# Patient Record
Sex: FEMALE | Race: WHITE | NOT HISPANIC OR LATINO | Employment: FULL TIME | URBAN - METROPOLITAN AREA
[De-identification: names, ages, dates, MRNs, and addresses within clinical notes are randomized per-mention and may not be internally consistent; named-entity substitution may affect disease eponyms.]

---

## 2017-03-08 ENCOUNTER — ALLSCRIPTS OFFICE VISIT (OUTPATIENT)
Dept: OTHER | Facility: OTHER | Age: 52
End: 2017-03-08

## 2017-03-16 ENCOUNTER — ALLSCRIPTS OFFICE VISIT (OUTPATIENT)
Dept: OTHER | Facility: OTHER | Age: 52
End: 2017-03-16

## 2017-03-21 ENCOUNTER — TRANSCRIBE ORDERS (OUTPATIENT)
Dept: ADMINISTRATIVE | Facility: HOSPITAL | Age: 52
End: 2017-03-21

## 2017-03-21 DIAGNOSIS — M54.5 LOW BACK PAIN, UNSPECIFIED BACK PAIN LATERALITY, UNSPECIFIED CHRONICITY, WITH SCIATICA PRESENCE UNSPECIFIED: Primary | ICD-10-CM

## 2017-03-21 DIAGNOSIS — M54.16 LUMBAR RADICULOPATHY: ICD-10-CM

## 2017-05-08 ENCOUNTER — ALLSCRIPTS OFFICE VISIT (OUTPATIENT)
Dept: OTHER | Facility: OTHER | Age: 52
End: 2017-05-08

## 2017-05-08 DIAGNOSIS — F11.20 UNCOMPLICATED OPIOID DEPENDENCE (HCC): ICD-10-CM

## 2017-05-08 DIAGNOSIS — Z79.891 LONG TERM CURRENT USE OF OPIATE ANALGESIC: ICD-10-CM

## 2017-05-08 DIAGNOSIS — G89.4 CHRONIC PAIN SYNDROME: ICD-10-CM

## 2017-07-10 ENCOUNTER — ALLSCRIPTS OFFICE VISIT (OUTPATIENT)
Dept: OTHER | Facility: OTHER | Age: 52
End: 2017-07-10

## 2017-08-23 ENCOUNTER — TRANSCRIBE ORDERS (OUTPATIENT)
Dept: ADMINISTRATIVE | Age: 52
End: 2017-08-23

## 2017-08-23 ENCOUNTER — APPOINTMENT (OUTPATIENT)
Dept: LAB | Age: 52
End: 2017-08-23
Payer: COMMERCIAL

## 2017-08-23 DIAGNOSIS — E78.5 HYPERLIPIDEMIA: ICD-10-CM

## 2017-08-23 DIAGNOSIS — D72.819 DECREASED WHITE BLOOD CELL COUNT: ICD-10-CM

## 2017-08-23 DIAGNOSIS — Z79.899 OTHER LONG TERM (CURRENT) DRUG THERAPY: ICD-10-CM

## 2017-08-23 LAB
25(OH)D3 SERPL-MCNC: 44.2 NG/ML (ref 30–100)
ALBUMIN SERPL BCP-MCNC: 3.6 G/DL (ref 3.5–5)
ALP SERPL-CCNC: 44 U/L (ref 46–116)
ALT SERPL W P-5'-P-CCNC: 18 U/L (ref 12–78)
ANION GAP SERPL CALCULATED.3IONS-SCNC: 7 MMOL/L (ref 4–13)
AST SERPL W P-5'-P-CCNC: 10 U/L (ref 5–45)
BASOPHILS # BLD AUTO: 0.01 THOUSANDS/ΜL (ref 0–0.1)
BASOPHILS NFR BLD AUTO: 0 % (ref 0–1)
BILIRUB SERPL-MCNC: 0.67 MG/DL (ref 0.2–1)
BUN SERPL-MCNC: 15 MG/DL (ref 5–25)
CALCIUM SERPL-MCNC: 8.8 MG/DL (ref 8.3–10.1)
CHLORIDE SERPL-SCNC: 105 MMOL/L (ref 100–108)
CHOLEST SERPL-MCNC: 186 MG/DL (ref 50–200)
CO2 SERPL-SCNC: 27 MMOL/L (ref 21–32)
CREAT SERPL-MCNC: 0.82 MG/DL (ref 0.6–1.3)
EOSINOPHIL # BLD AUTO: 0.11 THOUSAND/ΜL (ref 0–0.61)
EOSINOPHIL NFR BLD AUTO: 2 % (ref 0–6)
ERYTHROCYTE [DISTWIDTH] IN BLOOD BY AUTOMATED COUNT: 13.4 % (ref 11.6–15.1)
GFR SERPL CREATININE-BSD FRML MDRD: 84 ML/MIN/1.73SQ M
GLUCOSE P FAST SERPL-MCNC: 90 MG/DL (ref 65–99)
HCT VFR BLD AUTO: 39.4 % (ref 34.8–46.1)
HDLC SERPL-MCNC: 79 MG/DL (ref 40–60)
HGB BLD-MCNC: 13.5 G/DL (ref 11.5–15.4)
LDLC SERPL CALC-MCNC: 90 MG/DL (ref 0–100)
LYMPHOCYTES # BLD AUTO: 1.39 THOUSANDS/ΜL (ref 0.6–4.47)
LYMPHOCYTES NFR BLD AUTO: 25 % (ref 14–44)
MCH RBC QN AUTO: 31.5 PG (ref 26.8–34.3)
MCHC RBC AUTO-ENTMCNC: 34.3 G/DL (ref 31.4–37.4)
MCV RBC AUTO: 92 FL (ref 82–98)
MONOCYTES # BLD AUTO: 0.3 THOUSAND/ΜL (ref 0.17–1.22)
MONOCYTES NFR BLD AUTO: 5 % (ref 4–12)
NEUTROPHILS # BLD AUTO: 3.77 THOUSANDS/ΜL (ref 1.85–7.62)
NEUTS SEG NFR BLD AUTO: 68 % (ref 43–75)
NRBC BLD AUTO-RTO: 0 /100 WBCS
PLATELET # BLD AUTO: 225 THOUSANDS/UL (ref 149–390)
PMV BLD AUTO: 9.5 FL (ref 8.9–12.7)
POTASSIUM SERPL-SCNC: 4 MMOL/L (ref 3.5–5.3)
PROT SERPL-MCNC: 6.8 G/DL (ref 6.4–8.2)
RBC # BLD AUTO: 4.28 MILLION/UL (ref 3.81–5.12)
SODIUM SERPL-SCNC: 139 MMOL/L (ref 136–145)
TRIGL SERPL-MCNC: 87 MG/DL
TSH SERPL DL<=0.05 MIU/L-ACNC: 1.06 UIU/ML (ref 0.36–3.74)
WBC # BLD AUTO: 5.6 THOUSAND/UL (ref 4.31–10.16)

## 2017-08-23 PROCEDURE — 80053 COMPREHEN METABOLIC PANEL: CPT

## 2017-08-23 PROCEDURE — 36415 COLL VENOUS BLD VENIPUNCTURE: CPT

## 2017-08-23 PROCEDURE — 84443 ASSAY THYROID STIM HORMONE: CPT

## 2017-08-23 PROCEDURE — 82306 VITAMIN D 25 HYDROXY: CPT

## 2017-08-23 PROCEDURE — 80061 LIPID PANEL: CPT

## 2017-08-23 PROCEDURE — 85025 COMPLETE CBC W/AUTO DIFF WBC: CPT

## 2017-08-28 ENCOUNTER — ALLSCRIPTS OFFICE VISIT (OUTPATIENT)
Dept: OTHER | Facility: OTHER | Age: 52
End: 2017-08-28

## 2017-09-07 ENCOUNTER — ALLSCRIPTS OFFICE VISIT (OUTPATIENT)
Dept: OTHER | Facility: OTHER | Age: 52
End: 2017-09-07

## 2017-10-26 NOTE — PROGRESS NOTES
Assessment  1  Pain syndrome, chronic (338 4) (G89 4)   2  Sacroiliitis (720 2) (M46 1)    Plan  Pain syndrome, chronic    · Nucynta 50 MG Oral Tablet; TAKE 1 TABLET 3 TIMES DAILY AS NEEDED FOR  PAIN   Rx By: Andrew Davidson; Dispense: 30 Days ; #:90 Tablet; Refill: 0;For: Pain syndrome, chronic; MARTY = N; Print Rx   · Follow-up visit in 2 months Evaluation and Treatment  Follow-up  Status: Hold For -  Scheduling  Requested for: 07Sep2017   Ordered; For: Pain syndrome, chronic; Ordered By: Andrew Davidson Performed:  Order Comments: with nm 8 weeks Due: [id number]    Discussion/Summary    Patient is a 50year old female with history of sacroiliitis and perineural cysts at S1 and S2  She presents today for a follow up appointment  At this time, the patient continues to experience constant low back pain but the Nucynta which continues to provide adequate pain relief  Therefore, I will continue her on the 50 mg tablets, but can take up to 3 times a day as needed  She was given a prescription for this month, and one for next month they do not fill date until October 5, 2017  prescription monitoring drug program report was reviewed and was appropriate  The patient has the current Goals: Decreased pain and improved quality of life  The patent has the current Barriers: None  Patient is able to Self-Care  There are risks associated with opiod medications, including dependence, addiction and tolerance  The patient understands and agrees to use these medications only as prescribed  Potential side effects of the medications include, but are not limited to, constipation, drowsiness, addiction, impaired judgment and risk of fatal overdose if not taken as prescribed  Sharing medications is a felony  At this point and time, the patient is showing no signs of addiction, abuse, diversion or suicidal ideation  Chief Complaint  1   Back Pain    History of Present Illness  Patient is a 41year old female with history of sacroiliitis and perineural cysts at S1 and S2  She was last seen in she was last seen on July 10, 2017 in which the Nucynta was decreased to 50 mg twice a day  She presents today for a follow up appointment  At this time, the patient continues to exhibit is constant low back pain which occurs mostly at night  She describes as dull aching and pressure-like  She is currently rating her pain a 4/10 on the numeric rating scale  patient was taking Nucynta 50 mg which was decreased at the last office visit from 75 mg  She states there are days when she needed to take it once a day, but others when she needs take it 3 times a day  She was only prescribed in twice a day so she ran out a week early  Overall the medication provides 95% pain relief  She denies side effects or bowel or bladder issues  Jessica Miles presents with complaints of gradual onset of constant episodes of mild right lower back pain, described as dull and aching  On a scale of 1 to 10, the patient rates the pain as 4  Symptoms are unchanged  Review of Systems    Constitutional: no fever,-- no recent weight gain-- and-- no recent weight loss  Eyes: no double vision-- and-- no blurry vision  Cardiovascular: no chest pain,-- no palpitations-- and-- no lower extremity edema  Respiratory: no complaints of shortness of breath-- and-- no wheezing  Musculoskeletal: no difficulty walking,-- no muscle weakness,-- no joint stiffness,-- no joint swelling,-- no limb swelling,-- no pain in extremity-- and-- no decreased range of motion  Neurological: no dizziness,-- no difficulty swallowing,-- no memory loss,-- no loss of consciousness-- and-- no seizures  Gastrointestinal: no nausea,-- no vomiting,-- no constipation-- and-- no diarrhea  Genitourinary: no difficulty initiating urine stream,-- no genital pain-- and-- no frequent urination  Integumentary: no complaints of skin rash  Psychiatric: no depression     Endocrine: no excessive thirst,-- no adrenal disease,-- no hypothyroidism-- and-- no hyperthyroidism  Hematologic/Lymphatic: no tendency for easy bruising-- and-- no tendency for easy bleeding  Active Problems  1  Abnormal findings on diagnostic imaging of breast (793 89) (R92 8)   2  Allergic rhinitis (477 9) (J30 9)   3  Analgesic use (V58 69) (Z79 899)   4  Anxiety (300 00) (F41 9)   5  Burning sensation (782 0) (R20 8)   6  Encounter for long-term use of opiate analgesic (V58 69) (Z79 891)   7  Encounter for routine gynecological examination (V72 31) (Z01 419)   8  Encounter for screening mammogram for malignant neoplasm of breast (V76 12)   (Z12 31)   9  Esophageal reflux (530 81) (K21 9)   10  Hiatal hernia (553 3) (K44 9)   11  Hyperlipidemia (272 4) (E78 5)   12  Laboratory examination ordered as part of a routine general medical examination    (V72 62) (Z00 00)   13  Leiomyoma of uterus (218 9) (D25 9)   14  Leukopenia (288 50) (D72 819)   15  Lower back pain (724 2) (M54 5)   16  Lumbar radiculopathy (724 4) (M54 16)   17  Myofascial pain (729 1) (M79 1)   18  Need for immunization against influenza (V04 81) (Z23)   19  Pain syndrome, chronic (338 4) (G89 4)   20  Pulmonary Valve Regurgitation (424 3)   21  Sacroiliitis (720 2) (M46 1)   22  Uncomplicated opioid dependence (304 00) (F11 20)   23  Well female exam with routine gynecological exam (V72 31) (Z01 419)    Past Medical History  1  History of  0 (V49 89)   2  History of fibrocystic disease of breast (V13 89) (Q22 556)   3  History of headache (V13 89) (Z87 898)   4  History of hiatal hernia (V12 79) (Z87 19)   5  History of hyperlipidemia (V12 29) (Z86 39)   6  History of Routine Gynecological Exam With Cervical Pap Smear (V72 31)   7  History of Weight gain (783 1) (R63 5)    The active problems and past medical history were reviewed and updated today  Surgical History  1  History of Appendectomy   2   History of Complete Excision Of Fifth Metatarsal Head Right   3  History of Foot Surgery   4  History of Left Breast Lumpectomy    The surgical history was reviewed and updated today  Family History  Mother    1  Family history of CABG   2  Family history of Dyslipidemia   3  Family history of Family Health Status Of Mother - Alive   4  Family history of Hypertension (V17 49)  Father    5  Family history of Acute Myocardial Infarction (V17 3)   6  Family history of Dyslipidemia   7  Family history of Family Health Status Of Father - Alive   8  Family history of Hypertension (V17 49)  Paternal Uncle    5  Family history of malignant neoplasm of prostate (V16 42) (Z80 45)  Family History    10  Family history of Thyroid Disorder (V18 19)    The family history was reviewed and updated today  Social History   · Being A Social Drinker   · Denied: History of Drug Use (305 90)   · Exercise Habits   · Marital History - Currently    · Never A Smoker   · Occupation: Medical Professional   · Partner had vasectomy   · Working Full Time  The social history was reviewed and updated today  The social history was reviewed and is unchanged  Current Meds   1  Aspirin 81 MG Oral Tablet Chewable; Takse 1 tablet  MWF;   Therapy: 15BPQ7286 to (Last Rx:28Aug2017) Ordered   2  Escitalopram Oxalate 5 MG Oral Tablet; Take 1 tablet daily; Therapy: 95XLW8799 to (Last Rx:21Aug2017)  Requested for: 21Aug2017 Ordered   3  Metamucil Smooth Texture 28 % Oral Packet; USE AS DIRECTED; Therapy: (Recorded:74Onl0294) to Recorded   4  Nucynta 50 MG Oral Tablet; TAKE 1 TABLET 2 TIMES DAILY AS NEEDED FOR PAIN;   Therapy: 80LIH7938 to (Evaluate:79Ddr1511); Last Rx:90Yiz0674 Ordered   5  Omeprazole 20 MG Oral Capsule Delayed Release; take 1 capsule by mouth once daily; Therapy: 60IHS5857 to [id number] [id number])  Requested for: 26Iik8057; Last   Rx:22Skn8593 Ordered   6  Simvastatin 10 MG Oral Tablet; Take 1 tablet daily;    Therapy: 64RHN6743 to (Last Rx:21Aug2017)  Requested for: 72Ook5489 Ordered    The medication list was reviewed and updated today  Allergies  1  Sulfa Drugs    Vitals  Vital Signs    Recorded: 07Sep2017 12:52PM   Temperature 98 F   Heart Rate 66   Respiration 18   Systolic 761   Diastolic 78   Height 5 ft 3 in   Weight 145 lb    BMI Calculated 25 69   BSA Calculated 1 69   O2 Saturation 99   Pain Scale 4     Physical Exam    Constitutional   General appearance: Well developed, well nourished, alert, in no distress, non-toxic and no overt pain behavior  Eyes   Sclera: anicteric   HEENT   Hearing grossly intact  Neck   Neck: Supple, symmetric, trachea midline, no masses  Pulmonary   Respiratory effort: Even and unlabored  Cardiovascular   Examination of extremities: No edema or pitting edema present  Skin   Skin and subcutaneous tissue: Normal without rashes or lesions, well hydrated  Psychiatric   Mood and affect: Mood and affect appropriate  Musculoskeletal   Gait and station: Normal     Lumbar/Sacral Spine examination demonstrates Lumbosacral Spine:   Appearance: Normal  Spinal alignment exhibits normal lordosis  Tenderness: None except at lumbar spine-- and-- the right sacroiliac joint  Lumbosacral Spine Sensory: intact to light touch and pinprick in the lower extremities  ROM Lumbosacral Spine: Full  Flexion was not restricted-- and-- was painful  Extension was not restricted-- and-- was painful  Left lateral flexion was not restricted-- and-- was painless  Right lateral flexion was not restricted-- and-- was painless  ROM Hips: Full   Foot and ankle strength was normal bilaterally  Knee strength was normal bilaterally  Hip strength was normal bilaterally  Special Tests: Deferred positive Graeme's Maneuver on right-- and-- positive Graeme's Maneuver on left  Results/Data  Results Free Text Form Pain [facility]:   Results    I personally reviewed the films/images in the office today  MRI:   MRI LUMBAR SPINE WITH AND WITHOUT CONTRAST dated 1/5/16    INDICATION- Chronic low back pain and sciatica for spinal cyst which  was drained, 7-4 4, M54 16    COMPARISON- MR 7/26/2012    TECHNIQUE- Sagittal T1, sagittal T2, sagittal inversion recovery,  axial T1 and axial T2, coronal T2  Sagittal and axial T1 postcontrast   7 mL of Gadavist was injected intravenously with no immediate  consequence  IMAGE QUALITY- Diagnostic    FINDINGS-    ALIGNMENT- Normal alignment of the lumbar spine  No compression  fracture  No spondylolysis or spondylolisthesis  No scoliosis  MARROW SIGNAL- Normal marrow signal is identified within the  visualized bony structures  No discrete marrow lesion  DISTAL CORD AND CONUS- Normal size and signal of the distal cord and  conus  The conus ends at the L1 level  PARASPINAL SOFT TISSUES- Paraspinal soft tissues are unremarkable  SACRUM- Normal signal within the sacrum  No evidence of insufficiency  or stress fracture  Similar to prior study, there are several  perineural cysts in the upper sacral region, to include S1 and S2,  largest on the left approximately 15 mm the C2 level  LOWER THORACIC DISC SPACES- Normal disc height and signal  No disc  herniation, canal stenosis or foraminal narrowing  LUMBAR DISC SPACES- Maintenance of vertebral body and disc height,  signal intensity and alignment  POSTCONTRAST IMAGING- No abnormal enhancement  IMPRESSION-    Normal enhanced MRI of the lumbar spine  Stable bilateral perineural  cysts within the upper sacrum  The entire sacrum was not completely  evaluated  Other  pt took nucynta last Wednesday  Attending Note  Collaborating Physician: I discussed the case with the Advanced Practitioner and reviewed the note-- and-- I agree with the Advanced Practitioner note        Future Appointments    Date/Time Provider Specialty Site   04/30/2018 08:00 AM Thomas Wei MD Internal Medicine [facility] INTERNAL MED   11/06/2017 01:15 PM [address], CRNP Pain Management [facility] SPINE     Signatures   Electronically signed by : Georgina Mathis; Sep  7 2017  1:21PM EST                       (Author)    Electronically signed by : Dulce Alaniz MD; Sep  7 2017  1:40PM EST                       (Author)

## 2017-11-06 ENCOUNTER — ALLSCRIPTS OFFICE VISIT (OUTPATIENT)
Dept: OTHER | Facility: OTHER | Age: 52
End: 2017-11-06

## 2017-11-06 DIAGNOSIS — Z79.891 LONG TERM CURRENT USE OF OPIATE ANALGESIC: ICD-10-CM

## 2017-11-06 DIAGNOSIS — G89.4 CHRONIC PAIN SYNDROME: ICD-10-CM

## 2017-11-06 DIAGNOSIS — F11.20 UNCOMPLICATED OPIOID DEPENDENCE (HCC): ICD-10-CM

## 2017-11-07 NOTE — PROGRESS NOTES
Assessment  1  Pain syndrome, chronic (338 4) (G89 4)   2  Sacroiliitis (720 2) (M46 1)   3  Myofascial pain (729 1) (M79 1)    Plan  Encounter for long-term use of opiate analgesic, Pain syndrome, chronic,  Uncomplicated opioid dependence    · (1) DRUG ABUSE SCREEN, URINE ROUTINE; Status:Active; Requested ZYO:[id number];    Perform:[facility]; [id number]; Ordered;For:Encounter for long-term use of opiate analgesic, Pain syndrome, chronic, Uncomplicated opioid dependence; Ordered By:Blanka Toussaint;  Lumbar radiculopathy    · Follow-up visit in 2 months Evaluation and Treatment  Follow-up  Status: Hold For -  Scheduling  Requested for: 29YYX8261   Ordered; For: Lumbar radiculopathy; Ordered By: Jovan Narvaez Performed:  Due: 43UVF1562  Pain syndrome, chronic    · Nucynta 50 MG Oral Tablet; TAKE 1 TABLET 3 TIMES DAILY AS NEEDED FOR  PAIN; Do Not Fill Before: 06GTJ7310   Rx By: Jovan Narvaez; Dispense: 30 Days ; #:90 Tablet; Refill: 0;For: Pain syndrome, chronic; MARTY = N; Print Rx    Discussion/Summary    The patient is a 61-year-old female with a history of sacroiliitis and perineural cyst at S1 and S2  Patient reports that she is currently well managed on her current medication regimen  Therefore, the patient will be continued on Nucynta IR 50 mg 1 tablet 3 times daily as needed for pain  She was given a prescription for this month, and a prescription for next month we do not fill date of 2017  did not bring her prescription pill bottles in for Nucynta IR 50 mg this visit  Patient was instructed to bring her in her prescription pill bottles to each visit, per office policy for possible random pill count, and as required for future refills  prescription monitoring drug program report was reviewed and was appropriate  urine drug screen was performed in the office today, as part of the medication management protocol   Drug screens are performed to evaluate for the presence or absence of described, non prescribed, and/or illicit substances  The point of care results were appropriate for what is being prescribed  The specimen will be sent to the lab for confirmatory testing  The drug screen is medically necessary, because the patient is dependent upon, or being considered for opiate medication  The patient has the current Goals: Manage pain to tolerable level so patient can continue with activities of daily living  The patent has the current Barriers: None  Patient is able to Self-Care  Possible side effects of new medications were reviewed with the patient/guardian today  The treatment plan was reviewed with the patient/guardian  The patient/guardian understands and agrees with the treatment plan   The patient was counseled regarding instructions for management,-- risk factor reductions,-- patient and family education,-- impressions,-- risks and benefits of treatment options-- and-- importance of compliance with treatment  There are risks associated with opiod medications, including dependence, addiction and tolerance  The patient understands and agrees to use these medications only as prescribed  Potential side effects of the medications include, but are not limited to, constipation, drowsiness, addiction, impaired judgment and risk of fatal overdose if not taken as prescribed  Sharing medications is a felony  At this point and time, the patient is showing no signs of addiction, abuse, diversion or suicidal ideation  Chief Complaint  1  Back Pain  Back Pain      History of Present Illness  The patient is a 29-year-old female with a history of sacroiliitis and perineural cyst at S1 and S2  She was last seen in the office on 9/7/2017, in which she was continued on Nucynta 50 mg 1 tablet 3 times daily  She presents today for follow-up visit  At this time, the patient continues with ongoing low back pain that is intermittent in nature   It occurs mostly during the evening and nighttime hours  She reports that the pain starts in her right lower back and radiates to the lateral aspect of her right leg and into her calf  She describes her pain as dull aching, throbbing pain  She rates her pain as 6/10 numeric pain scale  is currently taking Nucynta 50 mg 1 tablet 3 times daily  Patient reports that this medication provides her overall 80% pain relief, without medication side effects  She denies bowel or bladder issues  recently rescued a pit bull, and feels walking her dog is aggravating her back  She states she will be getting a new leash which should help  Elaine Langston presents with complaints of gradual onset of intermittent episodes of moderate bilateral lower back pain, described as dull, aching and throbbing, radiating to the right lower leg and right foot  On a scale of 1 to 10, the patient rates the pain as 6  Symptoms are unchanged  Review of Systems    Constitutional: no fever,-- no recent weight gain-- and-- no recent weight loss  Eyes: no double vision-- and-- no blurry vision  Cardiovascular: no chest pain,-- no palpitations-- and-- no lower extremity edema  Respiratory: no complaints of shortness of breath-- and-- no wheezing  Musculoskeletal: joint stiffness, but-- no difficulty walking,-- no muscle weakness,-- no joint swelling,-- no limb swelling,-- no pain in extremity-- and-- no decreased range of motion  Neurological: no dizziness,-- no difficulty swallowing,-- no memory loss,-- no loss of consciousness-- and-- no seizures  Gastrointestinal: no nausea,-- no vomiting,-- no constipation-- and-- no diarrhea  Genitourinary: no difficulty initiating urine stream,-- no genital pain-- and-- no frequent urination  Integumentary: no complaints of skin rash  Psychiatric: no depression  Endocrine: no excessive thirst,-- no adrenal disease,-- no hypothyroidism-- and-- no hyperthyroidism     Hematologic/Lymphatic: no tendency for easy bruising-- and-- no tendency for easy bleeding  ROS reviewed  Active Problems  1  Abnormal findings on diagnostic imaging of breast (793 89) (R92 8)   2  Allergic rhinitis (477 9) (J30 9)   3  Analgesic use (V58 69) (Z79 899)   4  Anxiety (300 00) (F41 9)   5  Burning sensation (782 0) (R20 8)   6  Encounter for long-term use of opiate analgesic (V58 69) (Z79 891)   7  Encounter for routine gynecological examination (V72 31) (Z01 419)   8  Encounter for screening mammogram for malignant neoplasm of breast (V76 12)   (Z12 31)   9  Esophageal reflux (530 81) (K21 9)   10  Hiatal hernia (553 3) (K44 9)   11  Hyperlipidemia (272 4) (E78 5)   12  Laboratory examination ordered as part of a routine general medical examination    (V72 62) (Z00 00)   13  Leiomyoma of uterus (218 9) (D25 9)   14  Leukopenia (288 50) (D72 819)   15  Lower back pain (724 2) (M54 5)   16  Lumbar radiculopathy (724 4) (M54 16)   17  Myofascial pain (729 1) (M79 1)   18  Need for immunization against influenza (V04 81) (Z23)   19  Pain syndrome, chronic (338 4) (G89 4)   20  Pulmonary Valve Regurgitation (424 3)   21  Sacroiliitis (720 2) (M46 1)   22  Uncomplicated opioid dependence (304 00) (F11 20)   23  Well female exam with routine gynecological exam (V72 31) (Z01 419)    Past Medical History  1  History of  0 (V49 89)   2  History of fibrocystic disease of breast (V13 89) (W78 678)   3  History of headache (V13 89) (Z87 898)   4  History of hiatal hernia (V12 79) (Z87 19)   5  History of hyperlipidemia (V12 29) (Z86 39)   6  History of Routine Gynecological Exam With Cervical Pap Smear (V72 31)   7  History of Weight gain (783 1) (R63 5)    The active problems and past medical history were reviewed and updated today  Surgical History  1  History of Appendectomy   2  History of Complete Excision Of Fifth Metatarsal Head Right   3  History of Foot Surgery   4  History of Left Breast Lumpectomy    The surgical history was reviewed and updated today  Family History  Mother    1  Family history of CABG   2  Family history of Dyslipidemia   3  Family history of Family Health Status Of Mother - Alive   4  Family history of Hypertension (V17 49)  Father    5  Family history of Acute Myocardial Infarction (V17 3)   6  Family history of Dyslipidemia   7  Family history of Family Health Status Of Father - Alive   8  Family history of Hypertension (V17 49)  Paternal Uncle    5  Family history of malignant neoplasm of prostate (V16 42) (Z80 45)  Family History    10  Family history of Thyroid Disorder (V18 19)    The family history was reviewed and updated today  Social History   · Being A Social Drinker   · Denied: History of Drug Use (305 90)   · Exercise Habits   · Marital History - Currently    · Never A Smoker   · Occupation: Medical Professional   · Partner had vasectomy   · Working Full Time  The social history was reviewed and updated today  The social history was reviewed and is unchanged  Current Meds   1  Aspirin 81 MG Oral Tablet Chewable; Takse 1 tablet  MWF;   Therapy: 42FAQ3791 to (Last Rx:23Wyv9972) Ordered   2  Escitalopram Oxalate 5 MG Oral Tablet; Take 1 tablet daily; Therapy: 19RFC5036 to (Last Rx:12Dhp3331)  Requested for: 45Ayv5284 Ordered   3  Metamucil Smooth Texture 28 % Oral Packet; USE AS DIRECTED; Therapy: (Recorded:21Oct2013) to Recorded   4  Nucynta 50 MG Oral Tablet; TAKE 1 TABLET 3 TIMES DAILY AS NEEDED FOR PAIN;   Therapy: 28IOB7815 to (Evaluate:07Oct2017); Last Rx:86Hta5432 Ordered   5  Omeprazole 20 MG Oral Capsule Delayed Release; take 1 capsule by mouth once daily; Therapy: 91MAJ3250 to 447 1209)  Requested for: 80Uwu9020; Last   Rx:71Ksm2168 Ordered   6  Simvastatin 10 MG Oral Tablet; Take 1 tablet daily; Therapy: 07CHQ6693 to (Last Rx:72Zsh5791)  Requested for: 50Ezi6426 Ordered    The medication list was reviewed and updated today  Allergies  1   Sulfa Drugs    Vitals  Vital Signs Recorded: 91BWB5180 01:13PM   Temperature 98 6 F   Heart Rate 68   Respiration 16   Systolic 135   Diastolic 80   Height 5 ft 3 in   Weight 142 lb    BMI Calculated 25 15   BSA Calculated 1 67   Pain Scale 6     Physical Exam    Constitutional   General appearance: Well developed, well nourished, alert, in no distress, non-toxic and no overt pain behavior  Eyes   Sclera: anicteric   HEENT   Hearing grossly intact  Pulmonary   Respiratory effort: Even and unlabored  Skin   Skin and subcutaneous tissue: Normal without rashes or lesions, well hydrated  Psychiatric   Mood and affect: Mood and affect appropriate  Musculoskeletal   Gait and station: Normal     Lumbar/Sacral Spine examination demonstrates Lumbosacral Spine:   Appearance: Normal  Spinal alignment exhibits normal lordosis  Tenderness: right paraspinal-- and-- the right sacroiliac joint  Lumbosacral Spine Sensory: intact to light touch and pinprick in the lower extremities  ROM Lumbosacral Spine: Full  ROM Hips: Full   Foot and ankle strength was normal bilaterally  Knee strength was normal bilaterally  Hip strength was normal bilaterally  Results/Data  Procedure Flowsheet 02MLQ4417 01:26PM      Test Name Result Flag Reference   Urine Drug Screen Performed Date 53LQZ3315       Results Free Text Form [facility]:   Results    I personally reviewed the films/images in the office today  MRI:  MRI LUMBAR SPINE WITH AND WITHOUT CONTRAST dated 1/5/16    INDICATION- Chronic low back pain and sciatica for spinal cyst which  was drained, 7-4 4, M54 16    COMPARISON- MR 7/26/2012    TECHNIQUE- Sagittal T1, sagittal T2, sagittal inversion recovery,  axial T1 and axial T2, coronal T2  Sagittal and axial T1 postcontrast   7 mL of Gadavist was injected intravenously with no immediate  consequence  IMAGE QUALITY- Diagnostic    FINDINGS-    ALIGNMENT- Normal alignment of the lumbar spine  No compression  fracture   No spondylolysis or spondylolisthesis  No scoliosis  MARROW SIGNAL- Normal marrow signal is identified within the  visualized bony structures  No discrete marrow lesion  DISTAL CORD AND CONUS- Normal size and signal of the distal cord and  conus  The conus ends at the L1 level  PARASPINAL SOFT TISSUES- Paraspinal soft tissues are unremarkable  SACRUM- Normal signal within the sacrum  No evidence of insufficiency  or stress fracture  Similar to prior study, there are several  perineural cysts in the upper sacral region, to include S1 and S2,  largest on the left approximately 15 mm the C2 level  LOWER THORACIC DISC SPACES- Normal disc height and signal  No disc  herniation, canal stenosis or foraminal narrowing  LUMBAR DISC SPACES- Maintenance of vertebral body and disc height,  signal intensity and alignment  POSTCONTRAST IMAGING- No abnormal enhancement  IMPRESSION-    Normal enhanced MRI of the lumbar spine  Stable bilateral perineural  cysts within the upper sacrum  The entire sacrum was not completely  evaluated  Other  pt took nucynta yesterday 11/5/17  Attending Note  Collaborating Physician: I discussed the case with the Advanced Practitioner and reviewed the note-- and-- I agree with the Advanced Practitioner note        Future Appointments    Date/Time Provider Specialty Site   04/30/2018 08:00 AM Pilo Wei MD Internal Medicine [facility] INTERNAL MED   01/04/2018 01:00 PM RUDOLPH Arita Pain Management [facility] 15     Signatures   Electronically signed by : RUDOLPH Ko; Nov 6 2017  2:45PM EST                       (Author)    Electronically signed by : Benita Vaughan MD; Nov 6 2017  9:02PM EST                       (Author)

## 2017-12-07 ENCOUNTER — GENERIC CONVERSION - ENCOUNTER (OUTPATIENT)
Dept: OTHER | Facility: OTHER | Age: 52
End: 2017-12-07

## 2018-01-12 VITALS
RESPIRATION RATE: 12 BRPM | DIASTOLIC BLOOD PRESSURE: 84 MMHG | WEIGHT: 146 LBS | HEART RATE: 60 BPM | SYSTOLIC BLOOD PRESSURE: 128 MMHG | BODY MASS INDEX: 33.79 KG/M2 | HEIGHT: 55 IN

## 2018-01-12 NOTE — MISCELLANEOUS
Message      Recorded as Task   Date: 04/14/2016 01:37 PM, Created By: [facility]   Task Name: Med Renewal Request   Assigned To: Martínez Patterson clinical,Team   Regarding Patient: Erich Cheek, Status: Active   Comment:    [facility] - 14 Apr 2016 1:37 PM     TASK CREATED  Caller: Self; Renew Medication; [phone] (Home)  TC from pt at 1:15 that she saw Emmie Whitt today and she was given refill for her nucynta  She can't have it filled until 4/19 per her pharmacy but she is going out of town on 4/19 and wanted to get filled on 4/18  Pharmacist told her our office would need to call them to give permission if it could be filled early  Pt uses [facility] on [address]  Told pt I would d/w Emmie Whitt and c/b  I s/w pharmacist at [facility] and confirmed info pt told me is accurate  Please advise  Blanka Toussaint - 14 Apr 2016 1:41 PM     TASK REPLIED TO: Previously Assigned To MAXWELL Mckinney clinical,Team  ok to fill on 4/18   Alisa Harrington - 14 Apr 2016 2:58 PM     TASK EDITED  S/W Lauren Merino pharmacist at [facility] and informed ok to fill on 4/18 as per Emmie Whitt  Pt informed of the same  Pt appreciative        Active Problems    1  Abnormal findings on diagnostic imaging of breast (793 89) (R92 8)   2  Allergic rhinitis (477 9) (J30 9)   3  Analgesic use (V58 69) (Z79 899)   4  Anxiety (300 00) (F41 9)   5  Encounter for routine gynecological examination (V72 31) (Z01 419)   6  Encounter for screening mammogram for malignant neoplasm of breast (V76 12)   (Z12 31)   7  Esophageal reflux (530 81) (K21 9)   8  Hiatal hernia (553 3) (K44 9)   9  Hyperlipidemia (272 4) (E78 5)   10  Laboratory examination ordered as part of a routine general medical examination    (V72 62) (Z00 00)   11  Leiomyoma of uterus (218 9) (D25 9)   12  Lower back pain (724 2) (M54 5)   13  Lumbar radiculopathy (724 4) (M54 16)   14  Need for immunization against influenza (V04 81) (K52)   15   Pain syndrome, chronic (338 4) (G89 4)   16  Pulmonary Valve Regurgitation (424 3)   17  Routine Gynecological Exam With Cervical Pap Smear (V72 31)   18  Sacroiliitis (720 2) (M46 1)    Current Meds   1  Escitalopram Oxalate 10 MG Oral Tablet; Take 1 tablet daily; Therapy: 11ALR0796 to (Evaluate:13Jun2016)  Requested for: 24JDN1709; Last   Rx:02Ykp3788 Ordered   2  Fluticasone Propionate 50 MCG/ACT Nasal Suspension; USE 1 SPRAY IN EACH   NOSTRIL TWICE DAILY; Therapy: 11VFN8868 to (Evaluate:07Jun2015)  Requested for: 55EZF4637; Last   Rx:96Chp9173 Ordered   3  Metamucil Smooth Texture 28 % Oral Packet; USE AS DIRECTED; Therapy: (Recorded:21Oct2013) to Recorded   4  Nucynta  MG Oral Tablet Extended Release 12 Hour; Take one tablet twice daily; Therapy: 02Evu1181 to (Evaluate:22Opf8635); Last Rx:14Apr2016 Ordered   5  Omeprazole 20 MG Oral Capsule Delayed Release; take 1 capsule by mouth once daily; Therapy: 11JEA1311 to (Evaluate:13Jun2016)  Requested for: 54YJD4059; Last   Rx:96Qio5442 Ordered   6  Simvastatin 20 MG Oral Tablet; take 1 tablet by mouth once daily; Therapy: 20OCB1682 to (Evaluate:13Jun2016)  Requested for: 92RHB8438; Last   Rx:99Xgl1373 Ordered    Allergies    1   Sulfa Drugs    Signatures   Electronically signed by : Virgie Jackson, ; Apr 14 2016  2:58PM EST                       (Author)

## 2018-01-13 VITALS
RESPIRATION RATE: 16 BRPM | DIASTOLIC BLOOD PRESSURE: 90 MMHG | OXYGEN SATURATION: 98 % | SYSTOLIC BLOOD PRESSURE: 158 MMHG | HEART RATE: 56 BPM | BODY MASS INDEX: 25.96 KG/M2 | HEIGHT: 63 IN | WEIGHT: 146.5 LBS

## 2018-01-13 VITALS
TEMPERATURE: 98.6 F | RESPIRATION RATE: 16 BRPM | BODY MASS INDEX: 25.16 KG/M2 | HEART RATE: 68 BPM | HEIGHT: 63 IN | SYSTOLIC BLOOD PRESSURE: 122 MMHG | WEIGHT: 142 LBS | DIASTOLIC BLOOD PRESSURE: 80 MMHG

## 2018-01-13 VITALS
RESPIRATION RATE: 18 BRPM | OXYGEN SATURATION: 99 % | HEIGHT: 63 IN | SYSTOLIC BLOOD PRESSURE: 124 MMHG | DIASTOLIC BLOOD PRESSURE: 78 MMHG | HEART RATE: 66 BPM | BODY MASS INDEX: 25.69 KG/M2 | TEMPERATURE: 98 F | WEIGHT: 145 LBS

## 2018-01-14 VITALS
WEIGHT: 147 LBS | HEIGHT: 63 IN | SYSTOLIC BLOOD PRESSURE: 126 MMHG | BODY MASS INDEX: 26.05 KG/M2 | HEART RATE: 66 BPM | TEMPERATURE: 98.8 F | RESPIRATION RATE: 16 BRPM | DIASTOLIC BLOOD PRESSURE: 82 MMHG | OXYGEN SATURATION: 99 %

## 2018-01-14 VITALS
BODY MASS INDEX: 25.87 KG/M2 | RESPIRATION RATE: 16 BRPM | SYSTOLIC BLOOD PRESSURE: 100 MMHG | WEIGHT: 146 LBS | HEART RATE: 62 BPM | TEMPERATURE: 98.7 F | DIASTOLIC BLOOD PRESSURE: 60 MMHG | HEIGHT: 63 IN

## 2018-01-14 VITALS
HEIGHT: 63 IN | SYSTOLIC BLOOD PRESSURE: 122 MMHG | DIASTOLIC BLOOD PRESSURE: 74 MMHG | BODY MASS INDEX: 25.69 KG/M2 | OXYGEN SATURATION: 99 % | HEART RATE: 60 BPM | WEIGHT: 145 LBS | TEMPERATURE: 98.3 F | RESPIRATION RATE: 18 BRPM

## 2018-01-22 ENCOUNTER — GENERIC CONVERSION - ENCOUNTER (OUTPATIENT)
Dept: OTHER | Facility: OTHER | Age: 53
End: 2018-01-22

## 2018-01-23 NOTE — MISCELLANEOUS
Message   Recorded as Task   Date: 12/05/2017 02:33 PM, Created By: Ana Giles   Task Name: Call Back   Assigned To: SPA markell clinical,Team   Regarding Patient: Gabi Arrieta, Status: Active   CommentTaye Wooten - 05 Dec 2017 2:33 PM     [address]- patient called requesting to s/w a nurse about her meds   stated the Paulo De La Cruz is too expensive and would like to know if she could be prescribed something else like Tramadol   c/b [phone]   Shalonda Alaniz - 05 Dec 2017 2:49 PM     TASK EDITED  Please advise  Thanks  Mamta Jean - 05 Dec 2017 2:51 PM     TASK REPLIED TO: Previously Assigned To Mamta Ted  yes, she can be placed on tramadol  she just needs to bring nucynta rx back  task to Guillermo Arrieta to take care of Thursday   Shalonda Alaniz - 05 Dec 2017 2:59 PM     TASK EDITED  FYI NM, S/W pt regarding task  Advised pt to returned Nucynta Rx back to [address]  Pt verbalized understanding is awaiting to hear back from triage nurse regarding Tramadol prescription  Please advise  Thanks  Blanka Toussaint - 07 Dec 2017 7:19 AM     TASK REPLIED TO: Previously Assigned To Blanka Toussaint                      can return nucynta scripts today for tramadol script today  has appt on 1/4 so one script shoudl last to [facility]   Alisa Harrington - 07 Dec 2017 8:29 AM     TASK EDITED  Pt informed that she can come today and bring back the nucynta RX in exchange for the tramadol RX  Pt given office hrs  Active Problems    1  Abnormal findings on diagnostic imaging of breast (793 89) (R92 8)   2  Allergic rhinitis (477 9) (J30 9)   3  Analgesic use (V58 69) (Z79 899)   4  Anxiety (300 00) (F41 9)   5  Burning sensation (782 0) (R20 8)   6  Encounter for long-term use of opiate analgesic (V58 69) (Z79 891)   7  Encounter for routine gynecological examination (V72 31) (Z01 419)   8  Encounter for screening mammogram for malignant neoplasm of breast (V76 12)   (Z12 31)   9   Esophageal reflux (530 81) (K21 9)   10  Hiatal hernia (553 3) (K44 9)   11  Hyperlipidemia (272 4) (E78 5)   12  Laboratory examination ordered as part of a routine general medical examination    (V72 62) (Z00 00)   13  Leiomyoma of uterus (218 9) (D25 9)   14  Leukopenia (288 50) (D72 819)   15  Lower back pain (724 2) (M54 5)   16  Lumbar radiculopathy (724 4) (M54 16)   17  Myofascial pain (729 1) (M79 1)   18  Need for immunization against influenza (V04 81) (Z23)   19  Pain syndrome, chronic (338 4) (G89 4)   20  Pulmonary Valve Regurgitation (424 3)   21  Sacroiliitis (720 2) (M46 1)   22  Uncomplicated opioid dependence (304 00) (F11 20)   23  Well female exam with routine gynecological exam (V72 31) (Z01 419)    Current Meds   1  Aspirin 81 MG Oral Tablet Chewable; Takse 1 tablet  MWF;   Therapy: 42ZGH2655 to (Last Rx:21Xcs0905) Ordered   2  Escitalopram Oxalate 5 MG Oral Tablet; Take 1 tablet daily; Therapy: 95OIO4347 to (Last Rx:87Xdh8292)  Requested for: 21Aug2017 Ordered   3  Metamucil Smooth Texture 28 % Oral Packet; USE AS DIRECTED; Therapy: (Recorded:21Oct2013) to Recorded   4  Omeprazole 20 MG Oral Capsule Delayed Release; take 1 capsule by mouth once daily; Therapy: 44XHJ6369 to 0699 836 79 58)  Requested for: 28Aug2017; Last   Rx:13Tnm7776 Ordered   5  Simvastatin 10 MG Oral Tablet; Take 1 tablet daily; Therapy: 56WSH2995 to (Last Rx:36Kdo8924)  Requested for: 98Tls5011 Ordered   6  TraMADol HCl - 50 MG Oral Tablet; Take 1 tablet 3 times daily as needed; Therapy: 49WHU5846 to (Evaluate:72Tcv2330); Last Rx:72Ydd0653 Ordered    Allergies    1   Sulfa Drugs    Signatures   Electronically signed by : Marco Christian, ; Dec  7 2017  8:29AM EST                       (Author)

## 2018-01-24 NOTE — MISCELLANEOUS
Message     Recorded as Task   Date: 01/22/2018 08:22 AM, Created By: Corazon Aponte   Task Name: Miscellaneous   Assigned To: Janette Kern clinical,Team   Regarding Patient: Cholo Sutton, Status: Active   Comment:    HaycliftonBebe humphrey - 22 Jan 2018 8:22 AM     TASK CREATED  Appt rescheduled from today to 2/5 due to Bela Medina being out of the office  Pt said today was the last day for her Tramadol and she is out of it now  Pt uses [place] on file  Pt can be reached at [phone]  [facility] - 22 Jan 2018 8:54 AM     TASK EDITED  FYI: Pt had called office on 12/7/17 that the nucynta prescribed at last ov on 11/6/17 was too expensive  Pt had returned the nucynta scripts and p/u a tramadol 50mg TID PRN, #90 on 12/1/17  Pts ov w/ NM for today was R/S to 2/5 w/ NM  Today is pt's last day of tramadol  Pt is req tramadol RX called to her [facility] on file  Pls advise  Jennifer Garcia - 22 Jan 2018 10:36 AM     TASK REPLIED TO: Previously Assigned To Jennifer Garcia  ok to call in tramadol 50mg TID prn pain (#90) with no refills; please schedule SOVS to 4 weeks from now with [facility] prescription monitoring drug program report was reviewed and was appropriate  [address] [facility] - 22 Jan 2018 11:48 AM     TASK EDITED  Tramadol called to [facility] in [address] as per FQ's task  Pt informed of tramadol RF called to her [facility]  I cx'd the 2/5 sovs in Epic w/ NM and R/S to 2/15 at 11;30 w/ NM  Pt appreciative  Active Problems    1  Abnormal findings on diagnostic imaging of breast (793 89) (R92 8)   2  Allergic rhinitis (477 9) (J30 9)   3  Analgesic use (V58 69) (Z79 899)   4  Anxiety (300 00) (F41 9)   5  Burning sensation (782 0) (R20 8)   6  Encounter for long-term use of opiate analgesic (V58 69) (Z79 891)   7  Encounter for routine gynecological examination (V72 31) (Z01 419)   8  Encounter for screening mammogram for malignant neoplasm of breast (V76 12)   (Z12 31)   9  Esophageal reflux (530 81) (K21 9)   10  Hiatal hernia (553 3) (K44 9)   11  Hyperlipidemia (272 4) (E78 5)   12  Laboratory examination ordered as part of a routine general medical examination    (V72 62) (Z00 00)   13  Leiomyoma of uterus (218 9) (D25 9)   14  Leukopenia (288 50) (D72 819)   15  Lower back pain (724 2) (M54 5)   16  Lumbar radiculopathy (724 4) (M54 16)   17  Myofascial pain (729 1) (M79 1)   18  Need for immunization against influenza (V04 81) (Z23)   19  Pain syndrome, chronic (338 4) (G89 4)   20  Pulmonary Valve Regurgitation (424 3)   21  Sacroiliitis (720 2) (M46 1)   22  Uncomplicated opioid dependence (304 00) (F11 20)   23  Well female exam with routine gynecological exam (V72 31) (Z01 419)    Current Meds   1  Aspirin 81 MG Oral Tablet Chewable; Takse 1 tablet  MWF;   Therapy: 16UCV6743 to (Last Rx:[id number]) Ordered   2  Escitalopram Oxalate 5 MG Oral Tablet; Take 1 tablet daily; Therapy: 28QGY0829 to (Last Rx:06Gul5256)  Requested for: 21Aug2017 Ordered   3  Metamucil Smooth Texture 28 % Oral Packet; USE AS DIRECTED; Therapy: (Recorded:21Oct2013) to Recorded   4  Omeprazole 20 MG Oral Capsule Delayed Release; take 1 capsule by mouth once daily; Therapy: 26RGW8865 to )  Requested for: 06Omt2863; Last   Rx:13Lre8554 Ordered   5  Simvastatin 10 MG Oral Tablet; Take 1 tablet daily; Therapy: 62MKK9192 to (Last Rx:08Jsk4962)  Requested for: 18Hmq5958 Ordered   6  TraMADol HCl - 50 MG Oral Tablet; Take 1 tablet 3 times daily as needed; Therapy: 25YBB5490 to (Evaluate:48Oci3752); Last Rx:22Dkz1847 Ordered    Allergies    1  Sulfa Drugs    Plan  Pain syndrome, chronic    · TraMADol HCl - 50 MG Oral Tablet;  Take 1 tablet 3 times daily as needed    Signatures   Electronically signed by : Daniel Carpenter, ; Jan 22 2018 11:49AM EST                       (Author)

## 2018-02-14 PROBLEM — F11.20 UNCOMPLICATED OPIOID DEPENDENCE (HCC): Status: ACTIVE | Noted: 2017-05-08

## 2018-02-15 ENCOUNTER — OFFICE VISIT (OUTPATIENT)
Dept: PAIN MEDICINE | Facility: CLINIC | Age: 53
End: 2018-02-15
Payer: COMMERCIAL

## 2018-02-15 VITALS
SYSTOLIC BLOOD PRESSURE: 118 MMHG | BODY MASS INDEX: 23.73 KG/M2 | HEART RATE: 70 BPM | DIASTOLIC BLOOD PRESSURE: 72 MMHG | HEIGHT: 64 IN | TEMPERATURE: 98.7 F | WEIGHT: 139 LBS

## 2018-02-15 DIAGNOSIS — G89.4 PAIN SYNDROME, CHRONIC: Primary | ICD-10-CM

## 2018-02-15 DIAGNOSIS — M46.1 SACROILIITIS (HCC): ICD-10-CM

## 2018-02-15 DIAGNOSIS — F11.20 UNCOMPLICATED OPIOID DEPENDENCE (HCC): ICD-10-CM

## 2018-02-15 PROCEDURE — 80305 DRUG TEST PRSMV DIR OPT OBS: CPT | Performed by: NURSE PRACTITIONER

## 2018-02-15 PROCEDURE — 99214 OFFICE O/P EST MOD 30 MIN: CPT | Performed by: NURSE PRACTITIONER

## 2018-02-15 RX ORDER — SIMVASTATIN 10 MG
1 TABLET ORAL DAILY
COMMUNITY
Start: 2014-08-18 | End: 2018-02-17 | Stop reason: SDUPTHER

## 2018-02-15 RX ORDER — TRAMADOL HYDROCHLORIDE 50 MG/1
1 TABLET ORAL 3 TIMES DAILY PRN
COMMUNITY
Start: 2017-12-07 | End: 2018-02-15 | Stop reason: ALTCHOICE

## 2018-02-15 RX ORDER — OMEPRAZOLE 20 MG/1
1 CAPSULE, DELAYED RELEASE ORAL DAILY
Status: ON HOLD | COMMUNITY
Start: 2012-07-14 | End: 2018-07-20 | Stop reason: ALTCHOICE

## 2018-02-15 NOTE — PROGRESS NOTES
Assessment:  1  Pain syndrome, chronic    2  Sacroiliitis ([facility] [address] )    3  Uncomplicated opioid dependence ([facility] [address] )        Plan:    Kiet Cardona is a 50 y o  female who presents for a follow up office visit in regards to Leg Pain (right side) and Back Pain (lower)  The patient has a history of chronic pain syndrome and sacroiliitis  The patient continues with ongoing low back pain, and is receiving minimal pain relief from the tramadol 50 mg   Medication is also causing nausea  She would like to be placed back on Nucynta  I would like to place her on Nucynta ER, as this may be covered under her insurance  However, is currently on back order  For the time being, she will be placed on Nucynta 75 mg which she can take twice a day  She was given a co-pay card, so hopefully this will make the medication cheaper  She states her pharmacy  would not let her use this previously with the Nucynta 50 mg  She was given a prescription for this month, 1 for the following month they do not filled until March 13, 2018    There are risks associated with opioid medications, including dependence, addiction and tolerance  The patient understands and agrees to use these medications only as prescribed  Potential side effects of the medications include, but are not limited to, constipation, drowsiness, addiction, impaired judgment and risk of fatal overdose if not taken as prescribed  The patient was warned against driving while taking sedation medications  Sharing medications is a felony  At this point in time, the patient is showing no signs of addiction, abuse, diversion or suicidal ideation  A urine drug screen was collected at today's office visit as part of our medication management protocol  The point of care testing results were appropriate for what was being prescribed  The specimen will be sent for confirmatory testing   The drug screen is medically necessary because the patient is either dependent on opioid medication or is being considered for opioid medication therapy and the results could impact ongoing or future treatment  The drug screen is to evaluate for the presences or absence of prescribed, non-prescribed, and/or illicit drugs/substances  [place] Prescription Drug Monitoring Program report was reviewed and was appropriate       An opioid contract was reviewed with the patient  The patient was made aware they are only to receive opioid medication from our office, and must take the medication as prescribed  If the medication is lost or stolen, it will not be replaced  We also do not condone the use of illegal substances as well as the use of alcohol with opioid medication  Random urine drug screens will also be performed at office visits  Lastly, the patient was informed that office visits are needed for refills  Patient was agreeable and signed the contract  My impressions and treatment recommendations were discussed in detail with the patient who verbalized understanding and had no further questions  Discharge instructions were provided  I personally saw and examined the patient and I agree with the above discussed plan of care  No orders of the defined types were placed in this encounter  New Medications Ordered This Visit   Medications    simvastatin (ZOCOR) 10 mg tablet     Sig: Take 1 tablet by mouth daily    omeprazole (PriLOSEC) 20 mg delayed release capsule     Sig: Take 1 capsule by mouth daily    traMADol (ULTRAM) 50 mg tablet     Sig: Take 1 tablet by mouth 3 (three) times a day as needed       History of Present Illness:  Hyacinth Roberts is a 50 y o  female who presents for a follow up office visit in regards to Leg Pain (right side) and Back Pain (lower)  The patient has a history of chronic pain syndrome and sacroiliitis  The patients current symptoms include low back pain that radiates into the right thigh  The pain is constant occurs mostly in the morning at night    She describes as dull aching and shooting  She is currently rating her pain a 4/10 on the numeric rating scale    Current pain medications includes:  Tramadol 50 mg   The patient reports that this regimen is providing 10% pain relief  The patient is reporting nausea from this pain medication regimen  She was on Nucynta 50 mg 3 times a day previously, but her insurance now is charging 350 dollars for month supply  She was placed on tramadol, but states it provides no pain relief, and is causing nausea  She would like to be placed back on Nucynta    Pain Contract Signed: 2/15/18, Last Urine Drug Screen: 2/15/18    I have personally reviewed and/or updated the patient's past medical history, past surgical history, family history, social history, current medications, allergies, and vital signs today  Review of Systems    Patient Active Problem List   Diagnosis    Anxiety    Esophageal reflux    Hiatal hernia    Hyperlipidemia    Pain syndrome, chronic    Pulmonary valve disorder    [facility]roiliitis ([facility] [address] )    Uncomplicated opioid dependence ([facility] [address] )     Past Medical History  1  History of  0 (V49 89)   2  History of fibrocystic disease of breast (V13 89) (N90 399)   3  History of headache (V13 89) (Z87 898)   4  History of hiatal hernia (V12 79) (Z87 19)   5  History of hyperlipidemia (V12 29) (Z86 39)   6  History of Routine Gynecological Exam With Cervical Pap Smear (V72 31)   7  History of Weight gain (783 1) (R63 5)     The active problems and past medical history were reviewed and updated today  Surgical History  1  History of Appendectomy   2  History of Complete Excision Of Fifth Metatarsal Head Right   3  History of Foot Surgery   4  History of Left Breast Lumpectomy     The surgical history was reviewed and updated today  Family History  Mother    1  Family history of CABG   2  Family history of Dyslipidemia   3  Family history of Family Health Status Of Mother - Alive   4   Family history of Hypertension (V17 49)  Father    5  Family history of Acute Myocardial Infarction (V17 3)   6  Family history of Dyslipidemia   7  Family history of Family Health Status Of Father - Alive   8  Family history of Hypertension (V17 49)  Paternal Uncle    5  Family history of malignant neoplasm of prostate (V16 42) (Z80 45)  Family History    10  Family history of Thyroid Disorder (V18 19)     The family history was reviewed and updated today  Social History   · Being A Social Drinker   · Denied: History of Drug Use (305 90)   · Exercise Habits   · Marital History - Currently    · Never A Smoker   · Occupation: Medical Professional   · Partner had vasectomy   · Working Full Time      No current outpatient prescriptions on file prior to visit  No current facility-administered medications on file prior to visit  Allergies   Allergen Reactions    Sulfa Antibiotics      Annotation - [id number]: Migraine; Annotation - [id number]: cellular issues       Physical Exam:    /72   Pulse 70   Temp 98 7 °F (37 1 °C) (Oral)   Ht 5' 4" (1 626 m)   Wt 63 kg (139 lb)   BMI 23 86 kg/m²     Constitutional:normal, well developed, well nourished, alert, in no distress and non-toxic and no overt pain behavior    Eyes:anicteric  HEENT:grossly intact  Neck:supple, symmetric, trachea midline and no masses   Pulmonary:even and unlabored  Cardiovascular:No edema or pitting edema present  Skin:Normal without rashes or lesions and well hydrated  Psychiatric:Mood and affect appropriate  Neurologic:Cranial Nerves II-XII grossly intact  Musculoskeletal:normal     Lumbar exam deferred     Imaging  Last dose of Tramadol is 2/15/18 at 8:00

## 2018-02-17 DIAGNOSIS — E78.2 MIXED HYPERLIPIDEMIA: Primary | ICD-10-CM

## 2018-02-19 RX ORDER — SIMVASTATIN 10 MG
TABLET ORAL
Qty: 90 TABLET | Refills: 1 | Status: SHIPPED | OUTPATIENT
Start: 2018-02-19 | End: 2018-08-18 | Stop reason: SDUPTHER

## 2018-04-02 DIAGNOSIS — D72.819 DECREASED WHITE BLOOD CELL COUNT: ICD-10-CM

## 2018-04-02 DIAGNOSIS — R20.8 OTHER DISTURBANCES OF SKIN SENSATION: ICD-10-CM

## 2018-04-02 DIAGNOSIS — E78.5 HYPERLIPIDEMIA: ICD-10-CM

## 2018-04-02 DIAGNOSIS — M79.10 MYALGIA: ICD-10-CM

## 2018-04-12 ENCOUNTER — OFFICE VISIT (OUTPATIENT)
Dept: PAIN MEDICINE | Facility: CLINIC | Age: 53
End: 2018-04-12
Payer: COMMERCIAL

## 2018-04-12 VITALS
DIASTOLIC BLOOD PRESSURE: 62 MMHG | WEIGHT: 139 LBS | HEIGHT: 64 IN | TEMPERATURE: 98.1 F | SYSTOLIC BLOOD PRESSURE: 112 MMHG | HEART RATE: 74 BPM | BODY MASS INDEX: 23.73 KG/M2

## 2018-04-12 DIAGNOSIS — G89.4 PAIN SYNDROME, CHRONIC: Primary | ICD-10-CM

## 2018-04-12 DIAGNOSIS — M46.1 SACROILIITIS (HCC): ICD-10-CM

## 2018-04-12 PROCEDURE — 99214 OFFICE O/P EST MOD 30 MIN: CPT | Performed by: NURSE PRACTITIONER

## 2018-04-12 NOTE — PROGRESS NOTES
Assessment:  1  Pain syndrome, chronic    2  Sacroiliitis [facility])        Plan:  The patient is a 50 y o  female last seen on February 15, 2018 who presents for a follow up office visit in regards to chronic pain secondary to sacroiliitis  The patient continues with ongoing low back pain, which remains unchanged since the last office visit  Her symptoms are well managed with Nucynta 75 mg twice a day  Therefore, she will be continued on the medication as prescribed  Two prescriptions for Nucynta 75 mg were electronically sent to her pharmacy, with 1 script stating do not fill until May 10, 2018    [place] Prescription Drug Monitoring Program report was reviewed and was appropriate     There are risks associated with opioid medications, including dependence, addiction and tolerance  The patient understands and agrees to use these medications only as prescribed  Potential side effects of the medications include, but are not limited to, constipation, drowsiness, addiction, impaired judgment and risk of fatal overdose if not taken as prescribed  The patient was warned against driving while taking sedation medications  Sharing medications is a felony  At this point in time, the patient is showing no signs of addiction, abuse, diversion or suicidal ideation  The patient will follow-up in 8 weeks for medication prescription refill and reevaluation  The patient was advised to contact the office should their symptoms worsen in the interim  The patient was agreeable and verbalized an understanding  History of Present Illness: The patient is a 50 y o  female last seen on February 15, 2018 who presents for a follow up office visit in regards to chronic pain secondary to sacroiliitis  The patient currently reports ongoing low back pain, which remains intermittent  She states there are days when her pain is minimal, and other days when the pain is severe  Her pain occurs mostly in the evening at night    She does feel the paresthesias in her right thigh has significantly improved, and occurs rarely  She describes the pain as dull aching and shooting  She is currently rating it a 5/10 on the numeric rating scale    Current pain medications includes:  Nucynta 75 mg twice a day  The patient reports that this regimen is providing 90% pain relief  The patient is reporting no side effects from this pain medication regimen  Pain Contract Signed: 2/15/18  Last Urine Drug Screen: 2/15/18    I have personally reviewed and/or updated the patient's past medical history, past surgical history, family history, social history, current medications, allergies, and vital signs today  Review of Systems:    Review of Systems   Respiratory: Negative for shortness of breath  Cardiovascular: Negative for chest pain  Gastrointestinal: Negative for constipation, diarrhea, nausea and vomiting  Musculoskeletal: Negative for arthralgias, gait problem and myalgias  Skin: Negative for rash  Neurological: Negative for dizziness, seizures and weakness  All other systems reviewed and are negative  Past Medical History:   Diagnosis Date    Fibrocystic disease of breast     Hiatal hernia     Hyperlipidemia        Past Surgical History:   Procedure Laterality Date    APPENDECTOMY      BREAST LUMPECTOMY Left     FOOT SURGERY Right 2011    right, hardware removal    OTHER SURGICAL HISTORY Right 2010    Complete Excision of Fifth Metatarsal Head        Family History   Problem Relation Age of Onset    Coronary artery disease Mother      CABG    Other Mother      Dyslipidemia    Hypertension Mother     Heart attack Father      acute myocardial infarction    Other Father      Dyslipidemia    Hypertension Father     Prostate cancer Paternal Uncle     Thyroid disease Family      disorder       Social History     Occupational History    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, 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     Social History Main Topics    Smoking status: Never Smoker    Smokeless tobacco: Not on file    Alcohol use Yes      Comment: social drinker    Drug use: No    Sexual activity: Not on file      Comment: Partner had vasectomy         Current Outpatient Prescriptions:     omeprazole (PriLOSEC) 20 mg delayed release capsule, Take 1 capsule by mouth daily, Disp: , Rfl:     simvastatin (ZOCOR) 10 mg tablet, TAKE 1 TABLET DAILY, Disp: 90 tablet, Rfl: 1    tapentadol (NUCYNTA) 75 mg tablet, Take 1 tablet (75 mg total) by mouth 2 (two) times a day Max Daily Amount: 150 mg, Disp: 60 tablet, Rfl: 0    Allergies   Allergen Reactions    Sulfa Antibiotics      Annotation - [id number]: Migraine; Annotation - [id number]: cellular issues       Physical Exam:    /62   Pulse 74   Temp 98 1 °F (36 7 °C) (Oral)   Ht 5' 4" (1 626 m)   Wt 63 kg (139 lb)   BMI 23 86 kg/m²     Constitutional:normal, well developed, well nourished, alert, in no distress and non-toxic and no overt pain behavior  Eyes:anicteric  HEENT:grossly intact  Neck:supple, symmetric, trachea midline and no masses   Pulmonary:even and unlabored  Cardiovascular:No edema or pitting edema present  Skin:Normal without rashes or lesions and well hydrated  Psychiatric:Mood and affect appropriate  Neurologic:Cranial Nerves II-XII grossly intact  Musculoskeletal:normal      Imaging  MRI LUMBAR SPINE WITH AND WITHOUT CONTRAST 1/5/16     INDICATION-  Chronic low back pain and sciatica for spinal cyst which   was drained, 7-4 4, M54 16      COMPARISON-  MR 7/26/2012      TECHNIQUE-  Sagittal T1, sagittal T2, sagittal inversion recovery,   axial T1 and axial T2, coronal T2  Sagittal and axial T1 postcontrast    7 mL of Gadavist was injected intravenously with no immediate   consequence  IMAGE QUALITY-  Diagnostic      FINDINGS-      ALIGNMENT-  Normal alignment of the lumbar spine  No compression   fracture    No spondylolysis or spondylolisthesis  No scoliosis  MARROW SIGNAL-  Normal marrow signal is identified within the   visualized bony structures  No discrete marrow lesion  DISTAL CORD AND CONUS-  Normal size and signal of the distal cord and   conus  The conus ends at the L1 level  PARASPINAL SOFT TISSUES-  Paraspinal soft tissues are unremarkable  SACRUM-  Normal signal within the sacrum  No evidence of insufficiency   or stress fracture  Similar to prior study, there are several   perineural cysts in the upper sacral region, to include S1 and S2,   largest on the left approximately 15 mm the C2 level  LOWER THORACIC DISC SPACES-  Normal disc height and signal   No disc   herniation, canal stenosis or foraminal narrowing  LUMBAR DISC SPACES-  Maintenance of vertebral body and disc height,   signal intensity and alignment  POSTCONTRAST IMAGING-  No abnormal enhancement  IMPRESSION-      Normal enhanced MRI of the lumbar spine  Stable bilateral perineural   cysts within the upper sacrum  The entire sacrum was not completely   evaluated  Last dose of Nucynta was last night @ 8:00pm      No orders of the defined types were placed in this encounter

## 2018-04-26 ENCOUNTER — TRANSCRIBE ORDERS (OUTPATIENT)
Dept: ADMINISTRATIVE | Age: 53
End: 2018-04-26

## 2018-04-26 ENCOUNTER — APPOINTMENT (OUTPATIENT)
Dept: LAB | Age: 53
End: 2018-04-26
Payer: COMMERCIAL

## 2018-04-26 DIAGNOSIS — D72.819 DECREASED WHITE BLOOD CELL COUNT: ICD-10-CM

## 2018-04-26 DIAGNOSIS — M79.10 MYALGIA: ICD-10-CM

## 2018-04-26 DIAGNOSIS — E78.5 HYPERLIPIDEMIA: ICD-10-CM

## 2018-04-26 DIAGNOSIS — R20.8 OTHER DISTURBANCES OF SKIN SENSATION: ICD-10-CM

## 2018-04-26 LAB
25(OH)D3 SERPL-MCNC: 42 NG/ML (ref 30–100)
ALBUMIN SERPL BCP-MCNC: 4.2 G/DL (ref 3.5–5)
ALP SERPL-CCNC: 49 U/L (ref 46–116)
ALT SERPL W P-5'-P-CCNC: 23 U/L (ref 12–78)
ANION GAP SERPL CALCULATED.3IONS-SCNC: 6 MMOL/L (ref 4–13)
AST SERPL W P-5'-P-CCNC: 12 U/L (ref 5–45)
BASOPHILS # BLD AUTO: 0.01 THOUSANDS/ΜL (ref 0–0.1)
BASOPHILS NFR BLD AUTO: 0 % (ref 0–1)
BILIRUB SERPL-MCNC: 0.8 MG/DL (ref 0.2–1)
BUN SERPL-MCNC: 14 MG/DL (ref 5–25)
CALCIUM SERPL-MCNC: 9.3 MG/DL (ref 8.3–10.1)
CHLORIDE SERPL-SCNC: 104 MMOL/L (ref 100–108)
CHOLEST SERPL-MCNC: 204 MG/DL (ref 50–200)
CO2 SERPL-SCNC: 27 MMOL/L (ref 21–32)
CREAT SERPL-MCNC: 0.89 MG/DL (ref 0.6–1.3)
EOSINOPHIL # BLD AUTO: 0.15 THOUSAND/ΜL (ref 0–0.61)
EOSINOPHIL NFR BLD AUTO: 3 % (ref 0–6)
ERYTHROCYTE [DISTWIDTH] IN BLOOD BY AUTOMATED COUNT: 13 % (ref 11.6–15.1)
GFR SERPL CREATININE-BSD FRML MDRD: 76 ML/MIN/1.73SQ M
GLUCOSE P FAST SERPL-MCNC: 87 MG/DL (ref 65–99)
HCT VFR BLD AUTO: 42.9 % (ref 34.8–46.1)
HDLC SERPL-MCNC: 90 MG/DL (ref 40–60)
HGB BLD-MCNC: 14.8 G/DL (ref 11.5–15.4)
LDLC SERPL CALC-MCNC: 103 MG/DL (ref 0–100)
LYMPHOCYTES # BLD AUTO: 1.42 THOUSANDS/ΜL (ref 0.6–4.47)
LYMPHOCYTES NFR BLD AUTO: 28 % (ref 14–44)
MCH RBC QN AUTO: 31.8 PG (ref 26.8–34.3)
MCHC RBC AUTO-ENTMCNC: 34.5 G/DL (ref 31.4–37.4)
MCV RBC AUTO: 92 FL (ref 82–98)
MONOCYTES # BLD AUTO: 0.37 THOUSAND/ΜL (ref 0.17–1.22)
MONOCYTES NFR BLD AUTO: 7 % (ref 4–12)
NEUTROPHILS # BLD AUTO: 3.17 THOUSANDS/ΜL (ref 1.85–7.62)
NEUTS SEG NFR BLD AUTO: 62 % (ref 43–75)
NONHDLC SERPL-MCNC: 114 MG/DL
NRBC BLD AUTO-RTO: 0 /100 WBCS
PLATELET # BLD AUTO: 232 THOUSANDS/UL (ref 149–390)
PMV BLD AUTO: 9.3 FL (ref 8.9–12.7)
POTASSIUM SERPL-SCNC: 3.9 MMOL/L (ref 3.5–5.3)
PROT SERPL-MCNC: 7.4 G/DL (ref 6.4–8.2)
RBC # BLD AUTO: 4.66 MILLION/UL (ref 3.81–5.12)
SODIUM SERPL-SCNC: 137 MMOL/L (ref 136–145)
TRIGL SERPL-MCNC: 57 MG/DL
VIT B12 SERPL-MCNC: 411 PG/ML (ref 100–900)
WBC # BLD AUTO: 5.13 THOUSAND/UL (ref 4.31–10.16)

## 2018-04-26 PROCEDURE — 82607 VITAMIN B-12: CPT

## 2018-04-26 PROCEDURE — 80053 COMPREHEN METABOLIC PANEL: CPT

## 2018-04-26 PROCEDURE — 82306 VITAMIN D 25 HYDROXY: CPT

## 2018-04-26 PROCEDURE — 80061 LIPID PANEL: CPT

## 2018-04-26 PROCEDURE — 36415 COLL VENOUS BLD VENIPUNCTURE: CPT

## 2018-04-26 PROCEDURE — 85025 COMPLETE CBC W/AUTO DIFF WBC: CPT

## 2018-04-30 ENCOUNTER — OFFICE VISIT (OUTPATIENT)
Dept: INTERNAL MEDICINE CLINIC | Facility: CLINIC | Age: 53
End: 2018-04-30
Payer: COMMERCIAL

## 2018-04-30 VITALS
OXYGEN SATURATION: 99 % | RESPIRATION RATE: 16 BRPM | WEIGHT: 133.8 LBS | SYSTOLIC BLOOD PRESSURE: 130 MMHG | DIASTOLIC BLOOD PRESSURE: 82 MMHG | HEART RATE: 66 BPM | HEIGHT: 64 IN | BODY MASS INDEX: 22.84 KG/M2

## 2018-04-30 DIAGNOSIS — Z12.31 ENCOUNTER FOR SCREENING MAMMOGRAM FOR BREAST CANCER: ICD-10-CM

## 2018-04-30 DIAGNOSIS — E53.8 VITAMIN B12 DEFICIENCY: ICD-10-CM

## 2018-04-30 DIAGNOSIS — R68.89 EAR, NOSE, AND THROAT SYMPTOM: Primary | ICD-10-CM

## 2018-04-30 DIAGNOSIS — R45.4 IRRITABILITY AND ANGER: ICD-10-CM

## 2018-04-30 DIAGNOSIS — E78.49 OTHER HYPERLIPIDEMIA: ICD-10-CM

## 2018-04-30 DIAGNOSIS — J30.1 SEASONAL ALLERGIC RHINITIS DUE TO POLLEN: ICD-10-CM

## 2018-04-30 DIAGNOSIS — Z71.9 HEALTH COUNSELING: ICD-10-CM

## 2018-04-30 PROCEDURE — 99214 OFFICE O/P EST MOD 30 MIN: CPT | Performed by: INTERNAL MEDICINE

## 2018-04-30 RX ORDER — CITALOPRAM HYDROBROMIDE 20 MG/10ML
10 SOLUTION, ORAL ORAL DAILY
COMMUNITY
End: 2018-04-30 | Stop reason: CLARIF

## 2018-04-30 RX ORDER — LANOLIN ALCOHOL/MO/W.PET/CERES
1000 CREAM (GRAM) TOPICAL DAILY
Qty: 90 TABLET | Refills: 0
Start: 2018-04-30 | End: 2019-09-20 | Stop reason: ALTCHOICE

## 2018-04-30 RX ORDER — ESCITALOPRAM OXALATE 5 MG/1
5 TABLET ORAL DAILY
Qty: 90 TABLET | Refills: 1 | Status: SHIPPED | OUTPATIENT
Start: 2018-04-30 | End: 2018-12-12 | Stop reason: SDUPTHER

## 2018-04-30 NOTE — ASSESSMENT & PLAN NOTE
Lipids slightly worse, cut back on the cheese  On statin  Discussed her risks of heart disease, father had a heart attack in his 46s, mother in her early 62s  She is currently asymptomatic    Refer to Cardiology for risk stratification, ?cardiac CT vs stress test

## 2018-04-30 NOTE — PROGRESS NOTES
Assessment/Plan:    Hyperlipidemia  Lipids slightly worse, cut back on the cheese  On statin  Discussed her risks of heart disease, father had a heart attack in his 46s, mother in her early 62s  She is currently asymptomatic  Refer to Cardiology for risk stratification, ?cardiac CT vs stress test     Esophageal reflux  Occasional symptoms, taking Pepcid as needed  Instructed to take medication for 2-3 days as needed  Seasonal allergic rhinitis due to pollen  Using Flonase as needed  Irritability and anger  Restarted Lexapro, low dose  Sacroiliitis (HCC)  Stable  Vitamin B12 deficiency  Start daily supplement  Diagnoses and all orders for this visit:    Ear, nose, and throat symptom  Comments:  Monitor symptoms, would consider ENT referral     Vitamin B12 deficiency  Comments:  Start daily supplement  Orders:  -     Vitamin B12; Future  -     cyanocobalamin (VITAMIN B-12) 1,000 mcg tablet; Take 1 tablet (1,000 mcg total) by mouth daily    Health counseling  Comments:  She will schedule for EGD and colonoscopy this year  Mammogram due  Irritability and anger  -     escitalopram (LEXAPRO) 5 mg tablet; Take 1 tablet (5 mg total) by mouth daily    Other hyperlipidemia  -     Comprehensive metabolic panel; Future  -     Lipid panel; Future  -     TSH, 3rd generation; Future    Seasonal allergic rhinitis due to pollen    Encounter for screening mammogram for breast cancer  -     Mammo screening bilateral w cad; Future    Other orders  -     Discontinue: citalopram (CeleXA) 10 mg/5 mL suspension; Take 10 mg by mouth daily          Subjective:      Patient ID: Maida Meyers is a 50 y o  female  [place] Chol is feeling alright  She restarted Lexapro about a month ago, found that she is easily irritated and angry especially at work  Since then, she felt this has improved  She is not taking omeprazole, would take Pepcid as needed if she develops reflux symptoms      She complains of occasionally hearing her heartbeat, on the right side of her ear  This occurred several times, lasting for a few minutes  Denies any ringing in the ear, hearing loss or other symptoms  She reports her SI joint occasionally with bother her, low back pain is better since she is standing more frequently at work  She is also concerned about her risk for heart disease since it runs in the family  She denies any chest pain, shortness of breath, palpitations or other symptoms  Her blood pressure is well controlled  She noticed her LDL is slightly elevated, admits that she has been eating more cheese as a snack  She is taking her cholesterol medication  She has allergies, would use Flonase as needed  The following portions of the patient's history were reviewed and updated as appropriate: allergies, current medications, past medical history, past social history and problem list     Review of Systems   Constitutional: Negative for appetite change and fatigue  HENT: Negative for congestion, ear discharge, ear pain, hearing loss, postnasal drip and tinnitus  Eyes: Negative for visual disturbance  Respiratory: Negative for cough and shortness of breath  Cardiovascular: Negative for chest pain and leg swelling  Gastrointestinal: Negative for abdominal pain, constipation and diarrhea  Genitourinary: Negative for dysuria, frequency and urgency  Musculoskeletal: Positive for back pain  Negative for arthralgias and myalgias  Skin: Negative for rash and wound  Neurological: Negative for dizziness, numbness and headaches  Hematological: Does not bruise/bleed easily  Psychiatric/Behavioral: Negative for confusion and sleep disturbance  The patient is not nervous/anxious  Objective:      /82   Pulse 66   Resp 16   Ht 5' 4" (1 626 m)   Wt 60 7 kg (133 lb 12 8 oz)   SpO2 99%   BMI 22 97 kg/m²          Physical Exam   Constitutional: She is oriented to person, place, and time   She appears well-developed and well-nourished  HENT:   Head: Normocephalic and atraumatic  Right Ear: Tympanic membrane, external ear and ear canal normal    Left Ear: Tympanic membrane, external ear and ear canal normal    Nose: Nose normal  No rhinorrhea  Mouth/Throat: Oropharynx is clear and moist and mucous membranes are normal    Eyes: Conjunctivae are normal  Pupils are equal, round, and reactive to light  Neck: Neck supple  Cardiovascular: Normal rate, regular rhythm and normal heart sounds  No edema  Pulmonary/Chest: Effort normal and breath sounds normal  She has no wheezes  She has no rales  Abdominal: Soft  Bowel sounds are normal    Neurological: She is alert and oriented to person, place, and time  Skin: Skin is warm  No rash noted  Psychiatric: She has a normal mood and affect  Her behavior is normal    Nursing note and vitals reviewed  Medications and lab results reviewed with patient

## 2018-05-09 ENCOUNTER — TELEPHONE (OUTPATIENT)
Dept: INTERNAL MEDICINE CLINIC | Facility: CLINIC | Age: 53
End: 2018-05-09

## 2018-05-09 DIAGNOSIS — K21.9 GASTROESOPHAGEAL REFLUX DISEASE, ESOPHAGITIS PRESENCE NOT SPECIFIED: Primary | ICD-10-CM

## 2018-05-09 DIAGNOSIS — Z12.11 SCREENING FOR COLON CANCER: ICD-10-CM

## 2018-05-09 NOTE — TELEPHONE ENCOUNTER
Spoke to Sharmin from [place] [id number] regarding pt  States pt will be coming in to see Dr Hubert Rawls on Monday 5/14 for a colon consult and EGD  Requested a referral for pt to see Dr Aponte

## 2018-05-14 ENCOUNTER — OFFICE VISIT (OUTPATIENT)
Dept: GASTROENTEROLOGY | Facility: MEDICAL CENTER | Age: 53
End: 2018-05-14
Payer: COMMERCIAL

## 2018-05-14 VITALS
BODY MASS INDEX: 22.88 KG/M2 | HEIGHT: 64 IN | DIASTOLIC BLOOD PRESSURE: 60 MMHG | SYSTOLIC BLOOD PRESSURE: 120 MMHG | TEMPERATURE: 97.8 F | HEART RATE: 60 BPM | WEIGHT: 134 LBS

## 2018-05-14 DIAGNOSIS — K44.9 HIATAL HERNIA: ICD-10-CM

## 2018-05-14 DIAGNOSIS — K21.9 GASTROESOPHAGEAL REFLUX DISEASE, ESOPHAGITIS PRESENCE NOT SPECIFIED: Primary | ICD-10-CM

## 2018-05-14 DIAGNOSIS — Z12.11 SCREENING FOR COLON CANCER: ICD-10-CM

## 2018-05-14 PROCEDURE — 99244 OFF/OP CNSLTJ NEW/EST MOD 40: CPT | Performed by: INTERNAL MEDICINE

## 2018-05-14 NOTE — PROGRESS NOTES
[facility] - Outpatient Consultation  George Nowak 50 y o  female MRN: [id number]  Encounter: [id number]          ASSESSMENT AND PLAN:      1  Gastroesophageal reflux disease, esophagitis presence not specified  2  Hiatal hernia  I will schedule her for an upper endoscopy to evaluate for Aggarwal's esophagus given her long history of reflux symptoms and since she has never previously had an upper endoscopy  I spoke to her about the benefits and risks of the procedure as well as instructions and answered all of her questions  I told her if she has symptoms more than twice a week she should take a proton pump inhibitor daily  If she only has symptoms one or 2 times per week that she can continue with Pepcid as needed  - Case request operating room: EGD    3  Screening for colon cancer  She is due for screening colonoscopy because of her age 50  I spoke to her about the benefits and risks of the procedure and we discussed with her the instructions for the bowel preparation and answered all of her questions  - Case request operating room: COLONOSCOPY     ______________________________________________________________________    HPI:  She presents for evaluation for her chronic reflux symptoms and for screening colonoscopy  She has had intermittent reflux symptoms for the past 15 years  She was started on Prilosec about 15 years ago and had an upper GI series that was notable for a hiatal hernia  She occasionally gets breakthrough symptoms but overall symptoms are well controlled  She will sometimes go large stretches without taking a proton pump inhibitor and she will use Pepcid as needed  She denies nausea, vomiting, dysphagia, abdominal pain, change in bowel habits, bleeding, or weight loss  She has never had a colonoscopy and she denies any family history of colon polyps or colon cancer        REVIEW OF SYSTEMS:    CONSTITUTIONAL: Denies any fever, chills, rigors, and weight loss   HEENT: No earache or tinnitus  Denies hearing loss or visual disturbances  CARDIOVASCULAR: No chest pain or palpitations  RESPIRATORY: Denies any cough, hemoptysis, shortness of breath or dyspnea on exertion  GASTROINTESTINAL: As noted in the History of Present Illness  GENITOURINARY: No problems with urination  Denies any hematuria or dysuria  NEUROLOGIC: No dizziness or vertigo, denies headaches  MUSCULOSKELETAL: Denies any muscle or joint pain  SKIN: Denies skin rashes or itching  ENDOCRINE: Denies excessive thirst  Denies intolerance to heat or cold  PSYCHOSOCIAL: Denies depression or anxiety  Denies any recent memory loss         Historical Information   Past Medical History:   Diagnosis Date    Fibrocystic disease of breast     Hiatal hernia     Hyperlipidemia      Past Surgical History:   Procedure Laterality Date    APPENDECTOMY      BREAST LUMPECTOMY Left     FOOT SURGERY Right 2011    right, hardware removal    OTHER SURGICAL HISTORY Right 2010    Complete Excision of Fifth Metatarsal Head      Social History   History   Alcohol Use    Yes     Comment: social drinker     History   Drug Use No     History   Smoking Status    Never Smoker   Smokeless Tobacco    Never Used     Family History   Problem Relation Age of Onset    Coronary artery disease Mother      CABG in her 62s   [facility] Other Mother      Dyslipidemia    Hypertension Mother     Heart attack Father 46     acute myocardial infarction    Other Father      Dyslipidemia    Hypertension Father     Prostate cancer Paternal Uncle     Thyroid disease Family      disorder       Meds/Allergies       Current Outpatient Prescriptions:     cyanocobalamin (VITAMIN B-12) 1,000 mcg tablet    escitalopram (LEXAPRO) 5 mg tablet    omeprazole (PriLOSEC) 20 mg delayed release capsule    simvastatin (ZOCOR) 10 mg tablet    tapentadol (NUCYNTA) 75 mg tablet    Allergies   Allergen Reactions    Sulfa Antibiotics      Annotation - [id number]: Migraine; Annotation - [id number]: cellular issues           Objective     Blood pressure 120/60, pulse 60, temperature 97 8 °F (36 6 °C), temperature source Tympanic, height 5' 4" (1 626 m), weight 60 8 kg (134 lb)  Body mass index is 23 kg/m²  PHYSICAL EXAM:      General Appearance:   Alert, cooperative, no distress   HEENT:   Normocephalic, atraumatic, anicteric      Neck:  Supple, symmetrical, trachea midline   Lungs:   Clear to auscultation bilaterally; no rales, rhonchi or wheezing; respirations unlabored    Heart[de-identified]   Regular rate and rhythm; no murmur, rub, or gallop  Abdomen:   Soft, non-tender, non-distended; normal bowel sounds; no masses, no organomegaly    Genitalia:   Deferred    Rectal:   Deferred    Extremities:  No cyanosis, clubbing or edema    Pulses:  2+ and symmetric    Skin:  No jaundice, rashes, or lesions    Lymph nodes:  No palpable cervical lymphadenopathy        Lab Results:   No visits with results within 1 Day(s) from this visit     Latest known visit with results is:   Appointment on 04/26/2018   Component Date Value    WBC 04/26/2018 5 13     RBC 04/26/2018 4 66     Hemoglobin 04/26/2018 14 8     Hematocrit 04/26/2018 42 9     MCV 04/26/2018 92     MCH 04/26/2018 31 8     MCHC 04/26/2018 34 5     RDW 04/26/2018 13 0     MPV 04/26/2018 9 3     Platelets 42/45/9066 232     nRBC 04/26/2018 0     Neutrophils Relative 04/26/2018 62     Lymphocytes Relative 04/26/2018 28     Monocytes Relative 04/26/2018 7     Eosinophils Relative 04/26/2018 3     Basophils Relative 04/26/2018 0     Neutrophils Absolute 04/26/2018 3 17     Lymphocytes Absolute 04/26/2018 1 42     Monocytes Absolute 04/26/2018 0 37     Eosinophils Absolute 04/26/2018 0 15     Basophils Absolute 04/26/2018 0 01     Sodium 04/26/2018 137     Potassium 04/26/2018 3 9     Chloride 04/26/2018 104     CO2 04/26/2018 27     Anion Gap 04/26/2018 6     BUN 04/26/2018 14     Creatinine 04/26/2018 0 89     Glucose, Fasting 04/26/2018 87     Calcium 04/26/2018 9 3     AST 04/26/2018 12     ALT 04/26/2018 23     Alkaline Phosphatase 04/26/2018 49     Total Protein 04/26/2018 7 4     Albumin 04/26/2018 4 2     Total Bilirubin 04/26/2018 0 80     eGFR 04/26/2018 76     Cholesterol 04/26/2018 204*    Triglycerides 04/26/2018 57     HDL, Direct 04/26/2018 90*    LDL Calculated 04/26/2018 103*    Non-HDL-Chol (CHOL-HDL) 04/26/2018 114     Vitamin B-12 04/26/2018 411     Vit D, 25-Hydroxy 04/26/2018 42 0          Radiology Results:   No results found

## 2018-05-14 NOTE — LETTER
May 14, 2018     Chen Pollard MD  [address]  [address] [place] [address]    Patient: Scarlett Reid   YOB: 1965   Date of Visit: 5/14/2018       Dear Dr Sean Fitzpatrick: Thank you for referring Francisca Enamorado to me for evaluation  Below are my notes for this consultation  If you have questions, please do not hesitate to call me  I look forward to following your patient along with you  Sincerely,        Kiersten Allen MD        CC: No Recipients  Kiersten Allen MD  5/14/2018  8:02 AM  Sign at close encounter  [facility] - Outpatient Consultation  Scarlett Reid 50 y o  female MRN: [id number]  Encounter: [id number]          ASSESSMENT AND PLAN:      1  Gastroesophageal reflux disease, esophagitis presence not specified  2  Hiatal hernia  I will schedule her for an upper endoscopy to evaluate for Aggarwal's esophagus given her long history of reflux symptoms and since she has never previously had an upper endoscopy  I spoke to her about the benefits and risks of the procedure as well as instructions and answered all of her questions  I told her if she has symptoms more than twice a week she should take a proton pump inhibitor daily  If she only has symptoms one or 2 times per week that she can continue with Pepcid as needed  - Case request operating room: EGD    3  Screening for colon cancer  She is due for screening colonoscopy because of her age 50  I spoke to her about the benefits and risks of the procedure and we discussed with her the instructions for the bowel preparation and answered all of her questions  - Case request operating room: COLONOSCOPY     ______________________________________________________________________    HPI:  She presents for evaluation for her chronic reflux symptoms and for screening colonoscopy  She has had intermittent reflux symptoms for the past 15 years    She was started on Prilosec about 15 years ago and had an upper GI series that was notable for a hiatal hernia  She occasionally gets breakthrough symptoms but overall symptoms are well controlled  She will sometimes go large stretches without taking a proton pump inhibitor and she will use Pepcid as needed  She denies nausea, vomiting, dysphagia, abdominal pain, change in bowel habits, bleeding, or weight loss  She has never had a colonoscopy and she denies any family history of colon polyps or colon cancer  REVIEW OF SYSTEMS:    CONSTITUTIONAL: Denies any fever, chills, rigors, and weight loss  HEENT: No earache or tinnitus  Denies hearing loss or visual disturbances  CARDIOVASCULAR: No chest pain or palpitations  RESPIRATORY: Denies any cough, hemoptysis, shortness of breath or dyspnea on exertion  GASTROINTESTINAL: As noted in the History of Present Illness  GENITOURINARY: No problems with urination  Denies any hematuria or dysuria  NEUROLOGIC: No dizziness or vertigo, denies headaches  MUSCULOSKELETAL: Denies any muscle or joint pain  SKIN: Denies skin rashes or itching  ENDOCRINE: Denies excessive thirst  Denies intolerance to heat or cold  PSYCHOSOCIAL: Denies depression or anxiety  Denies any recent memory loss         Historical Information   Past Medical History:   Diagnosis Date    Fibrocystic disease of breast     Hiatal hernia     Hyperlipidemia      Past Surgical History:   Procedure Laterality Date    APPENDECTOMY      BREAST LUMPECTOMY Left     FOOT SURGERY Right 2011    right, hardware removal    OTHER SURGICAL HISTORY Right 2010    Complete Excision of Fifth Metatarsal Head      Social History   History   Alcohol Use    Yes     Comment: social drinker     History   Drug Use No     History   Smoking Status    Never Smoker   Smokeless Tobacco    Never Used     Family History   Problem Relation Age of Onset    Coronary artery disease Mother      CABG in her 62s   [facility] Other Mother      Dyslipidemia    Hypertension Mother    [facility] Heart attack Father 46     acute myocardial infarction    Other Father      Dyslipidemia    Hypertension Father     Prostate cancer Paternal Uncle     Thyroid disease Family      disorder       Meds/Allergies       Current Outpatient Prescriptions:     cyanocobalamin (VITAMIN B-12) 1,000 mcg tablet    escitalopram (LEXAPRO) 5 mg tablet    omeprazole (PriLOSEC) 20 mg delayed release capsule    simvastatin (ZOCOR) 10 mg tablet    tapentadol (NUCYNTA) 75 mg tablet    Allergies   Allergen Reactions    Sulfa Antibiotics      Annotation - [id number]: Migraine; Annotation - [id number]: cellular issues           Objective     Blood pressure 120/60, pulse 60, temperature 97 8 °F (36 6 °C), temperature source Tympanic, height 5' 4" (1 626 m), weight 60 8 kg (134 lb)  Body mass index is 23 kg/m²  PHYSICAL EXAM:      General Appearance:   Alert, cooperative, no distress   HEENT:   Normocephalic, atraumatic, anicteric      Neck:  Supple, symmetrical, trachea midline   Lungs:   Clear to auscultation bilaterally; no rales, rhonchi or wheezing; respirations unlabored    Heart[de-identified]   Regular rate and rhythm; no murmur, rub, or gallop  Abdomen:   Soft, non-tender, non-distended; normal bowel sounds; no masses, no organomegaly    Genitalia:   Deferred    Rectal:   Deferred    Extremities:  No cyanosis, clubbing or edema    Pulses:  2+ and symmetric    Skin:  No jaundice, rashes, or lesions    Lymph nodes:  No palpable cervical lymphadenopathy        Lab Results:   No visits with results within 1 Day(s) from this visit     Latest known visit with results is:   Appointment on 04/26/2018   Component Date Value    WBC 04/26/2018 5 13     RBC 04/26/2018 4 66     Hemoglobin 04/26/2018 14 8     Hematocrit 04/26/2018 42 9     MCV 04/26/2018 92     MCH 04/26/2018 31 8     MCHC 04/26/2018 34 5     RDW 04/26/2018 13 0     MPV 04/26/2018 9 3     Platelets 70/56/8738 232     nRBC 04/26/2018 0     Neutrophils Relative 04/26/2018 62     Lymphocytes Relative 04/26/2018 28     Monocytes Relative 04/26/2018 7     Eosinophils Relative 04/26/2018 3     Basophils Relative 04/26/2018 0     Neutrophils Absolute 04/26/2018 3 17     Lymphocytes Absolute 04/26/2018 1 42     Monocytes Absolute 04/26/2018 0 37     Eosinophils Absolute 04/26/2018 0 15     Basophils Absolute 04/26/2018 0 01     Sodium 04/26/2018 137     Potassium 04/26/2018 3 9     Chloride 04/26/2018 104     CO2 04/26/2018 27     Anion Gap 04/26/2018 6     BUN 04/26/2018 14     Creatinine 04/26/2018 0 89     Glucose, Fasting 04/26/2018 87     Calcium 04/26/2018 9 3     AST 04/26/2018 12     ALT 04/26/2018 23     Alkaline Phosphatase 04/26/2018 49     Total Protein 04/26/2018 7 4     Albumin 04/26/2018 4 2     Total Bilirubin 04/26/2018 0 80     eGFR 04/26/2018 76     Cholesterol 04/26/2018 204*    Triglycerides 04/26/2018 57     HDL, Direct 04/26/2018 90*    LDL Calculated 04/26/2018 103*    Non-HDL-Chol (CHOL-HDL) 04/26/2018 114     Vitamin B-12 04/26/2018 411     Vit D, 25-Hydroxy 04/26/2018 42 0          Radiology Results:   No results found

## 2018-05-14 NOTE — PATIENT INSTRUCTIONS
Pt is scheduled with Dr Darby Persaud on 7/20/18 with Dr Darby Persaud  Miralax/dulcolax instructions gone over by MA

## 2018-06-07 ENCOUNTER — OFFICE VISIT (OUTPATIENT)
Dept: PAIN MEDICINE | Facility: CLINIC | Age: 53
End: 2018-06-07
Payer: COMMERCIAL

## 2018-06-07 VITALS
RESPIRATION RATE: 18 BRPM | WEIGHT: 132 LBS | HEART RATE: 66 BPM | SYSTOLIC BLOOD PRESSURE: 120 MMHG | HEIGHT: 64 IN | DIASTOLIC BLOOD PRESSURE: 72 MMHG | BODY MASS INDEX: 22.53 KG/M2

## 2018-06-07 DIAGNOSIS — G89.4 PAIN SYNDROME, CHRONIC: Primary | ICD-10-CM

## 2018-06-07 DIAGNOSIS — M46.1 SACROILIITIS (HCC): ICD-10-CM

## 2018-06-07 PROCEDURE — 99214 OFFICE O/P EST MOD 30 MIN: CPT | Performed by: NURSE PRACTITIONER

## 2018-06-07 RX ORDER — METHYLPREDNISOLONE 4 MG/1
TABLET ORAL
Qty: 21 TABLET | Refills: 0 | Status: ON HOLD | OUTPATIENT
Start: 2018-06-07 | End: 2018-07-20 | Stop reason: ALTCHOICE

## 2018-06-07 NOTE — PROGRESS NOTES
Assessment:  1  Pain syndrome, chronic    2  Sacroiliitis ([address] )        Plan:  Carlo Davis is a 50 y o  female who presents for a follow up office visit in regards to Back Pain  She has a history of sacroiliitis  She is experiencing an increase in low back pain which is now occurring on the left side  Her pain is consistent with sacroiliitis  We discussed sacroiliac joint injections, but unfortunately the patient is leaving for a biking for on June 13th  Therefore to help with the inflammatory component of her pain, I will prescribe her a Medrol Dosepak  She was instructed to take the package as directed on the package insert  I will temporarily increase the Nucynta to 3 times a day dosing for this month to help with the increased pain  She will then return to twice a day dosing for the month of July  She states she would like her prescriptions electronically sent to her pharmacy  Therefore, 2 prescriptions for Nucynta 75 mg were electronically sent to pharmacy, with 1 script stating do not fill until June 9, 2018, and the 2nd script stating do not fill until July 7, 2018       There are risks associated with opioid medications, including dependence, addiction and tolerance  The patient understands and agrees to use these medications only as prescribed  Potential side effects of the medications include, but are not limited to, constipation, drowsiness, addiction, impaired judgment and risk of fatal overdose if not taken as prescribed  The patient was warned against driving while taking sedation medications  Sharing medications is a felony  At this point in time, the patient is showing no signs of addiction, abuse, diversion or suicidal ideation  [place] Prescription Drug Monitoring Program report was reviewed and was appropriate     My impressions and treatment recommendations were discussed in detail with the patient who verbalized understanding and had no further questions    Discharge instructions were provided  I personally saw and examined the patient and I agree with the above discussed plan of care  No orders of the defined types were placed in this encounter  New Medications Ordered This Visit   Medications    Methylprednisolone 4 MG TBPK     Sig: Use as directed on package     Dispense:  21 tablet     Refill:  0    tapentadol (NUCYNTA) 75 mg tablet     Sig: Take 1 tablet (75 mg total) by mouth 2 (two) times a day as needed for moderate pain Max Daily Amount: 150 mg     Dispense:  60 tablet     Refill:  0     Do not fill until 7/7/18  History of Present Illness:  Jing Vincent is a 50 y o  female who presents for a follow up office visit in regards to Back Pain  She has a history of sacroiliitis  The patients current symptoms include ongoing low back pain which has increased since the last office visit  She is now experiencing pain on the left side, which started about 2 weeks ago  She denies trauma or precipitating event which causes pain to occur  The pain is intermittent and occurs mostly in the evening at night, or when walking down steps  She describes as dull aching, throbbing, and pressure-like  She is currently rating her pain a 6/10 on the numeric rating scale  She is taking Nucynta 75 mg twice a day  She will be going on a biking tour in [facility] on June 13, 2018  She is asking if her Nucynta can be increased to help with her pain  The medication is providing moderate pain relief stating 65%  She denies side effects or bowel or bladder issues      Pain Contract Signed: 2/15/18, Last Urine Drug Screen: 2/15/18    I have personally reviewed and/or updated the patient's past medical history, past surgical history, family history, social history, current medications, allergies, and vital signs today  Review of Systems   Respiratory: Negative for shortness of breath  Cardiovascular: Negative for chest pain     Gastrointestinal: Negative for constipation, diarrhea, nausea and vomiting  Musculoskeletal: Negative for arthralgias, gait problem, joint swelling and myalgias  Skin: Negative for rash  Neurological: Negative for dizziness, seizures and weakness  All other systems reviewed and are negative  Patient Active Problem List   Diagnosis    Esophageal reflux    Hiatal hernia    Hyperlipidemia    Pain syndrome, chronic    Sacroiliitis (HCC)    Irritability and anger    Vitamin B12 deficiency    Seasonal allergic rhinitis due to pollen    Screening for colon cancer    Gastroesophageal reflux disease       Past Medical History:   Diagnosis Date    Fibrocystic disease of breast     Hiatal hernia     Hyperlipidemia        Past Surgical History:   Procedure Laterality Date    APPENDECTOMY      BREAST LUMPECTOMY Left     FOOT SURGERY Right 2011    right, hardware removal    OTHER SURGICAL HISTORY Right 2010    Complete Excision of Fifth Metatarsal Head        Family History   Problem Relation Age of Onset    Coronary artery disease Mother      CABG in her 62s   [facility] Other Mother      Dyslipidemia    Hypertension Mother     Heart attack Father 46     acute myocardial infarction    Other Father      Dyslipidemia    Hypertension Father     Prostate cancer Paternal Uncle     Thyroid disease Family      disorder       Social History     Occupational History    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, 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     Social History Main Topics    Smoking status: Never Smoker    Smokeless tobacco: Never Used    Alcohol use Yes      Comment: social drinker    Drug use: No    Sexual activity: Not on file      Comment: Partner had vasectomy       Current Outpatient Prescriptions on File Prior to Visit   Medication Sig    cyanocobalamin (VITAMIN B-12) 1,000 mcg tablet Take 1 tablet (1,000 mcg total) by mouth daily    escitalopram (LEXAPRO) 5 mg tablet Take 1 tablet (5 mg total) by mouth daily    omeprazole (PriLOSEC) 20 mg delayed release capsule Take 1 capsule by mouth daily    simvastatin (ZOCOR) 10 mg tablet TAKE 1 TABLET DAILY    [DISCONTINUED] tapentadol (NUCYNTA) 75 mg tablet Take 1 tablet (75 mg total) by mouth 2 (two) times a day Max Daily Amount: 150 mg     No current facility-administered medications on file prior to visit  Allergies   Allergen Reactions    Sulfa Antibiotics      Annotation - [id number]: Migraine; Annotation - [id number]: cellular issues       Physical Exam:    /72   Pulse 66   Resp 18   Ht 5' 4" (1 626 m)   Wt 59 9 kg (132 lb)   BMI 22 66 kg/m²     Constitutional:normal, well developed, well nourished, alert, in no distress and non-toxic and no overt pain behavior    Eyes:anicteric  HEENT:grossly intact  Neck:supple, symmetric, trachea midline and no masses   Pulmonary:even and unlabored  Cardiovascular:No edema or pitting edema present  Skin:Normal without rashes or lesions and well hydrated  Psychiatric:Mood and affect appropriate  Neurologic:Cranial Nerves II-XII grossly intact  Musculoskeletal:normal     Lumbar Spine Exam    Appearance:  Normal lordosis  Palpation/Tenderness:  left sacroiliac joint tenderness  Range of Motion:  Full range of motion with no pain or limitations in flexion, extension, lateral flexion and rotation    Imaging:     MRI LUMBAR SPINE WITH AND WITHOUT CONTRAST 1/5/16     INDICATION-  Chronic low back pain and sciatica for spinal cyst which   was drained, 7-4 4, M54 16      COMPARISON-  MR 7/26/2012      TECHNIQUE-  Sagittal T1, sagittal T2, sagittal inversion recovery,   axial T1 and axial T2, coronal T2   Sagittal and axial T1 postcontrast    7 mL of Gadavist was injected intravenously with no immediate   consequence       IMAGE QUALITY-  Diagnostic      FINDINGS-      ALIGNMENT-  Normal alignment of the lumbar spine   No compression   fracture   No spondylolysis or spondylolisthesis   No scoliosis       MARROW SIGNAL-  Normal marrow signal is identified within the   visualized bony structures   No discrete marrow lesion       DISTAL CORD AND CONUS-  Normal size and signal of the distal cord and   conus   The conus ends at the L1 level       PARASPINAL SOFT TISSUES-  Paraspinal soft tissues are unremarkable       SACRUM-  Normal signal within the sacrum   No evidence of insufficiency   or stress fracture   Similar to prior study, there are several   perineural cysts in the upper sacral region, to include S1 and S2,   largest on the left approximately 15 mm the C2 level       LOWER THORACIC DISC SPACES-  Normal disc height and signal   No disc   herniation, canal stenosis or foraminal narrowing       LUMBAR DISC SPACES-  Maintenance of vertebral body and disc height,   signal intensity and alignment       POSTCONTRAST IMAGING-  No abnormal enhancement       IMPRESSION-      Normal enhanced MRI of the lumbar spine   Stable bilateral perineural   cysts within the upper sacrum   The entire sacrum was not completely   evaluated            Pt reports last dose of Nucynta was 6/6/18

## 2018-07-19 ENCOUNTER — ANESTHESIA EVENT (OUTPATIENT)
Dept: GASTROENTEROLOGY | Facility: MEDICAL CENTER | Age: 53
End: 2018-07-19
Payer: COMMERCIAL

## 2018-07-20 ENCOUNTER — HOSPITAL ENCOUNTER (OUTPATIENT)
Facility: MEDICAL CENTER | Age: 53
Setting detail: OUTPATIENT SURGERY
Discharge: HOME | End: 2018-07-20
Attending: INTERNAL MEDICINE | Admitting: INTERNAL MEDICINE
Payer: COMMERCIAL

## 2018-07-20 ENCOUNTER — ANESTHESIA (OUTPATIENT)
Dept: GASTROENTEROLOGY | Facility: MEDICAL CENTER | Age: 53
End: 2018-07-20
Payer: COMMERCIAL

## 2018-07-20 VITALS
WEIGHT: 130 LBS | HEART RATE: 60 BPM | SYSTOLIC BLOOD PRESSURE: 141 MMHG | RESPIRATION RATE: 16 BRPM | HEIGHT: 64 IN | TEMPERATURE: 97.3 F | OXYGEN SATURATION: 100 % | BODY MASS INDEX: 22.2 KG/M2 | DIASTOLIC BLOOD PRESSURE: 79 MMHG

## 2018-07-20 DIAGNOSIS — Z12.11 SCREENING FOR COLON CANCER: ICD-10-CM

## 2018-07-20 DIAGNOSIS — K44.9 HIATAL HERNIA: ICD-10-CM

## 2018-07-20 DIAGNOSIS — K21.9 GASTROESOPHAGEAL REFLUX DISEASE, ESOPHAGITIS PRESENCE NOT SPECIFIED: ICD-10-CM

## 2018-07-20 LAB — EXT PREGNANCY TEST URINE: NEGATIVE

## 2018-07-20 PROCEDURE — 45385 COLONOSCOPY W/LESION REMOVAL: CPT | Performed by: INTERNAL MEDICINE

## 2018-07-20 PROCEDURE — 88305 TISSUE EXAM BY PATHOLOGIST: CPT | Performed by: PATHOLOGY

## 2018-07-20 PROCEDURE — 88342 IMHCHEM/IMCYTCHM 1ST ANTB: CPT | Performed by: PATHOLOGY

## 2018-07-20 PROCEDURE — 81025 URINE PREGNANCY TEST: CPT | Performed by: ANESTHESIOLOGY

## 2018-07-20 PROCEDURE — 43239 EGD BIOPSY SINGLE/MULTIPLE: CPT | Performed by: INTERNAL MEDICINE

## 2018-07-20 PROCEDURE — 45380 COLONOSCOPY AND BIOPSY: CPT | Performed by: INTERNAL MEDICINE

## 2018-07-20 RX ORDER — SODIUM CHLORIDE 9 MG/ML
125 INJECTION, SOLUTION INTRAVENOUS CONTINUOUS
Status: DISCONTINUED | OUTPATIENT
Start: 2018-07-20 | End: 2018-07-20 | Stop reason: HOSPADM

## 2018-07-20 RX ORDER — SIMETHICONE 20 MG/.3ML
EMULSION ORAL AS NEEDED
Status: DISCONTINUED | OUTPATIENT
Start: 2018-07-20 | End: 2018-07-20 | Stop reason: HOSPADM

## 2018-07-20 RX ORDER — PROPOFOL 10 MG/ML
INJECTION, EMULSION INTRAVENOUS AS NEEDED
Status: DISCONTINUED | OUTPATIENT
Start: 2018-07-20 | End: 2018-07-20 | Stop reason: SURG

## 2018-07-20 RX ADMIN — PROPOFOL 200 MG: 10 INJECTION, EMULSION INTRAVENOUS at 08:36

## 2018-07-20 RX ADMIN — PROPOFOL 50 MG: 10 INJECTION, EMULSION INTRAVENOUS at 08:40

## 2018-07-20 RX ADMIN — PROPOFOL 50 MG: 10 INJECTION, EMULSION INTRAVENOUS at 08:42

## 2018-07-20 RX ADMIN — PROPOFOL 50 MG: 10 INJECTION, EMULSION INTRAVENOUS at 08:38

## 2018-07-20 RX ADMIN — SODIUM CHLORIDE 125 ML/HR: 0.9 INJECTION, SOLUTION INTRAVENOUS at 08:22

## 2018-07-20 RX ADMIN — PROPOFOL 50 MG: 10 INJECTION, EMULSION INTRAVENOUS at 08:44

## 2018-07-20 RX ADMIN — PROPOFOL 50 MG: 10 INJECTION, EMULSION INTRAVENOUS at 08:47

## 2018-07-20 NOTE — OP NOTE
**** GI/ENDOSCOPY REPORT ****     PATIENT NAME: Corrine SANTIAGO - VISIT ID:  Patient ID: [id number] YOB: 1965     INTRODUCTION: Esophagogastroduodenoscopy - A 50 female patient presents   for an outpatient Esophagogastroduodenoscopy at [address] [place] [address]  INDICATIONS: GERD  CONSENT: The benefits, risks, and alternatives to the procedure were   discussed and informed consent was obtained from the patient  PREPARATION:  EKG, pulse, pulse oximetry and blood pressure were monitored   throughout the procedure  ASA Classification: Class 2 - Patient has mild   to moderate systemic disturbance that may or may not be related to the   disorder requiring surgery  MEDICATIONS: Anesthesia-check records     PROCEDURE:  The endoscope was passed without difficulty through the mouth   under direct visualization and advanced to the 2nd portion of the   duodenum  The scope was withdrawn and the mucosa was carefully examined  Retroflexion was performed  FINDINGS:   Esophagus: Two small inlets patches  GE junction: There was   a small sliding hiatus hernia visible in the GE junction  Stomach:   Multiple random biopsies was taken  A single polyp (measuring less than 5   mm in size) was found in the fundus  The polyp was removed by polypectomy   with cold biopsy forceps  GAVE  Duodenum: The duodenum appeared to be   normal      COMPLICATIONS: There were no complications  IMPRESSIONS: Two small inlets patches  A hiatus hernia found  A polyp   found in the fundus  Polypectomy was performed  GAVE  Normal duodenum  RECOMMENDATIONS: Follow-up on the results of the biopsy specimens  Anti-reflux measures: Raise the head of the bed 4 to 6 inches  Avoid   smoking  Avoid excess coffee, tea or other caffeinated beverages  Avoid   garments that fit tightly through the abdomen  Avoid eating before bed  Continue current medications  Colonoscopy to follow  ESTIMATED BLOOD LOSS:     PATHOLOGY SPECIMENS: Multiple random biopsies taken  Polypectomy   performed, using, cold biopsy forceps  PROCEDURE CODES:     ICD-9 Codes: 530 81 Esophageal reflux 553 3 Diaphragmatic hernia without   mention of obstruction or gangrene 211 1 Benign neoplasm of stomach     ICD-10 Codes: K21 Gastro-esophageal reflux disease K44 Diaphragmatic   hernia C85 0 Neoplasm of uncertain behavior of stomach     PERFORMED BY: HERMES Gallardo  on 07/20/2018  Version 1, electronically signed by HERMES Burton  on 07/20/2018   at 09:08

## 2018-07-20 NOTE — DISCHARGE INSTRUCTIONS
Upper Endoscopy   WHAT YOU NEED TO KNOW:   An upper endoscopy is also called an upper gastrointestinal (GI) endoscopy, or an esophagogastroduodenoscopy (EGD)  You may feel bloated, gassy, or have some abdominal discomfort after your procedure  Your throat may be sore for 24 to 36 hours  You may burp or pass gas from air that is still inside your body  DISCHARGE INSTRUCTIONS:   Call 911 if:   · You have sudden chest pain or trouble breathing  Seek care immediately if:   · You feel dizzy or faint  · You have trouble swallowing  · You have severe throat pain  · Your bowel movements are very dark or black  · Your abdomen is hard and firm and you have severe pain  · You vomit blood  Contact your healthcare provider if:   · You feel full or bloated and cannot burp or pass gas  · You have not had a bowel movement for 3 days after your procedure  · You have neck pain  · You have a fever or chills  · You have nausea or are vomiting  · You have a rash or hives  · You have questions or concerns about your endoscopy  Relieve a sore throat:  Suck on throat lozenges or crushed ice  Gargle with a small amount of warm salt water  Mix 1 teaspoon of salt and 1 cup of warm water to make salt water  Relieve gas and discomfort from bloating:  Lie on your right side with a heating pad on your abdomen  Take short walks to help pass gas  Eat small meals until bloating is relieved  Rest after your procedure:  Do not drive or make important decisions until the day after your procedure  Return to your normal activity as directed  You can usually return to work the day after your procedure  Follow up with your healthcare provider as directed:  Write down your questions so you remember to ask them during your visits  © 2017 [employer] Information is for End User's use only and may not be sold, redistributed or otherwise used for commercial purposes   All illustrations and images included in CareNotes® are the copyrighted property of [employer] M [employer]  or Shravan Vazquez  The above information is an  only  It is not intended as medical advice for individual conditions or treatments  Talk to your doctor, nurse or pharmacist before following any medical regimen to see if it is safe and effective for you  Hiatal Hernia   WHAT YOU NEED TO KNOW:   A hiatal hernia is a condition that causes part of your stomach to bulge through the hiatus (small opening) in your diaphragm  The part of the stomach may move up and down, or it may get trapped above the diaphragm  DISCHARGE INSTRUCTIONS:   Seek care immediately if:   · You have severe abdominal pain  · You try to vomit but nothing comes out (retching)  · You have severe chest pain and sudden trouble breathing  · Your bowel movements are black or bloody  · Your vomit looks like coffee grounds or has blood in it  Contact your healthcare provider if:   · Your symptoms are getting worse  · You have nausea, and you are vomiting  · You are losing weight without trying  · You have questions or concerns about your condition or care  Medicines:   · Medicines  may be given to relieve heartburn symptoms  These medicines help to decrease or block stomach acid  You may also be given medicines that help to tighten the esophageal sphincter  · Take your medicine as directed  Contact your healthcare provider if you think your medicine is not helping or if you have side effects  Tell him or her if you are allergic to any medicine  Keep a list of the medicines, vitamins, and herbs you take  Include the amounts, and when and why you take them  Bring the list or the pill bottles to follow-up visits  Carry your medicine list with you in case of an emergency    Follow up with your healthcare provider as directed:  Write down your questions so you remember to ask them during your visits  Self care:   · Avoid foods that make your symptoms worse  These may include spicy foods, fruit juices, alcohol, caffeine, chocolate, and mint  · Eat several small meals during the day  Small meals give your stomach less food to digest     · Avoid lying down and bending forward after you eat  Do not eat meals 2 to 3 hours before bedtime  This decreases your risk for reflux  · Maintain a healthy weight  If you are overweight, weight loss may help relieve your symptoms  · Sleep with your head elevated  at least 6 inches  · Do not smoke  Smoking can increase your symptoms of heartburn  © 2017 [address]  Information is for End User's use only and may not be sold, redistributed or otherwise used for commercial purposes  All illustrations and images included in CareNotes® are the copyrighted property of [employer]  or Shravan Vazquez  The above information is an  only  It is not intended as medical advice for individual conditions or treatments  Talk to your doctor, nurse or pharmacist before following any medical regimen to see if it is safe and effective for you  Gastric Polyps   WHAT YOU NEED TO KNOW:   Gastric polyps are growths that form in the lining of your stomach  They are not cancerous, but certain types of polyps can change into cancer  DISCHARGE INSTRUCTIONS:   Follow up with your healthcare provider as directed: You may need more tests or procedures  Write down any questions so you remember to ask them at your visits  Medicines:   · Medicines may  be given if you have an infection caused by H  pylori bacteria  · Take your medicine as directed  Contact your healthcare provider if you think your medicine is not helping or if you have side effects  Tell him or her if you are allergic to any medicine  Keep a list of the medicines, vitamins, and herbs you take  Include the amounts, and when and why you take them   Bring the list or the pill bottles to follow-up visits  Carry your medicine list with you in case of an emergency  Seek care immediately if:   · You have blood in your vomit  · You have dark bowel movements  · You have severe pain in your abdomen that does not go away after you take medicine  Contact your healthcare provider if:   · You have indigestion that does not go away with treatment  · You vomit after meals  · You have questions or concerns about your condition or care  © 2017 [address] [employer] [place] Information is for End User's use only and may not be sold, redistributed or otherwise used for commercial purposes  All illustrations and images included in CareNotes® are the copyrighted property of [employer] M [employer] Inc  or Shravan Vazquez  The above information is an  only  It is not intended as medical advice for individual conditions or treatments  Talk to your doctor, nurse or pharmacist before following any medical regimen to see if it is safe and effective for you  Colonoscopy   WHAT YOU NEED TO KNOW:   A colonoscopy is a procedure to examine the inside of your colon (intestine) with a scope  Polyps or tissue growths may have been removed during your colonoscopy  It is normal to feel bloated and to have some abdominal discomfort  You should be passing gas  If you have hemorrhoids or you had polyps removed, you may have a small amount of bleeding  DISCHARGE INSTRUCTIONS:   Seek care immediately if:   · You have a large amount of bright red blood in your bowel movements  · Your abdomen is hard and firm and you have severe pain  · You have sudden trouble breathing  Contact your healthcare provider if:   · You develop a rash or hives  · You have a fever within 24 hours of your procedure  · You have not had a bowel movement for 3 days after your procedure  · You have questions or concerns about your condition or care    Activity:   · Do not lift, strain, or run for 3 days after your procedure  · Rest after your procedure  You have been given medicine to relax you  Do not  drive or make important decisions until the day after your procedure  Return to your normal activity as directed  · Relieve gas and discomfort from bloating  by lying on your right side with a heating pad on your abdomen  You may need to take short walks to help the gas move out  Eat small meals until bloating is relieved  If you had polyps removed: For 7 days after your procedure:  · Do not  take aspirin  · Do not  go on long car rides  Help prevent constipation:   · Eat a variety of healthy foods  Healthy foods include fruit, vegetables, whole-grain breads, low-fat dairy products, beans, lean meat, and fish  Ask if you need to be on a special diet  Your healthcare provider may recommend that you eat high-fiber foods such as cooked beans  Fiber helps you have regular bowel movements  · Drink liquids as directed  Adults should drink between 9 and 13 eight-ounce cups of liquid every day  Ask what amount is best for you  For most people, good liquids to drink are water, juice, and milk  · Exercise as directed  Talk to your healthcare provider about the best exercise plan for you  Exercise can help prevent constipation, decrease your blood pressure and improve your health  Follow up with your healthcare provider as directed:  Write down your questions so you remember to ask them during your visits  © 2017 [address] Information is for End User's use only and may not be sold, redistributed or otherwise used for commercial purposes  All illustrations and images included in CareNotes® are the copyrighted property of [employer]  or Shravan Vazquez  The above information is an  only  It is not intended as medical advice for individual conditions or treatments   Talk to your doctor, nurse or pharmacist before following any medical regimen to see if it is safe and effective for you  Colorectal Polyps   WHAT YOU NEED TO KNOW:   Colorectal polyps are small growths of tissue in the lining of the colon and rectum  Most polyps are hyperplastic polyps and are usually benign (noncancerous)  Certain types of polyps, called adenomatous polyps, may turn into cancer  DISCHARGE INSTRUCTIONS:   Follow up with your healthcare provider or gastroenterologist as directed: You may need to return for more tests, such as another colonoscopy  Write down your questions so you remember to ask them during your visits  Reduce your risk for colorectal polyps:   · Eat a variety of healthy foods:  Healthy foods include fruit, vegetables, whole-grain breads, low-fat dairy products, beans, lean meat, and fish  Ask if you need to be on a special diet  · Maintain a healthy weight:  Ask your healthcare provider if you need to lose weight and how much you need to lose  Ask for help with a weight loss program     · Exercise:  Begin to exercise slowly and do more as you get stronger  Talk with your healthcare provider before you start an exercise program      · Limit alcohol:  Your risk for polyps increases the more you drink  · Do not smoke: If you smoke, it is never too late to quit  Ask for information about how to stop  For support and more information:   · Feliciano B[facility] ([facility]) [address], [place] [address]  Phone: 8- 516 - [phone]  Web Address: www digestive  niddk nih gov  Contact your healthcare provider or gastroenterologist if:   · You have a fever  · You have chills, a cough, or feel weak and achy  · You have abdominal pain that does not go away or gets worse after you take medicine  · Your abdomen is swollen  · You are losing weight without trying  · You have questions or concerns about your condition or care  Seek care immediately or call 911 if:   · You have sudden shortness of breath  · You have a fast heart rate, fast breathing, or are too dizzy to stand up  · You have severe abdominal pain  · You see blood in your bowel movement  © 2017 [address] [place] Information is for End User's use only and may not be sold, redistributed or otherwise used for commercial purposes  All illustrations and images included in CareNotes® are the copyrighted property of [employer]  or Shravan Vazquez  The above information is an  only  It is not intended as medical advice for individual conditions or treatments  Talk to your doctor, nurse or pharmacist before following any medical regimen to see if it is safe and effective for you

## 2018-07-20 NOTE — OP NOTE
**** GI/ENDOSCOPY REPORT ****     PATIENT NAME: Ahsan Dash ------ VISIT ID:  Patient ID: [id number]   YOB: 1965     INTRODUCTION: Colonoscopy - A 50 female patient presents for an outpatient   Colonoscopy at [address] [place] [address]  PREVIOUS COLONOSCOPY: No prior colonoscopy  INDICATIONS: Screening-ADR  CONSENT:  The benefits, risks, and alternatives to the procedure were   discussed and informed consent was obtained from the patient  PREPARATION: EKG, pulse, pulse oximetry and blood pressure were monitored   throughout the procedure  The patient was identified by myself both   verbally and by visual inspection of ID band  Airway Assessment   Classification: Airway class 2 - Visualization of the soft palate, fauces   and uvula  ASA Classification: Class 2 - Patient has mild to moderate   systemic disturbance that may or may not be related to the disorder   requiring surgery  MEDICATIONS: Anesthesia-check records     PROCEDURE:  The endoscope was passed without difficulty through the anus   under direct visualization and advanced to the cecum, confirmed by   appendiceal orifice and ileocecal valve  The scope was withdrawn and the   mucosa was carefully examined  The quality of the preparation was good  Retroflexion was performed  Cecal Intubation Time: 2 minutes(s) Scope   Withdrawal Time: 12 minutes(s)     RECTAL EXAM: Normal rectal exam      FINDINGS:  A single sessile polyp, measuring 8 mm in size, was found in   the cecum  The polyp was removed by cold snare polypectomy  A single   sessile polyp, measuring less than 5 mm in size, was found in the sigmoid   colon  The polyp was removed by cold biopsy polypectomy  COMPLICATIONS: There were no complications  IMPRESSIONS: A single sessile polyp found in the cecum; removed by cold   snare polypectomy  A single sessile polyp found in the sigmoid colon;   removed by cold biopsy polypectomy  RECOMMENDATIONS: Follow-up on the results of the biopsy specimens  Repeat   colonoscopy in 5 years or sooner if clinically indicated  ESTIMATED BLOOD LOSS:     PATHOLOGY SPECIMENS: Removed by cold snare polypectomy  Removed by cold   biopsy polypectomy  PROCEDURE CODES:     ICD-9 Codes: 211 3 Benign neoplasm of colon 211 3 Benign neoplasm of colon     ICD-10 Codes: K63 5 Polyp of colon K63 5 Polyp of colon     PERFORMED BY: HERMES Mcbride  on 07/20/2018  Version 1, electronically signed by HERMES Cunningham  on 07/20/2018   at 09:11

## 2018-07-20 NOTE — H&P
History and Physical -  [facility]  Ivett Mosquera 50 y o  female MRN: [id number]                  HPI: Ivett Mosquera is a 50y o  year old female who presents for GERD, hiatal hernia, and colon screening  REVIEW OF SYSTEMS: Per the HPI, and otherwise unremarkable  Historical Information   Past Medical History:   Diagnosis Date    Anxiety     Fibrocystic disease of breast     GERD (gastroesophageal reflux disease)     Hiatal hernia     Hyperlipidemia      Past Surgical History:   Procedure Laterality Date    APPENDECTOMY      BREAST LUMPECTOMY Left     FOOT SURGERY Right 2011    right, hardware removal    OTHER SURGICAL HISTORY Right 2010    Complete Excision of Fifth Metatarsal Head      Social History   History   Alcohol Use    Yes     Comment: social drinker     History   Drug Use No     History   Smoking Status    Never Smoker   Smokeless Tobacco    Never Used     Family History   Problem Relation Age of Onset    Coronary artery disease Mother         CABG in her 62s   Levora Freeman Other Mother         Dyslipidemia    Hypertension Mother     Heart attack Father 46        acute myocardial infarction    Other Father         Dyslipidemia    Hypertension Father     Prostate cancer Paternal Uncle     Thyroid disease Family         disorder       Meds/Allergies     Prescriptions Prior to Admission   Medication    cyanocobalamin (VITAMIN B-12) 1,000 mcg tablet    escitalopram (LEXAPRO) 5 mg tablet    simvastatin (ZOCOR) 10 mg tablet    tapentadol (NUCYNTA) 75 mg tablet       Allergies   Allergen Reactions    Sulfa Antibiotics      Annotation - [id number]: Migraine; Annotation - [id number]: cellular issues       Objective     Blood pressure 150/72, pulse 64, temperature (!) 97 3 °F (36 3 °C), temperature source Temporal, resp  rate 16, height 5' 4" (1 626 m), weight 59 kg (130 lb), SpO2 100 %        PHYSICAL EXAM    Gen: NAD  CV: RRR  CHEST: Clear  ABD: soft, NT/ND  EXT: no edema      ASSESSMENT/PLAN:  This is a 50y o  year old female here for upper endoscopy and colonoscopy, and she is stable and optimized for her procedure

## 2018-07-20 NOTE — ANESTHESIA PREPROCEDURE EVALUATION
Review of Systems/Medical History          Cardiovascular  Hyperlipidemia,    Pulmonary  Negative pulmonary ROS        GI/Hepatic    GERD ,  Hiatal hernia,             Endo/Other  Negative endo/other ROS      GYN       Hematology  Negative hematology ROS      Musculoskeletal  Back pain (chronic yennifer pain, sacroilitis) ,        Neurology  Negative neurology ROS      Psychology   Negative psychology ROS              Physical Exam    Airway    Mallampati score: I  TM Distance: >3 FB  Neck ROM: full     Dental       Cardiovascular  Rhythm: regular, Rate: normal, Cardiovascular exam normal    Pulmonary  Pulmonary exam normal     Other Findings        Anesthesia Plan  ASA Score- 2     Anesthesia Type- IV sedation with anesthesia with ASA Monitors  Additional Monitors:   Airway Plan:         Plan Factors-    Induction- intravenous  Postoperative Plan-     Informed Consent- Anesthetic plan and risks discussed with patient

## 2018-07-26 NOTE — PROGRESS NOTES
My medical assistant will call her with her results  Her colon polyps were adenomas so her next colonoscopy should be in five years  Her stomach biopsies were unremarkable

## 2018-08-02 ENCOUNTER — OFFICE VISIT (OUTPATIENT)
Dept: PAIN MEDICINE | Facility: CLINIC | Age: 53
End: 2018-08-02
Payer: COMMERCIAL

## 2018-08-02 VITALS
DIASTOLIC BLOOD PRESSURE: 76 MMHG | TEMPERATURE: 98.2 F | HEIGHT: 64 IN | WEIGHT: 133 LBS | BODY MASS INDEX: 22.71 KG/M2 | SYSTOLIC BLOOD PRESSURE: 128 MMHG | HEART RATE: 64 BPM

## 2018-08-02 DIAGNOSIS — G89.4 PAIN SYNDROME, CHRONIC: Primary | ICD-10-CM

## 2018-08-02 DIAGNOSIS — F11.20 UNCOMPLICATED OPIOID DEPENDENCE (HCC): ICD-10-CM

## 2018-08-02 DIAGNOSIS — M46.1 SACROILIITIS (HCC): ICD-10-CM

## 2018-08-02 PROCEDURE — 80305 DRUG TEST PRSMV DIR OPT OBS: CPT | Performed by: NURSE PRACTITIONER

## 2018-08-02 PROCEDURE — 99214 OFFICE O/P EST MOD 30 MIN: CPT | Performed by: NURSE PRACTITIONER

## 2018-08-02 NOTE — PROGRESS NOTES
Assessment:  1  Pain syndrome, chronic    2  Sacroiliitis [facility])        Plan:  The patient is a 50 y o  female last seen on 6/7/18 who presents for a follow up office visit in regards to chronic pain secondary to sacroiliitis  The patient continues with ongoing low back pain occurring from sacroiliitis  She feels the Nucynta 75 mg 2- 3 times a day is providing moderate pain relief stating 98 percent  Therefore, she will be continued on the medication as prescribed  Two months of prescriptions were electronically sent to her pharmacy, with 1 script stating do not fill until September 1, 2018    [place] Prescription Drug Monitoring Program report was reviewed and was appropriate     A urine drug screen was collected at today's office visit as part of our medication management protocol  The point of care testing results were appropriate for what was being prescribed  The specimen will be sent for confirmatory testing  The drug screen is medically necessary because the patient is either dependent on opioid medication or is being considered for opioid medication therapy and the results could impact ongoing or future treatment  The drug screen is to evaluate for the presences or absence of prescribed, non-prescribed, and/or illicit drugs/substances  There are risks associated with opioid medications, including dependence, addiction and tolerance  The patient understands and agrees to use these medications only as prescribed  Potential side effects of the medications include, but are not limited to, constipation, drowsiness, addiction, impaired judgment and risk of fatal overdose if not taken as prescribed  The patient was warned against driving while taking sedation medications  Sharing medications is a felony  At this point in time, the patient is showing no signs of addiction, abuse, diversion or suicidal ideation        The patient will follow-up in 8 weeks for medication prescription refill and reevaluation  The patient was advised to contact the office should their symptoms worsen in the interim  The patient was agreeable and verbalized an understanding  History of Present Illness: The patient is a 50 y o  female last seen on 6/7/18 who presents for a follow up office visit in regards to chronic pain secondary to sacroiliitis  The patient currently reports ongoing low back pain, which remains unchanged since the last office visit  She did experience exacerbation of pain after the last episode, and to the steroid Dosepak that was prescribed  She states this helped significantly  She continues with constant low back pain which occurs mostly in the evening at night  She describes as dull aching and pressure-like  She is currently rating her pain a 4/10 on the numeric rating scale  She has been taking the Nucynta 75 mg 3 times a day,  She decrease to twice a day this month, but states there were days when she needed to take 3 tabs  She ran out of her medication on Tuesday  The medication provides 98 percent pain relief, without side effects    Pain Contract Signed: 2/15/18  Last Urine Drug Screen: 8/2/18    I have personally reviewed and/or updated the patient's past medical history, past surgical history, family history, social history, current medications, allergies, and vital signs today  Review of Systems:    Review of Systems   Respiratory: Negative for shortness of breath  Cardiovascular: Negative for chest pain  Gastrointestinal: Negative for constipation, diarrhea, nausea and vomiting  Musculoskeletal: Positive for joint swelling  Negative for arthralgias, gait problem and myalgias  Skin: Negative for rash  Neurological: Negative for dizziness, seizures and weakness  All other systems reviewed and are negative          Past Medical History:   Diagnosis Date    Anxiety     Fibrocystic disease of breast     GERD (gastroesophageal reflux disease)     Hiatal hernia     Hyperlipidemia        Past Surgical History:   Procedure Laterality Date    APPENDECTOMY      BREAST LUMPECTOMY Left     FOOT SURGERY Right 2011    right, hardware removal    OTHER SURGICAL HISTORY Right 2010    Complete Excision of Fifth Metatarsal Head     [place] COLONOSCOPY FLX DX W/COLLJ SPEC WHEN PFRMD N/A 7/20/2018    Procedure: EGD AND COLONOSCOPY;  Surgeon: Marisela Rendon MD;  Location: [facility] GI LAB; Service: Gastroenterology       Family History   Problem Relation Age of Onset    Coronary artery disease Mother         CABG in her 62s   [employer] Other Mother         Dyslipidemia    Hypertension Mother     Heart attack Father 46        acute myocardial infarction    Other Father         Dyslipidemia    Hypertension Father     Prostate cancer Paternal Uncle     Thyroid disease Family         disorder       Social History     Occupational History    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, 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     Social History Main Topics    Smoking status: Never Smoker    Smokeless tobacco: Never Used    Alcohol use Yes      Comment: social drinker    Drug use: No    Sexual activity: Not on file      Comment: Partner had vasectomy         Current Outpatient Prescriptions:     cyanocobalamin (VITAMIN B-12) 1,000 mcg tablet, Take 1 tablet (1,000 mcg total) by mouth daily, Disp: 90 tablet, Rfl: 0    escitalopram (LEXAPRO) 5 mg tablet, Take 1 tablet (5 mg total) by mouth daily, Disp: 90 tablet, Rfl: 1    simvastatin (ZOCOR) 10 mg tablet, TAKE 1 TABLET DAILY, Disp: 90 tablet, Rfl: 1    tapentadol (NUCYNTA) 75 mg tablet, Take 1 tablet (75 mg total) by mouth 3 (three) times a day as needed for moderate pain Max Daily Amount: 225 mg, Disp: 90 tablet, Rfl: 0    Allergies   Allergen Reactions    Sulfa Antibiotics      Annotation - [id number]: Migraine;  Annotation - [id number]: cellular issues       Physical Exam:    /76   Pulse 64   Temp 98 2 °F (36 8 °C) (Oral) Ht 5' 4" (1 626 m)   Wt 60 3 kg (133 lb)   BMI 22 83 kg/m²     Constitutional:normal, well developed, well nourished, alert, in no distress and non-toxic and no overt pain behavior  Eyes:anicteric  HEENT:grossly intact  Neck:supple, symmetric, trachea midline and no masses   Pulmonary:even and unlabored  Cardiovascular:No edema or pitting edema present  Skin:Normal without rashes or lesions and well hydrated  Psychiatric:Mood and affect appropriate  Neurologic:Cranial Nerves II-XII grossly intact  Musculoskeletal:normal      Imaging    MRI LUMBAR SPINE WITH AND WITHOUT CONTRAST 1/5/16     INDICATION-  Chronic low back pain and sciatica for spinal cyst which   was drained, 7-4 4, M54 16      COMPARISON-  MR 7/26/2012      TECHNIQUE-  Sagittal T1, sagittal T2, sagittal inversion recovery,   axial T1 and axial T2, coronal T2  Sagittal and axial T1 postcontrast    7 mL of Gadavist was injected intravenously with no immediate   consequence  IMAGE QUALITY-  Diagnostic      FINDINGS-      ALIGNMENT-  Normal alignment of the lumbar spine  No compression   fracture  No spondylolysis or spondylolisthesis  No scoliosis  MARROW SIGNAL-  Normal marrow signal is identified within the   visualized bony structures  No discrete marrow lesion  DISTAL CORD AND CONUS-  Normal size and signal of the distal cord and   conus  The conus ends at the L1 level  PARASPINAL SOFT TISSUES-  Paraspinal soft tissues are unremarkable  SACRUM-  Normal signal within the sacrum  No evidence of insufficiency   or stress fracture  Similar to prior study, there are several   perineural cysts in the upper sacral region, to include S1 and S2,   largest on the left approximately 15 mm the C2 level  LOWER THORACIC DISC SPACES-  Normal disc height and signal   No disc   herniation, canal stenosis or foraminal narrowing        LUMBAR DISC SPACES-  Maintenance of vertebral body and disc height,   signal intensity and alignment  POSTCONTRAST IMAGING-  No abnormal enhancement  IMPRESSION-      Normal enhanced MRI of the lumbar spine  Stable bilateral perineural   cysts within the upper sacrum  The entire sacrum was not completely   evaluated  Last dose of Nucynta was Monday @ 8:00pm      No orders of the defined types were placed in this encounter

## 2018-08-18 DIAGNOSIS — E78.2 MIXED HYPERLIPIDEMIA: ICD-10-CM

## 2018-08-22 RX ORDER — SIMVASTATIN 10 MG
TABLET ORAL
Qty: 90 TABLET | Refills: 0 | Status: SHIPPED | OUTPATIENT
Start: 2018-08-22 | End: 2018-11-20 | Stop reason: SDUPTHER

## 2018-10-01 ENCOUNTER — OFFICE VISIT (OUTPATIENT)
Dept: PAIN MEDICINE | Facility: CLINIC | Age: 53
End: 2018-10-01
Payer: COMMERCIAL

## 2018-10-01 VITALS
HEIGHT: 64 IN | SYSTOLIC BLOOD PRESSURE: 122 MMHG | WEIGHT: 130 LBS | BODY MASS INDEX: 22.2 KG/M2 | DIASTOLIC BLOOD PRESSURE: 82 MMHG

## 2018-10-01 DIAGNOSIS — M46.1 SACROILIITIS (HCC): ICD-10-CM

## 2018-10-01 DIAGNOSIS — G89.4 PAIN SYNDROME, CHRONIC: Primary | ICD-10-CM

## 2018-10-01 DIAGNOSIS — F11.20 UNCOMPLICATED OPIOID DEPENDENCE (HCC): ICD-10-CM

## 2018-10-01 PROCEDURE — 99214 OFFICE O/P EST MOD 30 MIN: CPT | Performed by: NURSE PRACTITIONER

## 2018-10-01 NOTE — PROGRESS NOTES
Assessment:  1  Pain syndrome, chronic    2  Sacroiliitis ([address] )    3  Uncomplicated opioid dependence [facility])        Plan:The patient is a 48 y o  female last seen on 8/2/18 who presents for a follow up office visit in regards to chronic pain secondary to sacroiliitis  The patient continues with ongoing low back pain which remains unchanged since the last office visit  She is taking Nucynta 75 mg 3 times a day which is providing moderate pain relief stating 95%  Therefore, she will be continued on the medication as prescribed  Two prescriptions were electronically sent to her pharmacy, with 1 script she can fill today, and the 2nd script with a do not fill date of October 29, 2018  Patient forgot her prescription pill bottle for Nucynta  She was reminded to bring it to the next office visit, as random pill counts are performed per office policy  Patient verbalized understanding    If her symptoms worsened prior to the next office visit, the patient can call the office, and be scheduled for a bilateral sacroiliac joint injection    [address] [place] [address] Drive Monitoring Program report was reviewed and was appropriate       There are risks associated with opioid medications, including dependence, addiction and tolerance  The patient understands and agrees to use these medications only as prescribed  Potential side effects of the medications include, but are not limited to, constipation, drowsiness, addiction, impaired judgment and risk of fatal overdose if not taken as prescribed  The patient was warned against driving while taking sedation medications  Sharing medications is a felony  At this point in time, the patient is showing no signs of addiction, abuse, diversion or suicidal ideation  The patient will follow-up in 8 weeks for medication prescription refill and reevaluation  The patient was advised to contact the office should their symptoms worsen in the interim   The patient was agreeable and verbalized an understanding  History of Present Illness: The patient is a 48 y o  female last seen on 8/2/18 who presents for a follow up office visit in regards to chronic pain secondary to sacroiliitis  The patient currently reports ongoing low back pain, which remains unchanged since the last office visit  She continues with pain that radiates across the low back, but is more severe on the left side at this time  It is constant occurring mostly in the evening at night  She describes as dull aching and pressure-like  She is currently rating her pain a 5/10 on the numeric rating scale  She is taking Nucynta 75 mg 3 times a day which is providing 95% pain relief  She denies side effects or bowel or bladder issues  Pain Contract Signed: 2/15/18  Last Urine Drug Screen: 8/2/18    I have personally reviewed and/or updated the patient's past medical history, past surgical history, family history, social history, current medications, allergies, and vital signs today  Review of Systems:    Review of Systems   Respiratory: Negative for shortness of breath  Cardiovascular: Negative for chest pain  Gastrointestinal: Negative for constipation, diarrhea, nausea and vomiting  Musculoskeletal: Negative for arthralgias, gait problem, joint swelling and myalgias  Joint stiffness   Skin: Negative for rash  Neurological: Negative for dizziness, seizures and weakness  All other systems reviewed and are negative          Past Medical History:   Diagnosis Date    Anxiety     Fibrocystic disease of breast     GERD (gastroesophageal reflux disease)     Hiatal hernia     Hyperlipidemia        Past Surgical History:   Procedure Laterality Date    APPENDECTOMY      BREAST LUMPECTOMY Left     FOOT SURGERY Right 2011    right, hardware removal    OTHER SURGICAL HISTORY Right 2010    Complete Excision of Fifth Metatarsal Head     [place] COLONOSCOPY FLX DX W/COLLJ SPEC WHEN PFRMD N/A 7/20/2018 Procedure: EGD AND COLONOSCOPY;  Surgeon: Martha Antonio MD;  Location: [facility] GI LAB; Service: Gastroenterology       Family History   Problem Relation Age of Onset    Coronary artery disease Mother         CABG in her 62s   Delicia Isidro Other Mother         Dyslipidemia    Hypertension Mother     Heart attack Father 46        acute myocardial infarction    Other Father         Dyslipidemia    Hypertension Father     Prostate cancer Paternal Uncle     Thyroid disease Family         disorder       Social History     Occupational History    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, 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     Social History Main Topics    Smoking status: Never Smoker    Smokeless tobacco: Never Used    Alcohol use Yes      Comment: social drinker    Drug use: No    Sexual activity: Not on file      Comment: Partner had vasectomy         Current Outpatient Prescriptions:     cyanocobalamin (VITAMIN B-12) 1,000 mcg tablet, Take 1 tablet (1,000 mcg total) by mouth daily, Disp: 90 tablet, Rfl: 0    escitalopram (LEXAPRO) 5 mg tablet, Take 1 tablet (5 mg total) by mouth daily, Disp: 90 tablet, Rfl: 1    simvastatin (ZOCOR) 10 mg tablet, TAKE 1 TABLET DAILY, Disp: 90 tablet, Rfl: 0    tapentadol (NUCYNTA) 75 mg tablet, Take 1 tab PO TID PRN PAIN  DO not fill until 10/29/18, Disp: 90 tablet, Rfl: 0    Allergies   Allergen Reactions    Sulfa Antibiotics      Annotation - [id number]: Migraine; Annotation - [id number]: cellular issues       Physical Exam:    /82   Ht 5' 4" (1 626 m)   Wt 59 kg (130 lb)   BMI 22 31 kg/m²     Constitutional:normal, well developed, well nourished, alert, in no distress and non-toxic and no overt pain behavior    Eyes:anicteric  HEENT:grossly intact  Neck:supple, symmetric, trachea midline and no masses   Pulmonary:even and unlabored  Cardiovascular:No edema or pitting edema present  Skin:Normal without rashes or lesions and well hydrated  Psychiatric:Mood and affect appropriate  Neurologic:Cranial Nerves II-XII grossly intact  Musculoskeletal:normal      Imaging     MRI LUMBAR SPINE WITH AND WITHOUT CONTRAST 1/5/16     INDICATION-  Chronic low back pain and sciatica for spinal cyst which   was drained, 7-4 4, M54 16      COMPARISON-  MR 7/26/2012      TECHNIQUE-  Sagittal T1, sagittal T2, sagittal inversion recovery,   axial T1 and axial T2, coronal T2   Sagittal and axial T1 postcontrast    7 mL of Gadavist was injected intravenously with no immediate   consequence       IMAGE QUALITY-  Diagnostic      FINDINGS-      ALIGNMENT-  Normal alignment of the lumbar spine   No compression   fracture   No spondylolysis or spondylolisthesis   No scoliosis       MARROW SIGNAL-  Normal marrow signal is identified within the   visualized bony structures   No discrete marrow lesion       DISTAL CORD AND CONUS-  Normal size and signal of the distal cord and   conus   The conus ends at the L1 level       PARASPINAL SOFT TISSUES-  Paraspinal soft tissues are unremarkable       SACRUM-  Normal signal within the sacrum  No evidence of insufficiency   or stress fracture   Similar to prior study, there are several   perineural cysts in the upper sacral region, to include S1 and S2,   largest on the left approximately 15 mm the C2 level       LOWER THORACIC DISC SPACES-  Normal disc height and signal   No disc   herniation, canal stenosis or foraminal narrowing       LUMBAR DISC SPACES-  Maintenance of vertebral body and disc height,   signal intensity and alignment       POSTCONTRAST IMAGING-  No abnormal enhancement       IMPRESSION-      Normal enhanced MRI of the lumbar spine   Stable bilateral perineural   cysts within the upper sacrum   The entire sacrum was not completely   evaluated           No orders of the defined types were placed in this encounter

## 2018-10-10 ENCOUNTER — CLINICAL SUPPORT (OUTPATIENT)
Dept: INTERNAL MEDICINE CLINIC | Facility: CLINIC | Age: 53
End: 2018-10-10
Payer: COMMERCIAL

## 2018-10-10 DIAGNOSIS — Z23 FLU VACCINE NEED: Primary | ICD-10-CM

## 2018-10-10 PROCEDURE — 90682 RIV4 VACC RECOMBINANT DNA IM: CPT

## 2018-10-10 PROCEDURE — 90471 IMMUNIZATION ADMIN: CPT

## 2018-10-27 ENCOUNTER — APPOINTMENT (OUTPATIENT)
Dept: LAB | Age: 53
End: 2018-10-27
Payer: COMMERCIAL

## 2018-10-27 DIAGNOSIS — E78.49 OTHER HYPERLIPIDEMIA: ICD-10-CM

## 2018-10-27 DIAGNOSIS — E53.8 VITAMIN B12 DEFICIENCY: ICD-10-CM

## 2018-10-27 LAB
ALBUMIN SERPL BCP-MCNC: 3.8 G/DL (ref 3.5–5)
ALP SERPL-CCNC: 48 U/L (ref 46–116)
ALT SERPL W P-5'-P-CCNC: 25 U/L (ref 12–78)
ANION GAP SERPL CALCULATED.3IONS-SCNC: 6 MMOL/L (ref 4–13)
AST SERPL W P-5'-P-CCNC: 13 U/L (ref 5–45)
BILIRUB SERPL-MCNC: 0.68 MG/DL (ref 0.2–1)
BUN SERPL-MCNC: 20 MG/DL (ref 5–25)
CALCIUM SERPL-MCNC: 9 MG/DL (ref 8.3–10.1)
CHLORIDE SERPL-SCNC: 102 MMOL/L (ref 100–108)
CHOLEST SERPL-MCNC: 204 MG/DL (ref 50–200)
CO2 SERPL-SCNC: 29 MMOL/L (ref 21–32)
CREAT SERPL-MCNC: 0.9 MG/DL (ref 0.6–1.3)
GFR SERPL CREATININE-BSD FRML MDRD: 74 ML/MIN/1.73SQ M
GLUCOSE P FAST SERPL-MCNC: 83 MG/DL (ref 65–99)
HDLC SERPL-MCNC: 88 MG/DL (ref 40–60)
LDLC SERPL CALC-MCNC: 103 MG/DL (ref 0–100)
NONHDLC SERPL-MCNC: 116 MG/DL
POTASSIUM SERPL-SCNC: 4.2 MMOL/L (ref 3.5–5.3)
PROT SERPL-MCNC: 6.7 G/DL (ref 6.4–8.2)
SODIUM SERPL-SCNC: 137 MMOL/L (ref 136–145)
TRIGL SERPL-MCNC: 65 MG/DL
TSH SERPL DL<=0.05 MIU/L-ACNC: 1.21 UIU/ML (ref 0.36–3.74)
VIT B12 SERPL-MCNC: 889 PG/ML (ref 100–900)

## 2018-10-27 PROCEDURE — 80053 COMPREHEN METABOLIC PANEL: CPT

## 2018-10-27 PROCEDURE — 82607 VITAMIN B-12: CPT

## 2018-10-27 PROCEDURE — 80061 LIPID PANEL: CPT

## 2018-10-27 PROCEDURE — 36415 COLL VENOUS BLD VENIPUNCTURE: CPT

## 2018-10-27 PROCEDURE — 84443 ASSAY THYROID STIM HORMONE: CPT

## 2018-10-31 ENCOUNTER — OFFICE VISIT (OUTPATIENT)
Dept: INTERNAL MEDICINE CLINIC | Facility: CLINIC | Age: 53
End: 2018-10-31
Payer: COMMERCIAL

## 2018-10-31 VITALS
HEART RATE: 58 BPM | OXYGEN SATURATION: 95 % | RESPIRATION RATE: 16 BRPM | SYSTOLIC BLOOD PRESSURE: 118 MMHG | DIASTOLIC BLOOD PRESSURE: 78 MMHG | BODY MASS INDEX: 23.49 KG/M2 | HEIGHT: 64 IN | WEIGHT: 137.6 LBS | TEMPERATURE: 98 F

## 2018-10-31 DIAGNOSIS — E78.49 OTHER HYPERLIPIDEMIA: ICD-10-CM

## 2018-10-31 DIAGNOSIS — M46.1 SACROILIITIS (HCC): ICD-10-CM

## 2018-10-31 DIAGNOSIS — Z00.00 HEALTH MAINTENANCE EXAMINATION: Primary | ICD-10-CM

## 2018-10-31 DIAGNOSIS — E53.8 VITAMIN B12 DEFICIENCY: ICD-10-CM

## 2018-10-31 DIAGNOSIS — K21.9 GASTROESOPHAGEAL REFLUX DISEASE WITHOUT ESOPHAGITIS: ICD-10-CM

## 2018-10-31 DIAGNOSIS — J30.1 SEASONAL ALLERGIC RHINITIS DUE TO POLLEN: ICD-10-CM

## 2018-10-31 DIAGNOSIS — R45.4 IRRITABILITY AND ANGER: ICD-10-CM

## 2018-10-31 PROBLEM — M94.0 COSTOCHONDRITIS: Status: ACTIVE | Noted: 2018-10-31

## 2018-10-31 PROCEDURE — 99396 PREV VISIT EST AGE 40-64: CPT | Performed by: INTERNAL MEDICINE

## 2018-10-31 RX ORDER — OMEPRAZOLE 20 MG/1
20 CAPSULE, DELAYED RELEASE ORAL DAILY
Qty: 90 CAPSULE | Refills: 1 | Status: SHIPPED | OUTPATIENT
Start: 2018-10-31 | End: 2019-07-11 | Stop reason: SDUPTHER

## 2018-10-31 NOTE — PROGRESS NOTES
Assessment/Plan:    Hyperlipidemia  Stable, on statin  Gastroesophageal reflux disease  Takes omeprazole every other day  Recently had EGD  Sacroiliitis ([address] )  More frequent pain recently, on tapentadol  Followed by pain  Irritability and anger  Stable, on low dose escitalopram     Vitamin B12 deficiency  Improved, continue daily supplement  Seasonal allergic rhinitis due to pollen  Uses steroid nasal spray as needed  Costochondritis  Symptoms occur after UE exercises  Diagnoses and all orders for this visit:    Health maintenance examination  Comments:  Flu vaccine updated  Mammogram and PAPs due, repeat colonoscopy 2023  Gastroesophageal reflux disease without esophagitis  -     omeprazole (PriLOSEC) 20 mg delayed release capsule; Take 1 capsule (20 mg total) by mouth daily    Irritability and anger    Sacroiliitis (HCC)    Seasonal allergic rhinitis due to pollen    Vitamin B12 deficiency  -     CBC and differential; Future  -     Vitamin B12; Future    Other hyperlipidemia  -     Comprehensive metabolic panel; Future  -     Lipid panel; Future      Follow up in 9 months or as needed  Subjective:      Patient ID: Abdelrahman Moseley is a 48 y o  female  Ms Siria Villegas is feeling alright  She reports that her SI joint has been bothering her more frequent the  She reports that if the weather is dam or cold, pain is worse  She also experiences symptoms after prolonged sitting  New nighttime symptoms  She takes tapentadol regularly  She recently had her EGD and colonoscopy  She takes over-the-counter omeprazole every other day  She still experiences ear symptoms, has occasional pulsatile sensation in her right ear  The following portions of the patient's history were reviewed and updated as appropriate: allergies, current medications, past medical history, past social history and problem list     Review of Systems   Constitutional: Negative for appetite change and fatigue  HENT: Negative for congestion, ear pain, hearing loss and postnasal drip  Eyes: Negative for visual disturbance  Respiratory: Negative for cough and shortness of breath  Cardiovascular: Negative for chest pain and leg swelling  Gastrointestinal: Negative for abdominal pain, constipation and diarrhea  Genitourinary: Negative for dysuria and frequency  Musculoskeletal: Positive for back pain  Negative for arthralgias, gait problem and myalgias  Skin: Negative for rash and wound  Neurological: Negative for dizziness, numbness and headaches  Psychiatric/Behavioral: Negative for confusion and sleep disturbance  The patient is not nervous/anxious  Objective:      /78   Pulse 58   Temp 98 °F (36 7 °C)   Resp 16   Ht 5' 4" (1 626 m)   Wt 62 4 kg (137 lb 9 6 oz)   SpO2 95%   BMI 23 62 kg/m²          Physical Exam   Constitutional: She is oriented to person, place, and time  She appears well-developed and well-nourished  HENT:   Head: Normocephalic and atraumatic  Right Ear: Tympanic membrane, external ear and ear canal normal    Left Ear: Tympanic membrane and ear canal normal    Nose: Nose normal  No mucosal edema or rhinorrhea  Mouth/Throat: Oropharynx is clear and moist    Eyes: Pupils are equal, round, and reactive to light  Conjunctivae are normal    Neck: Neck supple  Cardiovascular: Normal rate, regular rhythm and normal heart sounds  No edema  Pulmonary/Chest: Effort normal and breath sounds normal  She has no wheezes  She has no rales  Abdominal: Soft  Bowel sounds are normal    Musculoskeletal:        Lumbar back: She exhibits no tenderness, no pain and no spasm  Neurological: She is alert and oriented to person, place, and time  Skin: Skin is warm  No rash noted  Psychiatric: She has a normal mood and affect  Her behavior is normal    Nursing note and vitals reviewed  Lab &/or imaging results reviewed with patient

## 2018-11-20 DIAGNOSIS — E78.2 MIXED HYPERLIPIDEMIA: ICD-10-CM

## 2018-11-20 RX ORDER — SIMVASTATIN 10 MG
TABLET ORAL
Qty: 90 TABLET | Refills: 1 | Status: SHIPPED | OUTPATIENT
Start: 2018-11-20 | End: 2019-05-19 | Stop reason: SDUPTHER

## 2018-11-26 ENCOUNTER — OFFICE VISIT (OUTPATIENT)
Dept: PAIN MEDICINE | Facility: CLINIC | Age: 53
End: 2018-11-26
Payer: COMMERCIAL

## 2018-11-26 VITALS
SYSTOLIC BLOOD PRESSURE: 110 MMHG | BODY MASS INDEX: 23.39 KG/M2 | HEIGHT: 64 IN | RESPIRATION RATE: 17 BRPM | HEART RATE: 73 BPM | WEIGHT: 137 LBS | DIASTOLIC BLOOD PRESSURE: 68 MMHG

## 2018-11-26 DIAGNOSIS — G89.4 PAIN SYNDROME, CHRONIC: Primary | ICD-10-CM

## 2018-11-26 DIAGNOSIS — M46.1 SACROILIITIS (HCC): ICD-10-CM

## 2018-11-26 DIAGNOSIS — M54.16 LUMBAR RADICULOPATHY: ICD-10-CM

## 2018-11-26 DIAGNOSIS — F11.20 UNCOMPLICATED OPIOID DEPENDENCE (HCC): ICD-10-CM

## 2018-11-26 PROCEDURE — 80305 DRUG TEST PRSMV DIR OPT OBS: CPT | Performed by: NURSE PRACTITIONER

## 2018-11-26 PROCEDURE — 99214 OFFICE O/P EST MOD 30 MIN: CPT | Performed by: NURSE PRACTITIONER

## 2018-11-26 RX ORDER — GABAPENTIN 300 MG/1
CAPSULE ORAL
COMMUNITY
Start: 2018-11-19 | End: 2019-03-11

## 2018-11-26 NOTE — PROGRESS NOTES
Assessment:  1  Pain syndrome, chronic    2  Sacroiliitis ([facility] [address] )    3  Lumbar radiculopathy    4  Uncomplicated opioid dependence ([facility] [address] )        Plan:  Kiet Cardona is a 48 y o  female who presents for a follow up office visit in regards to Back Pain and Leg Pain  The patient has a history of chronic pain syndrome secondary to sacroiliitis  Patient presents today with ongoing low back pain which remains unchanged since the last office visit  However, she has been experiencing paresthesias in her right leg which is new since last office visit  I will order an MRI of the lumbar spine to further evaluate  I will contact her with the results  She will continue on Nucynta 75 mg as prescribed, and 2 scripts were electronically sent to the pharmacy, with 1 script she can fill on November 27, 2018, and the 2nd script with a do not fill date of December 23, 2018    Patient brought her prescription pill bottle in for Nucynta 75 mg, which was filled on October 29, 2018  There were 7 tablets remaining which is appropriate    There are risks associated with opioid medications, including dependence, addiction and tolerance  The patient understands and agrees to use these medications only as prescribed  Potential side effects of the medications include, but are not limited to, constipation, drowsiness, addiction, impaired judgment and risk of fatal overdose if not taken as prescribed  The patient was warned against driving while taking sedation medications  Sharing medications is a felony  At this point in time, the patient is showing no signs of addiction, abuse, diversion or suicidal ideation  A urine drug screen was collected at today's office visit as part of our medication management protocol  The point of care testing results were appropriate for what was being prescribed  The specimen will be sent for confirmatory testing   The drug screen is medically necessary because the patient is either dependent on opioid medication or is being considered for opioid medication therapy and the results could impact ongoing or future treatment  The drug screen is to evaluate for the presences or absence of prescribed, non-prescribed, and/or illicit drugs/substances  [place] Prescription Drug Monitoring Program report was reviewed and was appropriate     My impressions and treatment recommendations were discussed in detail with the patient who verbalized understanding and had no further questions  Discharge instructions were provided  I personally saw and examined the patient and I agree with the above discussed plan of care  Orders Placed This Encounter   Procedures    MRI lumbar spine without contrast     Standing Status:   Future     Standing Expiration Date:   11/26/2022     Scheduling Instructions: There is no preparation for this test  Please leave your jewelry and valuables at home, wedding rings are the exception  Please bring your insurance cards, a form of photo ID and a list of your medications with you  Arrive 15 minutes prior to your appointment time in order to register  Please bring any prior CT or MRI studies of this area that were not performed at a [facility]  To schedule this appointment, please contact Central Scheduling at [phone]  Order Specific Question:   Is the patient pregnant? Answer:   No     Order Specific Question:   What is the patient's sedation requirement? Answer:   No Sedation     Order Specific Question:   Does the patient have metallic implants? Answer:   No     New Medications Ordered This Visit   Medications    gabapentin (NEURONTIN) 300 mg capsule    tapentadol (NUCYNTA) 75 mg tablet     Sig: Take 1 tab PO TID PRN PAIN  Do not fill until 12/23/18     Dispense:  90 tablet     Refill:  0       History of Present Illness:  Get Tan is a 48 y o  female who presents for a follow up office visit in regards to Back Pain and Leg Pain  The patient has a history of chronic pain syndrome secondary to sacroiliitis  She was last seen in the office on October 1, 2018, in which she was continued on Nucynta 75 mg 3 times a day  She presents today with ongoing low back pain which remains unchanged  However she has been experiencing new symptoms since last office visit  She states she has been feeling a stabbing pain on the top of her right foot  She had bunion surgery 8 years ago, so followed up with her foot doctor, who had performed a CT scan of the right foot, which showed no cause for this pain  She also been experiencing occasional right knee pain and a tingling sensation in the right calf  She describes the pain as burning and sharp and is rating it a 6/10 on the numeric rating scale  She is taking Nucynta 75 mg 3 times a day, which is providing 95 % pain relief  She denies side effects or bowel or bladder issues  She was prescribed gabapentin 300 mg by her podiatrist, but feels this is causing some drowsiness throughout the day    Pain Contract Signed: 2/15/18, Last Urine Drug Screen: 11/26/18 Last Pill Count 11/26/18    I have personally reviewed and/or updated the patient's past medical history, past surgical history, family history, social history, current medications, allergies, and vital signs today  Review of Systems   Respiratory: Negative for shortness of breath  Cardiovascular: Negative for chest pain  Gastrointestinal: Negative for constipation, diarrhea, nausea and vomiting  Musculoskeletal: Positive for joint swelling (joint stiffness)  Negative for arthralgias, gait problem and myalgias  Skin: Negative for rash  Neurological: Negative for dizziness, seizures and weakness  All other systems reviewed and are negative        Patient Active Problem List   Diagnosis    Esophageal reflux    Hiatal hernia    Hyperlipidemia    Pain syndrome, chronic    Sacroiliitis (HCC)    Irritability and anger    Vitamin B12 deficiency    Seasonal allergic rhinitis due to pollen    Screening for colon cancer    Gastroesophageal reflux disease    Costochondritis       Past Medical History:   Diagnosis Date    Anxiety     Fibrocystic disease of breast     GERD (gastroesophageal reflux disease)     Hiatal hernia     Hyperlipidemia        Past Surgical History:   Procedure Laterality Date    APPENDECTOMY      BREAST LUMPECTOMY Left     FOOT SURGERY Right 2011    right, hardware removal    OTHER SURGICAL HISTORY Right 2010    Complete Excision of Fifth Metatarsal Head     [place] COLONOSCOPY FLX DX W/COLLJ SPEC WHEN PFRMD N/A 7/20/2018    Procedure: EGD AND COLONOSCOPY;  Surgeon: Tiny Ramires MD;  Location: [facility]; Service: Gastroenterology       Family History   Problem Relation Age of Onset    Coronary artery disease Mother         CABG in her 62s   [employer] Other Mother         Dyslipidemia    Hypertension Mother     Heart attack Father 46        acute myocardial infarction    Other Father         Dyslipidemia    Hypertension Father     Prostate cancer Paternal Uncle     Thyroid disease Family         disorder    Alcohol abuse Neg Hx     Substance Abuse Neg Hx     Mental illness Neg Hx     Depression Neg Hx        Social History     Occupational History    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, now works [employer] business   1 day with GI     Social History Main Topics    Smoking status: Never Smoker    Smokeless tobacco: Never Used    Alcohol use Yes      Comment: social drinker    Drug use: No    Sexual activity: Not on file      Comment: Partner had vasectomy       Current Outpatient Prescriptions on File Prior to Visit   Medication Sig    cyanocobalamin (VITAMIN B-12) 1,000 mcg tablet Take 1 tablet (1,000 mcg total) by mouth daily    escitalopram (LEXAPRO) 5 mg tablet Take 1 tablet (5 mg total) by mouth daily    omeprazole (PriLOSEC) 20 mg delayed release capsule Take 1 capsule (20 mg total) by mouth daily    simvastatin (ZOCOR) 10 mg tablet TAKE 1 TABLET DAILY    [DISCONTINUED] tapentadol (NUCYNTA) 75 mg tablet Take 1 tab PO TID PRN PAIN  DO not fill until 10/29/18     No current facility-administered medications on file prior to visit  Allergies   Allergen Reactions    Sulfa Antibiotics      Annotation - [id number]: Migraine; Annotation - [id number]: cellular issues       Physical Exam:    /68 (BP Location: Left arm, Patient Position: Sitting, Cuff Size: Standard)   Pulse 73   Resp 17   Ht 5' 4" (1 626 m)   Wt 62 1 kg (137 lb)   BMI 23 52 kg/m²     Constitutional:normal, well developed, well nourished, alert, in no distress and non-toxic and no overt pain behavior    Eyes:anicteric  HEENT:grossly intact  Neck:supple, symmetric, trachea midline and no masses   Pulmonary:even and unlabored  Cardiovascular:No edema or pitting edema present  Skin:Normal without rashes or lesions and well hydrated  Psychiatric:Mood and affect appropriate  Neurologic:Cranial Nerves II-XII grossly intact  Musculoskeletal:normal     Lumbar Spine Exam    Appearance:  Normal lordosis  Palpation/Tenderness:  right lumbar paraspinal tenderness  right sacroiliac joint tenderness  Range of Motion:  Full range of motion with no pain or limitations in flexion, extension, lateral flexion and rotation  Motor Strength:  Left hip flexion:  5/5  Right hip flexion:  5/5  Left knee flexion:  5/5  Left knee extension:  5/5  Right knee flexion:  5/5  Right knee extension:  5/5  Left foot dorsiflexion:  5/5  Left foot plantar flexion:  5/5  Right foot dorsiflexion:  5/5  Right foot plantar flexion:  5/5    Imaging  MRI LUMBAR SPINE WITH AND WITHOUT CONTRAST 1/5/16     INDICATION-  Chronic low back pain and sciatica for spinal cyst which   was drained, 7-4 4, M54 16      COMPARISON-  MR 7/26/2012      TECHNIQUE-  Sagittal T1, sagittal T2, sagittal inversion recovery,   axial T1 and axial T2, coronal T2   Sagittal and axial T1 postcontrast    7 mL of Gadavist was injected intravenously with no immediate   consequence       IMAGE QUALITY-  Diagnostic      FINDINGS-      ALIGNMENT-  Normal alignment of the lumbar spine   No compression   fracture   No spondylolysis or spondylolisthesis   No scoliosis       MARROW SIGNAL-  Normal marrow signal is identified within the   visualized bony structures   No discrete marrow lesion       DISTAL CORD AND CONUS-  Normal size and signal of the distal cord and   conus   The conus ends at the L1 level       PARASPINAL SOFT TISSUES-  Paraspinal soft tissues are unremarkable       SACRUM-  Normal signal within the sacrum   No evidence of insufficiency   or stress fracture   Similar to prior study, there are several   perineural cysts in the upper sacral region, to include S1 and S2,   largest on the left approximately 15 mm the C2 level       LOWER THORACIC DISC SPACES-  Normal disc height and signal   No disc   herniation, canal stenosis or foraminal narrowing       LUMBAR DISC SPACES-  Maintenance of vertebral body and disc height,   signal intensity and alignment       POSTCONTRAST IMAGING-  No abnormal enhancement       IMPRESSION-      Normal enhanced MRI of the lumbar spine   Stable bilateral perineural   cysts within the upper sacrum   The entire sacrum was not completely   evaluated

## 2018-12-01 DIAGNOSIS — M54.40 CHRONIC LOW BACK PAIN WITH SCIATICA, SCIATICA LATERALITY UNSPECIFIED, UNSPECIFIED BACK PAIN LATERALITY: Primary | ICD-10-CM

## 2018-12-01 DIAGNOSIS — G89.29 CHRONIC LOW BACK PAIN WITH SCIATICA, SCIATICA LATERALITY UNSPECIFIED, UNSPECIFIED BACK PAIN LATERALITY: Primary | ICD-10-CM

## 2018-12-03 ENCOUNTER — TELEPHONE (OUTPATIENT)
Dept: RADIOLOGY | Facility: CLINIC | Age: 53
End: 2018-12-03

## 2018-12-03 NOTE — TELEPHONE ENCOUNTER
I left detailed vm on home/cell for pt that the MRI was denied by her Ins Co b/c she hasn't done 6 wks of PT within the past few months  [place] has prescribed PT for pt  Pt will need to call central scheduling and cx her MRI, ph # [phone]  The PT script will be mailed to the pt today  Pt should contact the office once the PT is completed  PT script mailed to pt  Any questions regarding this message pt should contact the [facility] office  C/B # and OH provided

## 2018-12-03 NOTE — TELEPHONE ENCOUNTER
Can you contact patient and let her know MRI denied due to no PT   I put order for PT in [facility]             ----- Message -----   From: Zac Starr RN   Sent: 11/30/2018  10:21 AM   To: RUDOLPH Rodriguez   Subject: FW: Denial / Peer to Peer                                ----- Message -----   From: Stephanie Vaughan   Sent: 11/30/2018   9:37 AM   To: RUDOLPH Rodriguez, Stephanie Vaughan, *   Subject: Denial / Peer to Peer                              Good Morning,      Received notification from Madhu Tuttle, regarding [employer] authorization for MRI Lumbar Spine WO Contrast  This has been Denied, based on no physical therapy , chiropractic treatments, or supervised home exercise program) for 6 weeks in the last few months  Peer to Peer can be done by calling [phone] / opt #3, case #[id number]  Please notify the patient        Thank you,   Christ Veras

## 2018-12-12 ENCOUNTER — EVALUATION (OUTPATIENT)
Dept: PHYSICAL THERAPY | Facility: REHABILITATION | Age: 53
End: 2018-12-12
Payer: COMMERCIAL

## 2018-12-12 DIAGNOSIS — G89.29 CHRONIC LOW BACK PAIN WITH SCIATICA, SCIATICA LATERALITY UNSPECIFIED, UNSPECIFIED BACK PAIN LATERALITY: Primary | ICD-10-CM

## 2018-12-12 DIAGNOSIS — M54.40 CHRONIC LOW BACK PAIN WITH SCIATICA, SCIATICA LATERALITY UNSPECIFIED, UNSPECIFIED BACK PAIN LATERALITY: Primary | ICD-10-CM

## 2018-12-12 DIAGNOSIS — R45.4 IRRITABILITY AND ANGER: ICD-10-CM

## 2018-12-12 PROCEDURE — G8979 MOBILITY GOAL STATUS: HCPCS | Performed by: PHYSICAL THERAPIST

## 2018-12-12 PROCEDURE — 97110 THERAPEUTIC EXERCISES: CPT | Performed by: PHYSICAL THERAPIST

## 2018-12-12 PROCEDURE — 97162 PT EVAL MOD COMPLEX 30 MIN: CPT | Performed by: PHYSICAL THERAPIST

## 2018-12-12 PROCEDURE — 97140 MANUAL THERAPY 1/> REGIONS: CPT | Performed by: PHYSICAL THERAPIST

## 2018-12-12 PROCEDURE — G8978 MOBILITY CURRENT STATUS: HCPCS | Performed by: PHYSICAL THERAPIST

## 2018-12-12 NOTE — PROGRESS NOTES
PT Evaluation     Today's date: 2018  Patient name: Sandee Vera  : 1965  MRN: [id number]  Referring provider: Carmelo Sue  Dx: No diagnosis found  Assessment  Assessment details: Pt is a 48 y o  Year old female who presents to physical therapy with a recurrent episode of chronic low back pain, which has hindered her ability to perform particular activities of daily living  Pt did not have reproduction of her symptoms with specific sacroiliac provocation tests nor with lumbar spine tests for possible sciatica, herniated disk, or sacroiliac dysfunction  Pt did respond well to manual therapy of the R side L5-S1 segment UPA's Grade III mobilizations, and reported a minor relief of symptoms  Pt did also have TTP along R piriformis muscle, which was alleviated with an active release technique  Pt was compliant with exercises that she was instructed on today, and showed eagerness to improve  Pt is a good candidate for physical therapy intervention to improve function and promote optimal quality of life  Impairments: abnormal muscle firing, abnormal or restricted ROM, activity intolerance, impaired physical strength and pain with function  Barriers to therapy: Chronic, recurrent pain   Understanding of Dx/Px/POC: excellent   Prognosis: fair    Goals  Short Term Goal 1: Pt will report back pain as 4/10 or less in 4 weeks, which will help improve quality of life and functional independence  Short Term Goal 2: Pt will improve lumbar ROM to WNL in 4 weeks  Short Term Goal 3: pt will improve strength by 1/2 grade     Long Term Goal 1: Pt will display independence with HEP in 8 weeks   Long Term Goal 2: Pt will return to ADL's independently   Long Term Goal 3: Pt will report improved FOTO score, >65        Plan  Plan details: POC will include dynamic, core stabilization exercises, strengthening exercises for the lower extremities, both in conjunction with manual therapy     Patient would benefit from: skilled physical therapy  Planned therapy interventions: manual therapy, joint mobilization, patient education, abdominal trunk stabilization, strengthening, stretching, graded exercise, home exercise program, functional ROM exercises and neuromuscular re-education  Frequency: 2x week  Duration in weeks: 8  Plan of Care beginning date: 2018  Plan of Care expiration date: 2019  Treatment plan discussed with: patient        Subjective Evaluation    History of Present Illness  Date of onset: 2018  Recurrent probem    Quality of life: good    Pain  Current pain ratin  At best pain ratin  At worst pain ratin  Pain location: Radiating pain ocassionally at R great toe, metatarsal area  Quality: dull ache, radiating and discomfort  Relieving factors: change in position  Aggravating factors: sitting  Progression: no change    Treatments  Previous treatment: chiropractic, physical therapy, injection treatment and medication  Current treatment: physical therapy  Patient Goals  Patient goals for therapy: decreased pain, increased motion, independence with ADLs/IADLs, increased strength, return to sport/leisure activities and return to work          Objective     Static Posture     Comments       General Comments     Lumbar Comments   History of Pertinent Illness: Pt presents with chronic low back pain that she reports has been occurring for about 8 years  About 4 months ago, she reports she had a lighting bolt like sensation around her great toe, but solely in that area  Since that episode, she has noticed a recurrence of that same symptom that happens intermittently at random  She also continues to have right sided back pain that is worse when she is sitting  She reports that she feels much better when she is walking, and active  Palpation: TTP of R L5-S1 segment as well as R piriformis muscle       Special tests:      Hip/SI joint:   scour=   negative    william =  negative SLS= negative                   piriformis test= (+) reproduction of symptoms                               Active SLR=  negative       Active SLR with stabilization =            Leg length = negative              Sacral Thrust=  Negative   S/L si joint compression= negative       LUMBAR tests:  SLR =  negative slump=  Negative PA mob= (+) pain at L5-S1 segment on R side of lumbar spine     quadrant test= negative      Repetitive testing: extension  = negative   flexion    = negative     Lower extremity reflexes: Patellar tendon and achilles tendon intact 2+ bilaterally     Lower extremity myotomes: intact bilaterally     Sensation: intact lower extremity sensation bilaterally to light touch  No history of fracture, surgery, recent illness, or osteoporosis  No Night pain, no history of cancer, no mechanical cause of pain,     Gait: Pt walks with slight ER bilaterally, and increased lumbar lordosis  No signs of trendelenburg or compensations for weakness       Manual Muscle Testing:   Hip Abduction: 4/5 bilaterally   Hip Adduction: 4/5 bilaterally   Foot Inversion: 4+/5 R, 5/5 L   Foot Eversion: 4+/5 bilaterally   Knee Flexion/Extension: 5/5 bilaterally   Hip extension (knee extended): 4/5 bilaterally (slight compensation of paraspinals during movement bilaterally)           Precautions: N/A    Daily Treatment Diary   PT one-on-one entire treatment   Manual  12/12            R Piriformis ART SE            R L5-S1 UPA's Grade III 3x30"                                                       Exercise Diary  12 12            TrA activation 2x8 reps (3 sec hold)            Bridges 2x8            Clamshells             Multifidi activation             Piriformis stretch 3x30"            TrA supine marches 2x8 (2 second hold) Modalities

## 2018-12-13 RX ORDER — ESCITALOPRAM OXALATE 5 MG/1
5 TABLET ORAL DAILY
Qty: 90 TABLET | Refills: 1 | Status: SHIPPED | OUTPATIENT
Start: 2018-12-13 | End: 2019-06-16 | Stop reason: SDUPTHER

## 2018-12-14 ENCOUNTER — OFFICE VISIT (OUTPATIENT)
Dept: PHYSICAL THERAPY | Facility: OTHER | Age: 53
End: 2018-12-14
Payer: COMMERCIAL

## 2018-12-14 DIAGNOSIS — M54.40 CHRONIC LOW BACK PAIN WITH SCIATICA, SCIATICA LATERALITY UNSPECIFIED, UNSPECIFIED BACK PAIN LATERALITY: Primary | ICD-10-CM

## 2018-12-14 DIAGNOSIS — G89.29 CHRONIC LOW BACK PAIN WITH SCIATICA, SCIATICA LATERALITY UNSPECIFIED, UNSPECIFIED BACK PAIN LATERALITY: Primary | ICD-10-CM

## 2018-12-14 PROCEDURE — 97110 THERAPEUTIC EXERCISES: CPT | Performed by: PHYSICAL THERAPIST

## 2018-12-14 PROCEDURE — 97140 MANUAL THERAPY 1/> REGIONS: CPT | Performed by: PHYSICAL THERAPIST

## 2018-12-14 PROCEDURE — 97112 NEUROMUSCULAR REEDUCATION: CPT | Performed by: PHYSICAL THERAPIST

## 2018-12-14 NOTE — PROGRESS NOTES
Daily Note     Today's date: 2018  Patient name: Sandee Vera  : 1965  MRN: [id number]  Referring provider: Carmelo Sue  Dx: No diagnosis found  Subjective: Pt reports that since her initial evaluation, she has still had soreness towards the R side of her low back that has occasionally radiated down to her knee  She did report her home exercise program went well, yet she is still experiencing pain  Pt reports her pain as a 4/10 today and describes it as a dull ache  Objective: See treatment diary below  Manual  12 14           R Piriformis ART SE SE           R L5-S1 UPA's Grade III SE SE           R L4-L5 UPA's Grade III   SE                                         Exercise Diary  12 14           TrA activation 2x8 reps (3 sec hold) 2x8 reps (3 sec hold)            Bridges 2x8            Clamshells  2x10  orange band            Multifidi activation             Piriformis stretch 3x30" 3x30"           TrA supine marches 2x8 (2 second hold)  2x10 (2 sec hold)            Bridges with adductor squeeze   2x15           Dead bugs   2x15           Quadruped leg kicks   2x10            Goblet Squat   2x15 #10           Burrell Carry   2x1' #10                                                                                                                                      Modalities                                                       Assessment: Tolerated treatment well today  She was able to tolerate a progression of different therapeutic exercises which targeted dynamic core stability as well as lower extremity strengthening  She required verbal, visual, and tactile cues to improve her squatting mechanics, yet was able to correct the movement with proper education  She had moderate tenderness and stiffness in the L4-L5 area during manual therapy UPA's, which reproduced sciatic like symptoms   Pt reported relief at the end of her session after exercise and manual therapy  Pt is highly motivated throughout her therapy and demonstrates good compliance  Plan: Continue per plan of care  Discuss with patient at end of next week regarding POC for visits going forward

## 2018-12-19 ENCOUNTER — OFFICE VISIT (OUTPATIENT)
Dept: PHYSICAL THERAPY | Facility: OTHER | Age: 53
End: 2018-12-19
Payer: COMMERCIAL

## 2018-12-19 DIAGNOSIS — M54.40 CHRONIC LOW BACK PAIN WITH SCIATICA, SCIATICA LATERALITY UNSPECIFIED, UNSPECIFIED BACK PAIN LATERALITY: Primary | ICD-10-CM

## 2018-12-19 DIAGNOSIS — G89.29 CHRONIC LOW BACK PAIN WITH SCIATICA, SCIATICA LATERALITY UNSPECIFIED, UNSPECIFIED BACK PAIN LATERALITY: Primary | ICD-10-CM

## 2018-12-19 PROCEDURE — 97112 NEUROMUSCULAR REEDUCATION: CPT | Performed by: PHYSICAL THERAPIST

## 2018-12-19 PROCEDURE — 97140 MANUAL THERAPY 1/> REGIONS: CPT | Performed by: PHYSICAL THERAPIST

## 2018-12-19 PROCEDURE — 97110 THERAPEUTIC EXERCISES: CPT | Performed by: PHYSICAL THERAPIST

## 2018-12-19 NOTE — PROGRESS NOTES
Daily Note     Today's date: 2018  Patient name: Chidi Vega  : 1965  MRN: [id number]  Referring provider: Belinda Russo  Dx: No diagnosis found  Subjective: Pt reports that she had a great weekend, but today going into work she had an episode of acute pain that started from the R side of her back and radiated down to her knee  It lingered for about 30 minutes, and then she took a Tyenol, and the pain eventually went away  Pt reports that she now is feeling better when she entered the clinic  Objective: See treatment diary below  Manual            R Piriformis ART SE SE SE          R L5-S1 UPA's Grade III SE SE SE          R L4-L5 UPA's Grade III   SE SE                                        Exercise Diary   19          TrA activation 2x8 reps (3 sec hold) 2x8 reps (3 sec hold)  D/c to HEP           Bridges 2x8  2x10          Clamshells  2x10  orange band  2x10 orange band           Multifidi activation             Piriformis stretch 3x30" 3x30" 3x30"          TrA supine marches 2x8 (2 second hold)  2x10 (2 sec hold)            Bridges with adductor squeeze   2x15           Dead bugs   2x15           Quadruped leg kicks   2x10            Goblet Squat   2x15 #10 2x15 #15          Burrell Carry   2x1' #10            Sidelying HF stretch    2'          Bridges 2 up/1 down    2x8          Quadruped opposite arm/leg   2x10           Sidestepping    3 laps orange TB           Monster walks    3 laps orange TB           Opposite arm/leg on physioball    2x10                                                       Modalities                                                         Assessment: Tolerated treatment well today  She initially responded well to manual therapy of the lumbar spine as well as soft tissue intervention   The patient was able to tolerate a progression of therapeutic exercises today, including hip hinging to work on proper body mechanics when carrying/lifting objects, sidestepping to promote hip strengthening in a lateral plane  She required less cueing for exercises that were performed during previous sessions, and was compliant throughout her session  Patient exhibited good technique with therapeutic exercises      Plan: Continue per plan of care

## 2018-12-21 ENCOUNTER — OFFICE VISIT (OUTPATIENT)
Dept: PHYSICAL THERAPY | Facility: OTHER | Age: 53
End: 2018-12-21
Payer: COMMERCIAL

## 2018-12-21 DIAGNOSIS — M54.40 CHRONIC LOW BACK PAIN WITH SCIATICA, SCIATICA LATERALITY UNSPECIFIED, UNSPECIFIED BACK PAIN LATERALITY: Primary | ICD-10-CM

## 2018-12-21 DIAGNOSIS — G89.29 CHRONIC LOW BACK PAIN WITH SCIATICA, SCIATICA LATERALITY UNSPECIFIED, UNSPECIFIED BACK PAIN LATERALITY: Primary | ICD-10-CM

## 2018-12-21 PROCEDURE — G8978 MOBILITY CURRENT STATUS: HCPCS | Performed by: PEDIATRICS

## 2018-12-21 PROCEDURE — 97112 NEUROMUSCULAR REEDUCATION: CPT | Performed by: PHYSICAL THERAPIST

## 2018-12-21 PROCEDURE — G8979 MOBILITY GOAL STATUS: HCPCS | Performed by: PEDIATRICS

## 2018-12-21 PROCEDURE — 97140 MANUAL THERAPY 1/> REGIONS: CPT | Performed by: PHYSICAL THERAPIST

## 2018-12-21 PROCEDURE — 97110 THERAPEUTIC EXERCISES: CPT | Performed by: PHYSICAL THERAPIST

## 2018-12-21 NOTE — PROGRESS NOTES
Daily Note     Today's date: 2018  Patient name: Delma Cantrell  : 1965  MRN: [id number]  Referring provider: Michaela Mendez  Dx: No diagnosis found  Subjective: Pt reports that she felt good after her previous session; however, yesterday she had a bad episode of sciatic like symptoms down her leg, more towards the outside of the leg  She states that she did not want to do exercise yesterday to make it worse  She states this morning that she is not having pain down the leg, but just in the back  Objective: See treatment diary below    Manual           R Piriformis ART SE SE SE SE         R L5-S1 UPA's Grade III SE SE SE SE         R L4-L5 UPA's Grade III   SE SE SE                                       Exercise Diary   21         TrA activation 2x8 reps (3 sec hold) 2x8 reps (3 sec hold)  D/c to HEP           Bridges 2x8  2x10 2x10         Clamshells  2x10  orange band  2x10 orange band  2x10 orange band          Multifidi activation             Piriformis stretch 3x30" 3x30" 3x30" 3x30"         TrA supine marches 2x8 (2 second hold)  2x10 (2 sec hold)            Bridges with adductor squeeze   2x15           Dead bugs   2x15           Quadruped leg kicks   2x10            Goblet Squat   2x15 #10 2x15 #15 2x15 #15         Burrell Carry   2x1' #10            Sidelying HF stretch    2' 2'         Bridges 2 up/1 down    2x8 2x8         Quadruped opposite arm/leg   2x10  2x10         Sidestepping    3 laps orange TB  3 laps orange TB          Monster walks    3 laps orange TB  3 laps orange TB          Opposite arm/leg on physioball    2x10  2x10          [place] TM walking    5'                                        Modalities                                                         Assessment: Tolerated treatment well  She was quite irritable throughout the R side of her low back, yet manual therapy did alleviate symptoms   She was able to go through her therapeutic exercises today without recurrence of symptoms  The only time she felt some discomfort during movement was walking on the woodway treadmill  Less cueing has been needed for her to complete exercises with proper form  Patient demonstrated fatigue post treatment      Plan: Continue per plan of care

## 2018-12-26 ENCOUNTER — OFFICE VISIT (OUTPATIENT)
Dept: PHYSICAL THERAPY | Facility: REHABILITATION | Age: 53
End: 2018-12-26
Payer: COMMERCIAL

## 2018-12-26 DIAGNOSIS — G89.29 CHRONIC LOW BACK PAIN WITH SCIATICA, SCIATICA LATERALITY UNSPECIFIED, UNSPECIFIED BACK PAIN LATERALITY: Primary | ICD-10-CM

## 2018-12-26 DIAGNOSIS — M54.40 CHRONIC LOW BACK PAIN WITH SCIATICA, SCIATICA LATERALITY UNSPECIFIED, UNSPECIFIED BACK PAIN LATERALITY: Primary | ICD-10-CM

## 2018-12-26 PROCEDURE — 97112 NEUROMUSCULAR REEDUCATION: CPT | Performed by: PHYSICAL THERAPIST

## 2018-12-26 PROCEDURE — 97110 THERAPEUTIC EXERCISES: CPT | Performed by: PHYSICAL THERAPIST

## 2018-12-26 PROCEDURE — 97140 MANUAL THERAPY 1/> REGIONS: CPT | Performed by: PHYSICAL THERAPIST

## 2018-12-26 NOTE — PROGRESS NOTES
PT Discharge     Today's date: 2018  Patient name: Tyree Gil  : 1965  MRN: [id number]  Referring provider: Ele Cloud  Dx: No diagnosis found  Patient did not return to several physical therapy visits, and as a result, has been discharged from physical therapy  Subjective: Pt reports that she has been doing well, and has not had as high of an intensity of aggravation in her back and down the right leg, yet she has still had a recurrence of symptoms in that area  She states that none of her exercises that were prescribed by physical therapy has bothered her low back, and that only sitting seems to aggravate her low back         Objective: See treatment diary below          12       12 14      12 19     12        12 26  R Piriformis ART SE SE SE SE SE        R L5-S1 UPA's Grade III SE SE SE SE SE        R L4-L5 UPA's Grade III   SE SE SE SE        Lumbar Gapping Mob Grade IV Bilateral     SE                         Exercise Diary  12  12 14 12 19 12 21 12 26        TrA activation 2x8 reps (3 sec hold) 2x8 reps (3 sec hold)  D/c to HEP           Bridges 2x8  2x10 2x10 D/C        Clamshells  2x10  orange band  2x10 orange band  2x10 orange band  2x10 maroon tb        Multifidi activation             Piriformis stretch 3x30" 3x30" 3x30" 3x30" 3x30"        TrA supine marches 2x8 (2 second hold)  2x10 (2 sec hold)            Bridges with adductor squeeze   2x15           Dead bugs   2x15           Quadruped leg kicks   2x10            Goblet Squat   2x15 #10 2x15 #15 2x15 #15 2x15 #15        Burrell Carry   2x1' #10            Sidelying HF stretch    2' 2'         Bridges 2 up/1 down    2x8 2x8         Quadruped opposite arm/leg   2x10  2x10         Sidestepping    3 laps orange TB  3 laps orange TB          Monster walks    3 laps orange TB  3 laps orange TB          Opposite arm/leg on physioball    2x10  2x10          [place] TM walking    5'         Single Leg Bridge 2x12        Hamstring dead lifts     2x10 #10 dumbells          Assessment: Tolerated treatment well today  More stiffness was noted at right side of lumbar spine during manual therapy versus the left side of the lumbar spine  Pt responded well to a lumbar gapping mobilization grade IV bilaterally, and she reported relief at the end of her session  She required verbal, visual and tactile cueing to engage her paraspinals and hip extensors during the dead lift movement, which she was able to perform well after education was provided  She has continued to demonstrate good progression with her exercises, and is compliant during her physical therapy sessions  Patient exhibited good technique with therapeutic exercises      Plan: Continue per plan of care

## 2019-01-21 ENCOUNTER — TELEPHONE (OUTPATIENT)
Dept: PAIN MEDICINE | Facility: MEDICAL CENTER | Age: 54
End: 2019-01-21

## 2019-01-21 ENCOUNTER — OFFICE VISIT (OUTPATIENT)
Dept: PAIN MEDICINE | Facility: CLINIC | Age: 54
End: 2019-01-21
Payer: COMMERCIAL

## 2019-01-21 VITALS
SYSTOLIC BLOOD PRESSURE: 118 MMHG | HEART RATE: 77 BPM | WEIGHT: 137 LBS | DIASTOLIC BLOOD PRESSURE: 76 MMHG | TEMPERATURE: 98.7 F | BODY MASS INDEX: 23.52 KG/M2

## 2019-01-21 DIAGNOSIS — G89.4 PAIN SYNDROME, CHRONIC: Primary | ICD-10-CM

## 2019-01-21 DIAGNOSIS — F11.20 UNCOMPLICATED OPIOID DEPENDENCE (HCC): ICD-10-CM

## 2019-01-21 DIAGNOSIS — M54.16 LUMBAR RADICULOPATHY: ICD-10-CM

## 2019-01-21 DIAGNOSIS — M46.1 SACROILIITIS (HCC): ICD-10-CM

## 2019-01-21 PROCEDURE — 99214 OFFICE O/P EST MOD 30 MIN: CPT | Performed by: NURSE PRACTITIONER

## 2019-01-21 PROCEDURE — 80305 DRUG TEST PRSMV DIR OPT OBS: CPT | Performed by: NURSE PRACTITIONER

## 2019-01-21 NOTE — PROGRESS NOTES
Assessment:  1  Pain syndrome, chronic    2  Sacroiliitis ([facility] [address] )    3  Lumbar radiculopathy    4  Uncomplicated opioid dependence ([facility] [address] )        Plan:  Jamal Avila is a 48 y o  female who presents for a follow up office visit in regards to Back Pain and Leg Pain  The patient has a history of chronic pain syndrome secondary to sacroiliitis  Patient presents today with increased back pain and new onset left leg pain  Unfortunately, physical therapy failed to obtain relief, and created a new onset of left leg pain  At this time, we will move forward with an MRI of the lumbar spine to evaluate for possible disc herniation at L4-L5  I will contact her with the results  She also has some tenderness over the left sacroiliac joint along with positive left Graeme's maneuver and pelvic thrust   Depending upon the MRI results, she may be a candidate for a left sacroiliac joint injection  To better control her pain, and prevent the need for more opioid medication,  I will switch her to Nucynta  mg  She was instructed to take 1 tablet Q 12 hr   Side effects were reviewed which include drowsiness, constipation, nausea  A prescription for this month was sent to the pharmacy which she can fill today, and 1 for next month they do not fill date of February 19, 2019  She was also instructed to printed a co-pay card off the Internet to help with the cost of the medication  There are risks associated with opioid medications, including dependence, addiction and tolerance  The patient understands and agrees to use these medications only as prescribed  Potential side effects of the medications include, but are not limited to, constipation, drowsiness, addiction, impaired judgment and risk of fatal overdose if not taken as prescribed  The patient was warned against driving while taking sedation medications  Sharing medications is a felony   At this point in time, the patient is showing no signs of addiction, abuse, diversion or suicidal ideation  A urine drug screen was collected at today's office visit as part of our medication management protocol  The point of care testing results were appropriate for what was being prescribed  The specimen will be sent for confirmatory testing  The drug screen is medically necessary because the patient is either dependent on opioid medication or is being considered for opioid medication therapy and the results could impact ongoing or future treatment  The drug screen is to evaluate for the presences or absence of prescribed, non-prescribed, and/or illicit drugs/substances  [place] Prescription Drug Monitoring Program report was reviewed and was appropriate       An opioid contract was reviewed with the patient  The patient was made aware they are only to receive opioid medication from our office, and must take the medication as prescribed  If the medication is lost or stolen, it will not be replaced  We also do not condone the use of illegal substances or alcohol with opioid medication  Random urine drug screens and pill counts will also be performed at office visits  Lastly, the patient was informed that office visits are needed for refills  Patient was agreeable and signed the contract  My impressions and treatment recommendations were discussed in detail with the patient who verbalized understanding and had no further questions  Discharge instructions were provided  I personally saw and examined the patient and I agree with the above discussed plan of care  Orders Placed This Encounter   Procedures    MRI lumbar spine without contrast     Standing Status:   Future     Standing Expiration Date:   1/21/2023     Scheduling Instructions: There is no preparation for this test  Please leave your jewelry and valuables at home, wedding rings are the exception  Please bring your insurance cards, a form of photo ID and a list of your medications with you   Arrive 15 minutes prior to your appointment time in order to register  Please bring any prior CT or MRI studies of this area that were not performed at a [facility]  To schedule this appointment, please contact Central Scheduling at [phone]  Order Specific Question:   Is the patient pregnant? Answer:   No     Order Specific Question:   What is the patient's sedation requirement? Answer:   No Sedation     Order Specific Question:   Does the patient have metallic implants? Answer:   No     New Medications Ordered This Visit   Medications    Tapentadol HCl  MG TB12     Sig: Take 1 tab PO Q 12 hours for pain   Do not fill until 2/19/19     Dispense:  60 tablet     Refill:  0       History of Present Illness:  Brigette Baxter is a 48 y o  female who presents for a follow up office visit in regards to Back Pain and Leg Pain  The patient has a history of chronic pain syndrome secondary to sacroiliitis  She was last seen in the office on November 26, 2018, in which she was given a prescription for physical therapy for her low back pain  She states since the last office visit she has attended about 4 weeks of physical therapy  At the last session, the patient states some manipulation was performed which on her left side, which had caused sciatic pain  She states her symptoms are typically on her right side, but now is experiencing left-sided low back pain which radiates down the lateral aspect of the left leg to the ankle  The pain is constant occurring mostly in the evening  She describes as dull aching, pressure-like, and shooting  She is rating 8/10 on the numeric rating scale  Because her symptoms had increased under now on the left side, she stopped attending physical therapy and resumed chiropractic treatment with Dr Trice Stanton  She is taking Nucynta 75 mg 3 times a day    Due to the increased pain, she started taking Nucynta 4 times a day and ran out of her medications 5 days ago  Pain Contract Signed: 1/21/19, Last Urine Drug Screen: 1/21/19    I have personally reviewed and/or updated the patient's past medical history, past surgical history, family history, social history, current medications, allergies, and vital signs today  Review of Systems   Respiratory: Negative for shortness of breath  Cardiovascular: Negative for chest pain  Gastrointestinal: Negative for constipation, diarrhea, nausea and vomiting  Musculoskeletal: Positive for joint swelling  Negative for arthralgias, gait problem and myalgias  Skin: Negative for rash  Neurological: Negative for dizziness, seizures and weakness  All other systems reviewed and are negative  Patient Active Problem List   Diagnosis    Esophageal reflux    Hiatal hernia    Hyperlipidemia    Pain syndrome, chronic    Sacroiliitis (HCC)    Irritability and anger    Vitamin B12 deficiency    Seasonal allergic rhinitis due to pollen    Screening for colon cancer    Gastroesophageal reflux disease    Costochondritis       Past Medical History:   Diagnosis Date    Anxiety     Fibrocystic disease of breast     GERD (gastroesophageal reflux disease)     Hiatal hernia     Hyperlipidemia        Past Surgical History:   Procedure Laterality Date    APPENDECTOMY      BREAST LUMPECTOMY Left     FOOT SURGERY Right 2011    right, hardware removal    OTHER SURGICAL HISTORY Right 2010    Complete Excision of Fifth Metatarsal Head     [place] COLONOSCOPY FLX DX W/COLLJ SPEC WHEN PFRMD N/A 7/20/2018    Procedure: EGD AND COLONOSCOPY;  Surgeon: Jayden Guevara MD;  Location: [facility];   Service: Gastroenterology       Family History   Problem Relation Age of Onset    Coronary artery disease Mother         CABG in her 62s   Deandre Contemelquiades Other Mother         Dyslipidemia    Hypertension Mother     Heart attack Father 46        acute myocardial infarction    Other Father         Dyslipidemia    Hypertension Father    Deandre Meyer Prostate cancer Paternal Uncle     Thyroid disease Family         disorder    Alcohol abuse Neg Hx     Substance Abuse Neg Hx     Mental illness Neg Hx     Depression Neg Hx        Social History     Occupational History    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, 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     Social History Main Topics    Smoking status: Never Smoker    Smokeless tobacco: Never Used    Alcohol use Yes      Comment: social drinker    Drug use: No    Sexual activity: Not on file      Comment: Partner had vasectomy       Current Outpatient Prescriptions on File Prior to Visit   Medication Sig    cyanocobalamin (VITAMIN B-12) 1,000 mcg tablet Take 1 tablet (1,000 mcg total) by mouth daily    escitalopram (LEXAPRO) 5 mg tablet Take 1 tablet (5 mg total) by mouth daily    gabapentin (NEURONTIN) 300 mg capsule     omeprazole (PriLOSEC) 20 mg delayed release capsule Take 1 capsule (20 mg total) by mouth daily    simvastatin (ZOCOR) 10 mg tablet TAKE 1 TABLET DAILY    [DISCONTINUED] tapentadol (NUCYNTA) 75 mg tablet Take 1 tab PO TID PRN PAIN  Do not fill until 12/23/18     No current facility-administered medications on file prior to visit  Allergies   Allergen Reactions    Sulfa Antibiotics      Annotation - [id number]: Migraine; Annotation - [id number]: cellular issues       Physical Exam:    /76   Pulse 77   Temp 98 7 °F (37 1 °C) (Oral)   Wt 62 1 kg (137 lb)   BMI 23 52 kg/m²     Constitutional:normal, well developed, well nourished, alert, in no distress and non-toxic and no overt pain behavior    Eyes:anicteric  HEENT:grossly intact  Neck:supple, symmetric, trachea midline and no masses   Pulmonary:even and unlabored  Cardiovascular:No edema or pitting edema present  Skin:Normal without rashes or lesions and well hydrated  Psychiatric:Mood and affect appropriate  Neurologic:Cranial Nerves II-XII grossly intact  Musculoskeletal:normal     Lumbar Spine Exam    Appearance:  Normal lordosis  Palpation/Tenderness:  left sacroiliac joint tenderness  right sacroiliac joint tenderness  Range of Motion:  Flexion:  No limitation  with pain  Extension:  No limitation  with pain  Motor Strength:  Left hip flexion:  5/5  Right hip flexion:  5/5  Left knee flexion:  5/5  Left knee extension:  5/5  Right knee flexion:  5/5  Right knee extension:  5/5  Left foot dorsiflexion:  5/5  Left foot plantar flexion:  5/5  Right foot dorsiflexion:  5/5  Right foot plantar flexion:  5/5  Special Tests:  Left Straight Leg Test:  negative  Right Straight Leg Test:  negative  Left Graeme's Maneuver:  positive  Right Graeme's Maneuver:  positive  Left Gaenslen's Test:  negative  Right Gaenslen's Test:  negative  Positive pelvic thrust       Last dose of Nucynta was five days ago

## 2019-01-23 NOTE — TELEPHONE ENCOUNTER
Cecy FITZPATRICKI:   I s/w Willow Monge and she finally got through to Ins [employer] and PA is still under review, was told decision could take 2 days  PA started 8:25 on 1/22  I called pt and informed her of the same  I explained that we need to allow the Ins Co another day to make their decision  Pt said that if it is approved she still doesn't know how much it is going to cost and if it's too expensive NM said she would put her back on the old pain medication  Pt said that Cecy Parker said that if it's becomes too much of a problem to get the new medication she would put the pt back on her old medication  Pt said if she goes back on her old medication her pharmacy will have to order it and it takes 24 hrs and that would mean she wouldn't have until Friday  I asked pt to give the Ins Co another 24 hrs and if she hasn't received a call from our office by 10 am on Thurs to c/b and ask if Cecy Parker would just switch her back to her old medication  Pt agreed    Pt aware our office will contact as soon as we hear back from her Ins Co

## 2019-01-23 NOTE — TELEPHONE ENCOUNTER
Agree with plan  If we go back to Nucynta IR, She can always fill it at [facility] for this time as sometimes they have it in stock  Can you please check with Tova Hartley in the a m   To see if she can get ahold of insurance to get their decision

## 2019-01-23 NOTE — TELEPHONE ENCOUNTER
I s/w Danial Zazueta who started the PA auth yest morning, she is on hold with the Ins Co right now  Will await Ins Co decision before contacting pt

## 2019-01-23 NOTE — TELEPHONE ENCOUNTER
Patient is calling asking if she can go back to her original script for this medication and not the ER  She states that [employer] company is holding it up

## 2019-01-24 DIAGNOSIS — G89.4 CHRONIC PAIN SYNDROME: Primary | ICD-10-CM

## 2019-01-24 NOTE — TELEPHONE ENCOUNTER
Called [facility] and spoke to lima and cancelled 2 scripts for nucynta  mg    Sent 2 scripts for Nucynta 75 mg but changed dosing to Q 6 hours instead of Q 8 hours for better pain relief for her  Sorry insurance was so annoying    If we get approval back, we can always change to ER in the future

## 2019-01-24 NOTE — TELEPHONE ENCOUNTER
Patient would like to back to the old plan since the insurance is taking to long, doesn't know if it will be costly   Call back # [phone]

## 2019-01-24 NOTE — TELEPHONE ENCOUNTER
It is still under review  They said we should here something by the end of the day today if not tomorrow morning

## 2019-01-24 NOTE — TELEPHONE ENCOUNTER
NADIA patient and made aware that prescriptions have been sent to the pharmacy  Patient very appreciative of call back

## 2019-01-24 NOTE — TELEPHONE ENCOUNTER
SW patient, states that she decided she wants to stay with Nucynta 75 mg TID, because she said if she continues to wait it will be Saturday before [facility] will have the prescription ready for pickup and if the cost is  more then she  would not change anyway  Advised will make NM and Madelaine aware  ++Please send prescription for Nucynta 75 mg to [facility] in [place]

## 2019-02-16 ENCOUNTER — HOSPITAL ENCOUNTER (OUTPATIENT)
Dept: MRI IMAGING | Facility: HOSPITAL | Age: 54
Discharge: HOME | End: 2019-02-16
Payer: COMMERCIAL

## 2019-02-16 DIAGNOSIS — M54.16 LUMBAR RADICULOPATHY: ICD-10-CM

## 2019-02-16 PROCEDURE — 72148 MRI LUMBAR SPINE W/O DYE: CPT

## 2019-02-18 ENCOUNTER — TELEPHONE (OUTPATIENT)
Dept: PAIN MEDICINE | Facility: MEDICAL CENTER | Age: 54
End: 2019-02-18

## 2019-02-18 NOTE — TELEPHONE ENCOUNTER
Pt is calling stating she received a voicemail from Maribeth Dang regarding the results of her MRI  Please call patient back at [phone] to go over those results

## 2019-02-25 ENCOUNTER — TELEPHONE (OUTPATIENT)
Dept: RADIOLOGY | Facility: CLINIC | Age: 54
End: 2019-02-25

## 2019-02-25 NOTE — TELEPHONE ENCOUNTER
----- Message from Romelia Cherry sent at 2/25/2019  4:00 PM EST -----  Called patient about MRI results and left detailed message  Suggested left SIJ injection  I asked her to call back if she wants to proceed    I will put order in once I know if she wants to have injection

## 2019-02-26 NOTE — TELEPHONE ENCOUNTER
ELVIS    S/w pt  States SI joint injections have not helped in the past and she would rather wait until her f/u on 3/11 and discuss things further with NM

## 2019-03-11 ENCOUNTER — OFFICE VISIT (OUTPATIENT)
Dept: PAIN MEDICINE | Facility: CLINIC | Age: 54
End: 2019-03-11
Payer: COMMERCIAL

## 2019-03-11 VITALS
RESPIRATION RATE: 16 BRPM | HEIGHT: 64 IN | SYSTOLIC BLOOD PRESSURE: 126 MMHG | BODY MASS INDEX: 23.39 KG/M2 | DIASTOLIC BLOOD PRESSURE: 84 MMHG | HEART RATE: 62 BPM | WEIGHT: 137 LBS

## 2019-03-11 DIAGNOSIS — M54.40 CHRONIC LOW BACK PAIN WITH SCIATICA, SCIATICA LATERALITY UNSPECIFIED, UNSPECIFIED BACK PAIN LATERALITY: ICD-10-CM

## 2019-03-11 DIAGNOSIS — G89.29 CHRONIC LOW BACK PAIN WITH SCIATICA, SCIATICA LATERALITY UNSPECIFIED, UNSPECIFIED BACK PAIN LATERALITY: ICD-10-CM

## 2019-03-11 DIAGNOSIS — M46.1 SACROILIITIS (HCC): ICD-10-CM

## 2019-03-11 DIAGNOSIS — F11.20 UNCOMPLICATED OPIOID DEPENDENCE (HCC): ICD-10-CM

## 2019-03-11 DIAGNOSIS — G89.4 CHRONIC PAIN SYNDROME: Primary | ICD-10-CM

## 2019-03-11 PROCEDURE — 99214 OFFICE O/P EST MOD 30 MIN: CPT | Performed by: NURSE PRACTITIONER

## 2019-03-11 NOTE — PROGRESS NOTES
Pt c/o back pain that radiates to the right knee and foot    Last dose Nucynta 03/11 AM  Assessment:  1  Chronic pain syndrome    2  Sacroiliitis ([facility] [address] )    3  Chronic low back pain with sciatica, sciatica laterality unspecified, unspecified back pain laterality    4  Uncomplicated opioid dependence ([facility] [address] )        Plan:  Fatou Dumont is a 48 y o  female who presents for a follow up office visit in regards to Back Pain (radaites to the right knee and foot); Neck Pain; and Foot Pain  The patient has a history of chronic pain syndrome secondary to sacroiliitis  The patient presents today with ongoing low back pain  MRI of the lumbar spine was performed in February showed no change from previous MRI  Upon examination her pain is consistent with sacroiliitis as she has sacroiliac joint tenderness and positive bilateral Graeme's maneuver  However, she also has some tenderness over the piriformis  To help decrease the inflammatory component of her pain, we will move forward with a bilateral sacroiliac joint injection  Complete risks and benefits including bleeding, infection, tissue reaction, nerve injury and allergic reaction were discussed  The approach was demonstrated using models and literature was provided  Verbal and written consent was obtained  She will continue on Nucynta as prescribed in 2 months were electronically sent to the pharmacy with 1 script stating do not fill until March 23, 2019 and the 2nd with a do not fill date of April 21, 2019  A prescription pill count was performed on the Nucynta 75 mg which was filled on February 25, 2019  There were 60 tablets remaining which is appropriate  There are risks associated with opioid medications, including dependence, addiction and tolerance  The patient understands and agrees to use these medications only as prescribed   Potential side effects of the medications include, but are not limited to, constipation, drowsiness, addiction, impaired judgment and risk of fatal overdose if not taken as prescribed  The patient was warned against driving while taking sedation medications  Sharing medications is a felony  At this point in time, the patient is showing no signs of addiction, abuse, diversion or suicidal ideation  [place] Prescription Drug Monitoring Program report was reviewed and was appropriate      The patient was also completed a BECKS depression inventory and SOAPP-R  today, as part of our yearly opioid management program      My impressions and treatment recommendations were discussed in detail with the patient who verbalized understanding and had no further questions  Discharge instructions were provided  I personally saw and examined the patient and I agree with the above discussed plan of care  Orders Placed This Encounter   Procedures    FL spine and pain procedure     Will call to schedule     Standing Status:   Future     Standing Expiration Date:   3/11/2023     Order Specific Question:   Reason for Exam:     Answer:   bilateral sacroiliac joint injection     Order Specific Question:   Is the patient pregnant? Answer:   No     Order Specific Question:   Anticoagulant hold needed? Answer:   no     New Medications Ordered This Visit   Medications    tapentadol (NUCYNTA) 75 mg tablet     Sig: Take 1 tab PO Q 6 hours prn pain  2nd month script do not fill until 4/21/19     Dispense:  120 tablet     Refill:  0       History of Present Illness:  Jose Luis Godfrey is a 48 y o  female who presents for a follow up office visit in regards to Back Pain (radaites to the right knee and foot); Neck Pain; and Foot Pain  The patient has a history of chronic pain syndrome secondary to sacroiliitis  She presents today with ongoing low back pain which remains unchanged since the last office visit  The low back pain which is typically on the right side is also now occurring on the left side    It radiates down the lateral aspect of both legs  The pain is constant occurring mostly in the evening at night  She describes as dull aching and sharp  She is rating her pain a 6/10 on the numeric rating scale  She was prescribed Nucynta  mg at the last office visit but was not covered by her insurance  Therefore, she was placed back on Nucynta 75 mg but was increased to 4 times a day for better pain control  The medication is providing 90% pain relief without side effects  She also had underwent MRI of the lumbar spine since last office visit which showed no significant central or foraminal narrowing  There were multiple perineural cyst noted at L5-S1 as well as the sacrum but no change since previous MRI    Pain Contract Signed: 1/21/19, Last Urine Drug Screen: 1/21/19 Last Pill Count: 3/11/19    I have personally reviewed and/or updated the patient's past medical history, past surgical history, family history, social history, current medications, allergies, and vital signs today  Review of Systems   Respiratory: Negative for shortness of breath  Cardiovascular: Negative for chest pain  Gastrointestinal: Negative for constipation, diarrhea, nausea and vomiting  Musculoskeletal: Negative for arthralgias, gait problem, joint swelling and myalgias  Joint stiffness   Skin: Negative for rash  Neurological: Negative for dizziness, seizures and weakness  All other systems reviewed and are negative        Patient Active Problem List   Diagnosis    Esophageal reflux    Hiatal hernia    Hyperlipidemia    Pain syndrome, chronic    Sacroiliitis (HCC)    Irritability and anger    Vitamin B12 deficiency    Seasonal allergic rhinitis due to pollen    Screening for colon cancer    Gastroesophageal reflux disease    Costochondritis       Past Medical History:   Diagnosis Date    Anxiety     Fibrocystic disease of breast     GERD (gastroesophageal reflux disease)     Hiatal hernia     Hyperlipidemia        Past Surgical History:   Procedure Laterality Date    APPENDECTOMY      BREAST LUMPECTOMY Left     FOOT SURGERY Right 2011    right, hardware removal    OTHER SURGICAL HISTORY Right 2010    Complete Excision of Fifth Metatarsal Head     AL COLONOSCOPY FLX DX W/COLLJ SPEC WHEN PFRMD N/A 7/20/2018    Procedure: EGD AND COLONOSCOPY;  Surgeon: Lyric Siegel MD;  Location: [facility]; Service: Gastroenterology       Family History   Problem Relation Age of Onset    Coronary artery disease Mother         CABG in her 62s   [facility] Other Mother         Dyslipidemia    Hypertension Mother     Heart attack Father 46        acute myocardial infarction    Other Father         Dyslipidemia    Hypertension Father     Prostate cancer Paternal Uncle     Thyroid disease Family         disorder    Alcohol abuse Neg Hx     Substance Abuse Neg Hx     Mental illness Neg Hx     Depression Neg Hx        Social History     Occupational History    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, 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   Tobacco Use    Smoking status: Never Smoker    Smokeless tobacco: Never Used   Substance and Sexual Activity    Alcohol use: Yes     Comment: social drinker    Drug use: No    Sexual activity: Not on file     Comment: Partner had vasectomy       Current Outpatient Medications on File Prior to Visit   Medication Sig    cyanocobalamin (VITAMIN B-12) 1,000 mcg tablet Take 1 tablet (1,000 mcg total) by mouth daily    escitalopram (LEXAPRO) 5 mg tablet Take 1 tablet (5 mg total) by mouth daily    omeprazole (PriLOSEC) 20 mg delayed release capsule Take 1 capsule (20 mg total) by mouth daily    simvastatin (ZOCOR) 10 mg tablet TAKE 1 TABLET DAILY    [DISCONTINUED] tapentadol (NUCYNTA) 75 mg tablet Take 1 tab PO Q 6 hours prn pain   DO not fill until 2/22/19    [DISCONTINUED] gabapentin (NEURONTIN) 300 mg capsule      No current facility-administered medications on file prior to visit  Allergies   Allergen Reactions    Sulfa Antibiotics      Annotation - [id number]: Migraine; Annotation - [id number]: cellular issues       Physical Exam:    /84 (BP Location: Right arm, Patient Position: Sitting, Cuff Size: Standard)   Pulse 62   Resp 16   Ht 5' 4" (1 626 m)   Wt 62 1 kg (137 lb)   BMI 23 52 kg/m²     Constitutional:normal, well developed, well nourished, alert, in no distress and non-toxic and no overt pain behavior  Eyes:anicteric  HEENT:grossly intact  Neck:supple, symmetric, trachea midline and no masses   Pulmonary:even and unlabored  Cardiovascular:No edema or pitting edema present  Skin:Normal without rashes or lesions and well hydrated  Psychiatric:Mood and affect appropriate  Neurologic:Cranial Nerves II-XII grossly intact  Musculoskeletal:normal  Lumbar Spine Exam    Appearance:  Normal lordosis  Palpation/Tenderness:  left sacroiliac joint tenderness  right sacroiliac joint tenderness  Range of Motion:  Flexion:  No limitation  without pain  Extension:  No limitation  with pain  Special Tests:  Left Graeme's Maneuver:  positive  Right Graeme's Maneuver:  positive  Left Gaenslen's Test:  negative  Right Gaenslen's Test:  positive   Pelvic thrust test: negative   Imaging    MRI LUMBAR SPINE WITHOUT CONTRAST 2/16/19     INDICATION: M54 16: Radiculopathy, lumbar region      COMPARISON:  January 5, 2016     TECHNIQUE:  Sagittal T1, sagittal T2, sagittal inversion recovery, axial T1 and axial T2, coronal T2        IMAGE QUALITY:  Diagnostic     FINDINGS:     VERTEBRAL BODIES:  Normal alignment of the lumbar spine  No spondylolysis or spondylolisthesis  No scoliosis  No compression fracture  Normal marrow signal is identified within the visualized bony structures  No discrete marrow lesion      SACRUM:  Normal signal within the sacrum   No evidence of insufficiency or stress fracture      DISTAL CORD AND CONUS:  Normal size and signal within the distal cord and conus        PARASPINAL SOFT TISSUES:  Paraspinal soft tissues are unremarkable      LOWER THORACIC DISC SPACES:  Normal disc height and signal   No disc herniation, canal stenosis or foraminal narrowing      LUMBAR DISC SPACES:       Perineural cysts are identified at L5-S1 as well as within the sacrum      L1-L2:  Normal      L2-L3:  Normal      L3-L4:  Normal      L4-L5:  Normal      L5-S1:  Normal      IMPRESSION:     No significant central or foraminal narrowing    Multiple perineural cysts are again noted including L5-S1 as well as the sacrum which are typically of no clinical significance

## 2019-03-22 ENCOUNTER — TELEPHONE (OUTPATIENT)
Dept: INTERNAL MEDICINE CLINIC | Facility: CLINIC | Age: 54
End: 2019-03-22

## 2019-03-22 DIAGNOSIS — Z12.31 ENCOUNTER FOR SCREENING MAMMOGRAM FOR BREAST CANCER: Primary | ICD-10-CM

## 2019-03-22 NOTE — TELEPHONE ENCOUNTER
Left message for pt to cb     Mammo  Patient is due for mammo  Mammo was ordered on 4/30/2018  Has patient had this done?  If so when and where- which dr?     If not we can reprint and mail order to patient

## 2019-04-12 ENCOUNTER — HOSPITAL ENCOUNTER (OUTPATIENT)
Dept: RADIOLOGY | Facility: CLINIC | Age: 54
Discharge: HOME | End: 2019-04-12
Attending: ANESTHESIOLOGY | Admitting: ANESTHESIOLOGY
Payer: COMMERCIAL

## 2019-04-12 VITALS
SYSTOLIC BLOOD PRESSURE: 129 MMHG | TEMPERATURE: 98.8 F | OXYGEN SATURATION: 100 % | RESPIRATION RATE: 18 BRPM | DIASTOLIC BLOOD PRESSURE: 85 MMHG | HEART RATE: 67 BPM

## 2019-04-12 DIAGNOSIS — M46.1 SACROILIITIS (HCC): ICD-10-CM

## 2019-04-12 RX ORDER — 0.9 % SODIUM CHLORIDE 0.9 %
10 VIAL (ML) INJECTION ONCE
Status: COMPLETED | OUTPATIENT
Start: 2019-04-12 | End: 2019-04-12

## 2019-04-12 RX ORDER — BUPIVACAINE HCL/PF 2.5 MG/ML
10 VIAL (ML) INJECTION ONCE
Status: COMPLETED | OUTPATIENT
Start: 2019-04-12 | End: 2019-04-12

## 2019-04-12 RX ORDER — METHYLPREDNISOLONE ACETATE 80 MG/ML
80 INJECTION, SUSPENSION INTRA-ARTICULAR; INTRALESIONAL; INTRAMUSCULAR; PARENTERAL; SOFT TISSUE ONCE
Status: COMPLETED | OUTPATIENT
Start: 2019-04-12 | End: 2019-04-12

## 2019-04-12 RX ADMIN — IOHEXOL 1 ML: 300 INJECTION, SOLUTION INTRAVENOUS at 10:46

## 2019-04-12 RX ADMIN — METHYLPREDNISOLONE ACETATE 80 MG: 80 INJECTION, SUSPENSION INTRA-ARTICULAR; INTRALESIONAL; INTRAMUSCULAR; PARENTERAL; SOFT TISSUE at 10:46

## 2019-04-12 RX ADMIN — Medication 5 ML: at 10:45

## 2019-04-12 RX ADMIN — SODIUM CHLORIDE 5 ML: 9 INJECTION, SOLUTION INTRAMUSCULAR; INTRAVENOUS; SUBCUTANEOUS at 10:45

## 2019-04-12 RX ADMIN — BUPIVACAINE HYDROCHLORIDE 7 ML: 2.5 INJECTION, SOLUTION EPIDURAL; INFILTRATION; INTRACAUDAL at 10:46

## 2019-04-19 ENCOUNTER — TELEPHONE (OUTPATIENT)
Dept: PAIN MEDICINE | Facility: CLINIC | Age: 54
End: 2019-04-19

## 2019-05-01 ENCOUNTER — HOSPITAL ENCOUNTER (OUTPATIENT)
Dept: RADIOLOGY | Age: 54
Discharge: HOME | End: 2019-05-01
Payer: COMMERCIAL

## 2019-05-01 VITALS — HEIGHT: 64 IN | WEIGHT: 140 LBS | BODY MASS INDEX: 23.9 KG/M2

## 2019-05-01 DIAGNOSIS — Z12.31 ENCOUNTER FOR SCREENING MAMMOGRAM FOR BREAST CANCER: ICD-10-CM

## 2019-05-01 PROCEDURE — 77067 SCR MAMMO BI INCL CAD: CPT

## 2019-05-13 ENCOUNTER — OFFICE VISIT (OUTPATIENT)
Dept: PAIN MEDICINE | Facility: CLINIC | Age: 54
End: 2019-05-13
Payer: COMMERCIAL

## 2019-05-13 VITALS
HEIGHT: 64 IN | BODY MASS INDEX: 24.03 KG/M2 | SYSTOLIC BLOOD PRESSURE: 119 MMHG | HEART RATE: 67 BPM | DIASTOLIC BLOOD PRESSURE: 78 MMHG

## 2019-05-13 DIAGNOSIS — G96.191 PERINEURAL CYST: ICD-10-CM

## 2019-05-13 DIAGNOSIS — M54.16 LUMBAR RADICULOPATHY: ICD-10-CM

## 2019-05-13 DIAGNOSIS — F11.20 UNCOMPLICATED OPIOID DEPENDENCE (HCC): ICD-10-CM

## 2019-05-13 DIAGNOSIS — M46.1 SACROILIITIS (HCC): ICD-10-CM

## 2019-05-13 DIAGNOSIS — Z79.891 LONG-TERM CURRENT USE OF OPIATE ANALGESIC: ICD-10-CM

## 2019-05-13 DIAGNOSIS — G89.4 CHRONIC PAIN SYNDROME: Primary | ICD-10-CM

## 2019-05-13 PROCEDURE — 99214 OFFICE O/P EST MOD 30 MIN: CPT | Performed by: NURSE PRACTITIONER

## 2019-05-13 PROCEDURE — 80305 DRUG TEST PRSMV DIR OPT OBS: CPT | Performed by: NURSE PRACTITIONER

## 2019-05-19 DIAGNOSIS — E78.2 MIXED HYPERLIPIDEMIA: ICD-10-CM

## 2019-05-19 RX ORDER — SIMVASTATIN 10 MG
TABLET ORAL
Qty: 90 TABLET | Refills: 1 | Status: SHIPPED | OUTPATIENT
Start: 2019-05-19 | End: 2020-05-27 | Stop reason: HOSPADM

## 2019-05-24 ENCOUNTER — HOSPITAL ENCOUNTER (OUTPATIENT)
Dept: MAMMOGRAPHY | Facility: CLINIC | Age: 54
Discharge: HOME | End: 2019-05-24
Payer: COMMERCIAL

## 2019-05-24 ENCOUNTER — HOSPITAL ENCOUNTER (OUTPATIENT)
Dept: ULTRASOUND IMAGING | Facility: CLINIC | Age: 54
Discharge: HOME | End: 2019-05-24
Payer: COMMERCIAL

## 2019-05-24 VITALS — BODY MASS INDEX: 23.9 KG/M2 | WEIGHT: 140 LBS | HEIGHT: 64 IN

## 2019-05-24 DIAGNOSIS — R92.8 ABNORMAL MAMMOGRAM: ICD-10-CM

## 2019-05-24 PROCEDURE — 77065 DX MAMMO INCL CAD UNI: CPT

## 2019-05-24 PROCEDURE — 76642 ULTRASOUND BREAST LIMITED: CPT

## 2019-05-24 PROCEDURE — G0279 TOMOSYNTHESIS, MAMMO: HCPCS

## 2019-05-31 ENCOUNTER — HOSPITAL ENCOUNTER (OUTPATIENT)
Dept: RADIOLOGY | Facility: CLINIC | Age: 54
Discharge: HOME | End: 2019-05-31
Attending: ANESTHESIOLOGY | Admitting: ANESTHESIOLOGY
Payer: COMMERCIAL

## 2019-05-31 VITALS
TEMPERATURE: 98.9 F | DIASTOLIC BLOOD PRESSURE: 85 MMHG | OXYGEN SATURATION: 95 % | RESPIRATION RATE: 18 BRPM | HEART RATE: 55 BPM | SYSTOLIC BLOOD PRESSURE: 130 MMHG

## 2019-05-31 DIAGNOSIS — M54.16 LUMBAR RADICULOPATHY: ICD-10-CM

## 2019-05-31 DIAGNOSIS — G96.191 PERINEURAL CYST: ICD-10-CM

## 2019-05-31 PROCEDURE — 64484 NJX AA&/STRD TFRM EPI L/S EA: CPT | Performed by: ANESTHESIOLOGY

## 2019-05-31 PROCEDURE — 64483 NJX AA&/STRD TFRM EPI L/S 1: CPT | Performed by: ANESTHESIOLOGY

## 2019-05-31 RX ORDER — 0.9 % SODIUM CHLORIDE 0.9 %
10 VIAL (ML) INJECTION ONCE
Status: COMPLETED | OUTPATIENT
Start: 2019-05-31 | End: 2019-05-31

## 2019-05-31 RX ORDER — BUPIVACAINE HCL/PF 2.5 MG/ML
10 VIAL (ML) INJECTION ONCE
Status: DISCONTINUED | OUTPATIENT
Start: 2019-05-31 | End: 2019-05-31

## 2019-05-31 RX ORDER — BUPIVACAINE HCL/PF 2.5 MG/ML
10 VIAL (ML) INJECTION ONCE
Status: COMPLETED | OUTPATIENT
Start: 2019-05-31 | End: 2019-05-31

## 2019-05-31 RX ORDER — METHYLPREDNISOLONE ACETATE 80 MG/ML
80 INJECTION, SUSPENSION INTRA-ARTICULAR; INTRALESIONAL; INTRAMUSCULAR; PARENTERAL; SOFT TISSUE ONCE
Status: DISCONTINUED | OUTPATIENT
Start: 2019-05-31 | End: 2019-05-31

## 2019-05-31 RX ORDER — METHYLPREDNISOLONE ACETATE 80 MG/ML
80 INJECTION, SUSPENSION INTRA-ARTICULAR; INTRALESIONAL; INTRAMUSCULAR; PARENTERAL; SOFT TISSUE ONCE
Status: COMPLETED | OUTPATIENT
Start: 2019-05-31 | End: 2019-05-31

## 2019-05-31 RX ORDER — 0.9 % SODIUM CHLORIDE 0.9 %
10 VIAL (ML) INJECTION ONCE
Status: DISCONTINUED | OUTPATIENT
Start: 2019-05-31 | End: 2019-05-31

## 2019-05-31 RX ADMIN — IOHEXOL 1 ML: 300 INJECTION, SOLUTION INTRAVENOUS at 14:47

## 2019-05-31 RX ADMIN — Medication 5 ML: at 14:45

## 2019-05-31 RX ADMIN — SODIUM CHLORIDE 5 ML: 9 INJECTION, SOLUTION INTRAMUSCULAR; INTRAVENOUS; SUBCUTANEOUS at 14:45

## 2019-05-31 RX ADMIN — METHYLPREDNISOLONE ACETATE 80 MG: 80 INJECTION, SUSPENSION INTRA-ARTICULAR; INTRALESIONAL; INTRAMUSCULAR; PARENTERAL; SOFT TISSUE at 14:47

## 2019-05-31 RX ADMIN — BUPIVACAINE HYDROCHLORIDE 2 ML: 2.5 INJECTION, SOLUTION EPIDURAL; INFILTRATION; INTRACAUDAL at 14:47

## 2019-06-07 ENCOUNTER — TELEPHONE (OUTPATIENT)
Dept: PAIN MEDICINE | Facility: CLINIC | Age: 54
End: 2019-06-07

## 2019-06-13 ENCOUNTER — HOSPITAL ENCOUNTER (OUTPATIENT)
Dept: ULTRASOUND IMAGING | Facility: CLINIC | Age: 54
Discharge: HOME | End: 2019-06-13
Admitting: RADIOLOGY
Payer: COMMERCIAL

## 2019-06-13 ENCOUNTER — HOSPITAL ENCOUNTER (OUTPATIENT)
Dept: MAMMOGRAPHY | Facility: CLINIC | Age: 54
Discharge: HOME | End: 2019-06-13
Payer: COMMERCIAL

## 2019-06-13 VITALS
HEIGHT: 64 IN | DIASTOLIC BLOOD PRESSURE: 80 MMHG | HEART RATE: 64 BPM | BODY MASS INDEX: 23.9 KG/M2 | SYSTOLIC BLOOD PRESSURE: 120 MMHG | WEIGHT: 140 LBS

## 2019-06-13 DIAGNOSIS — R92.8 ABNORMAL ULTRASOUND OF BREAST: ICD-10-CM

## 2019-06-13 PROCEDURE — 88342 IMHCHEM/IMCYTCHM 1ST ANTB: CPT | Performed by: PATHOLOGY

## 2019-06-13 PROCEDURE — 88341 IMHCHEM/IMCYTCHM EA ADD ANTB: CPT | Performed by: PATHOLOGY

## 2019-06-13 PROCEDURE — 88360 TUMOR IMMUNOHISTOCHEM/MANUAL: CPT | Performed by: PATHOLOGY

## 2019-06-13 PROCEDURE — 88305 TISSUE EXAM BY PATHOLOGIST: CPT | Performed by: PATHOLOGY

## 2019-06-13 PROCEDURE — 19084 BX BREAST ADD LESION US IMAG: CPT

## 2019-06-13 PROCEDURE — 19083 BX BREAST 1ST LESION US IMAG: CPT

## 2019-06-13 RX ORDER — LIDOCAINE HYDROCHLORIDE 10 MG/ML
4 INJECTION, SOLUTION INFILTRATION; PERINEURAL ONCE
Status: COMPLETED | OUTPATIENT
Start: 2019-06-13 | End: 2019-06-13

## 2019-06-13 RX ADMIN — LIDOCAINE HYDROCHLORIDE 4 ML: 10 INJECTION, SOLUTION INFILTRATION; PERINEURAL at 14:39

## 2019-06-13 RX ADMIN — LIDOCAINE HYDROCHLORIDE 4 ML: 10 INJECTION, SOLUTION INFILTRATION; PERINEURAL at 14:37

## 2019-06-16 DIAGNOSIS — R45.4 IRRITABILITY AND ANGER: ICD-10-CM

## 2019-06-17 ENCOUNTER — TELEPHONE (OUTPATIENT)
Dept: INTERNAL MEDICINE CLINIC | Facility: CLINIC | Age: 54
End: 2019-06-17

## 2019-06-17 RX ORDER — ESCITALOPRAM OXALATE 5 MG/1
5 TABLET ORAL DAILY
Qty: 90 TABLET | Refills: 1 | Status: SHIPPED | OUTPATIENT
Start: 2019-06-17 | End: 2019-12-08 | Stop reason: SDUPTHER

## 2019-06-18 ENCOUNTER — TELEPHONE (OUTPATIENT)
Dept: MAMMOGRAPHY | Facility: CLINIC | Age: 54
End: 2019-06-18

## 2019-06-18 PROBLEM — Z12.11 SCREENING FOR COLON CANCER: Status: RESOLVED | Noted: 2018-05-09 | Resolved: 2019-06-18

## 2019-06-18 PROBLEM — C50.412 MALIGNANT NEOPLASM OF UPPER-OUTER QUADRANT OF LEFT FEMALE BREAST (HCC): Status: ACTIVE | Noted: 2019-06-18

## 2019-06-20 ENCOUNTER — TRANSCRIBE ORDERS (OUTPATIENT)
Dept: LAB | Facility: CLINIC | Age: 54
End: 2019-06-20

## 2019-06-20 ENCOUNTER — APPOINTMENT (OUTPATIENT)
Dept: LAB | Facility: CLINIC | Age: 54
End: 2019-06-20
Payer: COMMERCIAL

## 2019-06-20 ENCOUNTER — CONSULT (OUTPATIENT)
Dept: SURGICAL ONCOLOGY | Facility: CLINIC | Age: 54
End: 2019-06-20
Payer: COMMERCIAL

## 2019-06-20 ENCOUNTER — DOCUMENTATION (OUTPATIENT)
Dept: HEMATOLOGY ONCOLOGY | Facility: CLINIC | Age: 54
End: 2019-06-20

## 2019-06-20 VITALS
SYSTOLIC BLOOD PRESSURE: 122 MMHG | HEART RATE: 64 BPM | HEIGHT: 64 IN | TEMPERATURE: 98.8 F | BODY MASS INDEX: 24.41 KG/M2 | RESPIRATION RATE: 16 BRPM | WEIGHT: 143 LBS | DIASTOLIC BLOOD PRESSURE: 80 MMHG

## 2019-06-20 DIAGNOSIS — C50.412 MALIGNANT NEOPLASM OF UPPER-OUTER QUADRANT OF LEFT FEMALE BREAST, UNSPECIFIED ESTROGEN RECEPTOR STATUS (HCC): Primary | ICD-10-CM

## 2019-06-20 DIAGNOSIS — C50.412 MALIGNANT NEOPLASM OF UPPER-OUTER QUADRANT OF LEFT FEMALE BREAST, UNSPECIFIED ESTROGEN RECEPTOR STATUS (HCC): ICD-10-CM

## 2019-06-20 LAB
BUN SERPL-MCNC: 17 MG/DL (ref 5–25)
CREAT SERPL-MCNC: 0.93 MG/DL (ref 0.6–1.3)
GFR SERPL CREATININE-BSD FRML MDRD: 71 ML/MIN/1.73SQ M

## 2019-06-20 PROCEDURE — 36415 COLL VENOUS BLD VENIPUNCTURE: CPT

## 2019-06-20 PROCEDURE — 99245 OFF/OP CONSLTJ NEW/EST HI 55: CPT | Performed by: SURGERY

## 2019-06-20 PROCEDURE — 82565 ASSAY OF CREATININE: CPT

## 2019-06-20 PROCEDURE — 84520 ASSAY OF UREA NITROGEN: CPT

## 2019-06-20 RX ORDER — FLUTICASONE PROPIONATE 50 MCG
1 SPRAY, SUSPENSION (ML) NASAL AS NEEDED
COMMUNITY
End: 2022-02-24

## 2019-06-20 RX ORDER — COVID-19 ANTIGEN TEST
KIT MISCELLANEOUS 2 TIMES DAILY PRN
COMMUNITY
End: 2019-09-20 | Stop reason: ALTCHOICE

## 2019-06-20 RX ORDER — FAMOTIDINE 20 MG/1
20 TABLET, FILM COATED ORAL 2 TIMES DAILY
COMMUNITY
End: 2019-07-29

## 2019-06-20 RX ORDER — MELATONIN
1000 DAILY
COMMUNITY

## 2019-06-21 LAB — MISCELLANEOUS LAB TEST RESULT: NORMAL

## 2019-06-24 ENCOUNTER — HOSPITAL ENCOUNTER (OUTPATIENT)
Dept: RADIOLOGY | Facility: HOSPITAL | Age: 54
Discharge: HOME | End: 2019-06-24
Attending: SURGERY
Payer: COMMERCIAL

## 2019-06-24 DIAGNOSIS — C50.412 MALIGNANT NEOPLASM OF UPPER-OUTER QUADRANT OF LEFT FEMALE BREAST, UNSPECIFIED ESTROGEN RECEPTOR STATUS (HCC): ICD-10-CM

## 2019-06-24 PROCEDURE — A9585 GADOBUTROL INJECTION: HCPCS | Performed by: SURGERY

## 2019-06-24 PROCEDURE — C8908 MRI W/O FOL W/CONT, BREAST,: HCPCS

## 2019-06-24 PROCEDURE — C8937 CAD BREAST MRI: HCPCS

## 2019-06-24 RX ADMIN — GADOBUTROL 6 ML: 604.72 INJECTION INTRAVENOUS at 13:43

## 2019-06-27 ENCOUNTER — OFFICE VISIT (OUTPATIENT)
Dept: PAIN MEDICINE | Facility: CLINIC | Age: 54
End: 2019-06-27
Payer: COMMERCIAL

## 2019-06-27 VITALS
HEIGHT: 64 IN | HEART RATE: 80 BPM | DIASTOLIC BLOOD PRESSURE: 82 MMHG | BODY MASS INDEX: 23.9 KG/M2 | WEIGHT: 140 LBS | SYSTOLIC BLOOD PRESSURE: 134 MMHG | RESPIRATION RATE: 18 BRPM

## 2019-06-27 DIAGNOSIS — M51.17 INTERVERTEBRAL DISC DISORDER WITH RADICULOPATHY OF LUMBOSACRAL REGION: ICD-10-CM

## 2019-06-27 DIAGNOSIS — G57.01 PIRIFORMIS SYNDROME OF RIGHT SIDE: Primary | ICD-10-CM

## 2019-06-27 DIAGNOSIS — F11.20 UNCOMPLICATED OPIOID DEPENDENCE (HCC): ICD-10-CM

## 2019-06-27 DIAGNOSIS — Z79.891 LONG-TERM CURRENT USE OF OPIATE ANALGESIC: ICD-10-CM

## 2019-06-27 DIAGNOSIS — M46.1 SACROILIITIS (HCC): ICD-10-CM

## 2019-06-27 DIAGNOSIS — G89.4 CHRONIC PAIN SYNDROME: ICD-10-CM

## 2019-06-27 PROCEDURE — 99214 OFFICE O/P EST MOD 30 MIN: CPT | Performed by: NURSE PRACTITIONER

## 2019-07-01 ENCOUNTER — PROCEDURE VISIT (OUTPATIENT)
Dept: PAIN MEDICINE | Facility: CLINIC | Age: 54
End: 2019-07-01
Payer: COMMERCIAL

## 2019-07-01 ENCOUNTER — TELEPHONE (OUTPATIENT)
Dept: SURGICAL ONCOLOGY | Facility: CLINIC | Age: 54
End: 2019-07-01

## 2019-07-01 VITALS
HEART RATE: 80 BPM | BODY MASS INDEX: 24.03 KG/M2 | WEIGHT: 140 LBS | DIASTOLIC BLOOD PRESSURE: 82 MMHG | SYSTOLIC BLOOD PRESSURE: 124 MMHG | TEMPERATURE: 99.1 F

## 2019-07-01 DIAGNOSIS — G57.01 PIRIFORMIS SYNDROME OF RIGHT SIDE: Primary | ICD-10-CM

## 2019-07-01 PROCEDURE — 76942 ECHO GUIDE FOR BIOPSY: CPT | Performed by: ANESTHESIOLOGY

## 2019-07-01 PROCEDURE — 20552 NJX 1/MLT TRIGGER POINT 1/2: CPT | Performed by: ANESTHESIOLOGY

## 2019-07-01 RX ORDER — METHYLPREDNISOLONE ACETATE 40 MG/ML
40 INJECTION, SUSPENSION INTRA-ARTICULAR; INTRALESIONAL; INTRAMUSCULAR; SOFT TISSUE ONCE
Status: COMPLETED | OUTPATIENT
Start: 2019-07-01 | End: 2019-07-02

## 2019-07-01 RX ORDER — BUPIVACAINE HYDROCHLORIDE 2.5 MG/ML
10 INJECTION, SOLUTION INFILTRATION; PERINEURAL ONCE
Status: COMPLETED | OUTPATIENT
Start: 2019-07-01 | End: 2019-07-02

## 2019-07-01 NOTE — TELEPHONE ENCOUNTER
Called patient with genetic testing results, which were negative  Patient verbalized understanding and relief  Pre-Op appointment with Dr Doreen Riojas verified for 7/8  Patient denies any questions at this time

## 2019-07-02 RX ADMIN — BUPIVACAINE HYDROCHLORIDE 10 ML: 2.5 INJECTION, SOLUTION INFILTRATION; PERINEURAL at 07:56

## 2019-07-02 RX ADMIN — METHYLPREDNISOLONE ACETATE 40 MG: 40 INJECTION, SUSPENSION INTRA-ARTICULAR; INTRALESIONAL; INTRAMUSCULAR; SOFT TISSUE at 07:57

## 2019-07-03 NOTE — PROGRESS NOTES
1  Piriformis syndrome of right side       Post-procedure Diagnosis:       1  Piriformis syndrome of right side       Operation Title(s):                   Right piriformis injection under ultrasound guidance  Attending Surgeon:                 Evelina Ray MD  Anesthesia:                             Local     Indications: The patient is a 48y o  year-old female with a diagnosis of right piriformis syndrome  The patient's history and physical exam were reviewed  The risks, benefits and alternatives to the procedure were discussed, and all questions were answered to the patient's satisfaction  The patient agreed to proceed, and written informed consent was obtained      Procedure in Detail: The patient was brought into the exam room and placed in the proneposition on the exam room table  The right buttock was prepped with chloraprep and draped in a sterile manner       A sterile ultrasound probe cover and gel was placed over a 3 75 MHz curvilinear transducer probe  The probe was placed over the right buttock to visualize the piriformis muscle  Optimal needle path was determined and the skin and subcutaneous tissues in the area was anesthetized with 1% lidocaine  Then, under ultrasound guidance, a 2 inch ultrasound needle was advanced until the needle entered the muscle  After visualization of the tip in the target area and negative aspiration for blood, a solution consisting of 7 mL 0 25% bupivacaine and 1 mL Depo-Medrol (40mg/ml) was easily injected      The patient's buttock was cleansed and a bandage was placed over the sites of needle insertion      Disposition: The patient tolerated the procedure well and there were no apparent complications  The patient was given written discharge instructions for the procedure

## 2019-07-05 PROBLEM — Z17.0 MALIGNANT NEOPLASM OF UPPER-OUTER QUADRANT OF LEFT BREAST IN FEMALE, ESTROGEN RECEPTOR POSITIVE (HCC): Status: ACTIVE | Noted: 2019-06-18

## 2019-07-08 ENCOUNTER — OFFICE VISIT (OUTPATIENT)
Dept: SURGICAL ONCOLOGY | Facility: CLINIC | Age: 54
End: 2019-07-08
Payer: COMMERCIAL

## 2019-07-08 VITALS
RESPIRATION RATE: 16 BRPM | HEART RATE: 64 BPM | SYSTOLIC BLOOD PRESSURE: 112 MMHG | HEIGHT: 64 IN | DIASTOLIC BLOOD PRESSURE: 72 MMHG | TEMPERATURE: 98.4 F | BODY MASS INDEX: 24.75 KG/M2 | WEIGHT: 145 LBS

## 2019-07-08 DIAGNOSIS — Z17.0 MALIGNANT NEOPLASM OF UPPER-OUTER QUADRANT OF LEFT BREAST IN FEMALE, ESTROGEN RECEPTOR POSITIVE (HCC): Primary | ICD-10-CM

## 2019-07-08 DIAGNOSIS — Z01.818 PREOP EXAMINATION: ICD-10-CM

## 2019-07-08 DIAGNOSIS — C50.412 MALIGNANT NEOPLASM OF UPPER-OUTER QUADRANT OF LEFT BREAST IN FEMALE, ESTROGEN RECEPTOR POSITIVE (HCC): Primary | ICD-10-CM

## 2019-07-08 DIAGNOSIS — Z13.71 BRCA NEGATIVE: ICD-10-CM

## 2019-07-08 PROCEDURE — 99214 OFFICE O/P EST MOD 30 MIN: CPT | Performed by: SURGERY

## 2019-07-08 NOTE — PROGRESS NOTES
Surgical Oncology Follow Up       [address] [facility]  [facility]  [address]  [facility] [address]    [place]  1965  [id number]  [phone] [facility]  [facility]  [address] [facility] [id number]    Chief Complaint   Patient presents with    Pre-op Exam          Assessment & Plan:   The patient presents for a follow-up visit  She has seen Dr Sang Gresham and is trying to decide whether she wants reconstruction and if so unilateral or bilateral   We ultimately decided that she prefers a bilateral mastectomy  We went through the process of informed consent for a bilateral mastectomy in conjunction with a left sentinel lymph node biopsy and possible axillary dissection  I have recommended the patient take another week to consider whether she wants reconstruction bilaterally or not since this seems that she is leaning towards no reconstruction because of her active lifestyle  All questions were answered the patient's satisfaction  We will coordinate her surgery watch she is made a definitive plan regarding reconstruction  Cancer History:        Malignant neoplasm of upper-outer quadrant of left breast in female, estrogen receptor positive ([facility] [address] )    6/13/2019 Biopsy     Left breast ultrasound-guided biopsy  12 o'clock, 4 cm from nipple  Invasive breast carcinoma of no special type (ductal NST)  Grade 2    2 o'clock, 5 cm from nipple  Invasive breast carcinoma of no special type (ductal NST)  Grade 2  ER 90  WV 90  HER2 1+      6/20/2019 Genetic Testing     BRCA negative  [facility]           Interval History:   See above    Review of Systems:   Review of Systems   All other systems reviewed and are negative        Past Medical History     Patient Active Problem List   Diagnosis    Esophageal reflux    Hiatal hernia    Hyperlipidemia    Pain syndrome, chronic    Sacroiliitis (HCC)    Irritability and anger    Vitamin B12 deficiency    Seasonal allergic rhinitis due to pollen    Gastroesophageal reflux disease    Costochondritis    Intervertebral disc disorder with radiculopathy of lumbosacral region    Malignant neoplasm of upper-outer quadrant of left breast in female, estrogen receptor positive ([facility] [address] )     Past Medical History:   Diagnosis Date    Anxiety     BRCA gene mutation negative 06/20/2019    [facility]    Fibrocystic disease of breast     GERD (gastroesophageal reflux disease)     Hiatal hernia     Hyperlipidemia      Past Surgical History:   Procedure Laterality Date    APPENDECTOMY      BREAST BIOPSY      BREAST CYST ASPIRATION Left 2005    FOOT SURGERY Right 2011    right, hardware removal    OTHER SURGICAL HISTORY Right 2010    Complete Excision of Fifth Metatarsal Head     NC COLONOSCOPY FLX DX W/COLLJ SPEC WHEN PFRMD N/A 7/20/2018    Procedure: EGD AND COLONOSCOPY;  Surgeon: Tyree Lau MD;  Location: [facility]; Service: Gastroenterology    US GUIDANCE BREAST BIOPSY LEFT EACH ADDITIONAL Left 6/13/2019    US GUIDED BREAST BIOPSY LEFT COMPLETE Left 6/13/2019     Family History   Problem Relation Age of Onset    Coronary artery disease Mother         CABG in her 62s   [employer] Other Mother         Dyslipidemia    Hypertension Mother     Heart attack Father 46        acute myocardial infarction    Other Father         Dyslipidemia    Hypertension Father     Prostate cancer Paternal Uncle 72    Thyroid disease Family         disorder    No Known Problems Maternal Aunt     No Known Problems Maternal Aunt      Social History     Socioeconomic History    Marital status: /Civil Union     Spouse name: Not on file    Number of children: Not on file    Years of education: Not on file    Highest education level: Not on file   Occupational History    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, now works fam business   1 day with GI   Social Needs    Financial resource strain: Not on file    Food insecurity:     Worry: Not on file     Inability: Not on file    Transportation needs:     Medical: Not on file     Non-medical: Not on file   Tobacco Use    Smoking status: Never Smoker    Smokeless tobacco: Never Used   Substance and Sexual Activity    Alcohol use:  Yes     Alcohol/week: 7 0 standard drinks     Types: 7 Standard drinks or equivalent per week    Drug use: No    Sexual activity: Not on file     Comment: Partner had vasectomy   Lifestyle    Physical activity:     Days per week: Not on file     Minutes per session: Not on file    Stress: Not on file   Relationships    Social connections:     Talks on phone: Not on file     Gets together: Not on file     Attends Yazidi service: Not on file     Active member of club or organization: Not on file     Attends meetings of clubs or organizations: Not on file     Relationship status: Not on file    Intimate partner violence:     Fear of current or ex partner: Not on file     Emotionally abused: Not on file     Physically abused: Not on file     Forced sexual activity: Not on file   Other Topics Concern    Not on file   Social History Narrative    Exercise habits    Working full-time - used to be GI RN       Current Outpatient Medications:     cholecalciferol (VITAMIN D3) 1,000 units tablet, Take 1,000 Units by mouth daily, Disp: , Rfl:     cyanocobalamin (VITAMIN B-12) 1,000 mcg tablet, Take 1 tablet (1,000 mcg total) by mouth daily, Disp: 90 tablet, Rfl: 0    escitalopram (LEXAPRO) 5 mg tablet, Take 1 tablet (5 mg total) by mouth daily, Disp: 90 tablet, Rfl: 1    famotidine (PEPCID) 20 mg tablet, Take 20 mg by mouth 2 (two) times a day, Disp: , Rfl:     fluticasone (FLONASE) 50 mcg/act nasal spray, 1 spray into each nostril daily, Disp: , Rfl:     Naproxen Sodium (ALEVE) 220 MG CAPS, Take by mouth 2 (two) times a day as needed, Disp: , Rfl:     omeprazole (PriLOSEC) 20 mg delayed release capsule, Take 1 capsule (20 mg total) by mouth daily, Disp: 90 capsule, Rfl: 1    simvastatin (ZOCOR) 10 mg tablet, TAKE 1 TABLET DAILY, Disp: 90 tablet, Rfl: 1    tapentadol (NUCYNTA) 75 mg tablet, Take 1 tab PO Q 6 hours prn pain  2nd month script  Do not fill until 8/13/19, Disp: 120 tablet, Rfl: 0  Allergies   Allergen Reactions    Sulfa Antibiotics Headache     Annotation - [id number]: Migraine; Annotation - [id number]: cellular issues       Physical Exam:     Vitals:    07/08/19 1200   BP: 112/72   Pulse: 64   Resp: 16   Temp: 98 4 °F (36 9 °C)     Physical Exam   Constitutional: She is oriented to person, place, and time  She appears well-developed and well-nourished  HENT:   Head: Normocephalic and atraumatic  Mouth/Throat: Oropharynx is clear and moist    Eyes: Pupils are equal, round, and reactive to light  EOM are normal    Neck: Normal range of motion  Neck supple  No JVD present  No tracheal deviation present  No thyromegaly present  Cardiovascular: Normal rate, regular rhythm, normal heart sounds and intact distal pulses  Exam reveals no gallop and no friction rub  No murmur heard  Pulmonary/Chest: Effort normal and breath sounds normal  No respiratory distress  She has no wheezes  She has no rales  Right breast exhibits no inverted nipple, no mass, no nipple discharge, no skin change and no tenderness  Left breast exhibits no inverted nipple, no mass, no nipple discharge, no skin change and no tenderness  Breasts are symmetrical      Both breasts were examined in the sitting and supine position  There are no worrisome skin lesions, no nipple retraction and no nipple discharge  There are no dominant masses, axillary adenopathy or supraclavicular adenopathy on either side  The patient has ecchymosis and hematomas of resolved  Abdominal: Soft  She exhibits no distension and no mass  There is no hepatomegaly  There is no tenderness  There is no rebound and no guarding     Musculoskeletal: Normal range of motion  She exhibits no edema or tenderness  Lymphadenopathy:     She has no cervical adenopathy  Neurological: She is alert and oriented to person, place, and time  No cranial nerve deficit  Skin: Skin is warm and dry  No rash noted  No erythema  Psychiatric: She has a normal mood and affect  Her behavior is normal    Vitals reviewed  Results:   I reviewed her MRI previously in reviewed the results with her  We had a long conversation regarding reconstruction bilateral verses unilateral as well as bilateral verses unilateral mastectomy  Patient has a very active lifestyle  At the very least the patient if she had reconstruction would go to a smaller size though she brought up the idea of possibly not having any reconstruction which may be a good option for her  She will consider this further and contact our office  Advance Care Planning/Advance Directives:  I discussed the disease status, treatment plans and follow-up with the patient

## 2019-07-08 NOTE — PATIENT INSTRUCTIONS
Pre-Surgery Instructions:   Medication Instructions    cholecalciferol (VITAMIN D3) 1,000 units tablet Stop taking 1 days prior to surgery    cyanocobalamin (VITAMIN B-12) 1,000 mcg tablet Stop taking 1 days prior to surgery    escitalopram (LEXAPRO) 5 mg tablet Stop taking 1 days prior to surgery    famotidine (PEPCID) 20 mg tablet Take morning of surgery if needed    fluticasone (FLONASE) 50 mcg/act nasal spray Take morning of surgery if needed    Naproxen Sodium (ALEVE) 220 MG CAPS Stop taking 1 week prior to surgery    omeprazole (PriLOSEC) 20 mg delayed release capsule Take morning of surgery if needed    simvastatin (ZOCOR) 10 mg tablet Take morning of surgery    tapentadol (NUCYNTA) 75 mg tablet Stop taking 1 days prior to surgery           Mastectomy   AMBULATORY CARE:   What you need to know about a mastectomy:  A mastectomy is surgery to remove all or part of your breast  Tissue, lymph nodes, or muscle near the breast may also be removed  A mastectomy is done to treat breast cancer and prevent cancer from spreading  A mastectomy can also be done to prevent breast cancer  This may be a choice if you are at high risk for breast cancer  The type of mastectomy you need may depend on the size of the tumor  It may also depend on if the cancer has spread  How to prepare for a mastectomy:  Your healthcare provider will talk to you about how to prepare for surgery  He may tell you not to eat or drink anything after midnight on the day of your surgery  He will tell you what medicines to take or not take on the day of your surgery  You may need to stop taking aspirin or blood thinners several days before surgery  Arrange for someone to drive you home and stay with you after surgery  This person can help care for you and watch for complications from surgery  What will happen during a mastectomy:   · You will be given general anesthesia to keep you asleep and free from pain during surgery   You may be given an antibiotic through your IV to help prevent a bacterial infection  Your healthcare provider will make an incision over your breast  He will remove the tumor and breast tissue  He may also remove muscle from behind your breast  If lymph nodes will be taken, a small incision will be made in your armpit  The lymph nodes will be removed and tested for cancer  · One or more drains may be inserted near your incision  A drain removes extra fluid and helps your incision heal  Your healthcare provider will close your incision with stitches and cover it with a bandage  He may also wrap a tight-fitting bandage around both of your breasts  This may decrease swelling, bleeding, and pain  What will happen after a mastectomy:  Healthcare providers will monitor you until you are awake  You may need to spend 1 to 2 nights in the hospital  You may have difficulty moving your arm closest to your mastectomy  This should get better in a few days  Get out of bed and walk when your healthcare provider says it is safe  This will help prevent blood clots  Risks of a mastectomy:  You may bleed more than expected or get an infection  Nerves, blood vessels, and muscles may be damaged during your surgery  Blood or fluid may collect under your skin  You may need other procedures to remove the fluid or blood  You may have swelling in your arm closest to the mastectomy or where lymph nodes were removed  This swelling is called lymphedema  Lymphedema may cause tingling, numbness, stiffness, and weakness in your arm  This may be permanent  You may get a blood clot in your arm or leg  The blood clot may travel to your heart, lungs, or brain  This may become life-threatening  Call 911 for any of the following:   · You feel lightheaded, short of breath, and have chest pain  · You cough up blood  · You have trouble breathing  Seek care immediately if:   · Blood soaks through your bandage  · Your stitches come apart      · Your bruise suddenly gets bigger  · Your leg or arm is larger than normal and painful  Contact your healthcare provider if:   · You have a fever or chills  · Your wound is red, swollen, or draining pus  · You have nausea or are vomiting  · Your skin is itchy, swollen, or you have a rash  · Your pain does not get better after you take pain medicine  · Your drain falls out or stops draining fluid  · Your drain has pus or foul-smelling fluid coming out of it  · You have numbness, tingling, or swelling in your arm or hand  · You feel very sad or anxious  · You have trouble coping with your condition  · You have questions or concerns about your condition or care  Medicines: You may need any of the following:  · Antibiotics  help prevent a bacterial infection  · Prescription pain medicine  may be given  Ask your healthcare provider how to take this medicine safely  Some prescription pain medicines contain acetaminophen  Do not take other medicines that contain acetaminophen without talking to your healthcare provider  Too much acetaminophen may cause liver damage  Prescription pain medicine may cause constipation  Ask your healthcare provider how to prevent or treat constipation  · NSAIDs , such as ibuprofen, help decrease swelling, pain, and fever  NSAIDs can cause stomach bleeding or kidney problems in certain people  If you take blood thinner medicine, always ask your healthcare provider if NSAIDs are safe for you  Always read the medicine label and follow directions  · Take your medicine as directed  Contact your healthcare provider if you think your medicine is not helping or if you have side effects  Tell him or her if you are allergic to any medicine  Keep a list of the medicines, vitamins, and herbs you take  Include the amounts, and when and why you take them  Bring the list or the pill bottles to follow-up visits   Carry your medicine list with you in case of an emergency  Care for your wound as directed: If you have a tight-fitting bandage, you can remove it in 24 to 48 hours, or as directed  Ask your healthcare provider when your incision can get wet  You may need to take a sponge bath until your drain is removed  Carefully wash around the incision with soap and water  It is okay to allow the soap and water to gently run over your incision  Gently pat dry the area and put on new, clean bandages as directed  Change your bandages when they get wet or dirty  If lymph nodes were removed from your armpit, ask your healthcare provider when you can wear deodorant  Check your incision every day for redness, pus, or swelling  Self-care:   · Apply ice  on your incision for 15 to 20 minutes every hour or as directed  Use an ice pack, or put crushed ice in a plastic bag  Cover it with a towel  Ice helps prevent tissue damage and decreases swelling and pain  · Elevate  your arm nearest to your incision above the level of your heart  Do this as often as you can  This will help decrease swelling and pain  Prop your arm on pillows or blankets to keep it elevated comfortably  · Rest  as directed  Do not lift anything heavier than 5 pounds  Do not push or pull with your arms  You can use your arms to groom, eat, and bathe  Take short walks around the house  Gradually walk further as you feel better  Ask your healthcare provider when you can return to your normal activities  · Do not sleep on your stomach  This will put too much pressure on your incision  Sleep on your back or on the side opposite to your incision  · Empty your drain  as directed  You may need to write down how much fluid you empty from your drain each day  Ask your healthcare provider for more information about how to empty your drain  · Wear a supportive bra  as directed  Wait until you remove the tight-fitting bandage to wear a bra  You may be given a surgical bra or told to wear a sports bra   A supportive bra may help hold your bandages in place  It may also help with swelling and pain  Do not  wear bras with lace or underwire  They may rub against your incision and cause discomfort  Arm stretches: Your healthcare provider may show you how to do arm stretches  Arm stretches may prevent stiff arms or shoulders  You may need to wait until after your drains are removed to begin stretching  Do not do arm stretches until your healthcare provider says it is okay  Ask your healthcare provider for more information about arm stretches  More information and support: You may have difficulty coping with the changes to your body  Talk to your family or friends about how you are feeling  Ask your healthcare provider about support groups  It may be helpful to talk with other women who have had a mastectomy  · [address] [place] [address]  Phone: [phone]  Web Address: [email or web address]  [employer]  · [address] [place] [address], [address] [place] [address]  Phone: [phone]  Web Address: http://ON24/  gov  Follow up with your healthcare provider as directed:  Write down your questions so you remember to ask them during your visits  © 2017 [address] [place] [address] Information is for End User's use only and may not be sold, redistributed or otherwise used for commercial purposes  All illustrations and images included in CareNotes® are the copyrighted property of [employer]  or Shravan Vazquez  The above information is an  only  It is not intended as medical advice for individual conditions or treatments  Talk to your doctor, nurse or pharmacist before following any medical regimen to see if it is safe and effective for you          Breast Cancer [place] Lymph Node Biopsy   AMBULATORY CARE:   What you need to know about a sentinel lymph node biopsy (SLNB):  A sentinel lymph node (SLN) is usually the lymph node closest to the breast tumor  It is usually found in the armpit, or along the sternum (breastbone) or collarbone  A biopsy is a procedure used to find and remove a SLN  During the biopsy, the SLN will be tested for cancer cells  If the test is positive, it may mean that breast cancer has spread outside of your breast  This information can help your healthcare provider decide what other treatments you need  How to prepare for a SLNB:   · You may need a nuclear scan before your procedure  During a nuclear scan, healthcare providers will inject a small amount of radioactive liquid in your breast  Radioactive liquid will move to the location of your lymph nodes and help them show up better in pictures  A camera will take pictures of the lymph nodes  The pictures will help your healthcare provider plan for your procedure  · Your healthcare provider will talk to you about how to prepare for your procedure  He may tell you not to eat or drink anything after midnight on the day of your procedure  He will tell you what medicines to take or not take on the day of your procedure  You may be given contrast liquid during your biopsy  Tell your healthcare provider if you have ever had an allergic reaction to contrast liquid  Arrange for someone to drive you home and stay with you after your procedure  What will happen during a SLNB:   · You may be given an antibiotic through your IV to help prevent a bacterial infection  Tell the healthcare provider if you have ever had an allergic reaction to an antibiotic  You may be given general anesthesia to keep you asleep and free from pain during your procedure  You may instead be given local anesthesia to numb the area  With local anesthesia, you may still feel pressure or pushing during the procedure, but you should not feel any pain  · Your healthcare provider will inject blue contrast liquid, radioactive liquid, or both near the tumor  The liquid will move to the SLN  Your healthcare provider may use an instrument to help find the SLN  He will do this by gently moving an instrument over your skin  The instrument will show pictures of the SLN on a monitor  Your healthcare provider will make a small incision in the skin that covers the SLN  The incision is usually in your armpit or chest  The SLN will be removed and checked for cancer cells  If cancer is found, your healthcare provider may remove several more lymph nodes for testing  Your incision may be closed with stitches or strips of medical tape and covered with a bandage  What will happen after a SLNB:  Healthcare providers will monitor you until you are awake  You may be able to go home after you are awake and your pain is controlled  Your urine or bowel movement may be blue for 24 to 48 hours after your procedure  This is caused by the blue contrast liquid given to you during the procedure  You may have bruising or swelling at the biopsy site  This is normal and expected  The arm closest to the biopsy site may be sore  This should get better within 48 to 72 hours  Risks of a SLNB:  You may bleed more than expected or get an infection  You may develop a condition called lymphedema  Lymphedema is tissue swelling in your arm nearest to where the SLN was removed  You may have long-term pain or discomfort in your arm  Your skin in the arm may be permanently thick or hard  Your nerves may be damaged during your procedure  This may cause numbness or tingling in your arm  It may also cause difficulty moving your arm  You may have an allergic reaction to the contrast liquid  This may require medicine or other treatments  Seek care immediately if:   · Blood soaks through your bandage  · Your stitches come apart  · Your bruise suddenly gets larger and feels firm  Contact your healthcare provider if:   · You have a fever or chills  · Your wound is red, swollen, or draining pus      · You have nausea or are vomiting  · Your skin is itchy, swollen, or you have a rash  · Your pain does not get better after you take medicine for pain  · You have questions or concerns about your condition or care  Medicines: You may need any of the following:  · NSAIDs , such as ibuprofen, help decrease swelling, pain, and fever  This medicine is available with or without a doctor's order  NSAIDs can cause stomach bleeding or kidney problems in certain people  If you take blood thinner medicine, always ask your healthcare provider if NSAIDs are safe for you  Always read the medicine label and follow directions  · Acetaminophen  decreases pain and fever  It is available without a doctor's order  Ask how much to take and how often to take it  Follow directions  Read the labels of all other medicines you are using to see if they also contain acetaminophen, or ask your doctor or pharmacist  Acetaminophen can cause liver damage if not taken correctly  Do not use more than 4 grams (4,000 milligrams) total of acetaminophen in one day  · Prescription pain medicine  may be given  Ask your healthcare provider how to take this medicine safely  Some prescription pain medicines contain acetaminophen  Do not take other medicines that contain acetaminophen without talking to your healthcare provider  Too much acetaminophen may cause liver damage  Prescription pain medicine may cause constipation  Ask your healthcare provider how to prevent or treat constipation  · Take your medicine as directed  Contact your healthcare provider if you think your medicine is not helping or if you have side effects  Tell him or her if you are allergic to any medicine  Keep a list of the medicines, vitamins, and herbs you take  Include the amounts, and when and why you take them  Bring the list or the pill bottles to follow-up visits  Carry your medicine list with you in case of an emergency    Care for your incision wound as directed:  Ask your healthcare provider when your wound can get wet  Carefully wash around the wound with soap and water  It is okay to let soap and water gently run over your wound  Do not  scrub your wound  Gently pat dry the area and put on new, clean bandages as directed  Change your bandages when they get wet or dirty  If you have strips of medical tape, let them fall of on their own  It may take 10 to 14 days for them to fall off  Check your wound every day for signs of infection, such as redness, swelling, or pus  Do not put powders or lotions on your wound  If lymph nodes have been taken from your armpit, ask your healthcare provider when you can wear deodorant  Self-care:   · Apply ice  on your wound for 15 to 20 minutes every hour or as directed  Use an ice pack, or put crushed ice in a plastic bag  Cover it with a towel before you apply it to your skin  Ice helps prevent tissue damage and decreases swelling and pain  · Elevate  your arm nearest to the biopsy site as often as you can  This will help decrease swelling and pain  Prop your arm on pillows or blankets to keep it elevated above the level of your heart comfortably  · Do not do strenuous activities  for 24 to 48 hours  Strenuous activities include heavy lifting, sports, or running  If lymph nodes were taken from your armpit, do not push or pull with your arm  These activities may put too much stress on your wound  Rest and take short walks around the house  Ask your healthcare provider when you can return to your normal activities  · Drink plenty of liquids  as directed  This will help flush out contrast liquid from your body  Ask how much liquid to drink each day and which liquids are best for you  Ask your healthcare provider how to prevent lymphedema and infection:  Lymphedema is fluid buildup in fatty tissues under your skin  Lymphedema may happen in the arm closest to where lymph nodes were removed   An infection in your skin can make lymphedema worse  Ask your healthcare provider how you can decrease your risk for skin infections and lymphedema  Follow up with your healthcare provider as directed:  Write down your questions so you remember to ask them during your visits  © 2017 [address] Information is for End User's use only and may not be sold, redistributed or otherwise used for commercial purposes  All illustrations and images included in CareNotes® are the copyrighted property of [employer]  or Shravan Vazquez  The above information is an  only  It is not intended as medical advice for individual conditions or treatments  Talk to your doctor, nurse or pharmacist before following any medical regimen to see if it is safe and effective for you  Corbin-Bunch Drain Care   AMBULATORY CARE:   A Corbin-Bunch (RILEY) drain  is used to remove fluids that build up in an area of your body after surgery  The RILEY drain is a bulb shaped device connected to a tube  One end of the tube is placed inside you during surgery  The other end comes out through a small cut in your skin  The bulb is connected to this end  You may have a stitch to hold the tube in place  Seek care immediately if:   · Your RILEY drain breaks or comes out  · You have cloudy yellow or brown drainage from your RILEY drain site, or the drainage smells bad  Contact your healthcare provider if:   · You drain less than 30 milliliters (2 tablespoons) in 24 hours  This may mean your drain can be removed  · You suddenly stop draining fluid or think your RILEY drain is blocked  · You have a fever higher than 101 5°F (38 6°C)  · You have increased pain, redness, or swelling around the drain site  · You have questions about your RILEY drain care  How a Corbin-Bunch drain works: The RILEY drain removes fluids by creating suction in the tube  The bulb is squeezed flat and connected to the tube that sticks out of your body   The bulb expands as it fills with fluid  How to change the bandage around your Corbin-Bunch drain:  If you have a bandage, change it once a day  You may need to change your bandage more than once a day if it gets completely wet  · Wash your hands with soap and water  · Loosen the tape and gently remove the old bandage  Throw the old bandage into a plastic trash bag  · Use soap and water or saline (salt water) solution to clean your RILEY drain site  Dip a cotton swab or gauze pad in the solution and gently clean your skin  · Pat the area dry  · Place a new bandage on your RILEY drain site and secure it to your skin with medical tape  · Wash your hands  How to empty the Corbin-Bunch drain:  Empty the bulb when it is half full or every 8 to 12 hours  · Wash your hands with soap and water  · Remove the plug from the bulb  · Pour the fluid into a measuring cup  · Clean the plug with an alcohol swab or a cotton ball dipped in rubbing alcohol  · Squeeze the bulb flat and put the plug back in  The bulb should stay flat until it starts to fill with fluid again  · Measure the amount of fluid you pour out  Write down how much fluid you empty from the RILEY drain and the date and time you collected it  · Flush the fluid down the toilet  Wash your hands  Clear clogged tubing: Use the following steps to clear your Corbin-Bunch tubing:  · Hold the tubing between your thumb and first finger at the place closest to your skin  This hand will prevent the tube from being pulled out of your skin  · Use your other thumb and first finger to slide the clog down the tubing toward the bulb  You may have to repeat the sliding until the tubing is unclogged  Corbin-Bunch drain removal:  The amount of fluid that you drain will decrease as your wound heals  The RILEY drain usually is removed when less than 30 milliliters (2 tablespoons) is collected in 24 hours   Ask your healthcare provider when and how your RILEY drain will be removed  Follow up with your healthcare provider as directed:  Write down your questions so you remember to ask them during your visits  © 2017 [address] Information is for End User's use only and may not be sold, redistributed or otherwise used for commercial purposes  All illustrations and images included in CareNotes® are the copyrighted property of [employer]  or Shravan Vazquez  The above information is an  only  It is not intended as medical advice for individual conditions or treatments  Talk to your doctor, nurse or pharmacist before following any medical regimen to see if it is safe and effective for you

## 2019-07-11 DIAGNOSIS — K21.9 GASTROESOPHAGEAL REFLUX DISEASE WITHOUT ESOPHAGITIS: ICD-10-CM

## 2019-07-11 RX ORDER — OMEPRAZOLE 20 MG/1
20 CAPSULE, DELAYED RELEASE ORAL DAILY
Qty: 90 CAPSULE | Refills: 0 | Status: SHIPPED | OUTPATIENT
Start: 2019-07-11 | End: 2019-10-09 | Stop reason: SDUPTHER

## 2019-07-15 ENCOUNTER — TELEPHONE (OUTPATIENT)
Dept: PAIN MEDICINE | Facility: CLINIC | Age: 54
End: 2019-07-15

## 2019-07-15 DIAGNOSIS — M51.17 INTERVERTEBRAL DISC DISORDER WITH RADICULOPATHY OF LUMBOSACRAL REGION: Primary | ICD-10-CM

## 2019-07-15 RX ORDER — METHYLPREDNISOLONE 4 MG/1
TABLET ORAL
Qty: 21 TABLET | Refills: 0 | Status: SHIPPED | OUTPATIENT
Start: 2019-07-15 | End: 2019-07-29

## 2019-07-15 NOTE — TELEPHONE ENCOUNTER
S/w patient, states the injection on 7/1/19- R Piriformis Injection with USGI really didn't help, would like to discuss further w/clinical     Cb # [phone]

## 2019-07-15 NOTE — TELEPHONE ENCOUNTER
Pt informed that a 6 day steroid pack was sent to her pharmacy, pt is not to take any NSAIDS such as Aleve, advil, Ibuprofen or Motrin while taking the steroid pack  Pt understood instructions  Pt also DB w/ FQ for Mon 7/22 for 9:45 and arriving at 9:30  I sent email to Olvin Burkett to add the DB appt to FQ's schedule

## 2019-07-15 NOTE — TELEPHONE ENCOUNTER
Pt reports that her pain is the worst it's been in the 5 yrs that she's hd the pain  Its her sciatic and the pain first starts in the Rt SIJ and shoots thru the knee to the foot  She can't sleep lying on either side  Pain is 8-10/10, she is taking a ton of Aleve and can't keep doing that  She said it doesn't seem like we have found where the pain is coming from  Pt said the only thing that has ever helped is a prednisone pack, pt asking it that can be prescribed for her again? Pt said no one called her 1 week after the inj so that's why she called  Pt uses [facility] in [address]  S/P Rt prirformis inj on 7/1/19  Pls advise

## 2019-07-15 NOTE — TELEPHONE ENCOUNTER
E-rx sent to pharmacy for medrol dosepak    Please double book her for me next Monday after she has finished the pack

## 2019-07-22 ENCOUNTER — OFFICE VISIT (OUTPATIENT)
Dept: PAIN MEDICINE | Facility: CLINIC | Age: 54
End: 2019-07-22
Payer: COMMERCIAL

## 2019-07-22 ENCOUNTER — HOSPITAL ENCOUNTER (OUTPATIENT)
Dept: RADIOLOGY | Facility: HOSPITAL | Age: 54
Discharge: HOME | End: 2019-07-22
Attending: ANESTHESIOLOGY
Payer: COMMERCIAL

## 2019-07-22 VITALS
BODY MASS INDEX: 24.89 KG/M2 | WEIGHT: 145 LBS | TEMPERATURE: 99 F | DIASTOLIC BLOOD PRESSURE: 84 MMHG | SYSTOLIC BLOOD PRESSURE: 132 MMHG | HEART RATE: 86 BPM

## 2019-07-22 DIAGNOSIS — M25.551 RIGHT HIP PAIN: ICD-10-CM

## 2019-07-22 DIAGNOSIS — M51.17 INTERVERTEBRAL DISC DISORDER WITH RADICULOPATHY OF LUMBOSACRAL REGION: Primary | ICD-10-CM

## 2019-07-22 PROCEDURE — 73502 X-RAY EXAM HIP UNI 2-3 VIEWS: CPT

## 2019-07-22 PROCEDURE — 99214 OFFICE O/P EST MOD 30 MIN: CPT | Performed by: ANESTHESIOLOGY

## 2019-07-22 NOTE — PROGRESS NOTES
Assessment:  1  Intervertebral disc disorder with radiculopathy of lumbosacral region    2  Right hip pain        Plan:  The patient is currently experiencing worsening right hip and buttock symptoms consistent with underlying right hip pathology  At this time, I will order x-rays of the right hip and given her history of breast cancer an MRI arthrogram of the right hip as well  I advised her we will call with the results and discuss treatment moving forward  My impressions and treatment recommendations were discussed in detail with the patient who verbalized understanding and had no further questions  Discharge instructions were provided  I personally saw and examined the patient and I agree with the above discussed plan of care  Orders Placed This Encounter   Procedures    MRI arthrogram right hip     Standing Status:   Future     Standing Expiration Date:   7/22/2023     Scheduling Instructions: There is no preparation for this test  Please leave your jewelry and valuables at home, wedding rings are the exception  Please bring your insurance cards, a form of photo ID and a list of your medications with you  Arrive 15 minutes prior to your appointment time in order to register  Please bring any prior CT or MRI studies of this area that were not performed at a [facility]  To schedule this appointment, please contact Central Scheduling at [phone]  Order Specific Question:   Is the patient pregnant? Answer:   No     Order Specific Question:   What is the patient's sedation requirement? Answer:   No Sedation     Order Specific Question:   Does the patient have metallic implants? Answer:   No    XR hip/pelv 2-3 vws right if performed     Standing Status:   Future     Standing Expiration Date:   7/22/2023     Scheduling Instructions:      Bring along any outside films relating to this procedure  Order Specific Question:   Is the patient pregnant? Answer:   No     No orders of the defined types were placed in this encounter  History of Present Illness:  Mirlande Preciado is a 48 y o  female who presents for a follow up office visit in regards to Back Pain (radiates into the right leg) and Leg Pain  The patient has a history of lumbar disc disorder with radiculopathy and piriformis syndrome and returns for follow-up  She is status post a right L5-S1 transforaminal epidural steroid injection on May 31st followed by right piriformis injection on July 1st   She reports about 7 days relief  But then symptoms recurred  She is experiencing constant pain in the lower back as well as right hip and lower leg described to be dull-aching throbbing and shooting  Symptoms are aggravated with sitting as well as climbing stairs  She remains on Nucynta 75 mg as needed which provides 50% relief  She is scheduled for double mastectomy on August 9th  I have personally reviewed and/or updated the patient's past medical history, past surgical history, family history, social history, current medications, allergies, and vital signs today  Review of Systems   Respiratory: Negative for shortness of breath  Cardiovascular: Negative for chest pain  Gastrointestinal: Negative for constipation, diarrhea, nausea and vomiting  Musculoskeletal: Positive for joint swelling  Negative for arthralgias, gait problem and myalgias  Skin: Negative for rash  Neurological: Negative for dizziness, seizures and weakness  All other systems reviewed and are negative        Patient Active Problem List   Diagnosis    Esophageal reflux    Hiatal hernia    Hyperlipidemia    Pain syndrome, chronic    Sacroiliitis (HCC)    Irritability and anger    Vitamin B12 deficiency    Seasonal allergic rhinitis due to pollen    Gastroesophageal reflux disease    Costochondritis    Intervertebral disc disorder with radiculopathy of lumbosacral region    Malignant neoplasm of upper-outer quadrant of left breast in female, estrogen receptor positive ([address] )       Past Medical History:   Diagnosis Date    Anxiety     BRCA gene mutation negative 06/20/2019    [facility]    Fibrocystic disease of breast     GERD (gastroesophageal reflux disease)     Hiatal hernia     Hyperlipidemia        Past Surgical History:   Procedure Laterality Date    APPENDECTOMY      BREAST BIOPSY      BREAST CYST ASPIRATION Left 2005    FOOT SURGERY Right 2011    right, hardware removal    OTHER SURGICAL HISTORY Right 2010    Complete Excision of Fifth Metatarsal Head     CO COLONOSCOPY FLX DX W/COLLJ SPEC WHEN PFRMD N/A 7/20/2018    Procedure: EGD AND COLONOSCOPY;  Surgeon: Erin Fischer MD;  Location: [facility]; Service: Gastroenterology    US GUIDANCE BREAST BIOPSY LEFT EACH ADDITIONAL Left 6/13/2019    US GUIDED BREAST BIOPSY LEFT COMPLETE Left 6/13/2019       Family History   Problem Relation Age of Onset    Coronary artery disease Mother         CABG in her 62s   [facility] Other Mother         Dyslipidemia    Hypertension Mother     Heart attack Father 46        acute myocardial infarction    Other Father         Dyslipidemia    Hypertension Father     Prostate cancer Paternal Uncle 72    Thyroid disease Family         disorder    No Known Problems Maternal Aunt     No Known Problems Maternal Aunt        Social History     Occupational History    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, 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   Tobacco Use    Smoking status: Never Smoker    Smokeless tobacco: Never Used   Substance and Sexual Activity    Alcohol use:  Yes     Alcohol/week: 7 0 standard drinks     Types: 7 Standard drinks or equivalent per week    Drug use: No    Sexual activity: Not on file     Comment: Partner had vasectomy       Current Outpatient Medications on File Prior to Visit   Medication Sig    cholecalciferol (VITAMIN D3) 1,000 units tablet Take 1,000 Units by mouth daily    cyanocobalamin (VITAMIN B-12) 1,000 mcg tablet Take 1 tablet (1,000 mcg total) by mouth daily    escitalopram (LEXAPRO) 5 mg tablet Take 1 tablet (5 mg total) by mouth daily    famotidine (PEPCID) 20 mg tablet Take 20 mg by mouth 2 (two) times a day    fluticasone (FLONASE) 50 mcg/act nasal spray 1 spray into each nostril daily    methylPREDNISolone 4 MG tablet therapy pack Use as directed on package    Naproxen Sodium (ALEVE) 220 MG CAPS Take by mouth 2 (two) times a day as needed    omeprazole (PriLOSEC) 20 mg delayed release capsule Take 1 capsule (20 mg total) by mouth daily    simvastatin (ZOCOR) 10 mg tablet TAKE 1 TABLET DAILY    tapentadol (NUCYNTA) 75 mg tablet Take 1 tab PO Q 6 hours prn pain  2nd month script  Do not fill until 8/13/19     No current facility-administered medications on file prior to visit  Allergies   Allergen Reactions    Sulfa Antibiotics Headache     Annotation - [id number]: Migraine; Annotation - [id number]: cellular issues       Physical Exam:    /84   Pulse 86   Temp 99 °F (37 2 °C) (Oral)   Wt 65 8 kg (145 lb)   BMI 24 89 kg/m²     Constitutional:normal, well developed, well nourished, alert, in no distress and non-toxic and no overt pain behavior    Eyes:anicteric  HEENT:grossly intact  Neck:supple, symmetric, trachea midline and no masses   Pulmonary:even and unlabored  Cardiovascular:No edema or pitting edema present  Skin:Normal without rashes or lesions and well hydrated  Psychiatric:Mood and affect appropriate  Neurologic:Cranial Nerves II-XII grossly intact  Musculoskeletal:antalgic     Lumbar Spine Exam  Appearance:  Normal lordosis  Palpation/Tenderness:  right sacroiliac joint tenderness  right piriformis tenderness  Sensory:  no sensory deficits noted  Range of Motion:  Flexion:  Minimally limited  with pain  Extension:  Minimally limited  with pain  Lateral Flexion - Left:  No limitation  without pain  Lateral Flexion - Right:  No limitation  without pain  Rotation - Left:  No limitation  without pain  Rotation - Right:  No limitation  without pain  Motor Strength:  Left hip flexion:  5/5  Left hip extension:  5/5  Right hip flexion:  5/5  Right hip extension:  5/5  Left knee flexion:  5/5  Left knee extension:  5/5  Right knee flexion:  5/5  Right knee extension:  5/5  Left foot dorsiflexion:  5/5  Left foot plantar flexion:  5/5  Right foot dorsiflexion:  5/5  Right foot plantar flexion:  5/5  Reflexes:  Left Patellar:  2+   Right Patellar:  2+   Left Achilles:  2+   Right Achilles:  2+   Special Tests:  Left Straight Leg Test:  negative  Right Straight Leg Test:  negative   Left CAROLINA: negative  Right CAROLINA: positive      Imaging    MRI LUMBAR SPINE WITHOUT CONTRAST (2/16/2019)     INDICATION: M54 16: Radiculopathy, lumbar region      COMPARISON:  January 5, 2016     TECHNIQUE:  Sagittal T1, sagittal T2, sagittal inversion recovery, axial T1 and axial T2, coronal T2        IMAGE QUALITY:  Diagnostic     FINDINGS:     VERTEBRAL BODIES:  Normal alignment of the lumbar spine  No spondylolysis or spondylolisthesis  No scoliosis  No compression fracture  Normal marrow signal is identified within the visualized bony structures  No discrete marrow lesion      SACRUM:  Normal signal within the sacrum   No evidence of insufficiency or stress fracture      DISTAL CORD AND CONUS:  Normal size and signal within the distal cord and conus        PARASPINAL SOFT TISSUES:  Paraspinal soft tissues are unremarkable      LOWER THORACIC DISC SPACES:  Normal disc height and signal   No disc herniation, canal stenosis or foraminal narrowing      LUMBAR DISC SPACES:       Perineural cysts are identified at L5-S1 as well as within the sacrum      L1-L2:  Normal      L2-L3:  Normal      L3-L4:  Normal      L4-L5:  Normal      L5-S1:  Normal     Last dose of Nucynta was this morning @ 7:00am

## 2019-07-23 ENCOUNTER — APPOINTMENT (OUTPATIENT)
Dept: RADIOLOGY | Age: 54
End: 2019-07-23
Payer: COMMERCIAL

## 2019-07-23 ENCOUNTER — LAB (OUTPATIENT)
Dept: LAB | Age: 54
End: 2019-07-23
Payer: COMMERCIAL

## 2019-07-23 ENCOUNTER — OFFICE VISIT (OUTPATIENT)
Dept: LAB | Age: 54
End: 2019-07-23
Payer: COMMERCIAL

## 2019-07-23 ENCOUNTER — TRANSCRIBE ORDERS (OUTPATIENT)
Dept: ADMINISTRATIVE | Age: 54
End: 2019-07-23

## 2019-07-23 ENCOUNTER — TRANSCRIBE ORDERS (OUTPATIENT)
Dept: ADMINISTRATIVE | Facility: HOSPITAL | Age: 54
End: 2019-07-23

## 2019-07-23 DIAGNOSIS — C50.412 MALIGNANT NEOPLASM OF UPPER-OUTER QUADRANT OF LEFT BREAST IN FEMALE, ESTROGEN RECEPTOR POSITIVE (HCC): ICD-10-CM

## 2019-07-23 DIAGNOSIS — Z17.0 MALIGNANT NEOPLASM OF UPPER-OUTER QUADRANT OF LEFT BREAST IN FEMALE, ESTROGEN RECEPTOR POSITIVE (HCC): ICD-10-CM

## 2019-07-23 DIAGNOSIS — C50.412 MALIGNANT NEOPLASM OF UPPER-OUTER QUADRANT OF LEFT FEMALE BREAST, UNSPECIFIED ESTROGEN RECEPTOR STATUS (HCC): Primary | ICD-10-CM

## 2019-07-23 DIAGNOSIS — Z17.0 ESTROGEN RECEPTOR POSITIVE: ICD-10-CM

## 2019-07-23 DIAGNOSIS — C50.412 MALIGNANT NEOPLASM OF UPPER-OUTER QUADRANT OF LEFT FEMALE BREAST, UNSPECIFIED ESTROGEN RECEPTOR STATUS (HCC): ICD-10-CM

## 2019-07-23 DIAGNOSIS — M25.551 RIGHT HIP PAIN: Primary | ICD-10-CM

## 2019-07-23 LAB
ALBUMIN SERPL BCP-MCNC: 3.9 G/DL (ref 3.5–5)
ALP SERPL-CCNC: 47 U/L (ref 46–116)
ALT SERPL W P-5'-P-CCNC: 23 U/L (ref 12–78)
ANION GAP SERPL CALCULATED.3IONS-SCNC: 6 MMOL/L (ref 4–13)
AST SERPL W P-5'-P-CCNC: 10 U/L (ref 5–45)
BASOPHILS # BLD AUTO: 0.02 THOUSANDS/ΜL (ref 0–0.1)
BASOPHILS NFR BLD AUTO: 0 % (ref 0–1)
BILIRUB SERPL-MCNC: 0.58 MG/DL (ref 0.2–1)
BUN SERPL-MCNC: 15 MG/DL (ref 5–25)
CALCIUM SERPL-MCNC: 9.3 MG/DL (ref 8.3–10.1)
CHLORIDE SERPL-SCNC: 103 MMOL/L (ref 100–108)
CO2 SERPL-SCNC: 31 MMOL/L (ref 21–32)
CREAT SERPL-MCNC: 0.9 MG/DL (ref 0.6–1.3)
EOSINOPHIL # BLD AUTO: 0.09 THOUSAND/ΜL (ref 0–0.61)
EOSINOPHIL NFR BLD AUTO: 1 % (ref 0–6)
ERYTHROCYTE [DISTWIDTH] IN BLOOD BY AUTOMATED COUNT: 13 % (ref 11.6–15.1)
GFR SERPL CREATININE-BSD FRML MDRD: 74 ML/MIN/1.73SQ M
GLUCOSE SERPL-MCNC: 53 MG/DL (ref 65–140)
HCT VFR BLD AUTO: 43.1 % (ref 34.8–46.1)
HGB BLD-MCNC: 14.3 G/DL (ref 11.5–15.4)
IMM GRANULOCYTES # BLD AUTO: 0.03 THOUSAND/UL (ref 0–0.2)
IMM GRANULOCYTES NFR BLD AUTO: 0 % (ref 0–2)
LYMPHOCYTES # BLD AUTO: 1.85 THOUSANDS/ΜL (ref 0.6–4.47)
LYMPHOCYTES NFR BLD AUTO: 26 % (ref 14–44)
MCH RBC QN AUTO: 31.6 PG (ref 26.8–34.3)
MCHC RBC AUTO-ENTMCNC: 33.2 G/DL (ref 31.4–37.4)
MCV RBC AUTO: 95 FL (ref 82–98)
MONOCYTES # BLD AUTO: 0.45 THOUSAND/ΜL (ref 0.17–1.22)
MONOCYTES NFR BLD AUTO: 6 % (ref 4–12)
NEUTROPHILS # BLD AUTO: 4.67 THOUSANDS/ΜL (ref 1.85–7.62)
NEUTS SEG NFR BLD AUTO: 67 % (ref 43–75)
NRBC BLD AUTO-RTO: 0 /100 WBCS
PLATELET # BLD AUTO: 273 THOUSANDS/UL (ref 149–390)
PMV BLD AUTO: 9.5 FL (ref 8.9–12.7)
POTASSIUM SERPL-SCNC: 3.7 MMOL/L (ref 3.5–5.3)
PROT SERPL-MCNC: 6.9 G/DL (ref 6.4–8.2)
RBC # BLD AUTO: 4.53 MILLION/UL (ref 3.81–5.12)
SODIUM SERPL-SCNC: 140 MMOL/L (ref 136–145)
WBC # BLD AUTO: 7.11 THOUSAND/UL (ref 4.31–10.16)

## 2019-07-23 PROCEDURE — 36415 COLL VENOUS BLD VENIPUNCTURE: CPT

## 2019-07-23 PROCEDURE — 85025 COMPLETE CBC W/AUTO DIFF WBC: CPT

## 2019-07-23 PROCEDURE — 80053 COMPREHEN METABOLIC PANEL: CPT

## 2019-07-23 PROCEDURE — 71046 X-RAY EXAM CHEST 2 VIEWS: CPT

## 2019-07-23 PROCEDURE — 93005 ELECTROCARDIOGRAM TRACING: CPT

## 2019-07-24 ENCOUNTER — OFFICE VISIT (OUTPATIENT)
Dept: SURGICAL ONCOLOGY | Facility: CLINIC | Age: 54
End: 2019-07-24

## 2019-07-24 VITALS
BODY MASS INDEX: 24.41 KG/M2 | HEIGHT: 64 IN | WEIGHT: 143 LBS | SYSTOLIC BLOOD PRESSURE: 138 MMHG | DIASTOLIC BLOOD PRESSURE: 80 MMHG | HEART RATE: 78 BPM | TEMPERATURE: 98.5 F | RESPIRATION RATE: 16 BRPM

## 2019-07-24 DIAGNOSIS — Z17.0 MALIGNANT NEOPLASM OF UPPER-OUTER QUADRANT OF LEFT BREAST IN FEMALE, ESTROGEN RECEPTOR POSITIVE (HCC): Primary | ICD-10-CM

## 2019-07-24 DIAGNOSIS — C50.412 MALIGNANT NEOPLASM OF UPPER-OUTER QUADRANT OF LEFT BREAST IN FEMALE, ESTROGEN RECEPTOR POSITIVE (HCC): Primary | ICD-10-CM

## 2019-07-24 LAB
ATRIAL RATE: 64 BPM
P AXIS: 51 DEGREES
PR INTERVAL: 180 MS
QRS AXIS: 69 DEGREES
QRSD INTERVAL: 64 MS
QT INTERVAL: 438 MS
QTC INTERVAL: 451 MS
T WAVE AXIS: 54 DEGREES
VENTRICULAR RATE: 64 BPM

## 2019-07-24 PROCEDURE — PREOP: Performed by: NURSE PRACTITIONER

## 2019-07-24 PROCEDURE — 93010 ELECTROCARDIOGRAM REPORT: CPT | Performed by: INTERNAL MEDICINE

## 2019-07-24 NOTE — H&P (VIEW-ONLY)
Surgical Oncology Follow Up       [address]  [facility]  [address]  [facility] [id number]    Javi King  1965  [id number]  [id number] [facility]  [facility]  [address] [facility] [address]    Chief Complaint   Patient presents with    History and Physical       Assessment/Plan:  1  Malignant neoplasm of upper-outer quadrant of left breast in female, estrogen receptor positive ([facility] [address] )  - Surgery as planned with Dr Severa Liberty    Discussion/Summary:  Patient is a 53-year-old female who presented to our office with a new diagnosis of left breast cancer in June of 2019  Her pathology revealed invasive ductal carcinoma, grade 2, ER 90%, KS 90%, her 2-  This is multifocal   The total area of involvement measures approximately 3 9 x 6 2 x 3 7 cm  She underwent genetic testing which was negative  She met with Dr Severa Liberty who reviewed surgical options  The patient has consented for bilateral masectomy with Dr Severa Liberty on August 9, 2019  She met with plastic surgery but has elected on no reconstruction  She presents today for a preoperative history and physical   She has completed her preoperative testing, results pending  She has no new complaints today and there are no concerns on today's exam  Our office will contact her with any abnormal results on pre operative testing  I instructed her to call with any new concerns or questions prior to her surgery  All of her questions were answered today      History of Present Illness:        Malignant neoplasm of upper-outer quadrant of left breast in female, estrogen receptor positive ([facility] [address] )    6/13/2019 Biopsy     Left breast ultrasound-guided biopsy  12 o'clock, 4 cm from nipple  Invasive breast carcinoma of no special type (ductal NST)  Grade 2    2 o'clock, 5 cm from nipple  Invasive breast carcinoma of no special type (ductal NST)  Grade 2  ER 90  KS 90  HER2 1+      6/20/2019 Genetic Testing     BRCA negative  [facility]          -Interval History:  Patient presents today for a preoperative history and physical   She is scheduled for a bilateral mastectomy with a left sentinel lymph node biopsy with Dr Sahra Quiroz on 8/9  She has no new complaints today- denies headaches, back pain, bone pain, cough, SOB, chest pain, palpitations, abdominal pain  She does report chronic SI joint and right hip pain  Review of Systems:  Review of Systems   Constitutional: Negative for activity change, appetite change, chills, fatigue, fever and unexpected weight change  HENT: Negative for trouble swallowing  Eyes: Negative for pain, redness and visual disturbance  Respiratory: Negative for cough, shortness of breath and wheezing  Cardiovascular: Negative for chest pain, palpitations and leg swelling  Gastrointestinal: Negative for abdominal pain, constipation, diarrhea, nausea and vomiting  Endocrine: Negative for cold intolerance and heat intolerance  Musculoskeletal: Positive for arthralgias (Right SI joint and hip pain- chronic)  Negative for back pain, gait problem and myalgias  Skin: Negative for color change and rash  Neurological: Negative for dizziness, syncope, light-headedness, numbness and headaches  Hematological: Negative for adenopathy  Psychiatric/Behavioral: Negative for agitation and confusion  All other systems reviewed and are negative        Patient Active Problem List   Diagnosis    Esophageal reflux    Hiatal hernia    Hyperlipidemia    Pain syndrome, chronic    Sacroiliitis (HCC)    Irritability and anger    Vitamin B12 deficiency    Seasonal allergic rhinitis due to pollen    Gastroesophageal reflux disease    Costochondritis    Intervertebral disc disorder with radiculopathy of lumbosacral region    Malignant neoplasm of upper-outer quadrant of left breast in female, estrogen receptor positive ([facility] [address] )     Past Medical History:   Diagnosis Date    Anxiety     BRCA gene mutation negative 06/20/2019    [facility]    Cancer [facility])     breast     Fibrocystic disease of breast     GERD (gastroesophageal reflux disease)     Hiatal hernia     Hyperlipidemia      Past Surgical History:   Procedure Laterality Date    APPENDECTOMY      BREAST BIOPSY      BREAST CYST ASPIRATION Left 2005    FOOT SURGERY Right 2011    right, hardware removal    OTHER SURGICAL HISTORY Right 2010    Complete Excision of Fifth Metatarsal Head     RI COLONOSCOPY FLX DX W/COLLJ SPEC WHEN PFRMD N/A 7/20/2018    Procedure: EGD AND COLONOSCOPY;  Surgeon: Adilene Barnes MD;  Location: [facility]; Service: Gastroenterology    US GUIDANCE BREAST BIOPSY LEFT EACH ADDITIONAL Left 6/13/2019    US GUIDED BREAST BIOPSY LEFT COMPLETE Left 6/13/2019     Family History   Problem Relation Age of Onset    Coronary artery disease Mother         CABG in her 62s   Maxim[facility] Roles Other Mother         Dyslipidemia    Hypertension Mother     Heart attack Father 46        acute myocardial infarction    Other Father         Dyslipidemia    Hypertension Father     Prostate cancer Paternal Uncle 72    Thyroid disease Family         disorder    No Known Problems Maternal Aunt     No Known Problems Maternal Aunt      Social History     Socioeconomic History    Marital status: /Civil Union     Spouse name: Not on file    Number of children: Not on file    Years of education: Not on file    Highest education level: Not on file   Occupational History    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, now works fam business  1 day with GI   Social Needs    Financial resource strain: Not on file    Food insecurity:     Worry: Not on file     Inability: Not on file    Transportation needs:     Medical: Not on file     Non-medical: Not on file   Tobacco Use    Smoking status: Never Smoker    Smokeless tobacco: Never Used   Substance and Sexual Activity    Alcohol use:  Yes Alcohol/week: 7 0 standard drinks     Types: 7 Standard drinks or equivalent per week    Drug use: No    Sexual activity: Not on file     Comment: Partner had vasectomy   Lifestyle    Physical activity:     Days per week: Not on file     Minutes per session: Not on file    Stress: Not on file   Relationships    Social connections:     Talks on phone: Not on file     Gets together: Not on file     Attends [employer] service: Not on file     Active member of club or organization: Not on file     Attends meetings of clubs or organizations: Not on file     Relationship status: Not on file    Intimate partner violence:     Fear of current or ex partner: Not on file     Emotionally abused: Not on file     Physically abused: Not on file     Forced sexual activity: Not on file   Other Topics Concern    Not on file   Social History Narrative    Exercise habits    Working full-time - used to be GI RN       Current Outpatient Medications:     cholecalciferol (VITAMIN D3) 1,000 units tablet, Take 1,000 Units by mouth daily, Disp: , Rfl:     cyanocobalamin (VITAMIN B-12) 1,000 mcg tablet, Take 1 tablet (1,000 mcg total) by mouth daily, Disp: 90 tablet, Rfl: 0    escitalopram (LEXAPRO) 5 mg tablet, Take 1 tablet (5 mg total) by mouth daily, Disp: 90 tablet, Rfl: 1    fluticasone (FLONASE) 50 mcg/act nasal spray, 1 spray into each nostril daily, Disp: , Rfl:     Naproxen Sodium (ALEVE) 220 MG CAPS, Take by mouth 2 (two) times a day as needed, Disp: , Rfl:     omeprazole (PriLOSEC) 20 mg delayed release capsule, Take 1 capsule (20 mg total) by mouth daily, Disp: 90 capsule, Rfl: 0    simvastatin (ZOCOR) 10 mg tablet, TAKE 1 TABLET DAILY, Disp: 90 tablet, Rfl: 1    tapentadol (NUCYNTA) 75 mg tablet, Take 1 tab PO Q 6 hours prn pain  2nd month script   Do not fill until 8/13/19, Disp: 120 tablet, Rfl: 0    famotidine (PEPCID) 20 mg tablet, Take 20 mg by mouth 2 (two) times a day, Disp: , Rfl:     methylPREDNISolone 4 MG tablet therapy pack, Use as directed on package (Patient not taking: Reported on 7/24/2019), Disp: 21 tablet, Rfl: 0  Allergies   Allergen Reactions    Sulfa Antibiotics Headache     Annotation - [id number]: Migraine; Annotation - [id number]: cellular issues     Vitals:    07/24/19 1414   BP: 138/80   Pulse: 78   Resp: 16   Temp: 98 5 °F (36 9 °C)       Physical Exam   Constitutional: She is oriented to person, place, and time  Vital signs are normal  She appears well-developed and well-nourished  No distress  HENT:   Head: Normocephalic and atraumatic  Neck: Normal range of motion  Carotid bruit is not present  Cardiovascular: Normal rate, regular rhythm and normal heart sounds  Pulmonary/Chest: Effort normal and breath sounds normal    Bilateral breasts were examined in the sitting and supine position  There is fullness in the left upper outer quadrant however I cannot appreciate a dominant mass in this region  There are 2 well-healed biopsy sites  There are no dominant masses, skin nodules, nipple changes or nipple discharge  There is no bilateral supraclavicular or axillary lymphadenopathy noted  Abdominal: Soft  Normal appearance  She exhibits no mass  There is no hepatosplenomegaly  There is no tenderness  Musculoskeletal: Normal range of motion  Lymphadenopathy:     She has no axillary adenopathy  Right: No supraclavicular adenopathy present  Left: No supraclavicular adenopathy present  Neurological: She is alert and oriented to person, place, and time  Skin: Skin is warm, dry and intact  No rash noted  She is not diaphoretic  Psychiatric: She has a normal mood and affect  Her speech is normal    Vitals reviewed  Advance Care Planning/Advance Directives:  Discussed disease status, cancer treatment plans and/or cancer treatment goals with the patient

## 2019-07-24 NOTE — PROGRESS NOTES
Surgical Oncology Follow Up       [address]  [facility]  [address]  [facility] [address]    Javi King  1965  [id number]  [id number] [address] [facility]  [facility] [place]  [address] [address]    Chief Complaint   Patient presents with    History and Physical       Assessment/Plan:  1  Malignant neoplasm of upper-outer quadrant of left breast in female, estrogen receptor positive ([facility] [address] )  - Surgery as planned with Dr Severa Liberty    Discussion/Summary:  Patient is a 68-year-old female who presented to our office with a new diagnosis of left breast cancer in June of 2019  Her pathology revealed invasive ductal carcinoma, grade 2, ER 90%, SC 90%, her 2-  This is multifocal   The total area of involvement measures approximately 3 9 x 6 2 x 3 7 cm  She underwent genetic testing which was negative  She met with Dr Severa Liberty who reviewed surgical options  The patient has consented for bilateral masectomy with Dr Severa Liberty on August 9, 2019  She met with plastic surgery but has elected on no reconstruction  She presents today for a preoperative history and physical   She has completed her preoperative testing, results pending  She has no new complaints today and there are no concerns on today's exam  Our office will contact her with any abnormal results on pre operative testing  I instructed her to call with any new concerns or questions prior to her surgery  All of her questions were answered today      History of Present Illness:        Malignant neoplasm of upper-outer quadrant of left breast in female, estrogen receptor positive ([facility] [address] )    6/13/2019 Biopsy     Left breast ultrasound-guided biopsy  12 o'clock, 4 cm from nipple  Invasive breast carcinoma of no special type (ductal NST)  Grade 2    2 o'clock, 5 cm from nipple  Invasive breast carcinoma of no special type (ductal NST)  Grade 2  ER 90  SC 90  HER2 1+      6/20/2019 Genetic Testing     BRCA negative  [facility]          -Interval History:  Patient presents today for a preoperative history and physical   She is scheduled for a bilateral mastectomy with a left sentinel lymph node biopsy with Dr Nury Shepherd on 8/9  She has no new complaints today- denies headaches, back pain, bone pain, cough, SOB, chest pain, palpitations, abdominal pain  She does report chronic SI joint and right hip pain  Review of Systems:  Review of Systems   Constitutional: Negative for activity change, appetite change, chills, fatigue, fever and unexpected weight change  HENT: Negative for trouble swallowing  Eyes: Negative for pain, redness and visual disturbance  Respiratory: Negative for cough, shortness of breath and wheezing  Cardiovascular: Negative for chest pain, palpitations and leg swelling  Gastrointestinal: Negative for abdominal pain, constipation, diarrhea, nausea and vomiting  Endocrine: Negative for cold intolerance and heat intolerance  Musculoskeletal: Positive for arthralgias (Right SI joint and hip pain- chronic)  Negative for back pain, gait problem and myalgias  Skin: Negative for color change and rash  Neurological: Negative for dizziness, syncope, light-headedness, numbness and headaches  Hematological: Negative for adenopathy  Psychiatric/Behavioral: Negative for agitation and confusion  All other systems reviewed and are negative        Patient Active Problem List   Diagnosis    Esophageal reflux    Hiatal hernia    Hyperlipidemia    Pain syndrome, chronic    Sacroiliitis (HCC)    Irritability and anger    Vitamin B12 deficiency    Seasonal allergic rhinitis due to pollen    Gastroesophageal reflux disease    Costochondritis    Intervertebral disc disorder with radiculopathy of lumbosacral region    Malignant neoplasm of upper-outer quadrant of left breast in female, estrogen receptor positive ([facility] [address] )     Past Medical History:   Diagnosis Date    Anxiety     BRCA gene mutation negative 06/20/2019    [facility]    Cancer [facility])     breast     Fibrocystic disease of breast     GERD (gastroesophageal reflux disease)     Hiatal hernia     Hyperlipidemia      Past Surgical History:   Procedure Laterality Date    APPENDECTOMY      BREAST BIOPSY      BREAST CYST ASPIRATION Left 2005    FOOT SURGERY Right 2011    right, hardware removal    OTHER SURGICAL HISTORY Right 2010    Complete Excision of Fifth Metatarsal Head     SC COLONOSCOPY FLX DX W/COLLJ SPEC WHEN PFRMD N/A 7/20/2018    Procedure: EGD AND COLONOSCOPY;  Surgeon: Kala Santacruz MD;  Location: [facility]; Service: Gastroenterology    US GUIDANCE BREAST BIOPSY LEFT EACH ADDITIONAL Left 6/13/2019    US GUIDED BREAST BIOPSY LEFT COMPLETE Left 6/13/2019     Family History   Problem Relation Age of Onset    Coronary artery disease Mother         CABG in her 62s   Raymundo Penadwell Other Mother         Dyslipidemia    Hypertension Mother     Heart attack Father 46        acute myocardial infarction    Other Father         Dyslipidemia    Hypertension Father     Prostate cancer Paternal Uncle 72    Thyroid disease Family         disorder    No Known Problems Maternal Aunt     No Known Problems Maternal Aunt      Social History     Socioeconomic History    Marital status: /Civil Union     Spouse name: Not on file    Number of children: Not on file    Years of education: Not on file    Highest education level: Not on file   Occupational History    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, 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   Social Needs    Financial resource strain: Not on file    Food insecurity:     Worry: Not on file     Inability: Not on file    Transportation needs:     Medical: Not on file     Non-medical: Not on file   Tobacco Use    Smoking status: Never Smoker    Smokeless tobacco: Never Used   Substance and Sexual Activity    Alcohol use:  Yes Alcohol/week: 7 0 standard drinks     Types: 7 Standard drinks or equivalent per week    Drug use: No    Sexual activity: Not on file     Comment: Partner had vasectomy   Lifestyle    Physical activity:     Days per week: Not on file     Minutes per session: Not on file    Stress: Not on file   Relationships    Social connections:     Talks on phone: Not on file     Gets together: Not on file     Attends Christian service: Not on file     Active member of club or organization: Not on file     Attends meetings of clubs or organizations: Not on file     Relationship status: Not on file    Intimate partner violence:     Fear of current or ex partner: Not on file     Emotionally abused: Not on file     Physically abused: Not on file     Forced sexual activity: Not on file   Other Topics Concern    Not on file   Social History Narrative    Exercise habits    Working full-time - used to be GI RN       Current Outpatient Medications:     cholecalciferol (VITAMIN D3) 1,000 units tablet, Take 1,000 Units by mouth daily, Disp: , Rfl:     cyanocobalamin (VITAMIN B-12) 1,000 mcg tablet, Take 1 tablet (1,000 mcg total) by mouth daily, Disp: 90 tablet, Rfl: 0    escitalopram (LEXAPRO) 5 mg tablet, Take 1 tablet (5 mg total) by mouth daily, Disp: 90 tablet, Rfl: 1    fluticasone (FLONASE) 50 mcg/act nasal spray, 1 spray into each nostril daily, Disp: , Rfl:     Naproxen Sodium (ALEVE) 220 MG CAPS, Take by mouth 2 (two) times a day as needed, Disp: , Rfl:     omeprazole (PriLOSEC) 20 mg delayed release capsule, Take 1 capsule (20 mg total) by mouth daily, Disp: 90 capsule, Rfl: 0    simvastatin (ZOCOR) 10 mg tablet, TAKE 1 TABLET DAILY, Disp: 90 tablet, Rfl: 1    tapentadol (NUCYNTA) 75 mg tablet, Take 1 tab PO Q 6 hours prn pain  2nd month script   Do not fill until 8/13/19, Disp: 120 tablet, Rfl: 0    famotidine (PEPCID) 20 mg tablet, Take 20 mg by mouth 2 (two) times a day, Disp: , Rfl:     methylPREDNISolone 4 MG tablet therapy pack, Use as directed on package (Patient not taking: Reported on 7/24/2019), Disp: 21 tablet, Rfl: 0  Allergies   Allergen Reactions    Sulfa Antibiotics Headache     Annotation - [id number]: Migraine; Annotation - [id number]: cellular issues     Vitals:    07/24/19 1414   BP: 138/80   Pulse: 78   Resp: 16   Temp: 98 5 °F (36 9 °C)       Physical Exam   Constitutional: She is oriented to person, place, and time  Vital signs are normal  She appears well-developed and well-nourished  No distress  HENT:   Head: Normocephalic and atraumatic  Neck: Normal range of motion  Carotid bruit is not present  Cardiovascular: Normal rate, regular rhythm and normal heart sounds  Pulmonary/Chest: Effort normal and breath sounds normal    Bilateral breasts were examined in the sitting and supine position  There is fullness in the left upper outer quadrant however I cannot appreciate a dominant mass in this region  There are 2 well-healed biopsy sites  There are no dominant masses, skin nodules, nipple changes or nipple discharge  There is no bilateral supraclavicular or axillary lymphadenopathy noted  Abdominal: Soft  Normal appearance  She exhibits no mass  There is no hepatosplenomegaly  There is no tenderness  Musculoskeletal: Normal range of motion  Lymphadenopathy:     She has no axillary adenopathy  Right: No supraclavicular adenopathy present  Left: No supraclavicular adenopathy present  Neurological: She is alert and oriented to person, place, and time  Skin: Skin is warm, dry and intact  No rash noted  She is not diaphoretic  Psychiatric: She has a normal mood and affect  Her speech is normal    Vitals reviewed  Advance Care Planning/Advance Directives:  Discussed disease status, cancer treatment plans and/or cancer treatment goals with the patient

## 2019-07-29 NOTE — PRE-PROCEDURE INSTRUCTIONS
Pre-Surgery Instructions:   Medication Instructions    cholecalciferol (VITAMIN D3) 1,000 units tablet Patient was instructed by Physician and understands   cyanocobalamin (VITAMIN B-12) 1,000 mcg tablet Patient was instructed by Physician and understands   escitalopram (LEXAPRO) 5 mg tablet Instructed patient per Anesthesia Guidelines   fluticasone (FLONASE) 50 mcg/act nasal spray Instructed patient per Anesthesia Guidelines   Naproxen Sodium (ALEVE) 220 MG CAPS Patient was instructed by Physician and understands   omeprazole (PriLOSEC) 20 mg delayed release capsule Instructed patient per Anesthesia Guidelines   simvastatin (ZOCOR) 10 mg tablet Instructed patient per Anesthesia Guidelines   tapentadol (NUCYNTA) 75 mg tablet Instructed patient per Anesthesia Guidelines  Pre op and bathing instructions reviewed   Pt will get hibiclens

## 2019-08-08 ENCOUNTER — ANESTHESIA EVENT (OUTPATIENT)
Dept: PERIOP | Facility: HOSPITAL | Age: 54
End: 2019-08-08
Payer: COMMERCIAL

## 2019-08-09 ENCOUNTER — HOSPITAL ENCOUNTER (OUTPATIENT)
Dept: NUCLEAR MEDICINE | Facility: HOSPITAL | Age: 54
Discharge: HOME | End: 2019-08-09
Attending: SURGERY
Payer: COMMERCIAL

## 2019-08-09 ENCOUNTER — HOSPITAL ENCOUNTER (OUTPATIENT)
Facility: HOSPITAL | Age: 54
Setting detail: OUTPATIENT SURGERY
Discharge: HOME | End: 2019-08-10
Attending: SURGERY | Admitting: SURGERY
Payer: COMMERCIAL

## 2019-08-09 ENCOUNTER — ANESTHESIA (OUTPATIENT)
Dept: PERIOP | Facility: HOSPITAL | Age: 54
End: 2019-08-09
Payer: COMMERCIAL

## 2019-08-09 DIAGNOSIS — Z17.0 MALIGNANT NEOPLASM OF UPPER-OUTER QUADRANT OF LEFT BREAST IN FEMALE, ESTROGEN RECEPTOR POSITIVE (HCC): ICD-10-CM

## 2019-08-09 DIAGNOSIS — C50.412 MALIGNANT NEOPLASM OF UPPER-OUTER QUADRANT OF LEFT BREAST IN FEMALE, ESTROGEN RECEPTOR POSITIVE (HCC): ICD-10-CM

## 2019-08-09 LAB
EXT PREGNANCY TEST URINE: NEGATIVE
EXT. CONTROL: NORMAL

## 2019-08-09 PROCEDURE — 88334 PATH CONSLTJ SURG CYTO XM EA: CPT | Performed by: PATHOLOGY

## 2019-08-09 PROCEDURE — NC001 PR NO CHARGE: Performed by: PHYSICIAN ASSISTANT

## 2019-08-09 PROCEDURE — 19303 MAST SIMPLE COMPLETE: CPT | Performed by: PHYSICIAN ASSISTANT

## 2019-08-09 PROCEDURE — 88342 IMHCHEM/IMCYTCHM 1ST ANTB: CPT | Performed by: PATHOLOGY

## 2019-08-09 PROCEDURE — 19307 MAST MOD RAD: CPT | Performed by: PHYSICIAN ASSISTANT

## 2019-08-09 PROCEDURE — 88307 TISSUE EXAM BY PATHOLOGIST: CPT | Performed by: PATHOLOGY

## 2019-08-09 PROCEDURE — 19307 MAST MOD RAD: CPT | Performed by: SURGERY

## 2019-08-09 PROCEDURE — A9541 TC99M SULFUR COLLOID: HCPCS

## 2019-08-09 PROCEDURE — 88333 PATH CONSLTJ SURG CYTO XM 1: CPT | Performed by: PATHOLOGY

## 2019-08-09 PROCEDURE — 81025 URINE PREGNANCY TEST: CPT | Performed by: ANESTHESIOLOGY

## 2019-08-09 PROCEDURE — 19303 MAST SIMPLE COMPLETE: CPT | Performed by: SURGERY

## 2019-08-09 PROCEDURE — 38900 IO MAP OF SENT LYMPH NODE: CPT | Performed by: SURGERY

## 2019-08-09 PROCEDURE — 38792 RA TRACER ID OF SENTINL NODE: CPT | Performed by: SURGERY

## 2019-08-09 PROCEDURE — 88341 IMHCHEM/IMCYTCHM EA ADD ANTB: CPT | Performed by: PATHOLOGY

## 2019-08-09 RX ORDER — MELATONIN
1000 DAILY
Status: DISCONTINUED | OUTPATIENT
Start: 2019-08-09 | End: 2019-08-10 | Stop reason: HOSPADM

## 2019-08-09 RX ORDER — MEPERIDINE HYDROCHLORIDE 25 MG/ML
12.5 INJECTION INTRAMUSCULAR; INTRAVENOUS; SUBCUTANEOUS AS NEEDED
Status: DISCONTINUED | OUTPATIENT
Start: 2019-08-09 | End: 2019-08-09 | Stop reason: HOSPADM

## 2019-08-09 RX ORDER — ONDANSETRON 4 MG/1
4 TABLET, ORALLY DISINTEGRATING ORAL EVERY 6 HOURS PRN
Status: DISCONTINUED | OUTPATIENT
Start: 2019-08-09 | End: 2019-08-10 | Stop reason: HOSPADM

## 2019-08-09 RX ORDER — BUPIVACAINE HYDROCHLORIDE AND EPINEPHRINE 2.5; 5 MG/ML; UG/ML
INJECTION, SOLUTION EPIDURAL; INFILTRATION; INTRACAUDAL; PERINEURAL AS NEEDED
Status: DISCONTINUED | OUTPATIENT
Start: 2019-08-09 | End: 2019-08-09 | Stop reason: HOSPADM

## 2019-08-09 RX ORDER — HYDROMORPHONE HCL/PF 1 MG/ML
SYRINGE (ML) INJECTION AS NEEDED
Status: DISCONTINUED | OUTPATIENT
Start: 2019-08-09 | End: 2019-08-09 | Stop reason: SURG

## 2019-08-09 RX ORDER — ACETAMINOPHEN 325 MG/1
650 TABLET ORAL EVERY 4 HOURS PRN
Status: DISCONTINUED | OUTPATIENT
Start: 2019-08-09 | End: 2019-08-10 | Stop reason: HOSPADM

## 2019-08-09 RX ORDER — MORPHINE SULFATE 10 MG/ML
2 INJECTION, SOLUTION INTRAMUSCULAR; INTRAVENOUS
Status: DISCONTINUED | OUTPATIENT
Start: 2019-08-09 | End: 2019-08-10 | Stop reason: HOSPADM

## 2019-08-09 RX ORDER — MIDAZOLAM HYDROCHLORIDE 1 MG/ML
INJECTION INTRAMUSCULAR; INTRAVENOUS AS NEEDED
Status: DISCONTINUED | OUTPATIENT
Start: 2019-08-09 | End: 2019-08-09 | Stop reason: SURG

## 2019-08-09 RX ORDER — GLYCOPYRROLATE 0.2 MG/ML
INJECTION INTRAMUSCULAR; INTRAVENOUS AS NEEDED
Status: DISCONTINUED | OUTPATIENT
Start: 2019-08-09 | End: 2019-08-09 | Stop reason: SURG

## 2019-08-09 RX ORDER — ESCITALOPRAM OXALATE 10 MG/1
5 TABLET ORAL DAILY
Status: DISCONTINUED | OUTPATIENT
Start: 2019-08-09 | End: 2019-08-10 | Stop reason: HOSPADM

## 2019-08-09 RX ORDER — ACETAMINOPHEN 500 MG
1000 TABLET ORAL EVERY 6 HOURS PRN
COMMUNITY
End: 2020-08-26 | Stop reason: ALTCHOICE

## 2019-08-09 RX ORDER — PROMETHAZINE HYDROCHLORIDE 25 MG/ML
12.5 INJECTION, SOLUTION INTRAMUSCULAR; INTRAVENOUS ONCE
Status: COMPLETED | OUTPATIENT
Start: 2019-08-09 | End: 2019-08-09

## 2019-08-09 RX ORDER — FENTANYL CITRATE/PF 50 MCG/ML
25 SYRINGE (ML) INJECTION
Status: DISCONTINUED | OUTPATIENT
Start: 2019-08-09 | End: 2019-08-09 | Stop reason: HOSPADM

## 2019-08-09 RX ORDER — LABETALOL 20 MG/4 ML (5 MG/ML) INTRAVENOUS SYRINGE
5
Status: DISCONTINUED | OUTPATIENT
Start: 2019-08-09 | End: 2019-08-09 | Stop reason: HOSPADM

## 2019-08-09 RX ORDER — LIDOCAINE HYDROCHLORIDE 10 MG/ML
INJECTION, SOLUTION INFILTRATION; PERINEURAL AS NEEDED
Status: DISCONTINUED | OUTPATIENT
Start: 2019-08-09 | End: 2019-08-09 | Stop reason: SURG

## 2019-08-09 RX ORDER — HYDROMORPHONE HCL/PF 1 MG/ML
0.5 SYRINGE (ML) INJECTION
Status: DISCONTINUED | OUTPATIENT
Start: 2019-08-09 | End: 2019-08-09 | Stop reason: HOSPADM

## 2019-08-09 RX ORDER — CEFAZOLIN SODIUM 1 G/50ML
1000 SOLUTION INTRAVENOUS ONCE
Status: COMPLETED | OUTPATIENT
Start: 2019-08-09 | End: 2019-08-09

## 2019-08-09 RX ORDER — EPHEDRINE SULFATE 50 MG/ML
INJECTION INTRAVENOUS AS NEEDED
Status: DISCONTINUED | OUTPATIENT
Start: 2019-08-09 | End: 2019-08-09 | Stop reason: SURG

## 2019-08-09 RX ORDER — FENTANYL CITRATE 50 UG/ML
INJECTION, SOLUTION INTRAMUSCULAR; INTRAVENOUS AS NEEDED
Status: DISCONTINUED | OUTPATIENT
Start: 2019-08-09 | End: 2019-08-09 | Stop reason: SURG

## 2019-08-09 RX ORDER — DEXAMETHASONE SODIUM PHOSPHATE 10 MG/ML
INJECTION, SOLUTION INTRAMUSCULAR; INTRAVENOUS AS NEEDED
Status: DISCONTINUED | OUTPATIENT
Start: 2019-08-09 | End: 2019-08-09 | Stop reason: SURG

## 2019-08-09 RX ORDER — OXYCODONE HYDROCHLORIDE 10 MG/1
10 TABLET ORAL EVERY 4 HOURS PRN
Status: DISCONTINUED | OUTPATIENT
Start: 2019-08-09 | End: 2019-08-10 | Stop reason: HOSPADM

## 2019-08-09 RX ORDER — ALBUTEROL SULFATE 2.5 MG/3ML
2.5 SOLUTION RESPIRATORY (INHALATION) ONCE AS NEEDED
Status: DISCONTINUED | OUTPATIENT
Start: 2019-08-09 | End: 2019-08-09 | Stop reason: HOSPADM

## 2019-08-09 RX ORDER — PANTOPRAZOLE SODIUM 40 MG/1
40 TABLET, DELAYED RELEASE ORAL
Status: DISCONTINUED | OUTPATIENT
Start: 2019-08-09 | End: 2019-08-10 | Stop reason: HOSPADM

## 2019-08-09 RX ORDER — NEOSTIGMINE METHYLSULFATE 1 MG/ML
INJECTION INTRAVENOUS AS NEEDED
Status: DISCONTINUED | OUTPATIENT
Start: 2019-08-09 | End: 2019-08-09 | Stop reason: SURG

## 2019-08-09 RX ORDER — ROCURONIUM BROMIDE 10 MG/ML
INJECTION, SOLUTION INTRAVENOUS AS NEEDED
Status: DISCONTINUED | OUTPATIENT
Start: 2019-08-09 | End: 2019-08-09 | Stop reason: SURG

## 2019-08-09 RX ORDER — SODIUM CHLORIDE, SODIUM LACTATE, POTASSIUM CHLORIDE, CALCIUM CHLORIDE 600; 310; 30; 20 MG/100ML; MG/100ML; MG/100ML; MG/100ML
125 INJECTION, SOLUTION INTRAVENOUS CONTINUOUS
Status: DISCONTINUED | OUTPATIENT
Start: 2019-08-09 | End: 2019-08-09

## 2019-08-09 RX ORDER — CHOLECALCIFEROL (VITAMIN D3) 125 MCG
1000 CAPSULE ORAL DAILY
Status: DISCONTINUED | OUTPATIENT
Start: 2019-08-09 | End: 2019-08-10 | Stop reason: HOSPADM

## 2019-08-09 RX ORDER — LIDOCAINE HYDROCHLORIDE 10 MG/ML
0.5 INJECTION, SOLUTION EPIDURAL; INFILTRATION; INTRACAUDAL; PERINEURAL ONCE AS NEEDED
Status: DISCONTINUED | OUTPATIENT
Start: 2019-08-09 | End: 2019-08-09 | Stop reason: HOSPADM

## 2019-08-09 RX ORDER — PROPOFOL 10 MG/ML
INJECTION, EMULSION INTRAVENOUS CONTINUOUS PRN
Status: DISCONTINUED | OUTPATIENT
Start: 2019-08-09 | End: 2019-08-09 | Stop reason: SURG

## 2019-08-09 RX ORDER — OXYCODONE HYDROCHLORIDE 5 MG/1
5 TABLET ORAL EVERY 4 HOURS PRN
Status: DISCONTINUED | OUTPATIENT
Start: 2019-08-09 | End: 2019-08-10 | Stop reason: HOSPADM

## 2019-08-09 RX ORDER — PRAVASTATIN SODIUM 20 MG
20 TABLET ORAL
Status: DISCONTINUED | OUTPATIENT
Start: 2019-08-09 | End: 2019-08-10 | Stop reason: HOSPADM

## 2019-08-09 RX ORDER — PROMETHAZINE HYDROCHLORIDE 25 MG/ML
12.5 INJECTION, SOLUTION INTRAMUSCULAR; INTRAVENOUS ONCE AS NEEDED
Status: COMPLETED | OUTPATIENT
Start: 2019-08-09 | End: 2019-08-09

## 2019-08-09 RX ORDER — ONDANSETRON 2 MG/ML
4 INJECTION INTRAMUSCULAR; INTRAVENOUS ONCE AS NEEDED
Status: COMPLETED | OUTPATIENT
Start: 2019-08-09 | End: 2019-08-09

## 2019-08-09 RX ORDER — PROPOFOL 10 MG/ML
INJECTION, EMULSION INTRAVENOUS AS NEEDED
Status: DISCONTINUED | OUTPATIENT
Start: 2019-08-09 | End: 2019-08-09 | Stop reason: SURG

## 2019-08-09 RX ADMIN — NEOSTIGMINE METHYLSULFATE 3 MG: 1 INJECTION INTRAVENOUS at 09:36

## 2019-08-09 RX ADMIN — ACETAMINOPHEN 650 MG: 325 TABLET, FILM COATED ORAL at 16:26

## 2019-08-09 RX ADMIN — PROMETHAZINE HYDROCHLORIDE 12.5 MG: 25 INJECTION INTRAMUSCULAR; INTRAVENOUS at 10:27

## 2019-08-09 RX ADMIN — GLYCOPYRROLATE 0.6 MG: 0.2 INJECTION, SOLUTION INTRAMUSCULAR; INTRAVENOUS at 09:36

## 2019-08-09 RX ADMIN — MIDAZOLAM HYDROCHLORIDE 2 MG: 1 INJECTION, SOLUTION INTRAMUSCULAR; INTRAVENOUS at 07:28

## 2019-08-09 RX ADMIN — FENTANYL CITRATE 50 MCG: 50 INJECTION INTRAMUSCULAR; INTRAVENOUS at 07:32

## 2019-08-09 RX ADMIN — HYDROMORPHONE HYDROCHLORIDE 1 MG: 1 INJECTION, SOLUTION INTRAMUSCULAR; INTRAVENOUS; SUBCUTANEOUS at 09:23

## 2019-08-09 RX ADMIN — ONDANSETRON 4 MG: 2 INJECTION INTRAMUSCULAR; INTRAVENOUS at 10:10

## 2019-08-09 RX ADMIN — HYDROMORPHONE HYDROCHLORIDE 0.5 MG: 1 INJECTION, SOLUTION INTRAMUSCULAR; INTRAVENOUS; SUBCUTANEOUS at 09:51

## 2019-08-09 RX ADMIN — HYDROMORPHONE HYDROCHLORIDE 0.5 MG: 1 INJECTION, SOLUTION INTRAMUSCULAR; INTRAVENOUS; SUBCUTANEOUS at 10:10

## 2019-08-09 RX ADMIN — EPHEDRINE SULFATE 5 MG: 50 INJECTION, SOLUTION INTRAVENOUS at 08:09

## 2019-08-09 RX ADMIN — FENTANYL CITRATE 50 MCG: 50 INJECTION INTRAMUSCULAR; INTRAVENOUS at 07:28

## 2019-08-09 RX ADMIN — HYDROMORPHONE HYDROCHLORIDE 0.5 MG: 1 INJECTION, SOLUTION INTRAMUSCULAR; INTRAVENOUS; SUBCUTANEOUS at 09:58

## 2019-08-09 RX ADMIN — OXYCODONE HYDROCHLORIDE 10 MG: 10 TABLET ORAL at 21:38

## 2019-08-09 RX ADMIN — EPHEDRINE SULFATE 5 MG: 50 INJECTION, SOLUTION INTRAVENOUS at 07:56

## 2019-08-09 RX ADMIN — ESCITALOPRAM OXALATE 5 MG: 10 TABLET ORAL at 21:38

## 2019-08-09 RX ADMIN — HYDROMORPHONE HYDROCHLORIDE 1 MG: 1 INJECTION, SOLUTION INTRAMUSCULAR; INTRAVENOUS; SUBCUTANEOUS at 10:04

## 2019-08-09 RX ADMIN — SODIUM CHLORIDE, SODIUM LACTATE, POTASSIUM CHLORIDE, AND CALCIUM CHLORIDE: .6; .31; .03; .02 INJECTION, SOLUTION INTRAVENOUS at 09:23

## 2019-08-09 RX ADMIN — PRAVASTATIN SODIUM 20 MG: 20 TABLET ORAL at 16:26

## 2019-08-09 RX ADMIN — SODIUM CHLORIDE, SODIUM LACTATE, POTASSIUM CHLORIDE, AND CALCIUM CHLORIDE: .6; .31; .03; .02 INJECTION, SOLUTION INTRAVENOUS at 07:19

## 2019-08-09 RX ADMIN — HYDROMORPHONE HYDROCHLORIDE 0.5 MG: 1 INJECTION, SOLUTION INTRAMUSCULAR; INTRAVENOUS; SUBCUTANEOUS at 10:26

## 2019-08-09 RX ADMIN — CEFAZOLIN SODIUM 1000 MG: 1 SOLUTION INTRAVENOUS at 07:32

## 2019-08-09 RX ADMIN — PROPOFOL 180 MG: 10 INJECTION, EMULSION INTRAVENOUS at 07:40

## 2019-08-09 RX ADMIN — PROPOFOL 120 MCG/KG/MIN: 10 INJECTION, EMULSION INTRAVENOUS at 07:42

## 2019-08-09 RX ADMIN — Medication 0.2 MCG/KG/MIN: at 07:42

## 2019-08-09 RX ADMIN — ONDANSETRON 4 MG: 4 TABLET, ORALLY DISINTEGRATING ORAL at 21:38

## 2019-08-09 RX ADMIN — ROCURONIUM BROMIDE 50 MG: 10 INJECTION INTRAVENOUS at 07:40

## 2019-08-09 RX ADMIN — PROMETHAZINE HYDROCHLORIDE 12.5 MG: 25 INJECTION INTRAMUSCULAR; INTRAVENOUS at 11:56

## 2019-08-09 RX ADMIN — LIDOCAINE HYDROCHLORIDE 50 MG: 10 INJECTION, SOLUTION INFILTRATION; PERINEURAL at 07:32

## 2019-08-09 RX ADMIN — PANTOPRAZOLE SODIUM 40 MG: 40 TABLET, DELAYED RELEASE ORAL at 14:34

## 2019-08-09 RX ADMIN — DEXAMETHASONE SODIUM PHOSPHATE 10 MG: 10 INJECTION, SOLUTION INTRAMUSCULAR; INTRAVENOUS at 07:32

## 2019-08-09 NOTE — PLAN OF CARE
Problem: PAIN - ADULT  Goal: Verbalizes/displays adequate comfort level or baseline comfort level  Description  Interventions:  - Encourage patient to monitor pain and request assistance  - Assess pain using appropriate pain scale  - Administer analgesics based on type and severity of pain and evaluate response  - Implement non-pharmacological measures as appropriate and evaluate response  - Consider cultural and social influences on pain and pain management  - Notify physician/advanced practitioner if interventions unsuccessful or patient reports new pain  Outcome: Progressing     Problem: INFECTION - ADULT  Goal: Absence or prevention of progression during hospitalization  Description  INTERVENTIONS:  - Assess and monitor for signs and symptoms of infection  - Monitor lab/diagnostic results  - Monitor all insertion sites, i e  indwelling lines, tubes, and drains  - Monitor endotracheal (as able) and nasal secretions for changes in amount and color  - Tangent appropriate cooling/warming therapies per order  - Administer medications as ordered  - Instruct and encourage patient and family to use good hand hygiene technique  - Identify and instruct in appropriate isolation precautions for identified infection/condition  Outcome: Progressing  Goal: Absence of fever/infection during neutropenic period  Description  INTERVENTIONS:  - Monitor WBC  - Implement neutropenic guidelines  Outcome: Progressing     Problem: SAFETY ADULT  Goal: Patient will remain free of falls  Description  INTERVENTIONS:  - Assess patient frequently for physical needs  -  Identify cognitive and physical deficits and behaviors that affect risk of falls    -  Tangent fall precautions as indicated by assessment   - Educate patient/family on patient safety including physical limitations  - Instruct patient to call for assistance with activity based on assessment  - Modify environment to reduce risk of injury  - Consider OT/PT consult to assist with strengthening/mobility  Outcome: Progressing  Goal: Maintain or return to baseline ADL function  Description  INTERVENTIONS:  -  Assess patient's ability to carry out ADLs; assess patient's baseline for ADL function and identify physical deficits which impact ability to perform ADLs (bathing, care of mouth/teeth, toileting, grooming, dressing, etc )  - Assess/evaluate cause of self-care deficits   - Assess range of motion  - Assess patient's mobility; develop plan if impaired  - Assess patient's need for assistive devices and provide as appropriate  - Encourage maximum independence but intervene and supervise when necessary  ¯ Involve family in performance of ADLs  ¯ Assess for home care needs following discharge   ¯ Request OT consult to assist with ADL evaluation and planning for discharge  ¯ Provide patient education as appropriate  Outcome: Progressing  Goal: Maintain or return mobility status to optimal level  Description  INTERVENTIONS:  - Assess patient's baseline mobility status (ambulation, transfers, stairs, etc )    - Identify cognitive and physical deficits and behaviors that affect mobility  - Identify mobility aids required to assist with transfers and/or ambulation (gait belt, sit-to-stand, lift, walker, cane, etc )  - [place] fall precautions as indicated by assessment  - Record patient progress and toleration of activity level on Mobility SBAR; progress patient to next Phase/Stage  - Instruct patient to call for assistance with activity based on assessment  - Request Rehabilitation consult to assist with strengthening/weightbearing, etc   Outcome: Progressing     Problem: DISCHARGE PLANNING  Goal: Discharge to home or other facility with appropriate resources  Description  INTERVENTIONS:  - Identify barriers to discharge w/patient and caregiver  - Arrange for needed discharge resources and transportation as appropriate  - Identify discharge learning needs (meds, wound care, etc )  - Arrange for interpretive services to assist at discharge as needed  - Refer to Case Management Department for coordinating discharge planning if the patient needs post-hospital services based on physician/advanced practitioner order or complex needs related to functional status, cognitive ability, or social support system  Outcome: Progressing     Problem: Knowledge Deficit  Goal: Patient/family/caregiver demonstrates understanding of disease process, treatment plan, medications, and discharge instructions  Description  Complete learning assessment and assess knowledge base    Interventions:  - Provide teaching at level of understanding  - Provide teaching via preferred learning methods  Outcome: Progressing

## 2019-08-09 NOTE — ANESTHESIA POSTPROCEDURE EVALUATION
Post-Op Assessment Note    CV Status:  Stable  Pain Score: 8    Pain management: adequate     Mental Status:  Alert and awake   Hydration Status:  Euvolemic   PONV Controlled:  Controlled   Airway Patency:  Patent   Post Op Vitals Reviewed: Yes      Staff: Anesthesiologist, CRNA           /72 (08/09/19 0959)    Temp (!) 97 4 °F (36 3 °C) (08/09/19 0959)    Pulse 86 (08/09/19 0959)   Resp 18 (08/09/19 0959)    SpO2 100 % (08/09/19 0959)

## 2019-08-09 NOTE — PROGRESS NOTES
Post Op Check Note -Surgery PA  Andrzejleonardo Rosen 48 y o  female MRN: [id number]  Unit/Bed#: -01 Encounter: [id number]    ASSESSMENT/PLAN:  Problem List     * (Principal) Malignant neoplasm of upper-outer quadrant of left breast in female, estrogen receptor positive ([address] )    Esophageal reflux    Hiatal hernia    Hyperlipidemia    Pain syndrome, chronic    Sacroiliitis (HCC)    Irritability and anger    Vitamin B12 deficiency    Seasonal allergic rhinitis due to pollen    Gastroesophageal reflux disease    Overview Signed 5/14/2018  8:12 AM by Nick Ma MD     Added automatically from request for surgery [id number]         Costochondritis    Intervertebral disc disorder with radiculopathy of lumbosacral region      -diet as tolerated  -pain control  -ambulate  -no heparin/lovenox  -monitor RILEY output  -dc planning for AM    Subjective/Objective     Subjective: Feels well  Tolerating diet, minimal pain  Objective/Physical Exam:   Blood pressure 119/71, pulse 66, temperature 97 7 °F (36 5 °C), temperature source Oral, resp  rate 18, height 5' 4" (1 626 m), weight 63 5 kg (140 lb), SpO2 99 %  ,Body mass index is 24 03 kg/m²  General appearance: alert and oriented, in no acute distress and alert  Heart: regular rate and rhythm, S1, S2 normal, no murmur, click, rub or gallop   Breasts-healing well bilaterally  No signs of hematoma  JPs functioning well    Lungs: clear to auscultation bilaterally  Abdomen: soft, non-tender; bowel sounds normal; no masses,  no organomegaly  Neurological: normal without focal findings, mental status, speech normal, alert and oriented x3, LEYLA and reflexes normal and symmetric      Current Facility-Administered Medications:     acetaminophen (TYLENOL) tablet 650 mg, 650 mg, Oral, Q4H PRN, Sloan Appiah PA-C, 650 mg at 08/09/19 1626    cholecalciferol (VITAMIN D3) tablet 1,000 Units, 1,000 Units, Oral, Daily, Duyen Appiah PA-C    cyanocobalamin (VITAMIN B-12) tablet 1,000 mcg, 1,000 mcg, Oral, Daily, Brittny Appiah PA-C    escitalopram (LEXAPRO) tablet 5 mg, 5 mg, Oral, Daily, Brittny Appiah PA-C    morphine (PF) 10 mg/mL injection 2 mg, 2 mg, Intravenous, Q1H PRN, Brittny Appiah PA-C    oxyCODONE (ROXICODONE) IR tablet 10 mg, 10 mg, Oral, Q4H PRN, Brittny Appiah PA-C    oxyCODONE (ROXICODONE) IR tablet 5 mg, 5 mg, Oral, Q4H PRN, Brittny Appiah PA-C    pantoprazole (PROTONIX) EC tablet 40 mg, 40 mg, Oral, Early Morning, Duyen Appiah PA-C, 40 mg at 08/09/19 1434    pravastatin (PRAVACHOL) tablet 20 mg, 20 mg, Oral, Daily With Elma Appiah PA-C, 20 mg at 08/09/19 1626      Intake/Output Summary (Last 24 hours) at 8/9/2019 1804  Last data filed at 8/9/2019 1601  Gross per 24 hour   Intake 1750 ml   Output 225 ml   Net 1525 ml       Invasive Devices     Peripheral Intravenous Line            Peripheral IV 08/09/19 Right Arm less than 1 day          Drain            Closed/Suction Drain Left;Lateral Breast Bulb 19 Fr  less than 1 day    Closed/Suction Drain Left;Medial Breast Bulb 19 Fr  less than 1 day    Closed/Suction Drain Right;Lateral Breast Bulb 19 Fr  less than 1 day    Closed/Suction Drain Right;Medial Breast Bulb 19 Fr  less than 1 day                          VTE Pharmacologic Prophylaxis: Sequential compression device (Venodyne)

## 2019-08-09 NOTE — OP NOTE
OPERATIVE REPORT  PATIENT NAME: Ángela Concepcion    :  1965  MRN: [id number]  Pt Location: AN OR ROOM 02    SURGERY DATE: 2019    Surgeon(s) and Role:     * Roxanna Dejesus MD - Primary     * Gayle Appiah PA-C - Assisting    Preop Diagnosis:  Malignant neoplasm of upper-outer quadrant of left breast in female, estrogen receptor positive ([facility] [address] ) [C50 412, Z17 0]    Post-Op Diagnosis Codes:     * Malignant neoplasm of upper-outer quadrant of left breast in female, estrogen receptor positive ([facility] [address] ) [C50 412, Z17 0]    Procedure(s) (LRB):  BREAST SIMPLE MASTECTOMY (Right)  BREAST MASTECTOMY, SENTINEL LYMPH NODE BIOPSY, LYMPHATIC MAPPING W/ BLUE DYE AND RADIOACTIVE DYE (INJECT AT 0730 BY DR WHITEHEAD IN THE O R ) (Left)    Specimen(s):  ID Type Source Tests Collected by Time Destination   1 : Right breast mastectomy  Tissue Breast, Right TISSUE EXAM Roxanna Dejesus MD 2019 6120    2 : Left breast mastectomy  Tissue Breast, Left TISSUE EXAM Roxanna Dejesus MD 2019 0830    3 : Left axilla sentinel lymph node  Tissue Lymph Node, [place] TISSUE EXAM Roxanna Dejesus MD 2019 1208        Estimated Blood Loss:   Minimal    Drains:  Closed/Suction Drain Right;Lateral Breast Bulb 19 Fr  (Active)   Number of days: 0       Closed/Suction Drain Right;Medial Breast Bulb 19 Fr  (Active)   Number of days: 0       Closed/Suction Drain Left;Medial Breast Bulb 19 Fr  (Active)   Number of days: 0       Closed/Suction Drain Left;Lateral Breast Bulb 19 Fr  (Active)   Number of days: 0       Urethral Catheter Non-latex (Active)   Number of days: 0       Anesthesia Type:   General    Operative Indications:  Malignant neoplasm of upper-outer quadrant of left breast in female, estrogen receptor positive ([facility] [address] ) [C50 412, Z17 0]      Operative Findings: There were 2 sentinel lymph nodes 1 was blue and radioactive the 2nd was adjacent to and slightly suspicious  They were both sent for touch prep analysis which showed no evidence of cancer in either 1  Complications:   None    Procedure and Technique:    The patient was brought to the operation room and placed under general anesthesia   Attention was paid to ensure appropriate padding and correct positioning   The left breast was injected intradermally with sulfur technetium colloid followed by 0 3cc of  methylene blue and then prepped and draped in a sterile fashion   I initiated a time-out, identifying the patient, the correct side and the above procedure   All parties agreed with the time out      Right Breast   Surgery was initiated on the noncancerous right breast   The planned incision was then injected with 0 25% Marcaine and epinephrine for local anesthesia   The incision was sharply incised   Thin flaps were then elevated using electrocautery starting superiorly and then continuing medially, inferiorly and finally laterally   The tail of Rosco Severs was then dissected away from the axilla proper leaving the breast tethered to the underlying musculature   The breast was then removed from the muscles in a sub-facial plane   The breast was oriented with sutures (short=superior; medium=medial; long=lateral)  The breast was sent to pathology in formalin for permanent pathologic evaluation  Meticulous hemostasis was maintained with electrocautery      Left Breast   Attention was then directed towards the left breast   The planned incision again anesthetized with 0 25% Marcaine and epinephrine and incised  Then flaps were elevated the lateral superior and inferior region to expose the axillary region  A blue lymphatic was seen migrating into the lymph node  The Multispectral Imaging Trigg counter was used the to identify a radioactive (3900 CPM) and strongly blue lymph node this was grossly normal however tethered to it was in the additional small node but somewhat firm  These lymph nodes were sent for touch prep which ultimately confirmed that there was no cancer identified in either lymph node    While waiting for pathology the remainder of the flaps were then created superiorly medially inferiorly and then the breast was removed in a subfascial manner  The breast was also oriented with short superior long lateral media medial as above  Once the pathology returned in the breast had been removed both breast were extensively irrigated and two 19mm, slotted, round RILEY drains were placed in each breast and brought out through separate incisions and secured with nylon sutures   The wounds were then closed in two layers  an inner layer of 2-0 vicryl and a subcuticular closure with 4-0 Monocryl  Steri-strips were applied  The counts were correct x2  A qualified resident physician was not available, the PA was used to facilitate dissection particularly for the axillary portion as well as closure      Patient Disposition:  PACU     SIGNATURE: Daisy Milligan MD  DATE: August 9, 2019  TIME: 9:30 AM

## 2019-08-10 VITALS
WEIGHT: 140 LBS | DIASTOLIC BLOOD PRESSURE: 78 MMHG | OXYGEN SATURATION: 98 % | HEIGHT: 64 IN | SYSTOLIC BLOOD PRESSURE: 125 MMHG | HEART RATE: 63 BPM | RESPIRATION RATE: 18 BRPM | TEMPERATURE: 99 F | BODY MASS INDEX: 23.9 KG/M2

## 2019-08-10 LAB
ANION GAP SERPL CALCULATED.3IONS-SCNC: 7 MMOL/L (ref 4–13)
BASOPHILS # BLD AUTO: 0.03 THOUSANDS/ΜL (ref 0–0.1)
BASOPHILS NFR BLD AUTO: 1 % (ref 0–1)
BUN SERPL-MCNC: 9 MG/DL (ref 5–25)
CALCIUM SERPL-MCNC: 8.8 MG/DL (ref 8.3–10.1)
CHLORIDE SERPL-SCNC: 107 MMOL/L (ref 100–108)
CO2 SERPL-SCNC: 29 MMOL/L (ref 21–32)
CREAT SERPL-MCNC: 0.78 MG/DL (ref 0.6–1.3)
EOSINOPHIL # BLD AUTO: 0.12 THOUSAND/ΜL (ref 0–0.61)
EOSINOPHIL NFR BLD AUTO: 2 % (ref 0–6)
ERYTHROCYTE [DISTWIDTH] IN BLOOD BY AUTOMATED COUNT: 13.2 % (ref 11.6–15.1)
GFR SERPL CREATININE-BSD FRML MDRD: 88 ML/MIN/1.73SQ M
GLUCOSE SERPL-MCNC: 104 MG/DL (ref 65–140)
HCT VFR BLD AUTO: 38.5 % (ref 34.8–46.1)
HGB BLD-MCNC: 12.8 G/DL (ref 11.5–15.4)
IMM GRANULOCYTES # BLD AUTO: 0.02 THOUSAND/UL (ref 0–0.2)
IMM GRANULOCYTES NFR BLD AUTO: 0 % (ref 0–2)
LYMPHOCYTES # BLD AUTO: 1.45 THOUSANDS/ΜL (ref 0.6–4.47)
LYMPHOCYTES NFR BLD AUTO: 22 % (ref 14–44)
MCH RBC QN AUTO: 32 PG (ref 26.8–34.3)
MCHC RBC AUTO-ENTMCNC: 33.2 G/DL (ref 31.4–37.4)
MCV RBC AUTO: 96 FL (ref 82–98)
MONOCYTES # BLD AUTO: 0.44 THOUSAND/ΜL (ref 0.17–1.22)
MONOCYTES NFR BLD AUTO: 7 % (ref 4–12)
NEUTROPHILS # BLD AUTO: 4.57 THOUSANDS/ΜL (ref 1.85–7.62)
NEUTS SEG NFR BLD AUTO: 68 % (ref 43–75)
NRBC BLD AUTO-RTO: 0 /100 WBCS
PLATELET # BLD AUTO: 212 THOUSANDS/UL (ref 149–390)
PMV BLD AUTO: 8.6 FL (ref 8.9–12.7)
POTASSIUM SERPL-SCNC: 4.9 MMOL/L (ref 3.5–5.3)
RBC # BLD AUTO: 4 MILLION/UL (ref 3.81–5.12)
SODIUM SERPL-SCNC: 143 MMOL/L (ref 136–145)
WBC # BLD AUTO: 6.63 THOUSAND/UL (ref 4.31–10.16)

## 2019-08-10 PROCEDURE — 99024 POSTOP FOLLOW-UP VISIT: CPT | Performed by: SURGERY

## 2019-08-10 PROCEDURE — 85025 COMPLETE CBC W/AUTO DIFF WBC: CPT | Performed by: PHYSICIAN ASSISTANT

## 2019-08-10 PROCEDURE — 80048 BASIC METABOLIC PNL TOTAL CA: CPT | Performed by: PHYSICIAN ASSISTANT

## 2019-08-10 RX ORDER — HYDROCODONE BITARTRATE AND ACETAMINOPHEN 5; 325 MG/1; MG/1
1 TABLET ORAL EVERY 6 HOURS PRN
Qty: 8 TABLET | Refills: 0 | Status: SHIPPED | OUTPATIENT
Start: 2019-08-10 | End: 2019-08-20

## 2019-08-10 RX ADMIN — CYANOCOBALAMIN TAB 500 MCG 1000 MCG: 500 TAB at 09:12

## 2019-08-10 RX ADMIN — ONDANSETRON 4 MG: 4 TABLET, ORALLY DISINTEGRATING ORAL at 05:53

## 2019-08-10 RX ADMIN — OXYCODONE HYDROCHLORIDE 10 MG: 10 TABLET ORAL at 05:53

## 2019-08-10 RX ADMIN — VITAMIN D, TAB 1000IU (100/BT) 1000 UNITS: 25 TAB at 09:12

## 2019-08-10 RX ADMIN — PANTOPRAZOLE SODIUM 40 MG: 40 TABLET, DELAYED RELEASE ORAL at 05:53

## 2019-08-10 NOTE — PROGRESS NOTES
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, estrogen receptor positive  -incisions c/d/i  -pt doing well this AM, drains with minimal output, R>L  -both L & R drains (4) with serosanguinous output, right slightly more blood tinged   -pt clear for d/c, denies requiring VNA  -plan for d/c this AM, pain script to be provided  -discussed with Dr Kevin Storm  -pt to follow up with Dr Haylee Huffman as OP     Subjective/Objective     Subjective: Pt doing well this AM  Denies pain  Radonna Stager to go home  She is comfortable with emptying/recording drain output  Objective:     Blood pressure 125/78, pulse 63, temperature 99 °F (37 2 °C), temperature source Oral, resp  rate 18, height 5' 4" (1 626 m), weight 63 5 kg (140 lb), SpO2 98 %  ,Body mass index is 24 03 kg/m²  Intake/Output Summary (Last 24 hours) at 8/10/2019 0849  Last data filed at 8/10/2019 8593  Gross per 24 hour   Intake 1750 ml   Output 400 ml   Net 1350 ml       Invasive Devices     Peripheral Intravenous Line            Peripheral IV 08/09/19 Right Arm 1 day          Drain            Closed/Suction Drain Left;Lateral Breast Bulb 19 Fr  less than 1 day    Closed/Suction Drain Left;Medial Breast Bulb 19 Fr  less than 1 day    Closed/Suction Drain Right;Lateral Breast Bulb 19 Fr  less than 1 day    Closed/Suction Drain Right;Medial Breast Bulb 19 Fr  less than 1 day                Physical Exam:   General: Alert and oriented, sitting in bedside chair in no acute distress  HEENT: Grossly normal, atraumatic  Chest wall: Incisions c/d/i   Drains x4 with serosanguinous output, right is slightly more blood tinged   Pulmonary: Clear to auscultation b/l  Cardiac: RRR  Abdominal: Soft, nontender  Extremities: Grossly normal  Skin: Dry, no rashes or lesions     Lab, Imaging and other studies:  CBC:   Lab Results   Component Value Date    WBC 6 63 08/10/2019    HGB 12 8 08/10/2019    HCT 38 5 08/10/2019    MCV 96 08/10/2019     08/10/2019    MCH 32 0 08/10/2019    MCHC 33 2 08/10/2019    RDW 13 2 08/10/2019    MPV 8 6 (L) 08/10/2019    NRBC 0 08/10/2019   , CMP:   Lab Results   Component Value Date    SODIUM 143 08/10/2019    K 4 9 08/10/2019     08/10/2019    CO2 29 08/10/2019    BUN 9 08/10/2019    CREATININE 0 78 08/10/2019    CALCIUM 8 8 08/10/2019    EGFR 88 08/10/2019     VTE Pharmacologic Prophylaxis: Sequential compression device (Venodyne)   VTE Mechanical Prophylaxis: sequential compression device

## 2019-08-13 ENCOUNTER — TELEPHONE (OUTPATIENT)
Dept: SURGICAL ONCOLOGY | Facility: CLINIC | Age: 54
End: 2019-08-13

## 2019-08-13 ENCOUNTER — DOCUMENTATION (OUTPATIENT)
Dept: SURGICAL ONCOLOGY | Facility: CLINIC | Age: 54
End: 2019-08-13

## 2019-08-13 NOTE — TELEPHONE ENCOUNTER
Patient called to report decreased output in surgical drains  Patient states that for the last two days, her drains have put out 10-20 mL total each day  Discussed this with Dr Tomas Laura who was in agreement for the patient's drains to be removed  Patient stated she would come in to the office today to have them removed  Patient denies any questions at this time

## 2019-08-13 NOTE — PROGRESS NOTES
Patient presented to the office for a surgical drain pull  Procedure was explained to the patient  Stitches were removed and drains pulled without complication  Reviewed instructions with patient and when to call the office; handout provided  Patient verbalized understanding and denies any questions or concerns at this time

## 2019-08-16 ENCOUNTER — TELEPHONE (OUTPATIENT)
Dept: PAIN MEDICINE | Facility: MEDICAL CENTER | Age: 54
End: 2019-08-16

## 2019-08-19 NOTE — TELEPHONE ENCOUNTER
On the phone with pts insurance company to see if they can make a decision today   It was not due for review until tomorrow morning

## 2019-08-19 NOTE — TELEPHONE ENCOUNTER
Patient states she contacted her medical insurance [employer] & they states that her prior authorization should be sent as high priority  Patient is currently out of her medication   Please advise, gina    Call back# [phone]

## 2019-08-26 ENCOUNTER — OFFICE VISIT (OUTPATIENT)
Dept: SURGICAL ONCOLOGY | Facility: CLINIC | Age: 54
End: 2019-08-26
Payer: COMMERCIAL

## 2019-08-26 ENCOUNTER — TELEPHONE (OUTPATIENT)
Dept: HEMATOLOGY ONCOLOGY | Facility: CLINIC | Age: 54
End: 2019-08-26

## 2019-08-26 VITALS
HEIGHT: 64 IN | WEIGHT: 143 LBS | DIASTOLIC BLOOD PRESSURE: 70 MMHG | BODY MASS INDEX: 24.41 KG/M2 | SYSTOLIC BLOOD PRESSURE: 118 MMHG | RESPIRATION RATE: 16 BRPM | TEMPERATURE: 98.7 F | HEART RATE: 66 BPM

## 2019-08-26 DIAGNOSIS — C50.412 MALIGNANT NEOPLASM OF UPPER-OUTER QUADRANT OF LEFT BREAST IN FEMALE, ESTROGEN RECEPTOR POSITIVE (HCC): Primary | ICD-10-CM

## 2019-08-26 DIAGNOSIS — Z17.0 MALIGNANT NEOPLASM OF UPPER-OUTER QUADRANT OF LEFT BREAST IN FEMALE, ESTROGEN RECEPTOR POSITIVE (HCC): Primary | ICD-10-CM

## 2019-08-26 DIAGNOSIS — Z90.13 STATUS POST MASTECTOMY, BILATERAL: ICD-10-CM

## 2019-08-26 PROCEDURE — 99214 OFFICE O/P EST MOD 30 MIN: CPT | Performed by: SURGERY

## 2019-08-26 RX ORDER — GABAPENTIN 300 MG/1
300 CAPSULE ORAL 3 TIMES DAILY
COMMUNITY
End: 2019-09-16 | Stop reason: SDUPTHER

## 2019-08-26 NOTE — PROGRESS NOTES
Surgical Oncology Breast Post-Op       [address] [facility],[address]  [facility]  [address] [place] [address]    Shalonda Lara  1965  [id number]  [address]  [facility]  [address] [address]    Chief Complaint:   Yaz Antonio is seen for a post-operative visit of her recent   Bilateral breast surgery  Oncology History:        Malignant neoplasm of upper-outer quadrant of left breast in female, estrogen receptor positive ([facility] [address] )    6/13/2019 Biopsy     Left breast ultrasound-guided biopsy  12 o'clock, 4 cm from nipple  Invasive breast carcinoma of no special type (ductal NST)  Grade 2    2 o'clock, 5 cm from nipple  Invasive breast carcinoma of no special type (ductal NST)  Grade 2  ER 90  SC 90  HER2 1+      6/20/2019 Genetic Testing     The following genes were evaluated: JOSH, BRCA1, BRCA2, CDH1, CHEK2, PALB2, PTEN, STK11, TP53  Negative for genetic mutations or variants  [employer]      8/9/2019 Surgery     Left breast mastectomy with sentinel lymph node biopsy  Two foci of invasive mammary carcinoma of no special type (ductal)  Grade 2  2 4 cm  DCIS measures at least 8 2 cm  0/2 Lymph nodes  Margins negative  Anatomic Stage IIA  Prognostic Stage IA    Right breast mastectomy; prophylactic  Negative for malignancy  Benign fibrocystic changes with atypia consisting of multifocal lobular neoplasia (atypical lobular hyperplasia and focal lobular carcinoma in situ)         Assessment & Plan:   Assessment/Plan     the patient presents for a follow-up postoperative visit  Her drains have previously been removed  I reviewed her pathology with her  I have discussed this with Dr Kaitlyn Mullins and  We recommend a mammo print test to hopefully be able to safely withhold chemotherapy  She is strongly ER SC positive and HER2 negative  We will coordinate an appoint with Dr Kaitlyn Mullins  We will see her back in 3 months  She is agreeable to see our advanced practitioner at that time  The patient was previously offered an appoint with physical therapy but is quite active and is returning to her routine active status   And does not wish to see physical therapy  History of Present Illness:     See above    Interval History:    see above    Review of Systems:   Review of Systems   All other systems reviewed and are negative  Past Medical History:     Patient Active Problem List   Diagnosis    Esophageal reflux    Hiatal hernia    Hyperlipidemia    Pain syndrome, chronic    Sacroiliitis (HCC)    Irritability and anger    Vitamin B12 deficiency    Seasonal allergic rhinitis due to pollen    Gastroesophageal reflux disease    Costochondritis    Intervertebral disc disorder with radiculopathy of lumbosacral region    Malignant neoplasm of upper-outer quadrant of left breast in female, estrogen receptor positive ([facility] [address] )     Past Medical History:   Diagnosis Date    Anxiety     BRCA gene mutation negative 06/20/2019    [employer]    Cancer ([facility] [address] )     breast     Fibrocystic disease of breast     GERD (gastroesophageal reflux disease)     Hiatal hernia     Hyperlipidemia      Past Surgical History:   Procedure Laterality Date    APPENDECTOMY      BREAST BIOPSY      BREAST CYST ASPIRATION Left 2005    COLONOSCOPY      FOOT SURGERY Right 2011    right, hardware removal    MASTECTOMY W/ SENTINEL NODE BIOPSY Left 8/9/2019    Procedure: BREAST MASTECTOMY, SENTINEL LYMPH NODE BIOPSY, LYMPHATIC MAPPING W/ BLUE DYE AND RADIOACTIVE DYE (INJECT AT 0730 BY DR WHITEHEAD IN THE O R );  Surgeon: Caryn Baker MD;  Location: [facility] Main OR;  Service: Surgical Oncology    OTHER SURGICAL HISTORY Right 2010    Complete Excision of Fifth Metatarsal Head     SC COLONOSCOPY FLX DX W/COLLJ SPEC WHEN PFRMD N/A 7/20/2018    Procedure: EGD AND COLONOSCOPY;  Surgeon: Ayana Degroot MD;  Location: [facility] GI LAB;   Service: Gastroenterology    SC MASTECTOMY, SIMPLE, COMPLETE Right 8/9/2019    Procedure: BREAST SIMPLE MASTECTOMY;  Surgeon: Roxanna Dejesus MD;  Location: [facility] Main OR;  Service: Surgical Oncology    US GUIDANCE BREAST BIOPSY LEFT EACH ADDITIONAL Left 6/13/2019    US GUIDED BREAST BIOPSY LEFT COMPLETE Left 6/13/2019     Family History   Problem Relation Age of Onset    Coronary artery disease Mother         CABG in her 62s   Isabel Bhatt Other Mother         Dyslipidemia    Hypertension Mother     Heart attack Father 46        acute myocardial infarction    Other Father         Dyslipidemia    Hypertension Father     Prostate cancer Paternal Uncle 72    Thyroid disease Family         disorder    No Known Problems Maternal Aunt     No Known Problems Maternal Aunt      Social History     Socioeconomic History    Marital status: /Civil Union     Spouse name: Not on file    Number of children: Not on file    Years of education: Not on file    Highest education level: Not on file   Occupational History    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, now works [employer] business   1 day with [employer]   Social Needs    Financial resource strain: Not on file    Food insecurity:     Worry: Not on file     Inability: Not on file    Transportation needs:     Medical: Not on file     Non-medical: Not on file   Tobacco Use    Smoking status: Never Smoker    Smokeless tobacco: Never Used   Substance and Sexual Activity    Alcohol use: Yes     Frequency: 2-3 times a week     Drinks per session: 1 or 2    Drug use: No    Sexual activity: Not on file     Comment: Partner had vasectomy   Lifestyle    Physical activity:     Days per week: Not on file     Minutes per session: Not on file    Stress: Not on file   Relationships    Social connections:     Talks on phone: Not on file     Gets together: Not on file     Attends Anabaptism service: Not on file     Active member of club or organization: Not on file     Attends meetings of clubs or organizations: Not on file     Relationship status: Not on file    Intimate partner violence:     Fear of current or ex partner: Not on file     Emotionally abused: Not on file     Physically abused: Not on file     Forced sexual activity: Not on file   Other Topics Concern    Not on file   Social History Narrative    Exercise habits    Working full-time - used to be GI RN       Current Outpatient Medications:     acetaminophen (TYLENOL) 500 mg tablet, Take 1,000 mg by mouth every 6 (six) hours as needed for mild pain, Disp: , Rfl:     cholecalciferol (VITAMIN D3) 1,000 units tablet, Take 1,000 Units by mouth daily, Disp: , Rfl:     cyanocobalamin (VITAMIN B-12) 1,000 mcg tablet, Take 1 tablet (1,000 mcg total) by mouth daily, Disp: 90 tablet, Rfl: 0    escitalopram (LEXAPRO) 5 mg tablet, Take 1 tablet (5 mg total) by mouth daily, Disp: 90 tablet, Rfl: 1    fluticasone (FLONASE) 50 mcg/act nasal spray, 1 spray into each nostril as needed , Disp: , Rfl:     gabapentin (NEURONTIN) 300 mg capsule, Take 300 mg by mouth 3 (three) times a day, Disp: , Rfl:     Naproxen Sodium (ALEVE) 220 MG CAPS, Take by mouth 2 (two) times a day as needed, Disp: , Rfl:     omeprazole (PriLOSEC) 20 mg delayed release capsule, Take 1 capsule (20 mg total) by mouth daily, Disp: 90 capsule, Rfl: 0    simvastatin (ZOCOR) 10 mg tablet, TAKE 1 TABLET DAILY, Disp: 90 tablet, Rfl: 1    tapentadol (NUCYNTA) 75 mg tablet, Take 1 tab PO Q 6 hours prn pain  2nd month script  Do not fill until 8/13/19, Disp: 120 tablet, Rfl: 0  Allergies   Allergen Reactions    Sulfa Antibiotics Headache     Annotation - [id number]: Migraine; Annotation - [id number]: cellular issues       Physical Exams:     Vitals:    08/26/19 0817   BP: 118/70   Pulse: 66   Resp: 16   Temp: 98 7 °F (37 1 °C)     Physical Exam   Pulmonary/Chest:    Examination of both mastectomy site shows no evidence of any infection  She has good range of motion         Results: Labs:       Discussion/Summary:     Patient presents for follow-up visit  I was able to inform her and provide her pathology report which stated that she was node negative in her margins were negative  However she had 2 tumors that were 2 4 and 2 3 cm in size  I explained  That classically the chemotherapy would be any recommended for the size tumors however various genomic tests such as mammo print and Oncotype can minimize the use of chemotherapy  We discussed the differences between Oncotype the mammo print  We ultimately elected to order a mammo print  I had previously discussed this with Dr Gemma Calhoun earlier this morning who concurs  She does not have any indications for radiation therapy  We talked about chemotherapy as well as anti hormonal therapy  All questions were answered the patient's satisfaction  We also  Reviewed the clinical trial  With mammo print  The patient  Is agreeable to undergo this trial   We went through the process of informed consent  I reviewed the consent with her  Total time spent with the patient was a 30 minutes with the majority of that time ( greater than 50%) spent face-to-face time  Patient has a good cosmetic outcome  She would like a return to work note  We will see her back in 3 months  She is agreeable to seeing Jenny Che our advanced practitioner at that time

## 2019-08-26 NOTE — TELEPHONE ENCOUNTER
New Patient Encounter    New Patient Intake Form   Patient Details:  Javi King  1965  [id number]    Background Information:  [address] starts by opening a telephone encounter and gathering the following information   Who is calling to schedule? If not self, relationship to patient? Provider office   Referring Provider Juno Campuzano   What is the diagnosis? Second Opinion and Breast CA   When was the diagnosis? 7/2019   Is patient aware of diagnosis? Yes   Reason for visit? NEW DX   Have you had any testing done? If so: when, where? Yes   Are records in [place]? yes   Was the patient told to bring a disk? no   Scheduling Information:   Preferred [place]: [address] [place] [address]     Requesting Specific Provider? Edda England   Are there any dates/time the patient cannot be seen? Not 9/16/19   Counseling Pre-Screen:  If the patient answers YES to any of the below questions, please route to the appropriate location specific counselor    Have you felt anxious or worried about cancer and the treatment you are receiving? Did Not Speak to Patient   Has your diagnosis caused physical, emotional, or financial hardship for you? Did Not Speak to Patient   Note: Do not ask the patient about transportation issues/needs  Please notate if the patient brings it up and the counselor will schedule accordingly  Miscellaneous:    After completing the above information, please route to Financial Counselor and the appropriate Nurse Navigator for review

## 2019-08-27 NOTE — TELEPHONE ENCOUNTER
Called insurance & spoke to danica call ref# [id number] DEWAYNE sd that pt has an active ppo plan that is effective 12/01/17  Plan runs on a contract year from 12/01/18-11/30/19  Pt has chemo & radiation benefits $500 deduct then covered 100%  Deduct has been met  The out of pocket of $7150 doesn't apply to this service  Pt has standard & advanced dx imaging that since the deductible  Has been met all the benefits are covered  in pt hospitalizations & out pt surgery is covered 100% since the deductible has been met  Pt can go to any in network lab  rx benefits thru [facility] & the speciality pharmacy is [facility] need cpt codes for the genetic & genomic testing  No other insurance on file  Called pt to go over the benefits but got her voicemail  left her a message to call me back

## 2019-09-06 ENCOUNTER — TELEPHONE (OUTPATIENT)
Dept: SURGICAL ONCOLOGY | Facility: CLINIC | Age: 54
End: 2019-09-06

## 2019-09-06 LAB — SCAN RESULT: NORMAL

## 2019-09-06 NOTE — TELEPHONE ENCOUNTER
Called patient to discuss mammaprint results  There was no answer; message left with call back number provided  Patient returned phone call  Results reviewed with patient  Explained that the MammaPrint did show a high risk of recurrence and that Dr Heena Kim would probably recommend chemotherapy based on those findings  Discussed with patient that Dr Heena Kim would go over more details at her consult appointment  Patient verbalized understanding and was appreciative of the phone call  All questions answered to the patient's satisfaction

## 2019-09-09 ENCOUNTER — TELEPHONE (OUTPATIENT)
Dept: INTERNAL MEDICINE CLINIC | Facility: CLINIC | Age: 54
End: 2019-09-09

## 2019-09-09 NOTE — TELEPHONE ENCOUNTER
Patient will be on the building [facility] Monday and would like to know if she can have her Flu shot?  9/16

## 2019-09-10 ENCOUNTER — TELEPHONE (OUTPATIENT)
Dept: HEMATOLOGY ONCOLOGY | Facility: CLINIC | Age: 54
End: 2019-09-10

## 2019-09-10 NOTE — TELEPHONE ENCOUNTER
BC Nurse Navigator:   Called patient for [employer]  Discussed upcoming Med-Onc appointment, mammaprint, answered  questions, provided emotional support  Encouraged her to call for any questions/concerns, she is agreeable to this and has my contact information  Will follow up on an as needed basis

## 2019-09-16 ENCOUNTER — HOSPITAL ENCOUNTER (OUTPATIENT)
Dept: MRI IMAGING | Facility: HOSPITAL | Age: 54
Discharge: HOME | End: 2019-09-16
Attending: ANESTHESIOLOGY
Payer: COMMERCIAL

## 2019-09-16 ENCOUNTER — CLINICAL SUPPORT (OUTPATIENT)
Dept: INTERNAL MEDICINE CLINIC | Facility: CLINIC | Age: 54
End: 2019-09-16
Payer: COMMERCIAL

## 2019-09-16 ENCOUNTER — TRANSCRIBE ORDERS (OUTPATIENT)
Dept: ADMINISTRATIVE | Facility: HOSPITAL | Age: 54
End: 2019-09-16

## 2019-09-16 ENCOUNTER — OFFICE VISIT (OUTPATIENT)
Dept: PAIN MEDICINE | Facility: CLINIC | Age: 54
End: 2019-09-16
Payer: COMMERCIAL

## 2019-09-16 ENCOUNTER — TELEPHONE (OUTPATIENT)
Dept: PAIN MEDICINE | Facility: CLINIC | Age: 54
End: 2019-09-16

## 2019-09-16 ENCOUNTER — HOSPITAL ENCOUNTER (OUTPATIENT)
Dept: RADIOLOGY | Facility: HOSPITAL | Age: 54
Discharge: HOME | End: 2019-09-16
Attending: ANESTHESIOLOGY
Payer: COMMERCIAL

## 2019-09-16 VITALS
HEIGHT: 64 IN | BODY MASS INDEX: 24.41 KG/M2 | DIASTOLIC BLOOD PRESSURE: 78 MMHG | WEIGHT: 143 LBS | SYSTOLIC BLOOD PRESSURE: 118 MMHG | TEMPERATURE: 99 F | HEART RATE: 67 BPM

## 2019-09-16 DIAGNOSIS — M25.551 RIGHT HIP PAIN: ICD-10-CM

## 2019-09-16 DIAGNOSIS — F11.20 UNCOMPLICATED OPIOID DEPENDENCE (HCC): ICD-10-CM

## 2019-09-16 DIAGNOSIS — M46.1 SACROILIITIS (HCC): ICD-10-CM

## 2019-09-16 DIAGNOSIS — Z79.891 LONG-TERM CURRENT USE OF OPIATE ANALGESIC: ICD-10-CM

## 2019-09-16 DIAGNOSIS — G89.29 CHRONIC BILATERAL LOW BACK PAIN WITH SCIATICA, SCIATICA LATERALITY UNSPECIFIED: ICD-10-CM

## 2019-09-16 DIAGNOSIS — Z23 NEED FOR INFLUENZA VACCINATION: Primary | ICD-10-CM

## 2019-09-16 DIAGNOSIS — G89.4 CHRONIC PAIN SYNDROME: Primary | ICD-10-CM

## 2019-09-16 DIAGNOSIS — M54.16 LUMBAR RADICULOPATHY: ICD-10-CM

## 2019-09-16 DIAGNOSIS — M54.40 CHRONIC BILATERAL LOW BACK PAIN WITH SCIATICA, SCIATICA LATERALITY UNSPECIFIED: ICD-10-CM

## 2019-09-16 DIAGNOSIS — M51.17 INTERVERTEBRAL DISC DISORDER WITH RADICULOPATHY OF LUMBOSACRAL REGION: ICD-10-CM

## 2019-09-16 DIAGNOSIS — M25.551 RIGHT HIP PAIN: Primary | ICD-10-CM

## 2019-09-16 PROCEDURE — 27093 INJECTION FOR HIP X-RAY: CPT

## 2019-09-16 PROCEDURE — 73722 MRI JOINT OF LWR EXTR W/DYE: CPT

## 2019-09-16 PROCEDURE — 99214 OFFICE O/P EST MOD 30 MIN: CPT | Performed by: NURSE PRACTITIONER

## 2019-09-16 PROCEDURE — 77002 NEEDLE LOCALIZATION BY XRAY: CPT

## 2019-09-16 PROCEDURE — 90682 RIV4 VACC RECOMBINANT DNA IM: CPT

## 2019-09-16 PROCEDURE — 90471 IMMUNIZATION ADMIN: CPT

## 2019-09-16 PROCEDURE — A9585 GADOBUTROL INJECTION: HCPCS | Performed by: ANESTHESIOLOGY

## 2019-09-16 PROCEDURE — 80305 DRUG TEST PRSMV DIR OPT OBS: CPT | Performed by: NURSE PRACTITIONER

## 2019-09-16 RX ORDER — 0.9 % SODIUM CHLORIDE 0.9 %
50 VIAL (ML) INJECTION
Status: COMPLETED | OUTPATIENT
Start: 2019-09-16 | End: 2019-09-16

## 2019-09-16 RX ORDER — LIDOCAINE HYDROCHLORIDE 10 MG/ML
10 INJECTION, SOLUTION EPIDURAL; INFILTRATION; INTRACAUDAL; PERINEURAL
Status: COMPLETED | OUTPATIENT
Start: 2019-09-16 | End: 2019-09-16

## 2019-09-16 RX ORDER — GABAPENTIN 300 MG/1
300 CAPSULE ORAL 2 TIMES DAILY
Qty: 60 CAPSULE | Refills: 5 | Status: SHIPPED | OUTPATIENT
Start: 2019-09-16 | End: 2020-02-14 | Stop reason: ALTCHOICE

## 2019-09-16 RX ADMIN — GADOBUTROL 0.2 ML: 604.72 INJECTION INTRAVENOUS at 12:48

## 2019-09-16 RX ADMIN — IOHEXOL 2 ML: 300 INJECTION, SOLUTION INTRAVENOUS at 11:49

## 2019-09-16 RX ADMIN — SODIUM CHLORIDE 17 ML: 9 INJECTION, SOLUTION INTRAMUSCULAR; INTRAVENOUS; SUBCUTANEOUS at 11:50

## 2019-09-16 RX ADMIN — LIDOCAINE HYDROCHLORIDE 2 ML: 10 INJECTION, SOLUTION EPIDURAL; INFILTRATION; INTRACAUDAL; PERINEURAL at 11:50

## 2019-09-16 NOTE — TELEPHONE ENCOUNTER
Radiology called back  I was in the process of transferring to [address] [facility]) and caller disconnected

## 2019-09-16 NOTE — TELEPHONE ENCOUNTER
Call from [place] [facility]  Ph# [phone]    [address] [facility] patient is having MRI done today  However the Fluoroscopy isn't signed  Please have this signed by physician

## 2019-09-16 NOTE — PROGRESS NOTES
Assessment:  1  Chronic pain syndrome    2  Chronic bilateral low back pain with sciatica, sciatica laterality unspecified    3  Sacroiliitis ([facility] [address] )    4  Intervertebral disc disorder with radiculopathy of lumbosacral region    5  Uncomplicated opioid dependence ([facility] [address] )    6  Long-term current use of opiate analgesic    7  Lumbar radiculopathy        Plan:  Smith King is a 48 y o  female who presents for a follow up office visit in regards to Back Pain and Leg Pain  The patient has a history of chronic pain syndrome secondary to low back pain, sacroiliitis, lumbar intervertebral disc disorder with radiculopathy  Patient presents today with ongoing right side at low back and leg pain  She is currently taking Nucynta 75 mg, and recently started gabapentin which is providing moderate pain relief  Therefore, she will be continued on the medication as prescribed  Two months of prescriptions for Nucynta 75 mg were electronically sent to the pharmacy, with 1 script stating do not fill until September 17, 2019, and the 2nd stating do not fill until October 15 2019  A prescription for gabapentin 300 mg twice a day was sent to the pharmacy  She was made aware that she can increase to 2 tablets at night if it is not providing enough pain relief throughout the night  The increased dose places her increased risk for side effects such as drowsiness and dizziness, and peripheral edema  I will contact her with the results of the right hip arthrogram, 1 to receive the results  There are risks associated with opioid medications, including dependence, addiction and tolerance  The patient understands and agrees to use these medications only as prescribed  Potential side effects of the medications include, but are not limited to, constipation, drowsiness, addiction, impaired judgment and risk of fatal overdose if not taken as prescribed  The patient was warned against driving while taking sedation medications  Sharing medications is a felony  At this point in time, the patient is showing no signs of addiction, abuse, diversion or suicidal ideation  A urine drug screen was collected at today's office visit as part of our medication management protocol  The point of care testing results were appropriate for what was being prescribed  The specimen will be sent for confirmatory testing  The drug screen is medically necessary because the patient is either dependent on opioid medication or is being considered for opioid medication therapy and the results could impact ongoing or future treatment  The drug screen is to evaluate for the presences or absence of prescribed, non-prescribed, and/or illicit drugs/substances  [place] Prescription Drug Monitoring Program report was reviewed and was appropriate       My impressions and treatment recommendations were discussed in detail with the patient who verbalized understanding and had no further questions  Discharge instructions were provided  I personally saw and examined the patient and I agree with the above discussed plan of care  No orders of the defined types were placed in this encounter  New Medications Ordered This Visit   Medications    tapentadol (NUCYNTA) 75 mg tablet     Sig: Take 1 tab PO Q 6 hours prn pain  Do not fill until 9/17/19     Dispense:  120 tablet     Refill:  0    gabapentin (NEURONTIN) 300 mg capsule     Sig: Take 1 capsule (300 mg total) by mouth 2 (two) times a day     Dispense:  60 capsule     Refill:  5       History of Present Illness:  Smith King is a 48 y o  female who presents for a follow up office visit in regards to Back Pain and Leg Pain  The patient has a history of chronic pain syndrome secondary to low back pain, sacroiliitis, lumbar intervertebral disc disorder with radiculopathy  She presents today with ongoing pain that is located on the right side of her low back    The pain radiates into the anterior aspect of the right thigh, and occasionally down to her foot  The pain is intermittent and worse at night  She recently started gabapentin 300 mg at bedtime due to the right leg pain  She states this did help to decrease some of the pain symptoms, but continues to wake up in pain  She also cannot lay on her right side for an extended period due to pain  She describes her pain as dull aching, sharp, and shooting  She is rating her pain 8/10 on numeric rating scale      On August 9, 2019, the patient had bilateral mastectomy due to breast cancer  She has a follow-up with her oncologist to discuss if chemotherapy will be needed  She also had right hip arthrogram earlier today  Results are not in the system at this time    She continues to take Nucynta the 75 mg 4 times a day which is providing moderate pain relief without side effects  Pain Contract Signed: 1/21/19, Last Urine Drug Screen: 9/16/19; Last Pill count: 9/16/19    I have personally reviewed and/or updated the patient's past medical history, past surgical history, family history, social history, current medications, allergies, and vital signs today       Review of Systems   Musculoskeletal:        Joint stiffness       Patient Active Problem List   Diagnosis    Esophageal reflux    Hiatal hernia    Hyperlipidemia    Pain syndrome, chronic    Sacroiliitis (HCC)    Irritability and anger    Vitamin B12 deficiency    Seasonal allergic rhinitis due to pollen    Gastroesophageal reflux disease    Costochondritis    Intervertebral disc disorder with radiculopathy of lumbosacral region    Malignant neoplasm of upper-outer quadrant of left breast in female, estrogen receptor positive ([facility] [address] )       Past Medical History:   Diagnosis Date    Anxiety     BRCA gene mutation negative 06/20/2019    [facility]    [facility])     breast     Fibrocystic disease of breast     GERD (gastroesophageal reflux disease)     Hiatal hernia     Hyperlipidemia        Past Surgical History:   Procedure Laterality Date    APPENDECTOMY      BREAST BIOPSY      BREAST CYST ASPIRATION Left 2005    COLONOSCOPY      FOOT SURGERY Right 2011    right, hardware removal    MASTECTOMY W/ SENTINEL NODE BIOPSY Left 8/9/2019    Procedure: BREAST MASTECTOMY, SENTINEL LYMPH NODE BIOPSY, LYMPHATIC MAPPING W/ BLUE DYE AND RADIOACTIVE DYE (INJECT AT 0730 BY DR WHITEHEAD IN THE O R );  Surgeon: Saniya Mejia MD;  [facility];  Service: Surgical Oncology    OTHER SURGICAL HISTORY Right 2010    Complete Excision of Fifth Metatarsal Head     OR COLONOSCOPY FLX DX W/COLLJ SPEC WHEN PFRMD N/A 7/20/2018    Procedure: EGD AND COLONOSCOPY;  Surgeon: Jeannette Holland MD;  Location: [facility]; Service: Gastroenterology    OR MASTECTOMY, SIMPLE, COMPLETE Right 8/9/2019    Procedure: BREAST SIMPLE MASTECTOMY;  Surgeon: Saniya Mejia MD;  [facility];  Service: Surgical Oncology    US GUIDANCE BREAST BIOPSY LEFT EACH ADDITIONAL Left 6/13/2019    US GUIDED BREAST BIOPSY LEFT COMPLETE Left 6/13/2019       Family History   Problem Relation Age of Onset    Coronary artery disease Mother         CABG in her 62s    Other Mother         Dyslipidemia    Hypertension Mother     Heart attack Father 46        acute myocardial infarction    Other Father         Dyslipidemia    Hypertension Father     Prostate cancer Paternal Uncle 72    Thyroid disease Family         disorder    No Known Problems Maternal Aunt     No Known Problems Maternal Aunt        Social History     Occupational History    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, now works [employer]   1 day with GI   Tobacco Use    Smoking status: Never Smoker    Smokeless tobacco: Never Used   Substance and Sexual Activity    Alcohol use: Yes     Frequency: 2-3 times a week     Drinks per session: 1 or 2    Drug use: No    Sexual activity: Not on file     Comment: Partner had vasectomy       Current Outpatient Medications on File Prior to Visit   Medication Sig    acetaminophen (TYLENOL) 500 mg tablet Take 1,000 mg by mouth every 6 (six) hours as needed for mild pain    cholecalciferol (VITAMIN D3) 1,000 units tablet Take 1,000 Units by mouth daily    cyanocobalamin (VITAMIN B-12) 1,000 mcg tablet Take 1 tablet (1,000 mcg total) by mouth daily    escitalopram (LEXAPRO) 5 mg tablet Take 1 tablet (5 mg total) by mouth daily    fluticasone (FLONASE) 50 mcg/act nasal spray 1 spray into each nostril as needed     Naproxen Sodium (ALEVE) 220 MG CAPS Take by mouth 2 (two) times a day as needed    omeprazole (PriLOSEC) 20 mg delayed release capsule Take 1 capsule (20 mg total) by mouth daily    simvastatin (ZOCOR) 10 mg tablet TAKE 1 TABLET DAILY    [DISCONTINUED] gabapentin (NEURONTIN) 300 mg capsule Take 300 mg by mouth 3 (three) times a day    [DISCONTINUED] tapentadol (NUCYNTA) 75 mg tablet Take 1 tab PO Q 6 hours prn pain  2nd month script  Do not fill until 8/13/19     Current Facility-Administered Medications on File Prior to Visit   Medication    [COMPLETED] gadobutrol injection (MULTI-DOSE) SOLN 0 2 mL    [COMPLETED] iohexol (OMNIPAQUE) 300 mg/mL injection 100 mL    [COMPLETED] lidocaine (PF) (XYLOCAINE-MPF) 1 % injection 10 mL    [COMPLETED] sodium chloride (PF) 0 9 % injection 50 mL       Allergies   Allergen Reactions    Sulfa Antibiotics Headache     Annotation - [id number]: Migraine; Annotation - [id number]: cellular issues       Physical Exam:    /78   Pulse 67   Temp 99 °F (37 2 °C) (Oral)   Ht 5' 4" (1 626 m)   Wt 64 9 kg (143 lb)   BMI 24 55 kg/m²     Constitutional:normal, well developed, well nourished, alert, in no distress and non-toxic and no overt pain behavior    Eyes:anicteric  HEENT:grossly intact  Neck:supple, symmetric, trachea midline and no masses   Pulmonary:even and unlabored  Cardiovascular:No edema or pitting edema present  Skin:Normal without rashes or lesions and well hydrated  Psychiatric:Mood and affect appropriate  Neurologic:Cranial Nerves II-XII grossly intact  Musculoskeletal:normal  No tenderness right trochanteric bursa    Imaging  MRI LUMBAR SPINE WITHOUT CONTRAST 2/16/19     INDICATION: M54 16: Radiculopathy, lumbar region      COMPARISON:  January 5, 2016     TECHNIQUE:  Sagittal T1, sagittal T2, sagittal inversion recovery, axial T1 and axial T2, coronal T2        IMAGE QUALITY:  Diagnostic     FINDINGS:     VERTEBRAL BODIES:  Normal alignment of the lumbar spine   No spondylolysis or spondylolisthesis  No scoliosis   No compression fracture     Normal marrow signal is identified within the visualized bony structures   No discrete marrow lesion      SACRUM:  Normal signal within the sacrum   No evidence of insufficiency or stress fracture      DISTAL CORD AND CONUS:  Normal size and signal within the distal cord and conus        PARASPINAL SOFT TISSUES:  Paraspinal soft tissues are unremarkable      LOWER THORACIC DISC SPACES:  Normal disc height and signal   No disc herniation, canal stenosis or foraminal narrowing      LUMBAR DISC SPACES:       Perineural cysts are identified at L5-S1 as well as within the sacrum      L1-L2:  Normal      L2-L3:  Normal      L3-L4:  Normal      L4-L5:  Normal      L5-S1:  Normal      IMPRESSION:     No significant central or foraminal narrowing   Multiple perineural cysts are again noted including L5-S1 as well as the sacrum which are typically of no clinical significance

## 2019-09-16 NOTE — PATIENT INSTRUCTIONS
Opioid Pain Management   AMBULATORY CARE:   An opioid  is a type of medicine used to treat pain  Examples of opioids are oxycodone, morphine, fentanyl, or codeine  Take opioid medicines as directed, for the condition it is prescribed:  Common problems that may occur when you do not take opioid medicines as directed include the following:  · Health problems  may occur  You may have trouble breathing  You may also develop liver or kidney damage, or stomach bleeding  Any of these health problems can become life-threatening  · Opioid dependence  means your body needs the opioid medicine to keep it from going through withdrawal      · Opioid tolerance  means the opioid does not control pain as well as it used to  You need higher doses of the opioid to get pain relief  · Opioid addiction  means you are not able to control the use of the opioid medicine  You use it when you do not have pain  You crave the opioid medicine  Call 911 or have someone call 911 for any of the following:   · You are breathing slower than normal, or you have trouble breathing  · You cannot be awakened  · You have a seizure  Seek care immediately if:   · Your heart is beating slower than usual     · Your heart feels like it is jumping or fluttering  · You are so sleepy that you cannot stay awake  · You have severe muscle pain or weakness  · You see or hear things that are not real   Contact your healthcare provider if:   · You are too dizzy to stand up  · Your pain gets worse or you have new pain  · You cannot do your usual activities because of side effects from the opioid  · You are constipated or have abdominal pain  · You have questions or concerns about your condition or care  Opioid safety measures:   · Take your medicine as directed  Ask if you need more information on how to take your medicine correctly  Follow up with your healthcare provider regularly  You may need to have your dose adjusted   Do not use opioid medicine if you are pregnant or breastfeeding  · Give your healthcare provider a list of all your medicines  Include any over-the-counter medicines, vitamins, and herbs  It can be dangerous to take opioids with certain other medicines, such as antihistamines  · Keep opioid medicine in a safe place  Store your opioid medicine in a locked cabinet to keep it away from children and others  · Do not drink alcohol while you use opioids  Alcohol use with an opioid medicine can make you sleepy and slow your breathing rate  You may stop breathing completely  · Do not drive or operate heavy machinery after you take opioid medicine  Opioid medicine can make you drowsy and make it hard to concentrate  You may injure yourself or others if you drive or operate heavy machinery while taking your medicine  · Drink fluids and eat high-fiber foods  Fluids and fiber will help prevent constipation  Ask your healthcare provider what fluids are right for you and how much you should drink  Also ask for a list of foods that contain fiber  Follow up with your healthcare provider as directed: You may need to have your dose adjusted  You may be referred to a pain specialist  Write down your questions so you remember to ask them during your visits  © 2017 [address] [employer] Information is for End User's use only and may not be sold, redistributed or otherwise used for commercial purposes  All illustrations and images included in CareNotes® are the copyrighted property of [employer]  or Shravan Vazquez  The above information is an  only  It is not intended as medical advice for individual conditions or treatments  Talk to your doctor, nurse or pharmacist before following any medical regimen to see if it is safe and effective for you

## 2019-09-17 ENCOUNTER — TELEPHONE (OUTPATIENT)
Dept: PAIN MEDICINE | Facility: CLINIC | Age: 54
End: 2019-09-17

## 2019-09-17 DIAGNOSIS — S73.191A TEAR OF RIGHT ACETABULAR LABRUM, INITIAL ENCOUNTER: Primary | ICD-10-CM

## 2019-09-17 NOTE — TELEPHONE ENCOUNTER
Spoke with patient, returning call to [address] [place] [address] Road  Call back # [phone]

## 2019-09-17 NOTE — TELEPHONE ENCOUNTER
Left voicemail to go over MRI arthrogram results which shows large labral tear  Also right trochanteric bursitis  Would recommend consultation with Dr Tripp Carolina

## 2019-09-17 NOTE — TELEPHONE ENCOUNTER
S/w pt, informed her of MRI arthrogram results  Pt would like to proceed with referral to Dr Rios Guido, please place order      **pt is going to call tomorrow to schedule, not need to call back once referral is placed

## 2019-09-19 ENCOUNTER — TRANSCRIBE ORDERS (OUTPATIENT)
Dept: ADMINISTRATIVE | Facility: HOSPITAL | Age: 54
End: 2019-09-19

## 2019-09-19 DIAGNOSIS — C50.919 MALIGNANT NEOPLASM OF FEMALE BREAST, UNSPECIFIED ESTROGEN RECEPTOR STATUS, UNSPECIFIED LATERALITY, UNSPECIFIED SITE OF BREAST (HCC): Primary | ICD-10-CM

## 2019-09-20 ENCOUNTER — DOCUMENTATION (OUTPATIENT)
Dept: HEMATOLOGY ONCOLOGY | Facility: CLINIC | Age: 54
End: 2019-09-20

## 2019-09-20 ENCOUNTER — OFFICE VISIT (OUTPATIENT)
Dept: HEMATOLOGY ONCOLOGY | Facility: CLINIC | Age: 54
End: 2019-09-20
Payer: COMMERCIAL

## 2019-09-20 VITALS
HEART RATE: 61 BPM | RESPIRATION RATE: 18 BRPM | TEMPERATURE: 97.3 F | BODY MASS INDEX: 23.9 KG/M2 | SYSTOLIC BLOOD PRESSURE: 126 MMHG | DIASTOLIC BLOOD PRESSURE: 78 MMHG | WEIGHT: 140 LBS | HEIGHT: 64 IN | OXYGEN SATURATION: 98 %

## 2019-09-20 DIAGNOSIS — L65.9 HAIR LOSS: Primary | ICD-10-CM

## 2019-09-20 DIAGNOSIS — C50.412 MALIGNANT NEOPLASM OF UPPER-OUTER QUADRANT OF LEFT BREAST IN FEMALE, ESTROGEN RECEPTOR POSITIVE (HCC): Primary | ICD-10-CM

## 2019-09-20 DIAGNOSIS — Z17.0 MALIGNANT NEOPLASM OF UPPER-OUTER QUADRANT OF LEFT BREAST IN FEMALE, ESTROGEN RECEPTOR POSITIVE (HCC): Primary | ICD-10-CM

## 2019-09-20 PROCEDURE — 99245 OFF/OP CONSLTJ NEW/EST HI 55: CPT | Performed by: INTERNAL MEDICINE

## 2019-09-20 RX ORDER — SODIUM CHLORIDE 9 MG/ML
20 INJECTION, SOLUTION INTRAVENOUS ONCE
Status: CANCELLED | OUTPATIENT
Start: 2019-10-25

## 2019-09-20 RX ORDER — ONDANSETRON 4 MG/1
4 TABLET, FILM COATED ORAL EVERY 8 HOURS PRN
Qty: 30 TABLET | Refills: 1 | Status: SHIPPED | OUTPATIENT
Start: 2019-09-20 | End: 2020-02-14 | Stop reason: ALTCHOICE

## 2019-09-20 RX ORDER — SODIUM CHLORIDE 9 MG/ML
20 INJECTION, SOLUTION INTRAVENOUS ONCE
Status: CANCELLED | OUTPATIENT
Start: 2019-10-04

## 2019-09-20 NOTE — PROGRESS NOTES
Hematology / Oncology Outpatient Consult Note    Mishel Motta 48 y o  female [id number]         Date:  9/20/2019    Assessment / Plan:  A 53-year-old premenopausal woman with newly diagnosed stage IIA right breast cancer, grade 2, ER 90% positive, WA 90% positive, HER2 negative disease  She is negative for BRCA gene mutation  She underwent right mastectomy and sentinel lymph node biopsy as well as left prophylactic mastectomy, resulting in ERIK  Her tumor was found to be high risk based on a MammaPrint  She presents today with her  to discuss adjuvant treatment options  We had extensive discussion regarding the diagnosis, tumor phenotype, staging information, implication of MammaPrint result, prognosis and treatment options  Based on the size of tumor, MammaPrint result, I think it is most appropriate to treat her with adjuvant chemotherapy  I recommended her Taxotere and cyclophosphamide every 3 weeks x4 cycles  Side effects of this regimen was thoroughly discussed, including but not limited to alopecia, nausea, vomiting, neutropenia, risk of infection, allergic reaction, peripheral edema as well as risk of permanent menopause  She understood and wished to proceed  I do not recommend primary prevention for neutropenic fever with Neulasta shot since her risk of neutropenic fever is quite small  She understood this  She is going to have 1st cycle treatment in October 4, 2019 at [facility]  I will see her again prior to the 2nd cycle treatment  She was instructed to give us a call immediately if she has fever above 100 4  All the patient and her 's questions were answered to their satisfaction  Subjective:     HPI:  A 48years old premenopausal woman who was found to have abnormality in her right breast, based on a screening mammography  Therefore, she underwent right breast biopsy in June 13, 2019 which showed invasive ductal carcinoma, grade 2    This was ER 90 % positive, MA 90 % positive, HER2 negative disease  She was referred to Dr Isabelle Cheek  She was negative for BRCA gene mutation  Because of the multifocal disease, she underwent right mastectomy and sentinel lymph node biopsy as well as left prophylactic mastectomy in August 9, 2019  Left breast tissue did not show any malignancy  She had a 2 3 as well as 2 4 cm of invasive ductal carcinoma, grade 2  Surgical margin was negative  2 sentinel lymph nodes were negative for metastatic disease  She did not have reconstruction  She presents today to discuss adjuvant treatment options  Dr Isabelle Cheek sent her tumor tissue for MammaPrint which came back as high risk disease  She feels well  She has no complaint of pain  Her weight is stable  She has no respiratory symptoms  She has no significant past medical history except chronic low back pain  She is a lifetime never smoker  She has no strong family history of breast cancer  Her performance status is normal           Interval History:          Objective:     Primary Diagnosis:    1  Right breast cancer, stage IIA (pT2, pN0, M0) grade 2, ER 90% positive, MA 90% positive, HER2 negative disease  MammaPrint high risk  Diagnosed in August 2019    2  BRCA gene mutation negative  Cancer Staging:  Cancer Staging  Malignant neoplasm of upper-outer quadrant of left breast in female, estrogen receptor positive ([facility] [address] )  Staging form: Breast, AJCC 8th Edition  - Pathologic: Stage IA (pT2(2), pN0(sn), cM0, G2, ER+, MA+, HER2-) - Unsigned  Neoadjuvant therapy: No  Laterality: Left  Tumor size (mm): 24  Multiple tumors: Yes  Number of tumors: 2  Method of lymph node assessment: [place] lymph node biopsy  Histologic grading system: 3 grade system        Previous Hematologic/ Oncologic Treatment:         Current Hematologic/ Oncologic Treatment:      Adjuvant chemotherapy with Taxotere and cyclophosphamide x4 cycle  1st cycle to be started in October 4, 2019  Disease Status:     ERIK status post right mastectomy and sentinel lymph node biopsy as well as left prophylactic mastectomy  Test Results:    Pathology:    2 3 and 2 4 cm of invasive ductal carcinoma, grade 2  2 sentinel lymph nodes were negative for metastatic disease  ER 90% positive, LA 90% positive, HER2 negative disease  MammaPrint high risk  Stage IIA (pT2, pN0, M0)      Radiology:    Chest x-ray was negative for pulmonary disease  Laboratory:    See below  Physical Exam:      General Appearance:    Alert, oriented        Eyes:    PERRL   Ears:    Normal external ear canals, both ears   Nose:   Nares normal, septum midline   Throat:   Mucosa moist  Pharynx without injection  Neck:   Supple       Lungs:     Clear to auscultation bilaterally   Chest Wall:    No tenderness or deformity    Heart:    Regular rate and rhythm       Abdomen:     Soft, non-tender, bowel sounds +, no organomegaly           Extremities:   Extremities no cyanosis or edema       Skin:   no rash or icterus  Lymph nodes:   Cervical, supraclavicular, and axillary nodes normal   Neurologic:   CNII-XII intact, normal strength, sensation and reflexes     Throughout          Breast exam:   Status post bilateral mastectomy without palpable chest wall abnormalities  ROS: Review of Systems   All other systems reviewed and are negative  Imaging: Fl Arthrogram Hip Right    Result Date: 9/17/2019  Narrative: RIGHT HIP ARTHROGRAM INDICATION: M25 551: Pain in right hip  patient presents with prescription for right hip arthrogram pre-MRI  COMPARISON:  None  FLUOROSCOPY TIME:    1 mft IMAGES:  1 FINDINGS: After the risks and benefits of the procedure were thoroughly explained, informed consent was obtained  The patient verbalized expressed understanding of the above risks and wished to proceed with the procedure  Final standard "time-out" procedure performed   The patient was prepped and draped in the usual sterile fashion  3 mL of 1% lidocaine solution was utilized for local anesthesia  Intermittent fluoroscopy was utilized for placement of a 20 gauge 3 5 inch spinal needle within the right hip joint  14 mL of a contrast cocktail consisting of 1/5 Omnipaque 300 mixed with 4/5 of 0 2 ml Gadavist/50ml Normal Saline was injected into the joint  2 mL of 1% Xylocaine was also injected into the joint  The patient tolerated the procedure well  There were no complications  I asked the patient to call us with any questions, concerns, or acute problems  The patient expressed understanding of the above  The patient will report for MR imaging of the right hip  Before the procedure, patient reports 8 out of 10 pain  Immediately after the injection, patient reports 1 out of 10 pain  Impression: Successful hip arthrogram with gadolinium injection into the right hip joint  MRI of the hip is currently pending  With intraarticular injection of local anesthetic, patient reports significant improvement in typical pain  Procedure was performed by COMPA Lake PA-C under the direct supervision of Dr Nick Marquis  Workstation performed: [id number]     Mri Arthrogram Right Hip    Result Date: 9/16/2019  Narrative: MRI ARTHROGRAM RIGHT HIP INDICATION:   M25 551: Pain in right hip  COMPARISON: Right foot plain films from 7/22/2019  Pelvic ultrasound from 1/23/2013  Abdomen and pelvic CT from 1/22/2013  TECHNIQUE:  MR sequences were obtained of the right hip and pelvis including: Localizer, coronal pelvis T1, coronal pelvis T2 fat sat  Smaller field of view sequences of the affected hip were obtained with axial oblique T1 fat sat, axial oblique T2 fat sat, coronal T1 fat sat, sagittal T2 fat sat, sagittal T1 fat sat  Images were acquired after intra-articular injection of gadolinium utilizing direct MR arthrography technique   FINDINGS: RIGHT HIP: -JOINT EFFUSION: There is good distention of the right hip joint with injected contrast  -BONES: Normal marrow signal demonstrated without hip fracture or AVN  -ARTICULAR SURFACES: There is a large posterior superior right acetabular labral tear (series 6 image 6 and series 7 images 9 through 12 ) -ACETABULAR LABRUM: Intact  -TROCHANTERIC BURSA: There is mild right trochanteric bursitis (series 2 image 12 ) LEFT HIP: (please note dedicated small field of view images were not made of the left hip joint limiting its evaluation ) -JOINT EFFUSION: None  -BONES: Normal marrow signal demonstrated without hip fracture or AVN  -ARTICULAR SURFACES: There is no osteoarthritis  -ACETABULAR LABRUM: No gross abnormalities although evaluation is very limited  -TROCHANTERIC BURSA: Normal  REST OF PELVIS: -BONES: Normal  -SI JOINTS AND SYMPHYSIS PUBIS:  Intact  -VISUALIZED LUMBAR SPINE:  unremarkable  -MUSCULATURE: Intact with intact hamstring origins bilaterally   -PELVIC SOFT TISSUES: There is a left fundal subserosal fibroid measuring 3 4 x 3 2 cm (series 2 image 14 ) SUBCUTANEOUS TISSUES: Normal     Impression: There is a large posterior superior right acetabular labral tear (series 6 image 6 and series 7 images 9 through 12 ) There is mild right trochanteric bursitis (series 2 image 12 ) Workstation performed: [id number]         Labs:   Lab Results   Component Value Date    WBC 6 63 08/10/2019    HGB 12 8 08/10/2019    HCT 38 5 08/10/2019    MCV 96 08/10/2019     08/10/2019     Lab Results   Component Value Date     02/05/2015    K 4 9 08/10/2019     08/10/2019    CO2 29 08/10/2019    ANIONGAP 6 02/05/2015    BUN 9 08/10/2019    CREATININE 0 78 08/10/2019    GLUCOSE 86 02/05/2015    GLUF 83 10/27/2018    CALCIUM 8 8 08/10/2019    AST 10 07/23/2019    ALT 23 07/23/2019    ALKPHOS 47 07/23/2019    PROT 7 4 02/05/2015    BILITOT 0 69 02/05/2015    EGFR 88 08/10/2019         Lab Results   Component Value Date    NLTWJMDW44 889 10/27/2018         Vital Sign:    Body surface area is 1 68 meters squared      Wt Readings from Last 3 Encounters:   09/20/19 63 5 kg (140 lb)   09/16/19 64 9 kg (143 lb)   08/26/19 64 9 kg (143 lb)        Temp Readings from Last 3 Encounters:   09/20/19 (!) 97 3 °F (36 3 °C) (Tympanic Core)   09/16/19 99 °F (37 2 °C) (Oral)   08/26/19 98 7 °F (37 1 °C)        BP Readings from Last 3 Encounters:   09/20/19 126/78   09/16/19 118/78   08/26/19 118/70         Pulse Readings from Last 3 Encounters:   09/20/19 61   09/16/19 67   08/26/19 66     @LASTSAO2(3)@    Active Problems:   Patient Active Problem List   Diagnosis    Esophageal reflux    Hiatal hernia    Hyperlipidemia    Pain syndrome, chronic    Sacroiliitis (HCC)    Irritability and anger    Vitamin B12 deficiency    Seasonal allergic rhinitis due to pollen    Gastroesophageal reflux disease    Costochondritis    Intervertebral disc disorder with radiculopathy of lumbosacral region    Malignant neoplasm of upper-outer quadrant of left breast in female, estrogen receptor positive ([facility] [address] )       Past Medical History:   Past Medical History:   Diagnosis Date    Anxiety     BRCA gene mutation negative 06/20/2019    [employer]    Cancer ([facility] [address] )     breast     Fibrocystic disease of breast     GERD (gastroesophageal reflux disease)     Hiatal hernia     Hyperlipidemia        Surgical History:   Past Surgical History:   Procedure Laterality Date    APPENDECTOMY      BREAST BIOPSY      BREAST CYST ASPIRATION Left 2005    COLONOSCOPY      FOOT SURGERY Right 2011    right, hardware removal    MASTECTOMY W/ SENTINEL NODE BIOPSY Left 8/9/2019    Procedure: BREAST MASTECTOMY, SENTINEL LYMPH NODE BIOPSY, LYMPHATIC MAPPING W/ BLUE DYE AND RADIOACTIVE DYE (INJECT AT 0730 BY DR WHITEHEAD IN THE O R );  Surgeon: Juno Campuzano MD;  [facility];  Service: Surgical Oncology    OTHER SURGICAL HISTORY Right 2010    Complete Excision of Fifth Metatarsal Head     AK COLONOSCOPY FLX DX W/COLLJ SPEC WHEN PFRMD N/A 7/20/2018 Procedure: EGD AND COLONOSCOPY;  Surgeon: Kiersten Allen MD;  Location: [facility] GI LAB; Service: Gastroenterology    CO MASTECTOMY, SIMPLE, COMPLETE Right 8/9/2019    Procedure: BREAST SIMPLE MASTECTOMY;  Surgeon: Leela Hopson MD;  [facility];  Service: Surgical Oncology    US GUIDANCE BREAST BIOPSY LEFT EACH ADDITIONAL Left 6/13/2019    US GUIDED BREAST BIOPSY LEFT COMPLETE Left 6/13/2019       Family History:    Family History   Problem Relation Age of Onset    Coronary artery disease Mother         CABG in her 62s   [place] Other Mother         Dyslipidemia    Hypertension Mother     Heart attack Father 46        acute myocardial infarction    Other Father         Dyslipidemia    Hypertension Father     Prostate cancer Paternal Uncle 72    Thyroid disease Family         disorder    No Known Problems Maternal Aunt     No Known Problems Maternal Aunt        Cancer-related family history includes Prostate cancer (age of onset: 72) in her paternal uncle  Social History:   Social History     Socioeconomic History    Marital status: /Civil Union     Spouse name: Not on file    Number of children: Not on file    Years of education: Not on file    Highest education level: Not on file   Occupational History    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, now works fam business   1 day with GI   Social Needs    Financial resource strain: Not on file    Food insecurity:     Worry: Not on file     Inability: Not on file    Transportation needs:     Medical: Not on file     Non-medical: Not on file   Tobacco Use    Smoking status: Never Smoker    Smokeless tobacco: Never Used   Substance and Sexual Activity    Alcohol use: Yes     Frequency: 2-3 times a week     Drinks per session: 1 or 2    Drug use: No    Sexual activity: Not on file     Comment: Partner had vasectomy   Lifestyle    Physical activity:     Days per week: Not on file     Minutes per session: Not on file    Stress: Not on file   Relationships    Social connections:     Talks on phone: Not on file     Gets together: Not on file     Attends Nondenominational service: Not on file     Active member of club or organization: Not on file     Attends meetings of clubs or organizations: Not on file     Relationship status: Not on file    Intimate partner violence:     Fear of current or ex partner: Not on file     Emotionally abused: Not on file     Physically abused: Not on file     Forced sexual activity: Not on file   Other Topics Concern    Not on file   Social History Narrative    Exercise habits    Working full-time - used to be GI RN       Current Medications:   Current Outpatient Medications   Medication Sig Dispense Refill    acetaminophen (TYLENOL) 500 mg tablet Take 1,000 mg by mouth every 6 (six) hours as needed for mild pain      cholecalciferol (VITAMIN D3) 1,000 units tablet Take 1,000 Units by mouth daily      escitalopram (LEXAPRO) 5 mg tablet Take 1 tablet (5 mg total) by mouth daily 90 tablet 1    fluticasone (FLONASE) 50 mcg/act nasal spray 1 spray into each nostril as needed       gabapentin (NEURONTIN) 300 mg capsule Take 1 capsule (300 mg total) by mouth 2 (two) times a day 60 capsule 5    omeprazole (PriLOSEC) 20 mg delayed release capsule Take 1 capsule (20 mg total) by mouth daily 90 capsule 0    simvastatin (ZOCOR) 10 mg tablet TAKE 1 TABLET DAILY 90 tablet 1    tapentadol (NUCYNTA) 75 mg tablet Take 1 tab PO Q 6 hours prn pain  2nd month script Do not fill until 10/15/19 120 tablet 0     No current facility-administered medications for this visit  Allergies: Allergies   Allergen Reactions    Sulfa Antibiotics Headache     Annotation - [id number]: Migraine;  Annotation - [id number]: cellular issues

## 2019-09-20 NOTE — PROGRESS NOTES
Met with patient and her  to discuss her chemotherapy regimen  She will have Taxotere and cyclophosphamide every 3 weeks x4 cycles  Side effects of this regimen were  thoroughly discussed by Dr Edda England, including but not limited to alopecia, nausea, vomiting, neutropenia, risk of infection, allergic reaction, peripheral edema as well as risk of permanent menopause  Dr Edda England does not recommend primary prevention for neutropenic fever with Neulasta shot since her risk of neutropenic fever is quite small  Consent signed  She started treatment 10/4 at the [facility] infusion center  She was instructed to give us a call immediately if she has fever above 100 4  Chemotherapy and you booklet, Nutritional booklet, and medication information printed out and given to patient  Will let Paula Parker RN know about this plan and ask that she reach out to the patient

## 2019-09-23 ENCOUNTER — TELEPHONE (OUTPATIENT)
Dept: RADIOLOGY | Facility: HOSPITAL | Age: 54
End: 2019-09-23

## 2019-09-23 RX ORDER — CEFAZOLIN SODIUM 1 G/50ML
1000 SOLUTION INTRAVENOUS ONCE
Status: CANCELLED | OUTPATIENT
Start: 2019-09-23 | End: 2019-09-23

## 2019-09-23 RX ORDER — SODIUM CHLORIDE 9 MG/ML
75 INJECTION, SOLUTION INTRAVENOUS CONTINUOUS
Status: CANCELLED | OUTPATIENT
Start: 2019-09-23

## 2019-09-23 NOTE — PROGRESS NOTES
Reviewed pre procedure instructions for port placement with patient  Patient is requesting this procedure to be down with only local numbing and no IV sedation  Patient to be NPO and with a ride home in the event that she does require sedation intra procedure  IR phone number given in event that patient has any questions

## 2019-09-26 ENCOUNTER — TELEPHONE (OUTPATIENT)
Dept: SURGERY | Facility: HOSPITAL | Age: 54
End: 2019-09-26

## 2019-09-26 ENCOUNTER — TELEPHONE (OUTPATIENT)
Dept: PALLIATIVE MEDICINE | Facility: CLINIC | Age: 54
End: 2019-09-26

## 2019-09-26 ENCOUNTER — TELEPHONE (OUTPATIENT)
Dept: GASTROENTEROLOGY | Facility: HOSPITAL | Age: 54
End: 2019-09-26

## 2019-09-26 NOTE — TELEPHONE ENCOUNTER
----- Message from Gregoria Pickard, [address] [facility] Akila Whitehead sent at 9/26/2019  8:55 AM EDT -----  Contact: [phone]  Pt expressed interest in [facility] program  Has already registered online  Please call to screen      Ref:Dr Chio Machado

## 2019-09-27 ENCOUNTER — TELEPHONE (OUTPATIENT)
Dept: INFUSION CENTER | Facility: CLINIC | Age: 54
End: 2019-09-27

## 2019-09-27 ENCOUNTER — HOSPITAL ENCOUNTER (OUTPATIENT)
Dept: RADIOLOGY | Facility: HOSPITAL | Age: 54
Discharge: HOME | End: 2019-09-27
Payer: COMMERCIAL

## 2019-09-27 VITALS
HEART RATE: 65 BPM | SYSTOLIC BLOOD PRESSURE: 139 MMHG | DIASTOLIC BLOOD PRESSURE: 66 MMHG | HEIGHT: 64 IN | TEMPERATURE: 98.4 F | BODY MASS INDEX: 24.41 KG/M2 | RESPIRATION RATE: 20 BRPM | WEIGHT: 143 LBS | OXYGEN SATURATION: 99 %

## 2019-09-27 DIAGNOSIS — C50.919 BREAST CANCER IN FEMALE (HCC): ICD-10-CM

## 2019-09-27 PROCEDURE — 77001 FLUOROGUIDE FOR VEIN DEVICE: CPT | Performed by: RADIOLOGY

## 2019-09-27 PROCEDURE — 36561 INSERT TUNNELED CV CATH: CPT

## 2019-09-27 PROCEDURE — 76937 US GUIDE VASCULAR ACCESS: CPT | Performed by: RADIOLOGY

## 2019-09-27 PROCEDURE — 77001 FLUOROGUIDE FOR VEIN DEVICE: CPT

## 2019-09-27 PROCEDURE — C1788 PORT, INDWELLING, IMP: HCPCS

## 2019-09-27 PROCEDURE — 76937 US GUIDE VASCULAR ACCESS: CPT

## 2019-09-27 PROCEDURE — 36561 INSERT TUNNELED CV CATH: CPT | Performed by: RADIOLOGY

## 2019-09-27 RX ORDER — CEFAZOLIN SODIUM 1 G/50ML
1000 SOLUTION INTRAVENOUS ONCE
Status: DISCONTINUED | OUTPATIENT
Start: 2019-09-27 | End: 2019-09-28 | Stop reason: HOSPADM

## 2019-09-27 RX ORDER — SODIUM CHLORIDE 9 MG/ML
75 INJECTION, SOLUTION INTRAVENOUS CONTINUOUS
Status: DISCONTINUED | OUTPATIENT
Start: 2019-09-27 | End: 2019-09-28 | Stop reason: HOSPADM

## 2019-09-27 NOTE — BRIEF OP NOTE (RAD/CATH)
IR PORT PLACEMENT  Procedure Note    PATIENT NAME: Dian Ash  : 1965  MRN: [id number]     Pre-op Diagnosis:   1  Breast cancer in female [facility])      Post-op Diagnosis:   1  Breast cancer in female [facility])        Surgeon:   Xavi Simmons MD  Assistants:     No qualified resident was available, Resident is only observing    Estimated Blood Loss: minimal  Findings: Successful port placement  May use immediately      Specimens: none    Complications:  None immediate    Anesthesia: Local    Xavi Simmons MD     Date: 2019  Time: 10:54 AM

## 2019-09-27 NOTE — TELEPHONE ENCOUNTER
The hope line transferred this call to the infusion center  Brandie Monroe [birth year] called to cancel all her chemo appointments  She stated that she has chosen to go some where else  I asked if she spoke with Dr Risa Taylor office and she stated that she did  Please confirm that I may cancel these appointments

## 2019-09-27 NOTE — DISCHARGE INSTRUCTIONS
Implanted Venous Access Port     WHAT YOU NEED TO KNOW:   An implanted venous access port is a device used to give treatments and take blood  It may also be called a central venous access device (CVAD)  The port is a small container that is placed under your skin, usually in your upper chest  The port is attached to a catheter that enters a large vein  DISCHARGE INSTRUCTIONS:   Resume your normal diet  Small sips of flat soda will help with mild nausea  Prevent an infection:   · Wash your hands often  Use soap and water  Clean your hands before and after you care for your port  Remind everyone who cares for your port to wash their hands  · Check your skin for infection every day  Look for redness, swelling, or fluid oozing from the port site  Care for your port:   1  You may shower beginning 48 hours after procedure  2  Change dressing if it becomes wet  3  Remove dressing after 24 hours  Leave glue in place  4  It is normal for some bruising to occur  5  Use Tylenol for pain  6  Limit use of arm on the side that your port was placed  Lift nothing heavier than 5 pounds for 1 week, and then gradually increase activity as tolerated  7  DO NOT apply ointment, lotion or cream to port site until incision is healed  Allow glue to fall off  DO NOT attempt to peel glue from skin even it it begins to flake  8, After the port incision is healed you may swim, bathe  Notify the Interventional Radiologist if you have any of the followin  Fever above 101 F    2  Increased redness or swelling after 1st day  3  Increased pain after 1st day  4  Any sign of infection (drainage from port site, skin separation, hot to touch)  5  Persistent nausea or vomiting  Contact Interventional Radiology at [phone] [facility] PATIENTS: Contact Interventional Radiology at [phone]) ([facility] [address]: Contact Interventional Radiology at [phone])

## 2019-09-30 ENCOUNTER — HOSPITAL ENCOUNTER (OUTPATIENT)
Dept: NON INVASIVE DIAGNOSTICS | Facility: CLINIC | Age: 54
Discharge: HOME | End: 2019-09-30
Payer: COMMERCIAL

## 2019-09-30 ENCOUNTER — TELEPHONE (OUTPATIENT)
Dept: PALLIATIVE MEDICINE | Facility: CLINIC | Age: 54
End: 2019-09-30

## 2019-09-30 DIAGNOSIS — C50.919 MALIGNANT NEOPLASM OF FEMALE BREAST, UNSPECIFIED ESTROGEN RECEPTOR STATUS, UNSPECIFIED LATERALITY, UNSPECIFIED SITE OF BREAST (HCC): ICD-10-CM

## 2019-09-30 PROCEDURE — 93306 TTE W/DOPPLER COMPLETE: CPT | Performed by: INTERNAL MEDICINE

## 2019-09-30 PROCEDURE — 93306 TTE W/DOPPLER COMPLETE: CPT

## 2019-09-30 NOTE — TELEPHONE ENCOUNTER
Medical Marijuana Pre-Visit Screening for Palliative & Supportive Care    Referral Source: Self-referral  Diagnosis: Malignant neoplasm of breast  Is the diagnosis an approved serious to start chemo again this month   medical condition as outlined by [place] Act [place]: Yes  History/Symptoms: Nausea with Chemo, pain, sleeplessness  Does the patient's diagnosis fall within the current scope of our Palliative & Supportive Care practice:   Does the patient intend to use MMJ with palliative intent: Yes    Does the patient currently have a signed controlled substances contract with another provider: No  Is the patient a resident of [place] with a valid state ID or 's license: Yes  Has the patient registered on the [address] [place] [address]  website: Yes  [email or web address]     Prior to any scheduled visit, the patient has been informed of the following:  · [facility] providers are knowledgeable about many ways to help people  A visit to discuss MMJ does not mean that the provider agrees that this is the best way to help you and may make other recommendations  · There is an expectation and requirement by the [place] MMJ law for continuity of care if your certification is completed  · You will be expected to sign an informed consent  · A PDMP report will be reviewed before your visit  · This may effect your ability to purchase a handgun  · Medical marijuana products are not covered by insurance  The dispensaries do not accept credit cards  · The certification does not exempt you from any employer based drug screening programs and may effect your ability to participate in federally funded programs  · [facility] does not allow for medical marijuana possession or use at any of it's inpatient facilities  If it is brought it you will be asked to send it home with a designated representative (friend or family member)    If not one is available to take the product(s) home they will be stored in a secure location until you are discharged    · Please spend time becoming familiar with the information on the website before your visit: Eloy baca    Date of scheduled visit: 10/7/19

## 2019-10-02 ENCOUNTER — OFFICE VISIT (OUTPATIENT)
Dept: OBGYN CLINIC | Facility: OTHER | Age: 54
End: 2019-10-02
Payer: COMMERCIAL

## 2019-10-02 VITALS
HEART RATE: 69 BPM | SYSTOLIC BLOOD PRESSURE: 150 MMHG | WEIGHT: 143 LBS | HEIGHT: 64 IN | BODY MASS INDEX: 24.41 KG/M2 | DIASTOLIC BLOOD PRESSURE: 88 MMHG

## 2019-10-02 DIAGNOSIS — S73.191A TEAR OF RIGHT ACETABULAR LABRUM, INITIAL ENCOUNTER: ICD-10-CM

## 2019-10-02 PROCEDURE — 99203 OFFICE O/P NEW LOW 30 MIN: CPT | Performed by: ORTHOPAEDIC SURGERY

## 2019-10-02 NOTE — PROGRESS NOTES
Orthopaedic Surgery - Office Note  Maria D Dubois (78 y o  female)   : 1965   MRN: [id number]  Encounter Date: 10/2/2019    Chief Complaint   Patient presents with    Right Hip - Pain       Assessment / Plan  Right hip acetabulum labral tear      · we did discuss going to pain management for a steroid injection into her right hip joint for a diagnostic reason  · It was discussed with the patient that if the injection provides her with relief we would like to see her back in the office and if the injection does not provide her with relief that we would need to explore other options due to her exam today not being consistent with a labral tear  Return if symptoms worsen or fail to improve  History of Present Illness  Maria D Dubois is a 48 y o  female who presents with right hip pain that has been ongoing for over 6 years  Patient has been treating in pain management with Dr Abebe Eduardo for SI jt dysfunction and cyst aspirations  Patient states that her pain is localized to her SI jt  She states that her pain never radiates into her anterior hip or groin area  She states that on occasion her pain is so severe that she cannot sleep  She also complains of not being able to sleep on her right side on occasion  She has been prescribed Gabapentin which does help with her sciatic pain  Patient rides bicycles regularly and likes to do this in her spare time  She describes leaning forward while bike riding relieving her pain  Patient does have a surgical history of a double mastectomy earlier this year and starts chemo on Tuesday  She has been in physical therapy without relief of her pain  Review of Systems  Pertinent items are noted in HPI  All other systems were reviewed and are negative  Physical Exam  /88   Pulse 69   Ht 5' 4" (1 626 m)   Wt 64 9 kg (143 lb)   LMP 2019   BMI 24 55 kg/m²   Cons: Appears well  No apparent distress  Psych: Alert  Oriented x3    Mood and affect normal   Eyes: PERRLA, EOMI  Resp: Normal effort  No audible wheezing or stridor  CV: Palpable pulse  No discernable arrhythmia  No LE edema  Lymph:  No palpable cervical, axillary, or inguinal lymphadenopathy  Skin: Warm  No palpable masses  No visible lesions  Neuro: Normal muscle tone  Normal and symmetric DTR's  Right Hip Exam  Alignment / Posture:  Normal resting hip posture  Inspection:  No swelling  Palpation:  SI jt  tenderness  ROM:  Hip Flexion 120  Hip ER 60  Hip IR 30  Strength:  Able to SLR without lag  Stability:  (-) Logroll  Tests:  (+) CAROLINA  (-) FADDIR  (-) Straight leg raise  Neurovascular:  Sensation intact in DP/SP/Rolle/Sa/T nerve distributions  2+ DP & PT pulses  Gait:  Normal     Studies Reviewed  I have personally reviewed pertinent films in PACS and my interpretation is MRI of right hip on 9/16/19 demonstrates anterior superior labral tear  Procedures  No procedures today  Medical, Surgical, Family, and Social History  The patient's medical history, family history, and social history, were reviewed and updated as appropriate      Past Medical History:   Diagnosis Date    Anxiety     BRCA gene mutation negative 06/20/2019    [facility]    Cancer [facility])     breast     Fibrocystic disease of breast     GERD (gastroesophageal reflux disease)     Hiatal hernia     Hyperlipidemia        Past Surgical History:   Procedure Laterality Date    APPENDECTOMY      BREAST BIOPSY      BREAST CYST ASPIRATION Left 2005    COLONOSCOPY      FOOT SURGERY Right 2011    right, hardware removal    IR PORT PLACEMENT  9/27/2019    MASTECTOMY W/ SENTINEL NODE BIOPSY Left 8/9/2019    Procedure: BREAST MASTECTOMY, SENTINEL LYMPH NODE BIOPSY, LYMPHATIC MAPPING W/ BLUE DYE AND RADIOACTIVE DYE (INJECT AT 0730 BY DR WHITEHEAD IN THE O R );  Surgeon: Deandre Nowak MD;  [facility];  Service: Surgical Oncology    OTHER SURGICAL HISTORY Right 2010    Complete Excision of Fifth Metatarsal Head     NM COLONOSCOPY FLX DX W/COLLJ SPEC WHEN PFRMD N/A 7/20/2018    Procedure: EGD AND COLONOSCOPY;  Surgeon: Mariza Reese MD;  Location: [facility] GI LAB; Service: Gastroenterology    NM MASTECTOMY, SIMPLE, COMPLETE Right 8/9/2019    Procedure: BREAST SIMPLE MASTECTOMY;  Surgeon: Annetta Kelley MD;  [facility];  Service: Surgical Oncology    US GUIDANCE BREAST BIOPSY LEFT EACH ADDITIONAL Left 6/13/2019    US GUIDED BREAST BIOPSY LEFT COMPLETE Left 6/13/2019       Family History   Problem Relation Age of Onset    Coronary artery disease Mother         CABG in her 62s    Other Mother         Dyslipidemia    Hypertension Mother     Heart attack Father 46        acute myocardial infarction    Other Father         Dyslipidemia    Hypertension Father     Prostate cancer Paternal Uncle 72    Thyroid disease Family         disorder    No Known Problems Maternal Aunt     No Known Problems Maternal Aunt        Social History     Occupational History    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, 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   Tobacco Use    Smoking status: Never Smoker    Smokeless tobacco: Never Used   Substance and Sexual Activity    Alcohol use: Yes     Frequency: 2-3 times a week     Drinks per session: 1 or 2    Drug use: No    Sexual activity: Not on file     Comment: Partner had vasectomy       Allergies   Allergen Reactions    Sulfa Antibiotics Headache     Annotation - [id number]: Migraine;  Annotation - [id number]: cellular issues         Current Outpatient Medications:     acetaminophen (TYLENOL) 500 mg tablet, Take 1,000 mg by mouth every 6 (six) hours as needed for mild pain, Disp: , Rfl:     cholecalciferol (VITAMIN D3) 1,000 units tablet, Take 1,000 Units by mouth daily, Disp: , Rfl:     CRANIAL PROSTHESIS, RX,, One wig as needed, Disp: 1 Piece, Rfl: 0    escitalopram (LEXAPRO) 5 mg tablet, Take 1 tablet (5 mg total) by mouth daily, Disp: 90 tablet, Rfl: 1    fluticasone (FLONASE) 50 mcg/act nasal spray, 1 spray into each nostril as needed , Disp: , Rfl:     gabapentin (NEURONTIN) 300 mg capsule, Take 1 capsule (300 mg total) by mouth 2 (two) times a day, Disp: 60 capsule, Rfl: 5    omeprazole (PriLOSEC) 20 mg delayed release capsule, Take 1 capsule (20 mg total) by mouth daily, Disp: 90 capsule, Rfl: 0    ondansetron (ZOFRAN) 4 mg tablet, Take 1 tablet (4 mg total) by mouth every 8 (eight) hours as needed for nausea or vomiting, Disp: 30 tablet, Rfl: 1    simvastatin (ZOCOR) 10 mg tablet, TAKE 1 TABLET DAILY, Disp: 90 tablet, Rfl: 1    tapentadol (NUCYNTA) 75 mg tablet, Take 1 tab PO Q 6 hours prn pain   2nd month script Do not fill until 10/15/19, Disp: 120 tablet, Rfl: 0      Scribe Attestation    I,:   Lizbeth Robledo am acting as a scribe while in the presence of the attending physician :        I,:   Simone Romeo MD personally performed the services described in this documentation    as scribed in my presence :

## 2019-10-03 ENCOUNTER — TELEPHONE (OUTPATIENT)
Dept: RADIOLOGY | Facility: CLINIC | Age: 54
End: 2019-10-03

## 2019-10-03 PROBLEM — G47.9 DIFFICULTY SLEEPING: Status: ACTIVE | Noted: 2019-10-03

## 2019-10-03 PROBLEM — R11.0 CHEMOTHERAPY-INDUCED NAUSEA: Status: ACTIVE | Noted: 2019-10-03

## 2019-10-03 PROBLEM — T45.1X5A CHEMOTHERAPY-INDUCED NAUSEA: Status: ACTIVE | Noted: 2019-10-03

## 2019-10-03 NOTE — TELEPHONE ENCOUNTER
Please let patient know that I saw Dr Ran Cunningham recommendation about a right hip injection  I know she will be starting chemo soon so does she want to hold off for now?

## 2019-10-07 ENCOUNTER — LAB REQUISITION (OUTPATIENT)
Dept: LAB | Facility: HOSPITAL | Age: 54
End: 2019-10-07
Payer: COMMERCIAL

## 2019-10-07 DIAGNOSIS — C50.412 MALIGNANT NEOPLASM OF UPPER-OUTER QUADRANT OF LEFT FEMALE BREAST (HCC): ICD-10-CM

## 2019-10-07 LAB — HCG SERPL QL: NEGATIVE

## 2019-10-07 PROCEDURE — 84703 CHORIONIC GONADOTROPIN ASSAY: CPT | Performed by: INTERNAL MEDICINE

## 2019-10-09 DIAGNOSIS — K21.9 GASTROESOPHAGEAL REFLUX DISEASE WITHOUT ESOPHAGITIS: ICD-10-CM

## 2019-10-09 RX ORDER — OMEPRAZOLE 20 MG/1
CAPSULE, DELAYED RELEASE ORAL
Qty: 90 CAPSULE | Refills: 0 | Status: SHIPPED | OUTPATIENT
Start: 2019-10-09 | End: 2020-04-06

## 2019-10-09 NOTE — TELEPHONE ENCOUNTER
ELVIS    S/w pt, she said she started chemo so she would like to hold off on the hip injection  Pt said she spoke with her oncologist about it and they want to wait till her WBC is up

## 2019-10-10 NOTE — PROGRESS NOTES
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    5  Chemotherapy-induced nausea    6  Medical marijuana use      Requested Prescriptions      No prescriptions requested or ordered in this encounter       The patient qualifies for use of MMJ in the state [facility] by having the following medical condition - cancer  From a palliative care stand point the patient is suffering with pain, difficulty sleeping, nausea  These symptoms and side effects might be alleviated with use of MMJ products  The patient registered online  The patient read and I reviewed the informed consent document with the patient  I answered all questions related to it before the patient signed it  The patient's medical certification was completed on this date  The patient was given a signed copy of the informed consent and medical certification  I issued a certification for MMJ use with palliative intent -  To help alleviate cancer related symptoms and cancer treatment related side effects  I do not endorse the belief that MMJ can treat cancer and strongly encouraged the patient to continue treatments and surveillance as recommend by cancer specialists  Patient was informed that use of MMJ products will test positive on UDS screens and that having an MMJ card does not protect her from liabilities that she assumes at work  She reports disclosing her efforts to obtain an MMJ card to her pain management specialist and employer  Representatives have queried the patient's controlled substance dispensing history in the Prescription Drug Monitoring Program in compliance with regulations before I have prescribed any controlled substances    The prescription history is consistent with prescribed therapy and our practice policies  40 minutes were spent face to face with Sarah Velasco with greater than 50% of the time spent in counseling or coordination of care including discussions of risks and benefits of treatment and follow up requirements   All of the patient's questions were answered during this discussion  Return in about 3 months (around 1/14/2020) for symptom assessment and management  Subjective:   Chief Complaint  New consultation for:  Medical marijuana certification  HPI     Sarah Velasco is a 48 y o  female with stage IIA right breast cancer  She was diagnosed in   She underwent right mastectomy and sentinel lymph node biopsy as well as left prophylactic mastectomy with Dr Raphael Marie  Her medical oncologist is Dr Claude Blend  Her tumor was found to be high risk based on a MammaPrint  She has recommended that she get adjuvant chemotherapy with Taxotere and cyclophosphamide  She is getting nuelasta support  The patient was referred to the department of Palliative & Supportive Care for concurrent symptom management  She is interested in obtaining a certification for medical marijuana use  She has chronic pain related to lumbar disc disease and sacroiliitis  She sees pain management (Dr Martita Hood) and sometimes gets injections but wants to avoid these when her immune system is low from chemotherapy  She is on gabapentin and nucynta  The pain in her hip has been ongoing for years  In best case scenario she would like to get off of prescription medications for pain  In addition to her hip pain, the neulasta flared a degree of whole body pains  She is concerned about chemotherapy related nausea  However she is happy to report that with pre-meds and hydration this has not been a big issue  She is not sleeping well  Mostly this problem is related to her hip pain  However it effects her overall wellbeing    She is very tired and is not used to sitting around all day  She reports that she has a great support system through all of this  She is  and she has a dog  She continues to try and work on a limited basis  She is an RN in an endoscopy suite  The following portions of the medical history were reviewed: past medical history, problem list, medication list, and social history  Current Outpatient Medications:     acetaminophen (TYLENOL) 500 mg tablet, Take 1,000 mg by mouth every 6 (six) hours as needed for mild pain, Disp: , Rfl:     cholecalciferol (VITAMIN D3) 1,000 units tablet, Take 1,000 Units by mouth daily, Disp: , Rfl:     escitalopram (LEXAPRO) 5 mg tablet, Take 1 tablet (5 mg total) by mouth daily, Disp: 90 tablet, Rfl: 1    fluticasone (FLONASE) 50 mcg/act nasal spray, 1 spray into each nostril as needed , Disp: , Rfl:     gabapentin (NEURONTIN) 300 mg capsule, Take 1 capsule (300 mg total) by mouth 2 (two) times a day, Disp: 60 capsule, Rfl: 5    granisetron (KYTRIL) 1 mg tablet, Take 1 mg by mouth every 12 (twelve) hours as needed for nausea or vomiting, Disp: , Rfl:     metoprolol tartrate (LOPRESSOR) 25 mg tablet, Take 25 mg by mouth every 12 (twelve) hours, Disp: , Rfl:     omeprazole (PriLOSEC) 20 mg delayed release capsule, TAKE 1 CAPSULE BY MOUTH EVERY DAY, Disp: 90 capsule, Rfl: 0    ondansetron (ZOFRAN) 4 mg tablet, Take 1 tablet (4 mg total) by mouth every 8 (eight) hours as needed for nausea or vomiting, Disp: 30 tablet, Rfl: 1    tapentadol (NUCYNTA) 75 mg tablet, Take 1 tab PO Q 6 hours prn pain  2nd month script Do not fill until 10/15/19, Disp: 120 tablet, Rfl: 0    simvastatin (ZOCOR) 10 mg tablet, TAKE 1 TABLET DAILY, Disp: 90 tablet, Rfl: 1  Review of Systems   Constitutional: Positive for activity change, appetite change (not hungry but eating) and fatigue  Negative for unexpected weight change     HENT:        Hair loss secondary to treatment   Musculoskeletal: Positive for arthralgias and back pain    Psychiatric/Behavioral: Positive for sleep disturbance  All other systems negative    Objective:  Vital Signs  /80 (BP Location: Right arm, Patient Position: Sitting, Cuff Size: Standard)   Pulse 66   Temp 98 5 °F (36 9 °C) (Tympanic)   Resp 14   Ht 5' 4 5" (1 638 m)   Wt 63 5 kg (140 lb)   LMP 09/18/2019   SpO2 98%   BMI 23 66 kg/m²    Physical Exam    Constitutional: Appears well-developed and well-nourished  In no acute physical or emotional distress  Head: Treatment induced alopecia  Eyes: EOM are normal  No ocular discharge  No scleral icterus  Neck: No visible adenopathy or masses  Respiratory: Effort normal  No stridor  No respiratory distress  Gastrointestinal: No abdominal distension  Musculoskeletal: No edema  Neurological: Alert, oriented and appropriately conversant  Skin: No diaphoresis, no rashes seen on exposed areas of skin  Psychiatric: Displays a normal mood and affect   Behavior, judgement and thought content appear normal

## 2019-10-14 ENCOUNTER — OFFICE VISIT (OUTPATIENT)
Dept: PALLIATIVE MEDICINE | Facility: CLINIC | Age: 54
End: 2019-10-14
Payer: COMMERCIAL

## 2019-10-14 ENCOUNTER — SOCIAL WORK (OUTPATIENT)
Dept: PALLIATIVE MEDICINE | Facility: CLINIC | Age: 54
End: 2019-10-14
Payer: COMMERCIAL

## 2019-10-14 VITALS
BODY MASS INDEX: 23.32 KG/M2 | DIASTOLIC BLOOD PRESSURE: 80 MMHG | OXYGEN SATURATION: 98 % | TEMPERATURE: 98.5 F | RESPIRATION RATE: 14 BRPM | WEIGHT: 140 LBS | HEART RATE: 66 BPM | SYSTOLIC BLOOD PRESSURE: 110 MMHG | HEIGHT: 65 IN

## 2019-10-14 DIAGNOSIS — Z79.899 MEDICAL MARIJUANA USE: ICD-10-CM

## 2019-10-14 DIAGNOSIS — C50.412 MALIGNANT NEOPLASM OF UPPER-OUTER QUADRANT OF LEFT BREAST IN FEMALE, ESTROGEN RECEPTOR POSITIVE (HCC): Primary | ICD-10-CM

## 2019-10-14 DIAGNOSIS — M89.8X9 BONE PAIN DUE TO GRANULOCYTE COLONY STIMULATING FACTOR: ICD-10-CM

## 2019-10-14 DIAGNOSIS — Z71.89 COUNSELING AND COORDINATION OF CARE: Primary | ICD-10-CM

## 2019-10-14 DIAGNOSIS — G47.9 DIFFICULTY SLEEPING: ICD-10-CM

## 2019-10-14 DIAGNOSIS — Z17.0 MALIGNANT NEOPLASM OF UPPER-OUTER QUADRANT OF LEFT BREAST IN FEMALE, ESTROGEN RECEPTOR POSITIVE (HCC): Primary | ICD-10-CM

## 2019-10-14 DIAGNOSIS — R11.0 CHEMOTHERAPY-INDUCED NAUSEA: ICD-10-CM

## 2019-10-14 DIAGNOSIS — T45.1X5A CHEMOTHERAPY-INDUCED NAUSEA: ICD-10-CM

## 2019-10-14 DIAGNOSIS — G89.4 PAIN SYNDROME, CHRONIC: ICD-10-CM

## 2019-10-14 PROCEDURE — 99203 OFFICE O/P NEW LOW 30 MIN: CPT | Performed by: FAMILY MEDICINE

## 2019-10-14 PROCEDURE — NC001 PR NO CHARGE

## 2019-10-14 RX ORDER — GRANISETRON HYDROCHLORIDE 1 MG/1
1 TABLET, FILM COATED ORAL EVERY 12 HOURS PRN
COMMUNITY
End: 2020-05-27 | Stop reason: HOSPADM

## 2019-10-14 NOTE — PROGRESS NOTES
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

## 2019-11-11 ENCOUNTER — OFFICE VISIT (OUTPATIENT)
Dept: PAIN MEDICINE | Facility: CLINIC | Age: 54
End: 2019-11-11
Payer: COMMERCIAL

## 2019-11-11 VITALS
SYSTOLIC BLOOD PRESSURE: 113 MMHG | HEART RATE: 78 BPM | BODY MASS INDEX: 22.99 KG/M2 | HEIGHT: 65 IN | DIASTOLIC BLOOD PRESSURE: 76 MMHG | TEMPERATURE: 99 F | WEIGHT: 138 LBS

## 2019-11-11 DIAGNOSIS — S73.191D TEAR OF RIGHT ACETABULAR LABRUM, SUBSEQUENT ENCOUNTER: ICD-10-CM

## 2019-11-11 DIAGNOSIS — M54.40 CHRONIC BILATERAL LOW BACK PAIN WITH SCIATICA, SCIATICA LATERALITY UNSPECIFIED: ICD-10-CM

## 2019-11-11 DIAGNOSIS — M46.1 SACROILIITIS (HCC): ICD-10-CM

## 2019-11-11 DIAGNOSIS — F11.20 UNCOMPLICATED OPIOID DEPENDENCE (HCC): ICD-10-CM

## 2019-11-11 DIAGNOSIS — G89.29 CHRONIC BILATERAL LOW BACK PAIN WITH SCIATICA, SCIATICA LATERALITY UNSPECIFIED: ICD-10-CM

## 2019-11-11 DIAGNOSIS — Z79.891 LONG-TERM CURRENT USE OF OPIATE ANALGESIC: ICD-10-CM

## 2019-11-11 DIAGNOSIS — M51.17 INTERVERTEBRAL DISC DISORDER WITH RADICULOPATHY OF LUMBOSACRAL REGION: ICD-10-CM

## 2019-11-11 DIAGNOSIS — G89.4 CHRONIC PAIN SYNDROME: Primary | ICD-10-CM

## 2019-11-11 PROCEDURE — 99214 OFFICE O/P EST MOD 30 MIN: CPT | Performed by: NURSE PRACTITIONER

## 2019-11-11 RX ORDER — CALCIUM CARBONATE 300MG(750)
TABLET,CHEWABLE ORAL 2 TIMES DAILY
COMMUNITY
End: 2020-05-27 | Stop reason: HOSPADM

## 2019-11-11 NOTE — PATIENT INSTRUCTIONS
Opioid Pain Management   AMBULATORY CARE:   An opioid  is a type of medicine used to treat pain  Examples of opioids are oxycodone, morphine, fentanyl, or codeine  Take opioid medicines as directed, for the condition it is prescribed:  Common problems that may occur when you do not take opioid medicines as directed include the following:  · Health problems  may occur  You may have trouble breathing  You may also develop liver or kidney damage, or stomach bleeding  Any of these health problems can become life-threatening  · Opioid dependence  means your body needs the opioid medicine to keep it from going through withdrawal      · Opioid tolerance  means the opioid does not control pain as well as it used to  You need higher doses of the opioid to get pain relief  · Opioid addiction  means you are not able to control the use of the opioid medicine  You use it when you do not have pain  You crave the opioid medicine  Call 911 or have someone call 911 for any of the following:   · You are breathing slower than normal, or you have trouble breathing  · You cannot be awakened  · You have a seizure  Seek care immediately if:   · Your heart is beating slower than usual     · Your heart feels like it is jumping or fluttering  · You are so sleepy that you cannot stay awake  · You have severe muscle pain or weakness  · You see or hear things that are not real   Contact your healthcare provider if:   · You are too dizzy to stand up  · Your pain gets worse or you have new pain  · You cannot do your usual activities because of side effects from the opioid  · You are constipated or have abdominal pain  · You have questions or concerns about your condition or care  Opioid safety measures:   · Take your medicine as directed  Ask if you need more information on how to take your medicine correctly  Follow up with your healthcare provider regularly  You may need to have your dose adjusted   Do not use opioid medicine if you are pregnant or breastfeeding  · Give your healthcare provider a list of all your medicines  Include any over-the-counter medicines, vitamins, and herbs  It can be dangerous to take opioids with certain other medicines, such as antihistamines  · Keep opioid medicine in a safe place  Store your opioid medicine in a locked cabinet to keep it away from children and others  · Do not drink alcohol while you use opioids  Alcohol use with an opioid medicine can make you sleepy and slow your breathing rate  You may stop breathing completely  · Do not drive or operate heavy machinery after you take opioid medicine  Opioid medicine can make you drowsy and make it hard to concentrate  You may injure yourself or others if you drive or operate heavy machinery while taking your medicine  · Drink fluids and eat high-fiber foods  Fluids and fiber will help prevent constipation  Ask your healthcare provider what fluids are right for you and how much you should drink  Also ask for a list of foods that contain fiber  Follow up with your healthcare provider as directed: You may need to have your dose adjusted  You may be referred to a pain specialist  Write down your questions so you remember to ask them during your visits  © 2017 [address] [employer] Information is for End User's use only and may not be sold, redistributed or otherwise used for commercial purposes  All illustrations and images included in CareNotes® are the copyrighted property of [employer]  or Shravan Vazquez  The above information is an  only  It is not intended as medical advice for individual conditions or treatments  Talk to your doctor, nurse or pharmacist before following any medical regimen to see if it is safe and effective for you

## 2019-11-11 NOTE — PROGRESS NOTES
Assessment:  1  Chronic pain syndrome    2  Chronic bilateral low back pain with sciatica, sciatica laterality unspecified    3  Intervertebral disc disorder with radiculopathy of lumbosacral region    4  Sacroiliitis ([address] )    5  Tear of right acetabular labrum, subsequent encounter    6  Uncomplicated opioid dependence ([address] )    7  Long-term current use of opiate analgesic        Plan:  Og Che is a 48 y o  female who presents for a follow up office visit in regards to Back Pain (low); Hip Pain (right); and Knee Pain (right)  The patient has a history of chronic pain syndrome secondary to low back pain, sacroiliitis, lumbar intervertebral disc disorder with radiculopathy, and right hip labral tear  Patient presents today with ongoing low back pain which is located on the right side  She has a right hip labral tear, but unfortunately we cannot move forward with an intra-articular joint injection until she completes chemotherapy  She will continue on Nucynta 75 mg as prescribed as it is providing 90% pain relief without side effects  If her pain worsened due to her cancer, she was instructed to contact our office  Two months of prescriptions were electronically sent to the pharmacy with do not fill date of November 16, 2019 and December 14, 2018  She will continue bent in as prescribed  There are risks associated with opioid medications, including dependence, addiction and tolerance  The patient understands and agrees to use these medications only as prescribed  Potential side effects of the medications include, but are not limited to, constipation, drowsiness, addiction, impaired judgment and risk of fatal overdose if not taken as prescribed  The patient was warned against driving while taking sedation medications  Sharing medications is a felony  At this point in time, the patient is showing no signs of addiction, abuse, diversion or suicidal ideation        [place] Prescription Drug Monitoring Program report was reviewed and was appropriate     My impressions and treatment recommendations were discussed in detail with the patient who verbalized understanding and had no further questions  Discharge instructions were provided  I personally saw and examined the patient and I agree with the above discussed plan of care  No orders of the defined types were placed in this encounter  New Medications Ordered This Visit   Medications    Magnesium 400 MG TABS     Sig: Take by mouth 2 (two) times a day    tapentadol (NUCYNTA) 75 mg tablet     Sig: Take 1 tab PO Q 6 hours prn pain  Do not fill until 12/14/19     Dispense:  120 tablet     Refill:  0       History of Present Illness:  Garry Mccartney is a 48 y o  female who presents for a follow up office visit in regards to Back Pain (low); Hip Pain (right); and Knee Pain (right)  The patient has a history of chronic pain syndrome secondary to low back pain, sacroiliitis, lumbar intervertebral disc disorder with radiculopathy, and right hip labral tear  She presents today with ongoing low back pain which is located on the right side  The pain also radiates into the right hip and anterior aspect of the right thigh to the knee  It is intermittent, and remains unchanged since the last office visit  She describes it as dull aching and shooting  She is currently rating 8/10 on the numeric rating scale    Since last office visit, she was diagnosed with a right hip labral tear, and saw Orthopedics  They had recommended a right hip intra-articular hip injection which would serve as a diagnostic and therapeutic purpose  Unfortunately, the patient has needed to start chemotherapy for breast cancer, and due to the low white blood cell counts, she is not a candidate for injections at this time  She continues to take Nucynta 75 mg 4 times a day, and gabapentin 300 mg at bedtime  She did obtain a medical marijuana card, and tried the tinctures    She feels the Nucynta provides more pain relief and stop the marijuana  She is currently receiving 90% pain relief without side effects  Pain Contract Signed: 1/21/19, Last Urine Drug Screen: 9/16/19; Last pill count: 9/16/19    I have personally reviewed and/or updated the patient's past medical history, past surgical history, family history, social history, current medications, allergies, and vital signs today       Review of Systems   Musculoskeletal:        Joint stiffness       Patient Active Problem List   Diagnosis    Esophageal reflux    Hiatal hernia    Hyperlipidemia    Pain syndrome, chronic    Sacroiliitis (HCC)    Irritability and anger    Vitamin B12 deficiency    Seasonal allergic rhinitis due to pollen    Gastroesophageal reflux disease    Costochondritis    Intervertebral disc disorder with radiculopathy of lumbosacral region    Malignant neoplasm of upper-outer quadrant of left breast in female, estrogen receptor positive ([facility] )    Chemotherapy-induced nausea    Difficulty sleeping    Medical marijuana use    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       Past Medical History:   Diagnosis Date    Anxiety     BRCA gene mutation negative 06/20/2019    [facility]    Cancer ([facility] )     breast     Fibrocystic disease of breast     GERD (gastroesophageal reflux disease)     Hiatal hernia     Hyperlipidemia        Past Surgical History:   Procedure Laterality Date    APPENDECTOMY      BREAST BIOPSY      BREAST CYST ASPIRATION Left 2005    COLONOSCOPY      FOOT SURGERY Right 2011    right, hardware removal    IR PORT PLACEMENT  9/27/2019    MASTECTOMY W/ SENTINEL NODE BIOPSY Left 8/9/2019    Procedure: BREAST MASTECTOMY, SENTINEL LYMPH NODE BIOPSY, LYMPHATIC MAPPING W/ BLUE DYE AND RADIOACTIVE DYE (INJECT AT 0730 BY DR WHITEHEAD IN THE O R );  Surgeon: Mauricio Pittman MD;  [facility];  Service: Surgical Oncology    OTHER SURGICAL HISTORY Right 2010    Complete Excision of Fifth Metatarsal Head     CO COLONOSCOPY FLX DX W/COLLJ SPEC WHEN PFRMD N/A 7/20/2018    Procedure: EGD AND COLONOSCOPY;  Surgeon: Jhony Osorio MD;  Location: [facility]; Service: Gastroenterology    CO MASTECTOMY, SIMPLE, COMPLETE Right 8/9/2019    Procedure: BREAST SIMPLE MASTECTOMY;  Surgeon: Elba Delaney MD;  [facility];  Service: Surgical Oncology    US GUIDANCE BREAST BIOPSY LEFT EACH ADDITIONAL Left 6/13/2019    US GUIDED BREAST BIOPSY LEFT COMPLETE Left 6/13/2019       Family History   Problem Relation Age of Onset    Coronary artery disease Mother         CABG in her 62s    Other Mother         Dyslipidemia    Hypertension Mother     Heart attack Father 46        acute myocardial infarction    Other Father         Dyslipidemia    Hypertension Father     Prostate cancer Paternal Uncle 72    Thyroid disease Family         disorder    No Known Problems Maternal Aunt     No Known Problems Maternal Aunt        Social History     Occupational History    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, 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   Tobacco Use    Smoking status: Never Smoker    Smokeless tobacco: Never Used   Substance and Sexual Activity    Alcohol use: Yes     Frequency: 2-3 times a week     Drinks per session: 1 or 2    Drug use: No    Sexual activity: Not on file     Comment: Partner had vasectomy       Current Outpatient Medications on File Prior to Visit   Medication Sig    Magnesium 400 MG TABS Take by mouth 2 (two) times a day    acetaminophen (TYLENOL) 500 mg tablet Take 1,000 mg by mouth every 6 (six) hours as needed for mild pain    cholecalciferol (VITAMIN D3) 1,000 units tablet Take 1,000 Units by mouth daily    escitalopram (LEXAPRO) 5 mg tablet Take 1 tablet (5 mg total) by mouth daily    fluticasone (FLONASE) 50 mcg/act nasal spray 1 spray into each nostril as needed     gabapentin (NEURONTIN) 300 mg capsule Take 1 capsule (300 mg total) by mouth 2 (two) times a day    granisetron (KYTRIL) 1 mg tablet Take 1 mg by mouth every 12 (twelve) hours as needed for nausea or vomiting    metoprolol tartrate (LOPRESSOR) 25 mg tablet Take 25 mg by mouth every 12 (twelve) hours    omeprazole (PriLOSEC) 20 mg delayed release capsule TAKE 1 CAPSULE BY MOUTH EVERY DAY    ondansetron (ZOFRAN) 4 mg tablet Take 1 tablet (4 mg total) by mouth every 8 (eight) hours as needed for nausea or vomiting    simvastatin (ZOCOR) 10 mg tablet TAKE 1 TABLET DAILY    [DISCONTINUED] tapentadol (NUCYNTA) 75 mg tablet Take 1 tab PO Q 6 hours prn pain  2nd month script Do not fill until 10/15/19     No current facility-administered medications on file prior to visit  Allergies   Allergen Reactions    Sulfa Antibiotics Headache     Annotation - [id number]: Migraine; Annotation - [id number]: cellular issues       Physical Exam:    /76 (BP Location: Right arm, Patient Position: Sitting)   Pulse 78   Temp 99 °F (37 2 °C) (Oral)   Ht 5' 4 5" (1 638 m)   Wt 62 6 kg (138 lb)   BMI 23 32 kg/m²     Constitutional:normal, well developed, well nourished, alert, in no distress and non-toxic and no overt pain behavior  Eyes:anicteric  HEENT:grossly intact  Neck:supple, symmetric, trachea midline and no masses   Pulmonary:even and unlabored  Cardiovascular:No edema or pitting edema present  Skin:Normal without rashes or lesions and well hydrated  Psychiatric:Mood and affect appropriate  Neurologic:Cranial Nerves II-XII grossly intact  Musculoskeletal:normal    Imaging  MRI LUMBAR SPINE WITHOUT CONTRAST 2/16/19     INDICATION: M54 16: Radiculopathy, lumbar region      COMPARISON:  January 5, 2016     TECHNIQUE:  Sagittal T1, sagittal T2, sagittal inversion recovery, axial T1 and axial T2, coronal T2        IMAGE QUALITY:  Diagnostic     FINDINGS:     VERTEBRAL BODIES:  Normal alignment of the lumbar spine   No spondylolysis or spondylolisthesis   No scoliosis   No compression fracture     Normal marrow signal is identified within the visualized bony structures   No discrete marrow lesion      SACRUM:  Normal signal within the sacrum  No evidence of insufficiency or stress fracture      DISTAL CORD AND CONUS:  Normal size and signal within the distal cord and conus        PARASPINAL SOFT TISSUES:  Paraspinal soft tissues are unremarkable      LOWER THORACIC DISC SPACES:  Normal disc height and signal   No disc herniation, canal stenosis or foraminal narrowing      LUMBAR DISC SPACES:       Perineural cysts are identified at L5-S1 as well as within the sacrum      L1-L2:  Normal      L2-L3:  Normal      L3-L4:  Normal      L4-L5:  Normal      L5-S1:  Normal      IMPRESSION:     No significant central or foraminal narrowing   Multiple perineural cysts are again noted including L5-S1 as well as the sacrum which are typically of no clinical significance         Patient is here for a follow up for low back, right hip and right knee pain

## 2019-11-27 ENCOUNTER — OFFICE VISIT (OUTPATIENT)
Dept: SURGICAL ONCOLOGY | Facility: CLINIC | Age: 54
End: 2019-11-27
Payer: COMMERCIAL

## 2019-11-27 VITALS
HEART RATE: 70 BPM | DIASTOLIC BLOOD PRESSURE: 80 MMHG | SYSTOLIC BLOOD PRESSURE: 120 MMHG | WEIGHT: 141 LBS | TEMPERATURE: 98.4 F | RESPIRATION RATE: 16 BRPM | BODY MASS INDEX: 23.49 KG/M2 | HEIGHT: 65 IN

## 2019-11-27 DIAGNOSIS — Z17.0 MALIGNANT NEOPLASM OF UPPER-OUTER QUADRANT OF LEFT BREAST IN FEMALE, ESTROGEN RECEPTOR POSITIVE (HCC): Primary | ICD-10-CM

## 2019-11-27 DIAGNOSIS — C50.412 MALIGNANT NEOPLASM OF UPPER-OUTER QUADRANT OF LEFT BREAST IN FEMALE, ESTROGEN RECEPTOR POSITIVE (HCC): Primary | ICD-10-CM

## 2019-11-27 PROCEDURE — 99214 OFFICE O/P EST MOD 30 MIN: CPT | Performed by: SURGERY

## 2019-11-27 NOTE — PROGRESS NOTES
Surgical Oncology Follow Up       [address] [facility]  [facility] [place]  [address] [facility] [address]  [place] [address]    Kennebec Stain  1965  [id number]  [id number] [facility]  [facility] [place]  [address] [place] [address]    Chief Complaint   Patient presents with    Breast Cancer     Pt is here for 3 month f/u          Assessment & Plan:   The patient presents for a follow-up visit  She is finished her AC I will start Taxol in the near future  Clinical examination shows no evidence of local regional distant recurrence disease  We will see her back in 6 months  She is agreeable see our advanced practitioner at that time      Cancer History:        Malignant neoplasm of upper-outer quadrant of left breast in female, estrogen receptor positive ([facility] )    6/13/2019 Biopsy     Left breast ultrasound-guided biopsy  12 o'clock, 4 cm from nipple  Invasive breast carcinoma of no special type (ductal NST)  Grade 2    2 o'clock, 5 cm from nipple  Invasive breast carcinoma of no special type (ductal NST)  Grade 2  ER 90  AR 90  HER2 1+      6/20/2019 Genetic Testing     The following genes were evaluated: JOSH, BRCA1, BRCA2, CDH1, CHEK2, PALB2, PTEN, STK11, TP53  Negative for genetic mutations or variants  [facility]      8/9/2019 Surgery     Left breast mastectomy with sentinel lymph node biopsy  Two foci of invasive mammary carcinoma of no special type (ductal)  Grade 2  2 4 cm  DCIS measures at least 8 2 cm  0/2 Lymph nodes  Margins negative  Anatomic Stage IIA  Prognostic Stage IA    Right breast mastectomy; prophylactic  Negative for malignancy  Benign fibrocystic changes with atypia consisting of multifocal lobular neoplasia (atypical lobular hyperplasia and focal lobular carcinoma in situ)      8/26/2019 Genomic Testing     MammaPrint for FLEX  High Risk      10/4/2019 - 10/4/2019 Chemotherapy     Adjuvant chemotherapy recommended by   Santiago King  Patient cancelled infusion/follow up appointments           Interval History:   See Assessment & Plan    Review of Systems:   Review of Systems   Constitutional: Positive for fatigue  All other systems reviewed and are negative  Past Medical History     Patient Active Problem List   Diagnosis    Esophageal reflux    Hiatal hernia    Hyperlipidemia    Pain syndrome, chronic    Sacroiliitis (HCC)    Irritability and anger    Vitamin B12 deficiency    Seasonal allergic rhinitis due to pollen    Gastroesophageal reflux disease    Costochondritis    Intervertebral disc disorder with radiculopathy of lumbosacral region    Malignant neoplasm of upper-outer quadrant of left breast in female, estrogen receptor positive ([facility] )    Chemotherapy-induced nausea    Difficulty sleeping    Medical marijuana use    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     Past Medical History:   Diagnosis Date    Anxiety     BRCA gene mutation negative 06/20/2019    [facility]    Cancer ([facility] )     breast     Fibrocystic disease of breast     GERD (gastroesophageal reflux disease)     Hiatal hernia     Hyperlipidemia      Past Surgical History:   Procedure Laterality Date    APPENDECTOMY      BREAST BIOPSY      BREAST CYST ASPIRATION Left 2005    COLONOSCOPY      FOOT SURGERY Right 2011    right, hardware removal    IR PORT PLACEMENT  9/27/2019    MASTECTOMY W/ SENTINEL NODE BIOPSY Left 8/9/2019    Procedure: BREAST MASTECTOMY, SENTINEL LYMPH NODE BIOPSY, LYMPHATIC MAPPING W/ BLUE DYE AND RADIOACTIVE DYE (INJECT AT 0730 BY DR WHITEHEAD IN THE O R );  Surgeon: Zen Ojeda MD;  [facility];  Service: Surgical Oncology    OTHER SURGICAL HISTORY Right 2010    Complete Excision of Fifth Metatarsal Head     [place] COLONOSCOPY FLX DX W/COLLJ SPEC WHEN PFRMD N/A 7/20/2018    Procedure: EGD AND COLONOSCOPY;  Surgeon: Rigoberto Ly MD;  Location: [facility];   Service: Gastroenterology    [place] MASTECTOMY, SIMPLE, COMPLETE Right 8/9/2019    Procedure: BREAST SIMPLE MASTECTOMY;  Surgeon: Malathi Griffin MD;  [facility];  Service: Surgical Oncology    US GUIDANCE BREAST BIOPSY LEFT EACH ADDITIONAL Left 6/13/2019    US GUIDED BREAST BIOPSY LEFT COMPLETE Left 6/13/2019     Family History   Problem Relation Age of Onset    Coronary artery disease Mother         CABG in her 62s   [facility] Other Mother         Dyslipidemia    Hypertension Mother     Heart attack Father 46        acute myocardial infarction    Other Father         Dyslipidemia    Hypertension Father     Prostate cancer Paternal Uncle 72    Thyroid disease Family         disorder    No Known Problems Maternal Aunt     No Known Problems Maternal Aunt      Social History     Socioeconomic History    Marital status: /Civil Union     Spouse name: Not on file    Number of children: Not on file    Years of education: Not on file    Highest education level: Not on file   Occupational History    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, now works fam business   1 day with [employer]   Social Needs    Financial resource strain: Not on file    Food insecurity:     Worry: Not on file     Inability: Not on file    Transportation needs:     Medical: Not on file     Non-medical: Not on file   Tobacco Use    Smoking status: Never Smoker    Smokeless tobacco: Never Used   Substance and Sexual Activity    Alcohol use: Yes     Frequency: 2-3 times a week     Drinks per session: 1 or 2    Drug use: No    Sexual activity: Not on file     Comment: Partner had vasectomy   Lifestyle    Physical activity:     Days per week: Not on file     Minutes per session: Not on file    Stress: Not on file   Relationships    Social connections:     Talks on phone: Not on file     Gets together: Not on file     Attends Yazidism service: Not on file     Active member of club or organization: Not on file     Attends meetings of clubs or organizations: Not on file     Relationship status: Not on file    Intimate partner violence:     Fear of current or ex partner: Not on file     Emotionally abused: Not on file     Physically abused: Not on file     Forced sexual activity: Not on file   Other Topics Concern    Not on file   Social History Narrative    Exercise habits    Working full-time - used to be GI RN       Current Outpatient Medications:     acetaminophen (TYLENOL) 500 mg tablet, Take 1,000 mg by mouth every 6 (six) hours as needed for mild pain, Disp: , Rfl:     cholecalciferol (VITAMIN D3) 1,000 units tablet, Take 1,000 Units by mouth daily, Disp: , Rfl:     escitalopram (LEXAPRO) 5 mg tablet, Take 1 tablet (5 mg total) by mouth daily, Disp: 90 tablet, Rfl: 1    fluticasone (FLONASE) 50 mcg/act nasal spray, 1 spray into each nostril as needed , Disp: , Rfl:     gabapentin (NEURONTIN) 300 mg capsule, Take 1 capsule (300 mg total) by mouth 2 (two) times a day, Disp: 60 capsule, Rfl: 5    granisetron (KYTRIL) 1 mg tablet, Take 1 mg by mouth every 12 (twelve) hours as needed for nausea or vomiting, Disp: , Rfl:     Magnesium 400 MG TABS, Take by mouth 2 (two) times a day, Disp: , Rfl:     metoprolol tartrate (LOPRESSOR) 25 mg tablet, Take 25 mg by mouth every 12 (twelve) hours, Disp: , Rfl:     omeprazole (PriLOSEC) 20 mg delayed release capsule, TAKE 1 CAPSULE BY MOUTH EVERY DAY, Disp: 90 capsule, Rfl: 0    tapentadol (NUCYNTA) 75 mg tablet, Take 1 tab PO Q 6 hours prn pain  Do not fill until 12/14/19, Disp: 120 tablet, Rfl: 0    ondansetron (ZOFRAN) 4 mg tablet, Take 1 tablet (4 mg total) by mouth every 8 (eight) hours as needed for nausea or vomiting (Patient not taking: Reported on 11/27/2019), Disp: 30 tablet, Rfl: 1    simvastatin (ZOCOR) 10 mg tablet, TAKE 1 TABLET DAILY, Disp: 90 tablet, Rfl: 1  Allergies   Allergen Reactions    Sulfa Antibiotics Headache     Annotation - [id number]: Migraine;  Annotation - [id number]: cellular issues       Physical Exam:     Vitals:    11/27/19 0758   BP: 120/80   Pulse: 70   Resp: 16   Temp: 98 4 °F (36 9 °C)     Physical Exam   Constitutional: She is oriented to person, place, and time  She appears well-developed and well-nourished  HENT:   Head: Normocephalic and atraumatic  Mouth/Throat: Oropharynx is clear and moist    Eyes: Pupils are equal, round, and reactive to light  EOM are normal    Neck: Normal range of motion  Neck supple  No JVD present  No tracheal deviation present  No thyromegaly present  Cardiovascular: Normal rate, regular rhythm, normal heart sounds and intact distal pulses  Exam reveals no gallop and no friction rub  No murmur heard  Pulmonary/Chest: Effort normal and breath sounds normal  No respiratory distress  She has no wheezes  She has no rales  Examination of both mastectomy sites demonstrate well-healed incisions there is no evidence of infection she has no seroma  She has a right-sided port with no evidence of infection  There is no axillary adenopathy on either side  There are no worrisome skin findings   Abdominal: Soft  She exhibits no distension and no mass  There is no hepatomegaly  There is no tenderness  There is no rebound and no guarding  Musculoskeletal: Normal range of motion  She exhibits no edema or tenderness  Lymphadenopathy:     She has no cervical adenopathy  Neurological: She is alert and oriented to person, place, and time  No cranial nerve deficit  Skin: Skin is warm and dry  No rash noted  No erythema  Psychiatric: She has a normal mood and affect  Her behavior is normal    Vitals reviewed  Results & Discussion:   The patient has completed her AC she will start her taxing therapy and then follow this with anti hormonal therapy  We will see her back in 6 months as outlined above  Advance Care Planning/Advance Directives:  I discussed the disease status, treatment plans and follow-up with the patient

## 2019-12-08 DIAGNOSIS — R45.4 IRRITABILITY AND ANGER: ICD-10-CM

## 2019-12-09 RX ORDER — ESCITALOPRAM OXALATE 5 MG/1
5 TABLET ORAL DAILY
Qty: 90 TABLET | Refills: 0 | Status: SHIPPED | OUTPATIENT
Start: 2019-12-09 | End: 2019-12-12 | Stop reason: SDUPTHER

## 2019-12-12 DIAGNOSIS — R45.4 IRRITABILITY AND ANGER: ICD-10-CM

## 2019-12-12 RX ORDER — ESCITALOPRAM OXALATE 5 MG/1
TABLET ORAL
Qty: 90 TABLET | Refills: 1 | Status: SHIPPED | OUTPATIENT
Start: 2019-12-12 | End: 2020-02-14 | Stop reason: SDUPTHER

## 2019-12-17 ENCOUNTER — TELEPHONE (OUTPATIENT)
Dept: RADIOLOGY | Facility: CLINIC | Age: 54
End: 2019-12-17

## 2019-12-17 DIAGNOSIS — G89.4 PAIN SYNDROME, CHRONIC: Primary | ICD-10-CM

## 2019-12-17 RX ORDER — TRAMADOL HYDROCHLORIDE 50 MG/1
50 TABLET ORAL EVERY 6 HOURS PRN
Qty: 120 TABLET | Refills: 0 | Status: SHIPPED | OUTPATIENT
Start: 2019-12-17 | End: 2020-01-06 | Stop reason: SDUPTHER

## 2019-12-17 NOTE — TELEPHONE ENCOUNTER
S/w pt, her insurance changed and when she went to the pharmacy they told her that they need more information from our office, RN informed pt that it probably needs a PA for her Nucynta  Called and confirmed that with [facility]       Insurance company: [employer]  Member ID: [id number]  Insurance company phone: [phone], if you do not hear anything by tomorrow morning (12/18/19) please send back to [facility], pt will need some meds to hold her over, thanks

## 2019-12-17 NOTE — TELEPHONE ENCOUNTER
Prior Shikha Roman has been approved through 12/17/2020  I called the pharmacy to make sure they received a paid claim  They said it did go through but it was over $400 00   With coupon $340 00   I called the patient she wanted to know if she can just have Tramadol sent to her pharmacy until her next appt with Frieda Cisneros on 1/6   She said she was not paying that much for the medication

## 2019-12-17 NOTE — TELEPHONE ENCOUNTER
Patient called and left a message on the answering machine stating that her ins  Changed from [facility] to [place]  She stated that the nucynta needs to go through the [place] now   She said that she only has 2 days left and If this is a problem then maybe Renzo Parent can call in a few Tramadol     Patient can be reached at [phone]

## 2019-12-17 NOTE — TELEPHONE ENCOUNTER
E-rx sent for tramadol 50mg QID prn pain # 120 no refills to [facility] to take instead of nucynta until she sees Vasquez Tam

## 2019-12-18 NOTE — TELEPHONE ENCOUNTER
Pt has a question about the quantity of tramadol that was dispensed  She said she did not get 120 pills of tramadol  She said she only has 28 pills which is only enough for 2 weeks if she takes it twice a day  Please advise   # [phone]

## 2019-12-18 NOTE — TELEPHONE ENCOUNTER
S/w Pharmacist Adalberto at [facility], states that because the tramadol is a newly prescribed controlled substance, the pharmacy is only able to dispense a 7 day supply of medication for the initial fill  Once the pt has used all of the medication from the initial fill, they are able to  the remainder of the script at the pharmacy  Pt was given 28 pills with initial fill of tramadol and will be able to get the remaining 92 pills once the 7 day period has ended  No further action needed from [facility] office for pt to receive refill  S/w pt, advised of the same  Pt verbalized understanding and appreciation  Advised to cb with any further questions or concerns

## 2019-12-30 ENCOUNTER — LAB REQUISITION (OUTPATIENT)
Dept: LAB | Facility: HOSPITAL | Age: 54
End: 2019-12-30
Payer: COMMERCIAL

## 2019-12-30 DIAGNOSIS — C50.412 MALIGNANT NEOPLASM OF UPPER-OUTER QUADRANT OF LEFT FEMALE BREAST (HCC): ICD-10-CM

## 2019-12-30 LAB
ALBUMIN SERPL BCP-MCNC: 3.7 G/DL (ref 3.5–5)
ALP SERPL-CCNC: 170 U/L (ref 46–116)
ALT SERPL W P-5'-P-CCNC: 39 U/L (ref 12–78)
ANION GAP SERPL CALCULATED.3IONS-SCNC: 6 MMOL/L (ref 4–13)
AST SERPL W P-5'-P-CCNC: 15 U/L (ref 5–45)
BILIRUB SERPL-MCNC: 0.65 MG/DL (ref 0.2–1)
BUN SERPL-MCNC: 22 MG/DL (ref 5–25)
CALCIUM SERPL-MCNC: 9.5 MG/DL (ref 8.3–10.1)
CHLORIDE SERPL-SCNC: 107 MMOL/L (ref 100–108)
CO2 SERPL-SCNC: 26 MMOL/L (ref 21–32)
CREAT SERPL-MCNC: 0.87 MG/DL (ref 0.6–1.3)
GFR SERPL CREATININE-BSD FRML MDRD: 77 ML/MIN/1.73SQ M
GLUCOSE SERPL-MCNC: 101 MG/DL (ref 65–140)
LDH SERPL-CCNC: 315 U/L (ref 81–234)
MAGNESIUM SERPL-MCNC: 2.1 MG/DL (ref 1.6–2.6)
POTASSIUM SERPL-SCNC: 4.5 MMOL/L (ref 3.5–5.3)
PROT SERPL-MCNC: 6.6 G/DL (ref 6.4–8.2)
SODIUM SERPL-SCNC: 139 MMOL/L (ref 136–145)

## 2019-12-30 PROCEDURE — 83735 ASSAY OF MAGNESIUM: CPT | Performed by: INTERNAL MEDICINE

## 2019-12-30 PROCEDURE — 83615 LACTATE (LD) (LDH) ENZYME: CPT | Performed by: INTERNAL MEDICINE

## 2019-12-30 PROCEDURE — 80053 COMPREHEN METABOLIC PANEL: CPT | Performed by: INTERNAL MEDICINE

## 2020-01-05 PROBLEM — S73.191A TEAR OF RIGHT ACETABULAR LABRUM: Status: ACTIVE | Noted: 2020-01-05

## 2020-01-06 ENCOUNTER — OFFICE VISIT (OUTPATIENT)
Dept: PAIN MEDICINE | Facility: CLINIC | Age: 55
End: 2020-01-06
Payer: COMMERCIAL

## 2020-01-06 VITALS
DIASTOLIC BLOOD PRESSURE: 78 MMHG | HEART RATE: 88 BPM | RESPIRATION RATE: 16 BRPM | SYSTOLIC BLOOD PRESSURE: 134 MMHG | WEIGHT: 140 LBS | HEIGHT: 65 IN | BODY MASS INDEX: 23.32 KG/M2

## 2020-01-06 DIAGNOSIS — Z79.891 LONG-TERM CURRENT USE OF OPIATE ANALGESIC: ICD-10-CM

## 2020-01-06 DIAGNOSIS — M46.1 SACROILIITIS (HCC): ICD-10-CM

## 2020-01-06 DIAGNOSIS — M51.17 INTERVERTEBRAL DISC DISORDER WITH RADICULOPATHY OF LUMBOSACRAL REGION: ICD-10-CM

## 2020-01-06 DIAGNOSIS — G89.4 CHRONIC PAIN SYNDROME: Primary | ICD-10-CM

## 2020-01-06 DIAGNOSIS — M54.42 CHRONIC BILATERAL LOW BACK PAIN WITH BILATERAL SCIATICA: ICD-10-CM

## 2020-01-06 DIAGNOSIS — S73.191D TEAR OF RIGHT ACETABULAR LABRUM, SUBSEQUENT ENCOUNTER: ICD-10-CM

## 2020-01-06 DIAGNOSIS — G89.29 CHRONIC BILATERAL LOW BACK PAIN WITH BILATERAL SCIATICA: ICD-10-CM

## 2020-01-06 DIAGNOSIS — M54.41 CHRONIC BILATERAL LOW BACK PAIN WITH BILATERAL SCIATICA: ICD-10-CM

## 2020-01-06 DIAGNOSIS — F11.20 UNCOMPLICATED OPIOID DEPENDENCE (HCC): ICD-10-CM

## 2020-01-06 DIAGNOSIS — G89.4 PAIN SYNDROME, CHRONIC: ICD-10-CM

## 2020-01-06 PROCEDURE — 99214 OFFICE O/P EST MOD 30 MIN: CPT | Performed by: NURSE PRACTITIONER

## 2020-01-06 PROCEDURE — 80305 DRUG TEST PRSMV DIR OPT OBS: CPT | Performed by: NURSE PRACTITIONER

## 2020-01-06 RX ORDER — TRAMADOL HYDROCHLORIDE 50 MG/1
TABLET ORAL
Qty: 240 TABLET | Refills: 2 | Status: SHIPPED | OUTPATIENT
Start: 2020-01-06 | End: 2020-02-26

## 2020-01-06 NOTE — PATIENT INSTRUCTIONS
Opioid Pain Management   AMBULATORY CARE:   An opioid  is a type of medicine used to treat pain  Examples of opioids are oxycodone, morphine, fentanyl, or codeine  Take opioid medicines as directed, for the condition it is prescribed:  Common problems that may occur when you do not take opioid medicines as directed include the following:  · Health problems  may occur  You may have trouble breathing  You may also develop liver or kidney damage, or stomach bleeding  Any of these health problems can become life-threatening  · Opioid dependence  means your body needs the opioid medicine to keep it from going through withdrawal      · Opioid tolerance  means the opioid does not control pain as well as it used to  You need higher doses of the opioid to get pain relief  · Opioid addiction  means you are not able to control the use of the opioid medicine  You use it when you do not have pain  You crave the opioid medicine  Call 911 or have someone call 911 for any of the following:   · You are breathing slower than normal, or you have trouble breathing  · You cannot be awakened  · You have a seizure  Seek care immediately if:   · Your heart is beating slower than usual     · Your heart feels like it is jumping or fluttering  · You are so sleepy that you cannot stay awake  · You have severe muscle pain or weakness  · You see or hear things that are not real   Contact your healthcare provider if:   · You are too dizzy to stand up  · Your pain gets worse or you have new pain  · You cannot do your usual activities because of side effects from the opioid  · You are constipated or have abdominal pain  · You have questions or concerns about your condition or care  Opioid safety measures:   · Take your medicine as directed  Ask if you need more information on how to take your medicine correctly  Follow up with your healthcare provider regularly  You may need to have your dose adjusted   Do not use opioid medicine if you are pregnant or breastfeeding  · Give your healthcare provider a list of all your medicines  Include any over-the-counter medicines, vitamins, and herbs  It can be dangerous to take opioids with certain other medicines, such as antihistamines  · Keep opioid medicine in a safe place  Store your opioid medicine in a locked cabinet to keep it away from children and others  · Do not drink alcohol while you use opioids  Alcohol use with an opioid medicine can make you sleepy and slow your breathing rate  You may stop breathing completely  · Do not drive or operate heavy machinery after you take opioid medicine  Opioid medicine can make you drowsy and make it hard to concentrate  You may injure yourself or others if you drive or operate heavy machinery while taking your medicine  · Drink fluids and eat high-fiber foods  Fluids and fiber will help prevent constipation  Ask your healthcare provider what fluids are right for you and how much you should drink  Also ask for a list of foods that contain fiber  Follow up with your healthcare provider as directed: You may need to have your dose adjusted  You may be referred to a pain specialist  Write down your questions so you remember to ask them during your visits  © 2017 [employer] [employer] Information is for End User's use only and may not be sold, redistributed or otherwise used for commercial purposes  All illustrations and images included in CareNotes® are the copyrighted property of [employer]  or Shravan Vazquez  The above information is an  only  It is not intended as medical advice for individual conditions or treatments  Talk to your doctor, nurse or pharmacist before following any medical regimen to see if it is safe and effective for you

## 2020-01-06 NOTE — PROGRESS NOTES
Assessment:  1  Chronic pain syndrome    2  Chronic bilateral low back pain with bilateral sciatica    3  Sacroiliitis ([address] )    4  Intervertebral disc disorder with radiculopathy of lumbosacral region    5  Tear of right acetabular labrum, subsequent encounter    6  Uncomplicated opioid dependence ([address] )    7  Long-term current use of opiate analgesic    8  Pain syndrome, chronic        Plan:  Danilo Nickerson is a 48 y o  female who presents for a follow up office visit in regards to Back Pain; Leg Pain; and Knee Pain  The patient has a history of chronic pain syndrome secondary to low back pain, lumbar intervertebral disc disorder, sacroiliitis, and right hip labral tear  Patient presents today with ongoing low back pain which is worse since the last office visit due to inactivity  She unfortunately cannot undergo a right intra-articular hip injection until she is finished with chemotherapy  I will continue her on tramadol since she has side effects from Vicodin and oxycodone  Instead I will increase to 2 tablets 4 times a day  A prescription for tramadol 50 mg with 2 refills was electronically sent to the pharmacy  The patient was also completed a BECKS depression inventory and SOAPP-R  today, as part of our yearly opioid management program        An opioid contract was reviewed with the patient  The patient was made aware they are only to receive opioid medication from our office, and must take the medication as prescribed  If the medication is lost or stolen, it will not be replaced  We also do not condone the use of illegal substances or alcohol with opioid medication  Random urine drug screens and pill counts will also be performed at office visits  Lastly, the patient was informed that office visits are needed for refills  Patient was agreeable and signed the contract  There are risks associated with opioid medications, including dependence, addiction and tolerance   The patient understands and agrees to use these medications only as prescribed  Potential side effects of the medications include, but are not limited to, constipation, drowsiness, addiction, impaired judgment and risk of fatal overdose if not taken as prescribed  The patient was warned against driving while taking sedation medications  Sharing medications is a felony  At this point in time, the patient is showing no signs of addiction, abuse, diversion or suicidal ideation  A urine drug screen was collected at today's office visit as part of our medication management protocol  The point of care testing results were appropriate for what was being prescribed  The specimen will be sent for confirmatory testing  The drug screen is medically necessary because the patient is either dependent on opioid medication or is being considered for opioid medication therapy and the results could impact ongoing or future treatment  The drug screen is to evaluate for the presences or absence of prescribed, non-prescribed, and/or illicit drugs/substances  [place] Prescription Drug Monitoring Program report was reviewed and was appropriate     My impressions and treatment recommendations were discussed in detail with the patient who verbalized understanding and had no further questions  Discharge instructions were provided  I personally saw and examined the patient and I agree with the above discussed plan of care      Orders Placed This Encounter   Procedures    MM ALL_Prescribed Meds and Special Instructions    MM DT_Alprazolam Definitive Test    MM DT_Amphetamine Definitive Test    MM DT_Buprenorphine Definitive Test    MM DT_Butalbital Definitive Test    MM DT_Clonazepam Definitive Test    MM DT_Cocaine Definitive Test    MM DT_Codeine Definitive Test    MM DT_Dextromethorphan Definitive Test    MM Diazepam Definitive Test    MM DT_Ethyl Glucuronide/Ethyl Sulfate Definitive Test    MM DT_Fentanyl Definitive Test    MM DT_Heroin Definitive Test    MM DT_Hydrocodone Definitive Test    MM DT_Hydromorphone Definitive Test    MM DT_Kratom Definitive Test    MM DT_Levorphanol Definitive Test    MM DT_MDMA Definitive Test    MM DT_Meperidine Definitive Test    MM DT_Methadone Definitive Test    MM DT_Methamphetamine Definitive Test    MM DT_Methylphenidate Definitive Test    MM DT_Morphine Definitive Test    MM DT_Oxazepam Definitive Test    MM DT_Oxycodone Definitive Test    MM DT_Oxymorphone Definitive Test    MM DT_Phencyclidine Definitive Test    MM DT_Phenobarbital Definitive Test    MM DT_Phentermine Definitive Test    MM DT_Secobarbital Definitive Test    MM DT_Spice Definitive Test    MM DT_Tapentadol Definitive Test    MM DT_Temazapam Definitive Test    MM DT_THC Definitive Test    MM DT_Tramadol Definitive Test    MM Lorazepam Definitive Test     No orders of the defined types were placed in this encounter  History of Present Illness:  Danilo Nickerson is a 48 y o  female who presents for a follow up office visit in regards to Back Pain; Leg Pain; and Knee Pain  The patient has a history of chronic pain syndrome secondary to low back pain, lumbar intervertebral disc disorder, sacroiliitis, and right hip labral tear  The patient presents today with ongoing pain across the low back and into the right hip  Her pain is worse since the last office visit  She contributes this to lying around, and not being as active  She continues to undergo chemotherapy treatments, and the last few bouts of chemo had been very rough  She states she has been experiencing bone pain as well as neuropathic pain throughout her body  She was given extra steroid at the last treatment, which did help significantly  The pain continues to be constant and described as dull aching, sharp, throbbing, pressure-like, and shooting  She is rating 8/10 on the numeric rating scale      She is currently taking tramadol 50 mg 4 times a day   She was taking Nucynta 75 mg, but because her insurance changed and she did not meet her deductible the medication will cost $300  The tramadol is providing minimal pain relief  She has taken Vicodin in the past which caused nausea, and oxycodone causes drowsiness  She also occasionally use medical marijuana for the pain and nausea  She uses very sparingly, and she states last used about 6 days ago      Pain Contract Signed: 1/6/20, Last Urine Drug Screen: 1/6/20    I have personally reviewed and/or updated the patient's past medical history, past surgical history, family history, social history, current medications, allergies, and vital signs today  Review of Systems   Respiratory: Negative for shortness of breath  Cardiovascular: Negative for chest pain  Gastrointestinal: Negative for constipation, diarrhea, nausea and vomiting  Musculoskeletal: Negative for arthralgias, gait problem, joint swelling and myalgias  Joint Stiffness   Skin: Negative for rash  Neurological: Negative for dizziness, seizures and weakness  All other systems reviewed and are negative        Patient Active Problem List   Diagnosis    Esophageal reflux    Hiatal hernia    Hyperlipidemia    Pain syndrome, chronic    Sacroiliitis (HCC)    Irritability and anger    Vitamin B12 deficiency    Seasonal allergic rhinitis due to pollen    Gastroesophageal reflux disease    Costochondritis    Intervertebral disc disorder with radiculopathy of lumbosacral region    Malignant neoplasm of upper-outer quadrant of left breast in female, estrogen receptor positive ([address] )    Chemotherapy-induced nausea    Difficulty sleeping    Medical marijuana use    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    Tear of right acetabular labrum       Past Medical History:   Diagnosis Date    Anxiety     [facility]20/2019    [facility]    Cancer [facility])     breast     Fibrocystic disease of breast     GERD (gastroesophageal reflux disease)     Hiatal hernia     Hyperlipidemia        Past Surgical History:   Procedure Laterality Date    APPENDECTOMY      BREAST BIOPSY      BREAST CYST ASPIRATION Left 2005    COLONOSCOPY      FOOT SURGERY Right 2011    right, hardware removal    IR PORT PLACEMENT  9/27/2019    MASTECTOMY W/ SENTINEL NODE BIOPSY Left 8/9/2019    Procedure: BREAST MASTECTOMY, SENTINEL LYMPH NODE BIOPSY, LYMPHATIC MAPPING W/ BLUE DYE AND RADIOACTIVE DYE (INJECT AT 0730 BY DR WHITEHEAD IN THE O R );  Surgeon: Carmelita Dillon MD;  [facility];  Service: Surgical Oncology    OTHER SURGICAL HISTORY Right 2010    Complete Excision of Fifth Metatarsal Head     [place] COLONOSCOPY FLX DX W/COLLJ SPEC WHEN PFRMD N/A 7/20/2018    Procedure: EGD AND COLONOSCOPY;  Surgeon: Dimitri Mcfarland MD;  Location: [facility]; Service: Gastroenterology    [place] MASTECTOMY, SIMPLE, COMPLETE Right 8/9/2019    Procedure: BREAST SIMPLE MASTECTOMY;  Surgeon: Carmelita Dillon MD;  [facility];  Service: Surgical Oncology    US GUIDANCE BREAST BIOPSY LEFT EACH ADDITIONAL Left 6/13/2019    US GUIDED BREAST BIOPSY LEFT COMPLETE Left 6/13/2019       Family History   Problem Relation Age of Onset    Coronary artery disease Mother         CABG in her 62s    Other Mother         Dyslipidemia    Hypertension Mother     Heart attack Father 46        acute myocardial infarction    Other Father         Dyslipidemia    Hypertension Father     Prostate cancer Paternal Uncle 72    Thyroid disease Family         disorder    No Known Problems Maternal Aunt     No Known Problems Maternal Aunt        Social History     Occupational History    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, now works [employer] business   1 day with GI   Tobacco Use    Smoking status: Never Smoker    Smokeless tobacco: Never Used   Substance and Sexual Activity    Alcohol use: Yes     Frequency: 2-3 times a week Drinks per session: 1 or 2    Drug use: No    Sexual activity: Not on file     Comment: Partner had vasectomy       Current Outpatient Medications on File Prior to Visit   Medication Sig    acetaminophen (TYLENOL) 500 mg tablet Take 1,000 mg by mouth every 6 (six) hours as needed for mild pain    cholecalciferol (VITAMIN D3) 1,000 units tablet Take 1,000 Units by mouth daily    escitalopram (LEXAPRO) 5 mg tablet TAKE 1 TABLET BY MOUTH EVERY DAY    fluticasone (FLONASE) 50 mcg/act nasal spray 1 spray into each nostril as needed     gabapentin (NEURONTIN) 300 mg capsule Take 1 capsule (300 mg total) by mouth 2 (two) times a day    granisetron (KYTRIL) 1 mg tablet Take 1 mg by mouth every 12 (twelve) hours as needed for nausea or vomiting    Magnesium 400 MG TABS Take by mouth 2 (two) times a day    metoprolol tartrate (LOPRESSOR) 25 mg tablet Take 25 mg by mouth every 12 (twelve) hours    omeprazole (PriLOSEC) 20 mg delayed release capsule TAKE 1 CAPSULE BY MOUTH EVERY DAY    ondansetron (ZOFRAN) 4 mg tablet Take 1 tablet (4 mg total) by mouth every 8 (eight) hours as needed for nausea or vomiting    simvastatin (ZOCOR) 10 mg tablet TAKE 1 TABLET DAILY    traMADol (ULTRAM) 50 mg tablet Take 1 tablet (50 mg total) by mouth every 6 (six) hours as needed for moderate pain    tapentadol (NUCYNTA) 75 mg tablet Take 1 tab PO Q 6 hours prn pain  Do not fill until 12/14/19 (Patient not taking: Reported on 1/6/2020)     No current facility-administered medications on file prior to visit  Allergies   Allergen Reactions    Sulfa Antibiotics Headache     Annotation - [id number]: Migraine; Annotation - [id number]: cellular issues       Physical Exam:    /78   Pulse 88   Resp 16   Ht 5' 4 5" (1 638 m)   Wt 63 5 kg (140 lb)   BMI 23 66 kg/m²     Constitutional:normal, well developed, well nourished, alert, in no distress and non-toxic and no overt pain behavior    Eyes:anicteric  HEENT:grossly intact  Neck:supple, symmetric, trachea midline and no masses   Pulmonary:even and unlabored  Cardiovascular:No edema or pitting edema present  Skin:Normal without rashes or lesions and well hydrated  Psychiatric:Mood and affect appropriate  Neurologic:Cranial Nerves II-XII grossly intact  Musculoskeletal:normal    Imaging    MRI LUMBAR SPINE WITHOUT CONTRAST 2/16/19     INDICATION: M54 16: Radiculopathy, lumbar region      COMPARISON:  January 5, 2016     TECHNIQUE:  Sagittal T1, sagittal T2, sagittal inversion recovery, axial T1 and axial T2, coronal T2        IMAGE QUALITY:  Diagnostic     FINDINGS:     VERTEBRAL BODIES:  Normal alignment of the lumbar spine  No spondylolysis or spondylolisthesis  No scoliosis  No compression fracture  Normal marrow signal is identified within the visualized bony structures  No discrete marrow lesion      SACRUM:  Normal signal within the sacrum  No evidence of insufficiency or stress fracture      DISTAL CORD AND CONUS:  Normal size and signal within the distal cord and conus        PARASPINAL SOFT TISSUES:  Paraspinal soft tissues are unremarkable      LOWER THORACIC DISC SPACES:  Normal disc height and signal   No disc herniation, canal stenosis or foraminal narrowing      LUMBAR DISC SPACES:       Perineural cysts are identified at L5-S1 as well as within the sacrum      L1-L2:  Normal      L2-L3:  Normal      L3-L4:  Normal      L4-L5:  Normal      L5-S1:  Normal      IMPRESSION:     No significant central or foraminal narrowing    Multiple perineural cysts are again noted including L5-S1 as well as the sacrum which are typically of no clinical significance         Pt reports last dose of Tramadol was 1/6/2020

## 2020-01-09 ENCOUNTER — LAB REQUISITION (OUTPATIENT)
Dept: LAB | Facility: HOSPITAL | Age: 55
End: 2020-01-09
Payer: COMMERCIAL

## 2020-01-09 DIAGNOSIS — Z17.0 ESTROGEN RECEPTOR POSITIVE STATUS (ER+): ICD-10-CM

## 2020-01-09 DIAGNOSIS — E55.9 VITAMIN D DEFICIENCY, UNSPECIFIED: ICD-10-CM

## 2020-01-09 DIAGNOSIS — C50.412 MALIGNANT NEOPLASM OF UPPER-OUTER QUADRANT OF LEFT FEMALE BREAST (HCC): ICD-10-CM

## 2020-01-09 LAB
6MAM UR QL CFM: NEGATIVE NG/ML
7AMINOCLONAZEPAM UR QL CFM: NEGATIVE NG/ML
A-OH ALPRAZ UR QL CFM: NEGATIVE NG/ML
AMPHET UR QL CFM: NEGATIVE NG/ML
AMPHET UR QL CFM: NEGATIVE NG/ML
BUPRENORPHINE UR QL CFM: NEGATIVE NG/ML
BUTALBITAL UR QL CFM: NEGATIVE NG/ML
BZE UR QL CFM: NEGATIVE NG/ML
CODEINE UR QL CFM: NEGATIVE NG/ML
EDDP UR QL CFM: NEGATIVE NG/ML
ETHYL GLUCURONIDE UR QL CFM: NEGATIVE NG/ML
ETHYL SULFATE UR QL SCN: NEGATIVE NG/ML
FENTANYL UR QL CFM: NEGATIVE NG/ML
GLIADIN IGG SER IA-ACNC: NEGATIVE NG/ML
HYDROCODONE UR QL CFM: NEGATIVE NG/ML
HYDROCODONE UR QL CFM: NEGATIVE NG/ML
HYDROMORPHONE UR QL CFM: NEGATIVE NG/ML
LORAZEPAM UR QL CFM: NEGATIVE NG/ML
MDMA UR QL CFM: NEGATIVE NG/ML
ME-PHENIDATE UR QL CFM: NEGATIVE NG/ML
MEPERIDINE UR QL CFM: NEGATIVE NG/ML
METHADONE UR QL CFM: NEGATIVE NG/ML
METHAMPHET UR QL CFM: NEGATIVE NG/ML
MORPHINE UR QL CFM: NEGATIVE NG/ML
MORPHINE UR QL CFM: NEGATIVE NG/ML
NORBUPRENORPHINE UR QL CFM: NEGATIVE NG/ML
NORDIAZEPAM UR QL CFM: NEGATIVE NG/ML
NORFENTANYL UR QL CFM: NEGATIVE NG/ML
NORHYDROCODONE UR QL CFM: NEGATIVE NG/ML
NORHYDROCODONE UR QL CFM: NEGATIVE NG/ML
NORMEPERIDINE UR QL CFM: NEGATIVE NG/ML
NOROXYCODONE UR QL CFM: NEGATIVE NG/ML
OXAZEPAM UR QL CFM: NEGATIVE NG/ML
OXYCODONE UR QL CFM: NEGATIVE NG/ML
OXYMORPHONE UR QL CFM: NEGATIVE NG/ML
OXYMORPHONE UR QL CFM: NEGATIVE NG/ML
PCP UR QL CFM: NEGATIVE NG/ML
PHENOBARB UR QL CFM: NEGATIVE NG/ML
RESULT ALL_PRESCRIBED MEDS AND SPECIAL INSTRUCTIONS: NORMAL
SECOBARBITAL UR QL CFM: NEGATIVE NG/ML
SL AMB 3-METHYL-FENTANYL QUANTIFICATION: NORMAL NG/ML
SL AMB 4-ANPP QUANTIFICATION: NORMAL NG/ML
SL AMB 4-FIBF QUANTIFICATION: NORMAL NG/ML
SL AMB 5F-ADB-M7 METABOLITE QUANTIFICATION: NEGATIVE NG/ML
SL AMB 7-OH-MITRAGYNINE (KRATOM ALKALOID) QUANTIFICATION: NEGATIVE NG/ML
SL AMB AB-FUBINACA-M3 METABOLITE QUANTIFICATION: NEGATIVE NG/ML
SL AMB ACETYL FENTANYL QUANTIFICATION: NORMAL NG/ML
SL AMB ACETYL NORFENTANYL QUANTIFICATION: NORMAL NG/ML
SL AMB ACRYL FENTANYL QUANTIFICATION: NORMAL NG/ML
SL AMB BUTRYL FENTANYL QUANTIFICATION: NORMAL NG/ML
SL AMB CARFENTANIL QUANTIFICATION: NORMAL NG/ML
SL AMB CTHC (MARIJUANA METABOLITE) QUANTIFICATION: NEGATIVE NG/ML
SL AMB CYCLOPROPYL FENTANYL QUANTIFICATION: NORMAL NG/ML
SL AMB DEXTROMETHORPHAN QUANTIFICATION: NEGATIVE NG/ML
SL AMB DEXTRORPHAN (DEXTROMETHORPHAN METABOLITE) QUANT: NEGATIVE NG/ML
SL AMB DEXTRORPHAN (DEXTROMETHORPHAN METABOLITE) QUANT: NEGATIVE NG/ML
SL AMB FURANYL FENTANYL QUANTIFICATION: NORMAL NG/ML
SL AMB JWH018 METABOLITE QUANTIFICATION: NEGATIVE NG/ML
SL AMB JWH073 METABOLITE QUANTIFICATION: NEGATIVE NG/ML
SL AMB MDMB-FUBINACA-M1 METABOLITE QUANTIFICATION: NEGATIVE NG/ML
SL AMB METHOXYACETYL FENTANYL QUANTIFICATION: NORMAL NG/ML
SL AMB N-DESMETHYL U-47700 QUANTIFICATION: NORMAL NG/ML
SL AMB N-DESMETHYL-TRAMADOL QUANT-MEASURED RESULT: ABNORMAL NG/ML
SL AMB O-DESMETHYL-TRAMADOL QUANT-MEASURED RESULT_SALIV: ABNORMAL NG/ML
SL AMB PHENTERMINE QUANTIFICATION: NEGATIVE NG/ML
SL AMB RCS4 METABOLITE QUANTIFICATION: NEGATIVE NG/ML
SL AMB RITALINIC ACID QUANTIFICATION: NEGATIVE NG/ML
SL AMB U-47700 QUANTIFICATION: NORMAL NG/ML
SPECIMEN DRAWN SERPL: NEGATIVE NG/ML
TAPENTADOL UR CFM-MCNC: ABNORMAL NG/ML
TEMAZEPAM UR QL CFM: NEGATIVE NG/ML
TEMAZEPAM UR QL CFM: NEGATIVE NG/ML
TIBC SERPL-MCNC: 295 UG/DL (ref 250–450)
TRAMADOL UR CFM-MCNC: ABNORMAL NG/ML

## 2020-01-09 PROCEDURE — 83550 IRON BINDING TEST: CPT | Performed by: INTERNAL MEDICINE

## 2020-01-30 ENCOUNTER — TELEPHONE (OUTPATIENT)
Dept: PAIN MEDICINE | Facility: MEDICAL CENTER | Age: 55
End: 2020-01-30

## 2020-01-30 NOTE — TELEPHONE ENCOUNTER
Pt is calling requesting to schedule a hip injection  Pt stated she discuss this with [place] at last office visit       Pt can be reached at [phone]

## 2020-02-03 DIAGNOSIS — S73.191D TEAR OF RIGHT ACETABULAR LABRUM, SUBSEQUENT ENCOUNTER: Primary | ICD-10-CM

## 2020-02-03 DIAGNOSIS — M25.551 PAIN OF RIGHT HIP JOINT: ICD-10-CM

## 2020-02-03 NOTE — TELEPHONE ENCOUNTER
Order in  Please let her know that she would need a WBC drawn a few days before,  If you can schedule her around her next draw    If not let me know and I can put order in for WBC     thanks

## 2020-02-13 ENCOUNTER — TRANSCRIBE ORDERS (OUTPATIENT)
Dept: ADMINISTRATIVE | Facility: HOSPITAL | Age: 55
End: 2020-02-13

## 2020-02-13 DIAGNOSIS — Z13.820 SCREENING FOR OSTEOPOROSIS: Primary | ICD-10-CM

## 2020-02-14 ENCOUNTER — OFFICE VISIT (OUTPATIENT)
Dept: INTERNAL MEDICINE CLINIC | Facility: CLINIC | Age: 55
End: 2020-02-14
Payer: COMMERCIAL

## 2020-02-14 DIAGNOSIS — Z90.13 S/P BILATERAL MASTECTOMY: ICD-10-CM

## 2020-02-14 DIAGNOSIS — F32.A ANXIETY AND DEPRESSION: ICD-10-CM

## 2020-02-14 DIAGNOSIS — E78.49 OTHER HYPERLIPIDEMIA: ICD-10-CM

## 2020-02-14 DIAGNOSIS — F41.9 ANXIETY AND DEPRESSION: ICD-10-CM

## 2020-02-14 DIAGNOSIS — S73.191D TEAR OF RIGHT ACETABULAR LABRUM, SUBSEQUENT ENCOUNTER: ICD-10-CM

## 2020-02-14 DIAGNOSIS — R45.4 IRRITABILITY AND ANGER: ICD-10-CM

## 2020-02-14 DIAGNOSIS — Z17.0 MALIGNANT NEOPLASM OF UPPER-OUTER QUADRANT OF LEFT BREAST IN FEMALE, ESTROGEN RECEPTOR POSITIVE (HCC): ICD-10-CM

## 2020-02-14 DIAGNOSIS — Z00.00 HEALTH MAINTENANCE EXAMINATION: Primary | ICD-10-CM

## 2020-02-14 DIAGNOSIS — C50.412 MALIGNANT NEOPLASM OF UPPER-OUTER QUADRANT OF LEFT BREAST IN FEMALE, ESTROGEN RECEPTOR POSITIVE (HCC): ICD-10-CM

## 2020-02-14 DIAGNOSIS — K21.9 GASTROESOPHAGEAL REFLUX DISEASE WITHOUT ESOPHAGITIS: ICD-10-CM

## 2020-02-14 PROBLEM — D72.819 LEUKOPENIA: Status: ACTIVE | Noted: 2020-02-14

## 2020-02-14 PROBLEM — T45.1X5A CHEMOTHERAPY-INDUCED NAUSEA: Status: RESOLVED | Noted: 2019-10-03 | Resolved: 2020-02-14

## 2020-02-14 PROBLEM — D25.9 LEIOMYOMA OF UTERUS, UNSPECIFIED: Status: ACTIVE | Noted: 2020-02-14

## 2020-02-14 PROBLEM — R11.0 CHEMOTHERAPY-INDUCED NAUSEA: Status: RESOLVED | Noted: 2019-10-03 | Resolved: 2020-02-14

## 2020-02-14 PROCEDURE — 99396 PREV VISIT EST AGE 40-64: CPT | Performed by: INTERNAL MEDICINE

## 2020-02-14 RX ORDER — ESCITALOPRAM OXALATE 10 MG/1
10 TABLET ORAL DAILY
Qty: 90 TABLET | Refills: 1 | Status: SHIPPED | OUTPATIENT
Start: 2020-02-14 | End: 2020-08-10

## 2020-02-14 RX ORDER — ESCITALOPRAM OXALATE 10 MG/1
10 TABLET ORAL DAILY
Qty: 90 TABLET | Refills: 0
Start: 2020-02-14 | End: 2020-02-14 | Stop reason: SDUPTHER

## 2020-02-14 NOTE — ASSESSMENT & PLAN NOTE
Maintain on Lexapro 10 mg for now, may need to increase dose  Recommend CBT, counseling, info given

## 2020-02-14 NOTE — ASSESSMENT & PLAN NOTE
S/p bilateral mastectomy, completed chemotherapy last month  She is reluctant to start tamoxifen  Repeat echo due

## 2020-02-14 NOTE — PROGRESS NOTES
Assessment/Plan:    Malignant neoplasm of upper-outer quadrant of left breast in female, estrogen receptor positive ([facility] )  S/p bilateral mastectomy, completed chemotherapy last month  She is reluctant to start tamoxifen  Repeat echo due  Tear of right acetabular labrum  Injection scheduled  Tramadol not as effective as Nucyta (changed d/t cost/insurance)  Medical marijuana use  Minimal improvement with regular use  Seasonal allergic rhinitis due to pollen  Instructed to use Flonase prn, saline spray daily  Anxiety and depression  Maintain on Lexapro 10 mg for now, may need to increase dose  Recommend CBT, counseling, info given  Gastroesophageal reflux disease  May try taking omeprazole every 3rd day, famotidine in between  Pain syndrome, chronic  Not taking Nucynta, gabapentin  Hyperlipidemia  Stopped statin 6 months ago  Recheck lipids later this year  Diagnoses and all orders for this visit:    Health maintenance examination  Comments:  Pneumovax later this year  Gastroesophageal reflux disease without esophagitis    Other hyperlipidemia  -     Comprehensive metabolic panel; Future  -     Lipid panel; Future  -     TSH, 3rd generation with Free T4 reflex; Future    Malignant neoplasm of upper-outer quadrant of left breast in female, estrogen receptor positive [facility] )  -     Echo complete with contrast if indicated; Future    Irritability and anger  -     Discontinue: escitalopram (LEXAPRO) 10 mg tablet; Take 1 tablet (10 mg total) by mouth daily  -     escitalopram (LEXAPRO) 10 mg tablet; Take 1 tablet (10 mg total) by mouth daily    Tear of right acetabular labrum, subsequent encounter    Anxiety and depression    S/P bilateral mastectomy      Follow up in 6 months or as needed  Subjective:      Patient ID: [place] Cotton is a 48 y o  female  Ms  Wei Ringer is feeling alright  She continues to feel tired, completed chemotherapy about a month ago    During chemotherapy, she had horrible neuropathy symptoms in pain  This has since resolved  She needs to repeat an echo  She had a bilateral mastectomy back in August, not interested in reconstructive surgery  She is reluctant to start tamoxifen due to possible side effects such as muscle and bone pains, elevated cholesterol and hot flashes, among others  Since about 5 months ago, she has been experiencing more frequent back and hip pain  She was diagnosed with a torn labrum  She has an injection scheduled  Flo Rizzo was changed due to insurance, was given tramadol instead which she says does not really help  She had stop taking gabapentin  She stopped taking simvastatin several months ago since she started chemotherapy  She increased her Lexapro few weeks ago, has felt this has helped  She is asking whether she needs counseling or not  She complains of intermittent right ear pain, uses Flonase as needed only  The following portions of the patient's history were reviewed and updated as appropriate: allergies, current medications, past medical history, past social history, past surgical history and problem list     Review of Systems   Constitutional: Negative for appetite change and fatigue  HENT: Positive for congestion and ear pain  Eyes: Negative for visual disturbance  Respiratory: Negative for cough and shortness of breath  Cardiovascular: Negative for chest pain and leg swelling  Gastrointestinal: Negative for abdominal pain, constipation and diarrhea  Genitourinary: Negative for dysuria and frequency  Musculoskeletal: Positive for arthralgias and back pain  Negative for myalgias  Skin: Negative for rash and wound  Neurological: Negative for dizziness, weakness, numbness and headaches  Psychiatric/Behavioral: Negative for confusion, dysphoric mood and sleep disturbance  The patient is not nervous/anxious  Objective: There were no vitals taken for this visit           Physical Exam Constitutional: She is oriented to person, place, and time  She appears well-developed and well-nourished  HENT:   Head: Normocephalic and atraumatic  Eyes: Pupils are equal, round, and reactive to light  Cardiovascular: Normal rate, regular rhythm and normal heart sounds  Pulmonary/Chest: Effort normal and breath sounds normal  She has no wheezes  Abdominal: Soft  Bowel sounds are normal    Musculoskeletal: She exhibits no edema  Neurological: She is alert and oriented to person, place, and time  Skin: Skin is warm  No rash noted  Psychiatric: She has a normal mood and affect  Her behavior is normal    Nursing note and vitals reviewed  Labs & imaging results reviewed with patient

## 2020-02-18 ENCOUNTER — TELEPHONE (OUTPATIENT)
Dept: RADIOLOGY | Facility: HOSPITAL | Age: 55
End: 2020-02-18

## 2020-02-18 ENCOUNTER — HOSPITAL ENCOUNTER (OUTPATIENT)
Dept: RADIOLOGY | Facility: CLINIC | Age: 55
Discharge: HOME | End: 2020-02-18
Attending: ANESTHESIOLOGY
Payer: COMMERCIAL

## 2020-02-18 VITALS
RESPIRATION RATE: 20 BRPM | OXYGEN SATURATION: 99 % | SYSTOLIC BLOOD PRESSURE: 118 MMHG | DIASTOLIC BLOOD PRESSURE: 78 MMHG | TEMPERATURE: 98.7 F | HEART RATE: 58 BPM

## 2020-02-18 DIAGNOSIS — G89.4 PAIN SYNDROME, CHRONIC: Primary | ICD-10-CM

## 2020-02-18 DIAGNOSIS — S73.191D TEAR OF RIGHT ACETABULAR LABRUM, SUBSEQUENT ENCOUNTER: ICD-10-CM

## 2020-02-18 DIAGNOSIS — M25.551 PAIN OF RIGHT HIP JOINT: ICD-10-CM

## 2020-02-18 PROCEDURE — 20610 DRAIN/INJ JOINT/BURSA W/O US: CPT | Performed by: ANESTHESIOLOGY

## 2020-02-18 PROCEDURE — 77002 NEEDLE LOCALIZATION BY XRAY: CPT

## 2020-02-18 PROCEDURE — 77002 NEEDLE LOCALIZATION BY XRAY: CPT | Performed by: ANESTHESIOLOGY

## 2020-02-18 RX ORDER — 0.9 % SODIUM CHLORIDE 0.9 %
10 VIAL (ML) INJECTION ONCE
Status: COMPLETED | OUTPATIENT
Start: 2020-02-18 | End: 2020-02-18

## 2020-02-18 RX ORDER — METHYLPREDNISOLONE ACETATE 80 MG/ML
80 INJECTION, SUSPENSION INTRA-ARTICULAR; INTRALESIONAL; INTRAMUSCULAR; PARENTERAL; SOFT TISSUE ONCE
Status: COMPLETED | OUTPATIENT
Start: 2020-02-18 | End: 2020-02-18

## 2020-02-18 RX ORDER — BUPIVACAINE HCL/PF 2.5 MG/ML
10 VIAL (ML) INJECTION ONCE
Status: COMPLETED | OUTPATIENT
Start: 2020-02-18 | End: 2020-02-18

## 2020-02-18 RX ADMIN — SODIUM CHLORIDE 2.5 ML: 9 INJECTION, SOLUTION INTRAMUSCULAR; INTRAVENOUS; SUBCUTANEOUS at 09:48

## 2020-02-18 RX ADMIN — METHYLPREDNISOLONE ACETATE 80 MG: 80 INJECTION, SUSPENSION INTRA-ARTICULAR; INTRALESIONAL; INTRAMUSCULAR; PARENTERAL; SOFT TISSUE at 09:50

## 2020-02-18 RX ADMIN — IOHEXOL 1 ML: 300 INJECTION, SOLUTION INTRAVENOUS at 09:50

## 2020-02-18 RX ADMIN — BUPIVACAINE HYDROCHLORIDE 4 ML: 2.5 INJECTION, SOLUTION EPIDURAL; INFILTRATION; INTRACAUDAL at 09:50

## 2020-02-18 RX ADMIN — Medication 2.5 ML: at 09:48

## 2020-02-18 NOTE — H&P
History of Present Illness: The patient is a 48 y o  female who presents with complaints of right hip pain is here today for right hip intra-articular steroid injection    Patient Active Problem List   Diagnosis    Hiatal hernia    Hyperlipidemia    Pain syndrome, chronic    Sacroiliitis ([facility] [address] )    Anxiety and depression    Vitamin B12 deficiency    Seasonal allergic rhinitis due to pollen    Gastroesophageal reflux disease    Costochondritis    Intervertebral disc disorder with radiculopathy of lumbosacral region    Malignant neoplasm of upper-outer quadrant of left breast in female, estrogen receptor positive ([facility] [address] )    Difficulty sleeping    Medical marijuana use    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    Tear of right acetabular labrum    Leukopenia    Leiomyoma of uterus, unspecified    S/P bilateral mastectomy       Past Medical History:   Diagnosis Date    Anxiety     BRCA gene mutation negative 06/20/2019    [facility]    Cancer ([facility] [address] )     breast     Fibrocystic disease of breast     GERD (gastroesophageal reflux disease)     Hiatal hernia     Hyperlipidemia        Past Surgical History:   Procedure Laterality Date    APPENDECTOMY      BREAST BIOPSY      BREAST CYST ASPIRATION Left 2005    COLONOSCOPY      FOOT SURGERY Right 2011    right, hardware removal    IR PORT PLACEMENT  9/27/2019    MASTECTOMY W/ SENTINEL NODE BIOPSY Left 8/9/2019    Procedure: BREAST MASTECTOMY, SENTINEL LYMPH NODE BIOPSY, LYMPHATIC MAPPING W/ BLUE DYE AND RADIOACTIVE DYE (INJECT AT 0730 BY DR WHITEHEAD IN THE O R );  Surgeon: Antonio Mckeon MD;  [facility];  Service: Surgical Oncology    OTHER SURGICAL HISTORY Right 2010    Complete Excision of Fifth Metatarsal Head     AK COLONOSCOPY FLX DX W/COLLJ SPEC WHEN PFRMD N/A 7/20/2018    Procedure: EGD AND COLONOSCOPY;  Surgeon: Melvi Osuna MD;  Location: [facility];   Service: Gastroenterology    AK MASTECTOMY, SIMPLE, COMPLETE Right 8/9/2019    Procedure: BREAST SIMPLE MASTECTOMY;  Surgeon: Renu Quiñonez MD;  [facility];  Service: Surgical Oncology    US GUIDANCE BREAST BIOPSY LEFT EACH ADDITIONAL Left 6/13/2019    US GUIDED BREAST BIOPSY LEFT COMPLETE Left 6/13/2019         Current Outpatient Medications:     acetaminophen (TYLENOL) 500 mg tablet, Take 1,000 mg by mouth every 6 (six) hours as needed for mild pain, Disp: , Rfl:     cholecalciferol (VITAMIN D3) 1,000 units tablet, Take 1,000 Units by mouth daily, Disp: , Rfl:     escitalopram (LEXAPRO) 10 mg tablet, Take 1 tablet (10 mg total) by mouth daily, Disp: 90 tablet, Rfl: 1    fluticasone (FLONASE) 50 mcg/act nasal spray, 1 spray into each nostril as needed , Disp: , Rfl:     granisetron (KYTRIL) 1 mg tablet, Take 1 mg by mouth every 12 (twelve) hours as needed for nausea or vomiting, Disp: , Rfl:     Magnesium 400 MG TABS, Take by mouth 2 (two) times a day, Disp: , Rfl:     metoprolol tartrate (LOPRESSOR) 25 mg tablet, Take 25 mg by mouth every 12 (twelve) hours, Disp: , Rfl:     omeprazole (PriLOSEC) 20 mg delayed release capsule, TAKE 1 CAPSULE BY MOUTH EVERY DAY, Disp: 90 capsule, Rfl: 0    simvastatin (ZOCOR) 10 mg tablet, TAKE 1 TABLET DAILY (Patient not taking: Reported on 2/14/2020), Disp: 90 tablet, Rfl: 1    traMADol (ULTRAM) 50 mg tablet, Take 2 tabs PO QID prn pain, Disp: 240 tablet, Rfl: 2    Current Facility-Administered Medications:     bupivacaine (PF) (MARCAINE) 0 25 % injection 10 mL, 10 mL, Intra-articular, Once, Jesica Puga MD    iohexol (OMNIPAQUE) 300 mg/mL injection 50 mL, 50 mL, Intra-articular, Once, Jesica Puga MD    lidocaine (PF) (XYLOCAINE-MPF) 2 % injection 5 mL, 5 mL, Infiltration, Once, Jesica Puga MD    methylPREDNISolone acetate (DEPO-MEDROL) injection 80 mg, 80 mg, Intra-articular, Once, Jesica Puga MD    sodium chloride (PF) 0 9 % injection 10 mL, 10 mL, Infiltration, Once, Jesica Puga MD    Allergies Allergen Reactions    Sulfa Antibiotics Headache     Annotation - [id number]: Migraine; Annotation - [id number]: cellular issues       Physical Exam:   Vitals:    02/18/20 0931   BP: 118/78   Pulse: 63   Resp: 20   Temp: 98 7 °F (37 1 °C)   SpO2: 99%     General: Awake, Alert, Oriented x 3, Mood and affect appropriate  Respiratory: Respirations even and unlabored  Cardiovascular: Peripheral pulses intact; no edema  Musculoskeletal Exam:   Right hip pain    ASA Score: 2    Patient/Chart Verification  Consents Confirmed: Procedural, To be obtained in the Pre-Procedure area  H&P( within 30 days) Verified: To be obtained in the Pre-Procedure area  Allergies Reviewed: Yes  Anticoag/NSAID held?: NA  Currently on antibiotics?: No    Assessment:   1  Tear of right acetabular labrum, subsequent encounter    2   Pain of right hip joint        Plan: right hip intra-articular joint injection

## 2020-02-18 NOTE — DISCHARGE INSTR - LAB
Steroid Joint Injection   WHAT YOU NEED TO KNOW:   A steroid joint injection is a procedure to inject steroid medicine into a joint  Steroid medicine decreases pain and inflammation  The injection may also contain an anesthetic (numbing medicine) to decrease pain  It may be done to treat conditions such as arthritis, gout, or carpal tunnel syndrome  The injections may be given in your knee, ankle, shoulder, elbow, wrist, ankle or sacroiliac joint  1  Do not apply heat to any area that is numb  If you have discomfort or soreness at the injection site, you may apply ice today, 20 minutes on and 20 minutes off  Tomorrow you may use ice or warm, moist heat  Do not apply ice or heat directly to the skin  2  You may have an increase or change in the discomfort for 36-48 hours after your treatment  Apply ice and continue with any pain medicine you have been prescribed  3  Do not do anything strenuous today  You may shower, but no tub baths or hot tubs today  You may resume your normal activities tomorrow, but do not overdo it  Resume normal activities slowly when you are feeling better  4  If you experience redness, drainage or swelling at the injection site, or if you develop a fever above 100 degrees, please call [facility] at [phone] or go to the Emergency Room  5  Continue to take all routine medicines prescribed by your primary care physician unless otherwise instructed by our staff  Most blood thinners should be started again according to your regularly scheduled dosing  If you have any questions, please give our office a call  If you have a problem specifically related to your procedure, please call our office at [phone]  Problems not related to your procedure should be directed to your primary care physician

## 2020-02-19 ENCOUNTER — HOSPITAL ENCOUNTER (OUTPATIENT)
Dept: RADIOLOGY | Facility: HOSPITAL | Age: 55
Discharge: HOME | End: 2020-02-19
Admitting: RADIOLOGY
Payer: COMMERCIAL

## 2020-02-19 VITALS
RESPIRATION RATE: 18 BRPM | HEART RATE: 82 BPM | DIASTOLIC BLOOD PRESSURE: 66 MMHG | OXYGEN SATURATION: 100 % | SYSTOLIC BLOOD PRESSURE: 139 MMHG

## 2020-02-19 DIAGNOSIS — Z92.21 S/P CHEMOTHERAPY, TIME SINCE 4-12 WEEKS: ICD-10-CM

## 2020-02-19 PROCEDURE — 77001 FLUOROGUIDE FOR VEIN DEVICE: CPT | Performed by: RADIOLOGY

## 2020-02-19 PROCEDURE — 36590 REMOVAL TUNNELED CV CATH: CPT

## 2020-02-19 PROCEDURE — 36590 REMOVAL TUNNELED CV CATH: CPT | Performed by: RADIOLOGY

## 2020-02-19 NOTE — DISCHARGE INSTRUCTIONS
Implanted Venous Access Port Removal    WHAT YOU NEED TO KNOW:   An implanted venous access port is a device used to give treatments and take blood  It may also be called a central venous access device (CVAD)  The port is a small container that is placed under your skin, usually in your upper chest  A port can also be placed in your arm or abdomen  The port is attached to a catheter that enters a large vein  DISCHARGE INSTRUCTIONS:   Resume your normal diet  Small sips of flat soda will help with mild nausea  Prevent an infection:     Wash your hands often  Use soap and water  Clean your hands before and  after you care for your incision  Check your skin for infection every day  Look for redness, swelling, or fluid oozing from the incision site  Dressing may come off in 24 hours  Medical glue will peel off on its own in 5 to 10 days  You may shower 24 hours after procedure  Follow up with your healthcare provider as directed  Write down your questions so you remember to ask them during your visits  Activity:  You may return to your daily activities when the area heals  Contact Interventional Radiology at [phone] [place] PATIENTS: Contact Interventional Radiology at [phone]) [place] PATIENTS: Contact Interventional Radiology at [phone]) if:     You have a fever  You have persistent nausea  Your inciscion site is red, swollen, or draining pus  You have questions or concerns about your condition or care  Seek care immediately or call 911 if:  Blood soaks through your bandage  The skin over or around your incision breaks open  Your heart is jumping or fluttering  You have a headache, blurred vision, and feel confused  You have pain in your arm, neck, shoulder, or chest     You have trouble breathing that is getting worse over time

## 2020-02-24 ENCOUNTER — OFFICE VISIT (OUTPATIENT)
Dept: GYNECOLOGY | Facility: CLINIC | Age: 55
End: 2020-02-24
Payer: COMMERCIAL

## 2020-02-24 VITALS
DIASTOLIC BLOOD PRESSURE: 78 MMHG | BODY MASS INDEX: 23.66 KG/M2 | SYSTOLIC BLOOD PRESSURE: 126 MMHG | WEIGHT: 142 LBS | HEIGHT: 65 IN

## 2020-02-24 DIAGNOSIS — Z01.419 ENCNTR FOR GYN EXAM (GENERAL) (ROUTINE) W/O ABN FINDINGS: Primary | ICD-10-CM

## 2020-02-24 DIAGNOSIS — N95.2 VAGINAL ATROPHY: ICD-10-CM

## 2020-02-24 DIAGNOSIS — Z90.13 HISTORY OF BILATERAL MASTECTOMY: ICD-10-CM

## 2020-02-24 PROCEDURE — G0145 SCR C/V CYTO,THINLAYER,RESCR: HCPCS | Performed by: NURSE PRACTITIONER

## 2020-02-24 PROCEDURE — 87624 HPV HI-RISK TYP POOLED RSLT: CPT | Performed by: NURSE PRACTITIONER

## 2020-02-24 PROCEDURE — 99396 PREV VISIT EST AGE 40-64: CPT | Performed by: NURSE PRACTITIONER

## 2020-02-24 NOTE — PATIENT INSTRUCTIONS
Calcium 1200-1500mg + 600-1000 IU Vit D daily  Exercise 150 minutes per week minimum including weight bearing exercises  Pap with high risk HPV Q 5 years  Colonoscopy-Up to date  Kegels 20 times twice daily  Silicone based lubricant with sex  Vaginal moisturizers twice weekly as needed

## 2020-02-24 NOTE — PROGRESS NOTES
Assessment/Plan:     Calcium 1200-1500mg + 600-1000 IU Vit D daily  Exercise 150 minutes per week minimum including weight bearing exercises  Pap with high risk HPV Q 5 years  Colonoscopy-Up to date  Kegels 20 times twice daily  Silicone based lubricant with sex  Vaginal moisturizers twice weekly as needed  HQ-2 score of 3; PHQ-9 score of 9  Hope to follow up with a therapist    Diagnoses and all orders for this visit:    Encntr for gyn exam (general) (routine) w/o abn findings  -     Liquid-based pap, screening    Vaginal atrophy    History of bilateral mastectomy              Subjective:      Patient ID: Trellis Eisenmenger is a 48 y o  female  Trellis Eisenmenger is a 48 y o  female who is here today as a new patient for her annual visit  Former patient of Dr Marcos Lomas with last gyn exam around 3 years ago  6/18/19 diagnosed with left invasive ductal breast cancer  6/19 BRCA negative  8/10 left mastectomy; right prophylactic mastectomy  No vaginal bleeding  Admits to insertional dyspareunia and I"it feels like glass  " She has not had breast reconstruction post bilateral mastectomy and is pleased to "be flat"  Recent removal of right chest port a cath  Just starting to exercise  Trellis Eisenmenger is sexually active with male partner/ of 15 years  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 [employer]  Also works as a nurse at [employer]  The following portions of the patient's history were reviewed and updated as appropriate: allergies, current medications, past family history, past medical history, past social history, past surgical history and problem list     Review of Systems   Constitutional: Negative  Negative for activity change, appetite change, chills, diaphoresis, fatigue, fever and unexpected weight change  HENT: Negative for congestion, dental problem, sneezing, sore throat and trouble swallowing  Eyes: Negative for visual disturbance     Respiratory: Negative for chest tightness and shortness of breath  Cardiovascular: Negative for chest pain and leg swelling  Gastrointestinal: Negative for abdominal pain, constipation, diarrhea, nausea and vomiting  Genitourinary: Positive for dyspareunia and flank pain  Negative for difficulty urinating, dysuria, frequency, hematuria, pelvic pain, urgency, vaginal bleeding, vaginal discharge and vaginal pain  Musculoskeletal: Negative for back pain and neck pain  Skin: Negative  Allergic/Immunologic: Negative  Neurological: Negative for weakness and headaches  Hematological: Negative for adenopathy  Psychiatric/Behavioral: Negative  Objective:      /78 (BP Location: Left arm, Patient Position: Sitting, Cuff Size: Standard)   Ht 5' 4 5" (1 638 m)   Wt 64 4 kg (142 lb)   LMP 10/07/2019   BMI 24 00 kg/m²          Physical Exam   Constitutional: She is oriented to person, place, and time  Vital signs are normal  She appears well-developed and well-nourished  HENT:   Head: Normocephalic and atraumatic  Eyes: Right eye exhibits no discharge  Left eye exhibits no discharge  Neck: Trachea normal and normal range of motion  Neck supple  No thyromegaly present  Cardiovascular: Normal rate, regular rhythm, normal heart sounds and intact distal pulses  Pulmonary/Chest: Effort normal and breath sounds normal  Right breast exhibits no mass, no skin change and no tenderness  Left breast exhibits no mass, no skin change and no tenderness  Bilateral mastectomy scars  No nipple reconstruction       Abdominal: Soft  Normal appearance  Genitourinary: Uterus normal  Rectal exam shows no external hemorrhoid  Pelvic exam was performed with patient supine  No labial fusion  There is no rash, tenderness, lesion or injury on the right labia  There is no rash, tenderness, lesion or injury on the left labia  Cervix exhibits no motion tenderness, no discharge and no friability   Right adnexum displays no mass, no tenderness and no fullness  Left adnexum displays no mass, no tenderness and no fullness  No erythema, tenderness or bleeding in the vagina  No foreign body in the vagina  No signs of injury around the vagina  No vaginal discharge found  Genitourinary Comments: Vulvovaginal atrophy  Slight contact bleeding on cervix   Musculoskeletal: Normal range of motion  Lymphadenopathy:        Head (right side): No submental, no submandibular and no tonsillar adenopathy present  Head (left side): No submental, no submandibular and no tonsillar adenopathy present  She has no cervical adenopathy  She has no axillary adenopathy  No inguinal adenopathy noted on the right or left side  Right: No inguinal adenopathy present  Left: No inguinal adenopathy present  Neurological: She is alert and oriented to person, place, and time  Skin: Skin is warm and dry  alopecia   Psychiatric: She has a normal mood and affect  Nursing note and vitals reviewed

## 2020-02-25 ENCOUNTER — TELEPHONE (OUTPATIENT)
Dept: PAIN MEDICINE | Facility: CLINIC | Age: 55
End: 2020-02-25

## 2020-02-25 DIAGNOSIS — G89.4 PAIN SYNDROME, CHRONIC: ICD-10-CM

## 2020-02-25 LAB
HPV HR 12 DNA CVX QL NAA+PROBE: NEGATIVE
HPV16 DNA CVX QL NAA+PROBE: NEGATIVE
HPV18 DNA CVX QL NAA+PROBE: NEGATIVE

## 2020-02-26 NOTE — TELEPHONE ENCOUNTER
Patient states that she has a lot of improvement  Patient states that now her hip feels better she feel her SIJ more  Pain scale is 2/10 with 80% improvement  Patient also states that her Nucynta was refilled on 2/18  Patient states that she will run out of medication on 3/18  Patient wants to know if we can please send a refill over  Patient has appointment with Ayden Hicks on 3/30  Correct pharmacy on file

## 2020-02-27 LAB
LAB AP GYN PRIMARY INTERPRETATION: NORMAL
Lab: NORMAL

## 2020-02-28 ENCOUNTER — HOSPITAL ENCOUNTER (OUTPATIENT)
Dept: NON INVASIVE DIAGNOSTICS | Facility: CLINIC | Age: 55
Discharge: HOME | End: 2020-02-28
Payer: COMMERCIAL

## 2020-02-28 DIAGNOSIS — Z17.0 MALIGNANT NEOPLASM OF UPPER-OUTER QUADRANT OF LEFT BREAST IN FEMALE, ESTROGEN RECEPTOR POSITIVE (HCC): ICD-10-CM

## 2020-02-28 DIAGNOSIS — C50.412 MALIGNANT NEOPLASM OF UPPER-OUTER QUADRANT OF LEFT BREAST IN FEMALE, ESTROGEN RECEPTOR POSITIVE (HCC): ICD-10-CM

## 2020-02-28 PROCEDURE — 93306 TTE W/DOPPLER COMPLETE: CPT

## 2020-02-28 PROCEDURE — 93306 TTE W/DOPPLER COMPLETE: CPT | Performed by: INTERNAL MEDICINE

## 2020-03-18 ENCOUNTER — HOSPITAL ENCOUNTER (OUTPATIENT)
Dept: RADIOLOGY | Age: 55
Discharge: HOME | End: 2020-03-18
Payer: COMMERCIAL

## 2020-03-18 DIAGNOSIS — Z13.820 SCREENING FOR OSTEOPOROSIS: ICD-10-CM

## 2020-03-18 PROCEDURE — 77080 DXA BONE DENSITY AXIAL: CPT

## 2020-03-30 ENCOUNTER — TELEPHONE (OUTPATIENT)
Dept: RHEUMATOLOGY | Facility: CLINIC | Age: 55
End: 2020-03-30

## 2020-03-30 ENCOUNTER — TELEMEDICINE (OUTPATIENT)
Dept: PAIN MEDICINE | Facility: CLINIC | Age: 55
End: 2020-03-30
Payer: COMMERCIAL

## 2020-03-30 ENCOUNTER — TELEPHONE (OUTPATIENT)
Dept: PAIN MEDICINE | Facility: CLINIC | Age: 55
End: 2020-03-30

## 2020-03-30 DIAGNOSIS — G89.29 CHRONIC BILATERAL LOW BACK PAIN WITH SCIATICA, SCIATICA LATERALITY UNSPECIFIED: ICD-10-CM

## 2020-03-30 DIAGNOSIS — S73.191D TEAR OF RIGHT ACETABULAR LABRUM, SUBSEQUENT ENCOUNTER: ICD-10-CM

## 2020-03-30 DIAGNOSIS — F11.20 UNCOMPLICATED OPIOID DEPENDENCE (HCC): ICD-10-CM

## 2020-03-30 DIAGNOSIS — G89.4 CHRONIC PAIN SYNDROME: Primary | ICD-10-CM

## 2020-03-30 DIAGNOSIS — Z79.891 LONG-TERM CURRENT USE OF OPIATE ANALGESIC: ICD-10-CM

## 2020-03-30 DIAGNOSIS — G89.4 PAIN SYNDROME, CHRONIC: ICD-10-CM

## 2020-03-30 DIAGNOSIS — M25.551 PAIN OF RIGHT HIP JOINT: ICD-10-CM

## 2020-03-30 DIAGNOSIS — M54.40 CHRONIC BILATERAL LOW BACK PAIN WITH SCIATICA, SCIATICA LATERALITY UNSPECIFIED: ICD-10-CM

## 2020-03-30 DIAGNOSIS — M46.1 SACROILIITIS (HCC): ICD-10-CM

## 2020-03-30 PROCEDURE — 99441 PR PHYS/QHP TELEPHONE EVALUATION 5-10 MIN: CPT | Performed by: NURSE PRACTITIONER

## 2020-03-30 NOTE — PATIENT INSTRUCTIONS
The patient has a history of chronic pain syndrome secondary to low back pain, lumbar intervertebral disc disorder, sacroiliitis, and right hip labral tear  The patient has been doing well since the last office visit  She had obtained 80% pain relief from the right intra-articular hip injection she had on February 18, 2020  She is currently taking Nucynta 1 tab in the morning, and 1 tab in the afternoon and has been taking tramadol during the day  The patient was advised that she can only take 1 short-acting opioid  She would prefer to stay on Nucynta 75 mg 4 times a day, at this provides more relief  Therefore, 2 months of prescriptions for Nucynta 75 mg were sent to the pharmacy with do not fill date of April 14th 2020 and May 12, 2020  She has stopped using medical marijuana now that her chemotherapy treatments are complete    [place] prescription monitoring drug program report was reviewed and was appropriate  There are risks associated with opioid medications, including dependence, addiction and tolerance  The patient understands and agrees to use these medications only as prescribed  Potential side effects of the medications include, but are not limited to, constipation, drowsiness, addiction, impaired judgment and risk of fatal overdose if not taken as prescribed  The patient was warned against driving while taking sedation medications  Sharing medications is a felony  At this point in time, the patient is showing no signs of addiction, abuse, diversion or suicidal ideation

## 2020-03-30 NOTE — PROGRESS NOTES
Virtual Brief Visit    Problem List Items Addressed This Visit        Musculoskeletal and Integument    Sacroiliitis (HCC)    Tear of right acetabular labrum       Other    Pain syndrome, chronic    Pain of right hip joint      Other Visit Diagnoses     Chronic pain syndrome    -  Primary    Chronic bilateral low back pain with sciatica, sciatica laterality unspecified        Uncomplicated opioid dependence ([address] )        Long-term current use of opiate analgesic                    Reason for visit is follow up     Encounter provider RUDOLPH Locke    Provider located at [address] [place] [phone]      Recent Visits  No visits were found meeting these conditions  Showing recent visits within past 7 days and meeting all other requirements     Today's Visits  Date Type Provider Dept   03/30/20 Telemedicine RUD[facility]PH [facility]   Showing today's visits and meeting all other requirements     Future Appointments  No visits were found meeting these conditions  Showing future appointments within next 150 days and meeting all other requirements        After connecting through telephone, the patient was identified by name and date of birth  Josselin Chapa was informed that this is a telemedicine visit and that the visit is being conducted through telephone  My office door was closed  No one else was in the room  She acknowledged consent and understanding of privacy and security of the video platform  The patient has agreed to participate and understands they can discontinue the visit at any time  Patient is aware this is a billable service  Subjective  Josselin Chapa is a 48 y o  female who is concerned with the current crisis regarding COVID19 and has opted to proceed with the telephone visit, however, is currently not noting any signs or symptoms at this time        The patient has a history of chronic pain syndrome secondary to low back pain, lumbar intervertebral disc disorder, sacroiliitis, and right hip labral tear  Patient continues with ongoing right-sided low back pain which radiates in the posterior aspect of her right leg  She states she did obtain 80% pain relief after undergoing a right intra-articular hip injection on February 18, 2020  She states her symptoms wax and wane, and certain days are worse than others  She contributes the worsening pain to the damp weather  She recently started tamoxifen now that her chemotherapy treatments are over  She states she has been waking up with joint pain which subsides later in the day  She has stopped using medical marijuana another her chemotherapy is complete  She continues to take Nucynta 75 mg 4 times a day  She states she recently tried to take the Nucynta in the morning and at night only and take tramadol during the day  She is wondering if she can continue with this routine  She denies side effects or bowel or bladder issues      Past Medical History:   Diagnosis Date    Anxiety     BRCA gene mutation negative 06/20/2019    [facility])     breast     Fibrocystic disease of breast     GERD (gastroesophageal reflux disease)     Hiatal hernia     Hyperlipidemia        Past Surgical History:   Procedure Laterality Date    APPENDECTOMY      BREAST BIOPSY      BREAST CYST ASPIRATION Left 2005    COLONOSCOPY      FOOT SURGERY Right 2011    right, hardware removal    IR PORT PLACEMENT  9/27/2019    IR PORT REMOVAL  2/19/2020    MASTECTOMY W/ SENTINEL NODE BIOPSY Left 8/9/2019    Procedure: BREAST MASTECTOMY, SENTINEL LYMPH NODE BIOPSY, LYMPHATIC MAPPING W/ BLUE DYE AND RADIOACTIVE DYE (INJECT AT 0730 BY DR WHITEHEAD IN THE O R );  Surgeon: Sarthak Pena MD;  [facility];  Service: Surgical Oncology    OTHER SURGICAL HISTORY Right 2010    Complete Excision of Fifth Metatarsal Head     IN COLONOSCOPY FLX DX W/COLLJ [facility] WHEN PFRMD N/A 7/20/2018    Procedure: EGD AND COLONOSCOPY;  Surgeon: Christiano Samano MD;  Location: [facility]; Service: Gastroenterology    IN MASTECTOMY, SIMPLE, COMPLETE Right 8/9/2019    Procedure: BREAST SIMPLE MASTECTOMY;  Surgeon: Loli Wilkinson MD;  [facility];  Service: Surgical Oncology    US GUIDANCE BREAST BIOPSY LEFT EACH ADDITIONAL Left 6/13/2019    US GUIDED BREAST BIOPSY LEFT COMPLETE Left 6/13/2019       Current Outpatient Medications   Medication Sig Dispense Refill    acetaminophen (TYLENOL) 500 mg tablet Take 1,000 mg by mouth every 6 (six) hours as needed for mild pain      cholecalciferol (VITAMIN D3) 1,000 units tablet Take 1,000 Units by mouth daily      escitalopram (LEXAPRO) 10 mg tablet Take 1 tablet (10 mg total) by mouth daily 90 tablet 1    fluticasone (FLONASE) 50 mcg/act nasal spray 1 spray into each nostril as needed       granisetron (KYTRIL) 1 mg tablet Take 1 mg by mouth every 12 (twelve) hours as needed for nausea or vomiting      Magnesium 400 MG TABS Take by mouth 2 (two) times a day      metoprolol tartrate (LOPRESSOR) 25 mg tablet Take 25 mg by mouth every 12 (twelve) hours      omeprazole (PriLOSEC) 20 mg delayed release capsule TAKE 1 CAPSULE BY MOUTH EVERY DAY 90 capsule 0    simvastatin (ZOCOR) 10 mg tablet TAKE 1 TABLET DAILY 90 tablet 1    tapentadol (NUCYNTA) 75 mg tablet Take 1 tablet (75 mg total) by mouth every 6 (six) hours as needed for moderate pain or severe painMax Daily Amount: 300 mg 120 tablet 0     No current facility-administered medications for this visit  Allergies   Allergen Reactions    Sulfa Antibiotics Headache     Annotation - [id number]: Migraine; Annotation - [id number]: cellular issues       Review of Systems   Musculoskeletal: Positive for back pain  The patient has a history of chronic pain syndrome secondary to low back pain, lumbar intervertebral disc disorder, sacroiliitis, and right hip labral tear  The patient has been doing well since the last office visit  She had obtained 80% pain relief from the right intra-articular hip injection she had on February 18, 2020  She is currently taking Nucynta 1 tab in the morning, and 1 tab in the afternoon and has been taking tramadol during the day  The patient was advised that she can only take 1 short-acting opioid  She would prefer to stay on Nucynta 75 mg 4 times a day, as this provides more relief  Therefore 2 months of prescriptions for Nucynta 75 mg were sent to the pharmacy with do not fill date of April 14th 2020 and May 12, 2020  She has stopped using medical marijuana now that her chemotherapy treatments are complete    [place] prescription monitoring drug program report was reviewed and was appropriate  There are risks associated with opioid medications, including dependence, addiction and tolerance  The patient understands and agrees to use these medications only as prescribed  Potential side effects of the medications include, but are not limited to, constipation, drowsiness, addiction, impaired judgment and risk of fatal overdose if not taken as prescribed  The patient was warned against driving while taking sedation medications  Sharing medications is a felony  At this point in time, the patient is showing no signs of addiction, abuse, diversion or suicidal ideation  I spent 10 minutes with the patient during this visit

## 2020-04-06 DIAGNOSIS — K21.9 GASTROESOPHAGEAL REFLUX DISEASE WITHOUT ESOPHAGITIS: ICD-10-CM

## 2020-04-06 RX ORDER — OMEPRAZOLE 20 MG/1
CAPSULE, DELAYED RELEASE ORAL
Qty: 90 CAPSULE | Refills: 1 | Status: SHIPPED | OUTPATIENT
Start: 2020-04-06 | End: 2020-05-27 | Stop reason: HOSPADM

## 2020-05-21 ENCOUNTER — TELEPHONE (OUTPATIENT)
Dept: SURGICAL ONCOLOGY | Facility: CLINIC | Age: 55
End: 2020-05-21

## 2020-05-27 ENCOUNTER — OFFICE VISIT (OUTPATIENT)
Dept: SURGICAL ONCOLOGY | Facility: CLINIC | Age: 55
End: 2020-05-27
Payer: COMMERCIAL

## 2020-05-27 VITALS
WEIGHT: 145 LBS | SYSTOLIC BLOOD PRESSURE: 112 MMHG | BODY MASS INDEX: 24.16 KG/M2 | DIASTOLIC BLOOD PRESSURE: 72 MMHG | HEIGHT: 65 IN | HEART RATE: 63 BPM | RESPIRATION RATE: 16 BRPM | TEMPERATURE: 97.1 F

## 2020-05-27 DIAGNOSIS — Z79.810 USE OF TAMOXIFEN (NOLVADEX): ICD-10-CM

## 2020-05-27 DIAGNOSIS — C50.912 MALIGNANT NEOPLASM OF LEFT BREAST IN FEMALE, ESTROGEN RECEPTOR POSITIVE, UNSPECIFIED SITE OF BREAST (HCC): Primary | ICD-10-CM

## 2020-05-27 DIAGNOSIS — Z17.0 MALIGNANT NEOPLASM OF LEFT BREAST IN FEMALE, ESTROGEN RECEPTOR POSITIVE, UNSPECIFIED SITE OF BREAST (HCC): Primary | ICD-10-CM

## 2020-05-27 PROBLEM — Z85.3 HISTORY OF LEFT BREAST CANCER: Status: ACTIVE | Noted: 2019-06-18

## 2020-05-27 PROBLEM — Z08 ENCOUNTER FOR FOLLOW-UP EXAMINATION AFTER COMPLETED TREATMENT FOR MALIGNANT NEOPLASM: Status: ACTIVE | Noted: 2020-05-27

## 2020-05-27 PROCEDURE — 99214 OFFICE O/P EST MOD 30 MIN: CPT | Performed by: NURSE PRACTITIONER

## 2020-05-27 PROCEDURE — 3008F BODY MASS INDEX DOCD: CPT | Performed by: NURSE PRACTITIONER

## 2020-05-27 PROCEDURE — 1036F TOBACCO NON-USER: CPT | Performed by: NURSE PRACTITIONER

## 2020-05-27 RX ORDER — FAMOTIDINE 20 MG/1
20 TABLET, FILM COATED ORAL 2 TIMES DAILY
COMMUNITY
End: 2020-08-26 | Stop reason: SDUPTHER

## 2020-05-27 RX ORDER — GABAPENTIN 300 MG/1
300 CAPSULE ORAL 2 TIMES DAILY
COMMUNITY
Start: 2020-05-03 | End: 2020-06-08 | Stop reason: SDUPTHER

## 2020-05-27 RX ORDER — TAMOXIFEN CITRATE 20 MG/1
20 TABLET ORAL DAILY
COMMUNITY
Start: 2020-02-24

## 2020-06-03 RX ORDER — GABAPENTIN 300 MG/1
CAPSULE ORAL
Qty: 60 CAPSULE | Refills: 5 | OUTPATIENT
Start: 2020-06-03

## 2020-06-08 ENCOUNTER — OFFICE VISIT (OUTPATIENT)
Dept: PAIN MEDICINE | Facility: CLINIC | Age: 55
End: 2020-06-08
Payer: COMMERCIAL

## 2020-06-08 VITALS
HEIGHT: 64 IN | BODY MASS INDEX: 24.89 KG/M2 | HEART RATE: 60 BPM | SYSTOLIC BLOOD PRESSURE: 100 MMHG | DIASTOLIC BLOOD PRESSURE: 70 MMHG

## 2020-06-08 DIAGNOSIS — S73.191D TEAR OF RIGHT ACETABULAR LABRUM, SUBSEQUENT ENCOUNTER: ICD-10-CM

## 2020-06-08 DIAGNOSIS — M46.1 SACROILIITIS (HCC): ICD-10-CM

## 2020-06-08 DIAGNOSIS — Z79.891 LONG-TERM CURRENT USE OF OPIATE ANALGESIC: ICD-10-CM

## 2020-06-08 DIAGNOSIS — G89.4 CHRONIC PAIN SYNDROME: Primary | ICD-10-CM

## 2020-06-08 DIAGNOSIS — G89.4 PAIN SYNDROME, CHRONIC: ICD-10-CM

## 2020-06-08 DIAGNOSIS — M25.551 RIGHT HIP PAIN: ICD-10-CM

## 2020-06-08 DIAGNOSIS — F11.20 UNCOMPLICATED OPIOID DEPENDENCE (HCC): ICD-10-CM

## 2020-06-08 DIAGNOSIS — G89.29 CHRONIC BILATERAL LOW BACK PAIN, UNSPECIFIED WHETHER SCIATICA PRESENT: ICD-10-CM

## 2020-06-08 DIAGNOSIS — M54.50 CHRONIC BILATERAL LOW BACK PAIN, UNSPECIFIED WHETHER SCIATICA PRESENT: ICD-10-CM

## 2020-06-08 PROCEDURE — 1036F TOBACCO NON-USER: CPT | Performed by: NURSE PRACTITIONER

## 2020-06-08 PROCEDURE — 99214 OFFICE O/P EST MOD 30 MIN: CPT | Performed by: NURSE PRACTITIONER

## 2020-06-08 PROCEDURE — 80305 DRUG TEST PRSMV DIR OPT OBS: CPT | Performed by: NURSE PRACTITIONER

## 2020-06-08 RX ORDER — GABAPENTIN 300 MG/1
900 CAPSULE ORAL
Qty: 270 CAPSULE | Refills: 5 | Status: SHIPPED | OUTPATIENT
Start: 2020-06-08 | End: 2020-08-26 | Stop reason: ALTCHOICE

## 2020-06-08 RX ORDER — TRAMADOL HYDROCHLORIDE 50 MG/1
TABLET ORAL
Qty: 150 TABLET | Refills: 2 | Status: SHIPPED | OUTPATIENT
Start: 2020-06-08 | End: 2020-07-02

## 2020-06-12 LAB
6MAM UR QL CFM: NEGATIVE NG/ML
7AMINOCLONAZEPAM UR QL CFM: NEGATIVE NG/ML
A-OH ALPRAZ UR QL CFM: NEGATIVE NG/ML
AMPHET UR QL CFM: NEGATIVE NG/ML
AMPHET UR QL CFM: NEGATIVE NG/ML
BUPRENORPHINE UR QL CFM: NEGATIVE NG/ML
BUTALBITAL UR QL CFM: NEGATIVE NG/ML
BZE UR QL CFM: NEGATIVE NG/ML
CODEINE UR QL CFM: NEGATIVE NG/ML
EDDP UR QL CFM: NEGATIVE NG/ML
ETHYL GLUCURONIDE UR QL CFM: NEGATIVE NG/ML
ETHYL SULFATE UR QL SCN: NEGATIVE NG/ML
FENTANYL UR QL CFM: NEGATIVE NG/ML
GLIADIN IGG SER IA-ACNC: NEGATIVE NG/ML
HYDROCODONE UR QL CFM: NEGATIVE NG/ML
HYDROCODONE UR QL CFM: NEGATIVE NG/ML
HYDROMORPHONE UR QL CFM: NEGATIVE NG/ML
LORAZEPAM UR QL CFM: NEGATIVE NG/ML
MDMA UR QL CFM: NEGATIVE NG/ML
ME-PHENIDATE UR QL CFM: NEGATIVE NG/ML
MEPERIDINE UR QL CFM: NEGATIVE NG/ML
METHADONE UR QL CFM: NEGATIVE NG/ML
METHAMPHET UR QL CFM: NEGATIVE NG/ML
MORPHINE UR QL CFM: NEGATIVE NG/ML
MORPHINE UR QL CFM: NEGATIVE NG/ML
NORBUPRENORPHINE UR QL CFM: NEGATIVE NG/ML
NORDIAZEPAM UR QL CFM: NEGATIVE NG/ML
NORFENTANYL UR QL CFM: NEGATIVE NG/ML
NORHYDROCODONE UR QL CFM: NEGATIVE NG/ML
NORHYDROCODONE UR QL CFM: NEGATIVE NG/ML
NORMEPERIDINE UR QL CFM: NEGATIVE NG/ML
NOROXYCODONE UR QL CFM: NEGATIVE NG/ML
OXAZEPAM UR QL CFM: NEGATIVE NG/ML
OXYCODONE UR QL CFM: NEGATIVE NG/ML
OXYMORPHONE UR QL CFM: NEGATIVE NG/ML
OXYMORPHONE UR QL CFM: NEGATIVE NG/ML
PCP UR QL CFM: NEGATIVE NG/ML
PHENOBARB UR QL CFM: NEGATIVE NG/ML
SECOBARBITAL UR QL CFM: NEGATIVE NG/ML
SL AMB 3-METHYL-FENTANYL QUANTIFICATION: NORMAL NG/ML
SL AMB 4-ANPP QUANTIFICATION: NORMAL NG/ML
SL AMB 4-FIBF QUANTIFICATION: NORMAL NG/ML
SL AMB 5F-ADB-M7 METABOLITE QUANTIFICATION: NEGATIVE NG/ML
SL AMB 7-OH-MITRAGYNINE (KRATOM ALKALOID) QUANTIFICATION: NEGATIVE NG/ML
SL AMB AB-FUBINACA-M3 METABOLITE QUANTIFICATION: NEGATIVE NG/ML
SL AMB ACETYL FENTANYL QUANTIFICATION: NORMAL NG/ML
SL AMB ACETYL NORFENTANYL QUANTIFICATION: NORMAL NG/ML
SL AMB ACRYL FENTANYL QUANTIFICATION: NORMAL NG/ML
SL AMB BUTRYL FENTANYL QUANTIFICATION: NORMAL NG/ML
SL AMB CARFENTANIL QUANTIFICATION: NORMAL NG/ML
SL AMB CTHC (MARIJUANA METABOLITE) QUANTIFICATION: NEGATIVE NG/ML
SL AMB CYCLOPROPYL FENTANYL QUANTIFICATION: NORMAL NG/ML
SL AMB DEXTROMETHORPHAN QUANTIFICATION: NEGATIVE NG/ML
SL AMB DEXTRORPHAN (DEXTROMETHORPHAN METABOLITE) QUANT: NEGATIVE NG/ML
SL AMB DEXTRORPHAN (DEXTROMETHORPHAN METABOLITE) QUANT: NEGATIVE NG/ML
SL AMB FURANYL FENTANYL QUANTIFICATION: NORMAL NG/ML
SL AMB JWH018 METABOLITE QUANTIFICATION: NEGATIVE NG/ML
SL AMB JWH073 METABOLITE QUANTIFICATION: NEGATIVE NG/ML
SL AMB MDMB-FUBINACA-M1 METABOLITE QUANTIFICATION: NEGATIVE NG/ML
SL AMB METHOXYACETYL FENTANYL QUANTIFICATION: NORMAL NG/ML
SL AMB N-DESMETHYL U-47700 QUANTIFICATION: NORMAL NG/ML
SL AMB N-DESMETHYL-TRAMADOL QUANTIFICATION: ABNORMAL NG/ML
SL AMB PHENTERMINE QUANTIFICATION: NEGATIVE NG/ML
SL AMB RCS4 METABOLITE QUANTIFICATION: NEGATIVE NG/ML
SL AMB RITALINIC ACID QUANTIFICATION: NEGATIVE NG/ML
SL AMB U-47700 QUANTIFICATION: NORMAL NG/ML
SPECIMEN DRAWN SERPL: NEGATIVE NG/ML
TAPENTADOL UR CFM-MCNC: >6400 NG/ML
TEMAZEPAM UR QL CFM: NEGATIVE NG/ML
TEMAZEPAM UR QL CFM: NEGATIVE NG/ML
TRAMADOL UR QL CFM: ABNORMAL NG/ML
URATE/CREAT 24H UR: ABNORMAL NG/ML

## 2020-06-30 ENCOUNTER — TELEPHONE (OUTPATIENT)
Dept: PSYCHIATRY | Facility: CLINIC | Age: 55
End: 2020-06-30

## 2020-07-01 ENCOUNTER — TELEPHONE (OUTPATIENT)
Dept: PSYCHIATRY | Facility: CLINIC | Age: 55
End: 2020-07-01

## 2020-07-02 DIAGNOSIS — Z79.891 LONG-TERM CURRENT USE OF OPIATE ANALGESIC: ICD-10-CM

## 2020-07-02 DIAGNOSIS — G89.4 PAIN SYNDROME, CHRONIC: ICD-10-CM

## 2020-07-02 NOTE — PROGRESS NOTES
Spoke to carissa at [facility] and she cancelled tramadol 50 mg 2 refills  2 scripts nucynta 75 mg sent( fill dates 7/2/20 and 7/31/20)   Patient notified thru my chart

## 2020-07-08 ENCOUNTER — TELEPHONE (OUTPATIENT)
Dept: RHEUMATOLOGY | Facility: CLINIC | Age: 55
End: 2020-07-08

## 2020-08-08 DIAGNOSIS — R45.4 IRRITABILITY AND ANGER: ICD-10-CM

## 2020-08-10 RX ORDER — ESCITALOPRAM OXALATE 10 MG/1
TABLET ORAL
Qty: 90 TABLET | Refills: 1 | Status: SHIPPED | OUTPATIENT
Start: 2020-08-10 | End: 2021-01-29

## 2020-08-24 ENCOUNTER — LAB (OUTPATIENT)
Dept: LAB | Age: 55
End: 2020-08-24
Payer: COMMERCIAL

## 2020-08-24 DIAGNOSIS — E78.49 OTHER HYPERLIPIDEMIA: ICD-10-CM

## 2020-08-24 LAB
ALBUMIN SERPL BCP-MCNC: 3.9 G/DL (ref 3.5–5)
ALP SERPL-CCNC: 51 U/L (ref 46–116)
ALT SERPL W P-5'-P-CCNC: 29 U/L (ref 12–78)
ANION GAP SERPL CALCULATED.3IONS-SCNC: 3 MMOL/L (ref 4–13)
AST SERPL W P-5'-P-CCNC: 16 U/L (ref 5–45)
BILIRUB SERPL-MCNC: 0.4 MG/DL (ref 0.2–1)
BUN SERPL-MCNC: 17 MG/DL (ref 5–25)
CALCIUM SERPL-MCNC: 9.3 MG/DL (ref 8.3–10.1)
CHLORIDE SERPL-SCNC: 108 MMOL/L (ref 100–108)
CHOLEST SERPL-MCNC: 256 MG/DL (ref 50–200)
CO2 SERPL-SCNC: 31 MMOL/L (ref 21–32)
CREAT SERPL-MCNC: 0.86 MG/DL (ref 0.6–1.3)
GFR SERPL CREATININE-BSD FRML MDRD: 78 ML/MIN/1.73SQ M
GLUCOSE P FAST SERPL-MCNC: 105 MG/DL (ref 65–99)
HDLC SERPL-MCNC: 84 MG/DL
LDLC SERPL CALC-MCNC: 146 MG/DL (ref 0–100)
NONHDLC SERPL-MCNC: 172 MG/DL
POTASSIUM SERPL-SCNC: 4.6 MMOL/L (ref 3.5–5.3)
PROT SERPL-MCNC: 7.1 G/DL (ref 6.4–8.2)
SODIUM SERPL-SCNC: 142 MMOL/L (ref 136–145)
TRIGL SERPL-MCNC: 130 MG/DL
TSH SERPL DL<=0.05 MIU/L-ACNC: 1.29 UIU/ML (ref 0.36–3.74)

## 2020-08-24 PROCEDURE — 80053 COMPREHEN METABOLIC PANEL: CPT

## 2020-08-24 PROCEDURE — 36415 COLL VENOUS BLD VENIPUNCTURE: CPT

## 2020-08-24 PROCEDURE — 84443 ASSAY THYROID STIM HORMONE: CPT

## 2020-08-24 PROCEDURE — 80061 LIPID PANEL: CPT

## 2020-08-26 ENCOUNTER — OFFICE VISIT (OUTPATIENT)
Dept: INTERNAL MEDICINE CLINIC | Facility: CLINIC | Age: 55
End: 2020-08-26
Payer: COMMERCIAL

## 2020-08-26 VITALS
TEMPERATURE: 96.6 F | DIASTOLIC BLOOD PRESSURE: 88 MMHG | BODY MASS INDEX: 24.41 KG/M2 | HEART RATE: 60 BPM | HEIGHT: 64 IN | OXYGEN SATURATION: 98 % | WEIGHT: 143 LBS | SYSTOLIC BLOOD PRESSURE: 126 MMHG

## 2020-08-26 DIAGNOSIS — Z71.9 HEALTH COUNSELING: ICD-10-CM

## 2020-08-26 DIAGNOSIS — Z17.0 MALIGNANT NEOPLASM OF LEFT BREAST IN FEMALE, ESTROGEN RECEPTOR POSITIVE, UNSPECIFIED SITE OF BREAST (HCC): Primary | ICD-10-CM

## 2020-08-26 DIAGNOSIS — F41.9 ANXIETY AND DEPRESSION: ICD-10-CM

## 2020-08-26 DIAGNOSIS — R73.01 ABNORMAL FASTING GLUCOSE: ICD-10-CM

## 2020-08-26 DIAGNOSIS — C50.912 MALIGNANT NEOPLASM OF LEFT BREAST IN FEMALE, ESTROGEN RECEPTOR POSITIVE, UNSPECIFIED SITE OF BREAST (HCC): Primary | ICD-10-CM

## 2020-08-26 DIAGNOSIS — E53.8 VITAMIN B12 DEFICIENCY: ICD-10-CM

## 2020-08-26 DIAGNOSIS — K21.9 GASTROESOPHAGEAL REFLUX DISEASE WITHOUT ESOPHAGITIS: ICD-10-CM

## 2020-08-26 DIAGNOSIS — G89.4 PAIN SYNDROME, CHRONIC: ICD-10-CM

## 2020-08-26 DIAGNOSIS — E78.49 OTHER HYPERLIPIDEMIA: ICD-10-CM

## 2020-08-26 DIAGNOSIS — E78.2 MIXED HYPERLIPIDEMIA: ICD-10-CM

## 2020-08-26 DIAGNOSIS — F32.A ANXIETY AND DEPRESSION: ICD-10-CM

## 2020-08-26 PROBLEM — G47.9 DIFFICULTY SLEEPING: Status: RESOLVED | Noted: 2019-10-03 | Resolved: 2020-08-26

## 2020-08-26 PROCEDURE — 3008F BODY MASS INDEX DOCD: CPT | Performed by: INTERNAL MEDICINE

## 2020-08-26 PROCEDURE — 99214 OFFICE O/P EST MOD 30 MIN: CPT | Performed by: INTERNAL MEDICINE

## 2020-08-26 PROCEDURE — 1036F TOBACCO NON-USER: CPT | Performed by: INTERNAL MEDICINE

## 2020-08-26 RX ORDER — FAMOTIDINE 20 MG/1
20 TABLET, FILM COATED ORAL 2 TIMES DAILY
Qty: 180 TABLET | Refills: 1 | Status: SHIPPED | OUTPATIENT
Start: 2020-08-26 | End: 2021-02-14

## 2020-08-26 RX ORDER — SIMVASTATIN 10 MG
10 TABLET ORAL DAILY
Qty: 90 TABLET | Refills: 1 | Status: SHIPPED | OUTPATIENT
Start: 2020-08-26 | End: 2021-05-18

## 2020-08-26 NOTE — ASSESSMENT & PLAN NOTE
Frequent hot flashes  Discussed changing citalopram to paroxetine or venlafaxine  She will adjust diet, will consider this at next visit

## 2020-08-26 NOTE — PROGRESS NOTES
Assessment/Plan:    Use of tamoxifen (Nolvadex)  Frequent hot flashes  Discussed changing citalopram to paroxetine or venlafaxine  She will adjust diet, will consider this at next visit  Pain syndrome, chronic  Off gabapentin, taking Nucynta at least once a day  Continue medical marijuana, acupuncture  Anxiety and depression  Maintain on Lexapro for now  Declined counseling since doing well  Recommend to look at online CBT  Gastroesophageal reflux disease  Improved, off PPI  Taking famotidine bid, minimal symptoms  Hyperlipidemia  Lipids worse, prefers to restart low dose simvastatin  Tear of right acetabular labrum  Pain has resolved  Diagnoses and all orders for this visit:    Malignant neoplasm of left breast in female, estrogen receptor positive, unspecified site of breast ([facility] [address] )    Other hyperlipidemia  -     Lipid panel; Future  -     Comprehensive metabolic panel; Future    Anxiety and depression    Vitamin B12 deficiency  -     Vitamin B12; Future    Gastroesophageal reflux disease without esophagitis  -     famotidine (PEPCID) 20 mg tablet; Take 1 tablet (20 mg total) by mouth 2 (two) times a day    Abnormal fasting glucose  -     Hemoglobin A1C; Future    Mixed hyperlipidemia  -     simvastatin (ZOCOR) 10 mg tablet; Take 1 tablet (10 mg total) by mouth daily    Pain syndrome, chronic    Health counseling  Comments:  ?Pneumovax      Follow up in 6 months or as needed  Subjective:      Patient ID: Hyacinth Roberts is a 48 y o  female complains of hot flashes  She reports symptoms occurs during the day and at night  She is able to cope but it can be bothersome sometimes  She is already on Lexapro for her anxiety and depression  She is unable to start hormone therapy  She reports pain has been well controlled  She started acupuncture which has helped, together with the medical marijuana  She had stop gabapentin, has needed 1 tablet of nascent the every night only      She reports her anxiety and depression has improved, feels she is in a good place right now  She looked into counseling but has not heard from them  She feels she does not need it right now  She denies any reflux symptoms, has been taking famotidine twice a day as instructed by her oncologist     The following portions of the patient's history were reviewed and updated as appropriate: allergies, current medications, past medical history, past social history and problem list     Review of Systems   Constitutional: Negative for appetite change and fatigue  HENT: Negative for congestion, ear pain and postnasal drip  Eyes: Negative for visual disturbance  Respiratory: Negative for cough and shortness of breath  Cardiovascular: Negative for chest pain and leg swelling  Gastrointestinal: Negative for abdominal pain, constipation and diarrhea  Genitourinary: Negative for dysuria and frequency  Musculoskeletal: Negative for arthralgias and myalgias  Skin: Negative for rash and wound  Neurological: Negative for dizziness, numbness and headaches  Psychiatric/Behavioral: Negative for confusion and dysphoric mood  The patient is not nervous/anxious  Objective:      /88   Pulse 60   Temp (!) 96 6 °F (35 9 °C)   Ht 5' 4" (1 626 m)   Wt 64 9 kg (143 lb)   SpO2 98%   BMI 24 55 kg/m²          Physical Exam  Vitals signs and nursing note reviewed  Constitutional:       General: She is not in acute distress  Appearance: She is well-developed  HENT:      Head: Normocephalic and atraumatic  Eyes:      Pupils: Pupils are equal, round, and reactive to light  Cardiovascular:      Rate and Rhythm: Normal rate and regular rhythm  Heart sounds: Normal heart sounds  Pulmonary:      Effort: Pulmonary effort is normal       Breath sounds: Normal breath sounds  No wheezing  Abdominal:      General: Bowel sounds are normal       Palpations: Abdomen is soft     Skin:     General: Skin is warm  Findings: No rash  Neurological:      Mental Status: She is alert and oriented to person, place, and time  Psychiatric:         Mood and Affect: Mood and affect normal  Mood is not anxious or depressed  Behavior: Behavior normal            Lab results reviewed with patient

## 2020-08-27 ENCOUNTER — OFFICE VISIT (OUTPATIENT)
Dept: PAIN MEDICINE | Facility: CLINIC | Age: 55
End: 2020-08-27
Payer: COMMERCIAL

## 2020-08-27 VITALS
HEART RATE: 60 BPM | RESPIRATION RATE: 16 BRPM | SYSTOLIC BLOOD PRESSURE: 112 MMHG | WEIGHT: 143 LBS | DIASTOLIC BLOOD PRESSURE: 76 MMHG | HEIGHT: 64 IN | BODY MASS INDEX: 24.41 KG/M2 | TEMPERATURE: 98.1 F

## 2020-08-27 DIAGNOSIS — M25.551 RIGHT HIP PAIN: ICD-10-CM

## 2020-08-27 DIAGNOSIS — M21.70 LEG LENGTH DISCREPANCY: ICD-10-CM

## 2020-08-27 DIAGNOSIS — F11.20 UNCOMPLICATED OPIOID DEPENDENCE (HCC): ICD-10-CM

## 2020-08-27 DIAGNOSIS — M46.1 SACROILIITIS (HCC): ICD-10-CM

## 2020-08-27 DIAGNOSIS — G89.4 PAIN SYNDROME, CHRONIC: Primary | ICD-10-CM

## 2020-08-27 DIAGNOSIS — S73.191D TEAR OF RIGHT ACETABULAR LABRUM, SUBSEQUENT ENCOUNTER: ICD-10-CM

## 2020-08-27 DIAGNOSIS — Z79.891 LONG-TERM CURRENT USE OF OPIATE ANALGESIC: ICD-10-CM

## 2020-08-27 PROCEDURE — 99214 OFFICE O/P EST MOD 30 MIN: CPT | Performed by: NURSE PRACTITIONER

## 2020-08-27 PROCEDURE — 3008F BODY MASS INDEX DOCD: CPT | Performed by: NURSE PRACTITIONER

## 2020-08-27 PROCEDURE — 1036F TOBACCO NON-USER: CPT | Performed by: NURSE PRACTITIONER

## 2020-08-27 NOTE — PATIENT INSTRUCTIONS
Opioid Pain Management   AMBULATORY CARE:   An opioid  is a type of medicine used to treat pain  Examples of opioids are oxycodone, morphine, fentanyl, or codeine  Take opioid medicines as directed, for the condition it is prescribed:  Common problems that may occur when you do not take opioid medicines as directed include the following:  · Health problems  may occur  You may have trouble breathing  You may also develop liver or kidney damage, or stomach bleeding  Any of these health problems can become life-threatening  · Opioid dependence  means your body needs the opioid medicine to keep it from going through withdrawal      · Opioid tolerance  means the opioid does not control pain as well as it used to  You need higher doses of the opioid to get pain relief  · Opioid addiction  means you are not able to control the use of the opioid medicine  You use it when you do not have pain  You crave the opioid medicine  Call 911 or have someone call 911 for any of the following:   · You are breathing slower than normal, or you have trouble breathing  · You cannot be awakened  · You have a seizure  Seek care immediately if:   · Your heart is beating slower than usual     · Your heart feels like it is jumping or fluttering  · You are so sleepy that you cannot stay awake  · You have severe muscle pain or weakness  · You see or hear things that are not real   Contact your healthcare provider if:   · You are too dizzy to stand up  · Your pain gets worse or you have new pain  · You cannot do your usual activities because of side effects from the opioid  · You are constipated or have abdominal pain  · You have questions or concerns about your condition or care  Opioid safety measures:   · Take your medicine as directed  Ask if you need more information on how to take your medicine correctly  Follow up with your healthcare provider regularly  You may need to have your dose adjusted   Do not use opioid medicine if you are pregnant or breastfeeding  · Give your healthcare provider a list of all your medicines  Include any over-the-counter medicines, vitamins, and herbs  It can be dangerous to take opioids with certain other medicines, such as antihistamines  · Keep opioid medicine in a safe place  Store your opioid medicine in a locked cabinet to keep it away from children and others  · Do not drink alcohol while you use opioids  Alcohol use with an opioid medicine can make you sleepy and slow your breathing rate  You may stop breathing completely  · Do not drive or operate heavy machinery after you take opioid medicine  Opioid medicine can make you drowsy and make it hard to concentrate  You may injure yourself or others if you drive or operate heavy machinery while taking your medicine  · Drink fluids and eat high-fiber foods  Fluids and fiber will help prevent constipation  Ask your healthcare provider what fluids are right for you and how much you should drink  Also ask for a list of foods that contain fiber  Follow up with your healthcare provider as directed: You may need to have your dose adjusted  You may be referred to a pain specialist  Write down your questions so you remember to ask them during your visits  © 2017 [address] [employer] Information is for End User's use only and may not be sold, redistributed or otherwise used for commercial purposes  All illustrations and images included in CareNotes® are the copyrighted property of [employer]  or Shravan Vazquez  The above information is an  only  It is not intended as medical advice for individual conditions or treatments  Talk to your doctor, nurse or pharmacist before following any medical regimen to see if it is safe and effective for you

## 2020-08-27 NOTE — PROGRESS NOTES
Assessment:  1  Pain syndrome, chronic    2  Right hip pain    3  Sacroiliitis ([address] )    4  Tear of right acetabular labrum, subsequent encounter    5  Uncomplicated opioid dependence ([address] )    6  Long-term current use of opiate analgesic    7  Leg length discrepancy        Plan:    The patient is a 48 y o  female with a history of chronic pain syndrome secondary to low back pain, sacroiliitis, right hip pain due to a right hip labral tear  Patient continues to have ongoing low back pain mostly on the right side generating from her sacroiliac joint  She has underwent multiple sacroiliac joint injections but continues to be symptomatic  Pain worse with standing and walking  Upon examination today a slight leg length discrepancy was noted  I will order her a CT leg length scanogram to further evaluate  She will continue on Nucynta 75 mg as prescribed, as a provides  Two months prescriptions were electronically sent to the pharmacy with do not fill date of August 29, 2020 and September 27, 2020    [place] Prescription Drug Monitoring Program report was reviewed and was appropriate     There are risks associated with opioid medications, including dependence, addiction and tolerance  The patient understands and agrees to use these medications only as prescribed  Potential side effects of the medications include, but are not limited to, constipation, drowsiness, addiction, impaired judgment and risk of fatal overdose if not taken as prescribed  The patient was warned against driving while taking sedation medications  Sharing medications is a felony  At this point in time, the patient is showing no signs of addiction, abuse, diversion or suicidal ideation  The patient will follow-up in 8 weeks for medication prescription refill and reevaluation  The patient was advised to contact the office should their symptoms worsen in the interim  The patient was agreeable and verbalized an understanding  History of Present Illness: The patient is a 48 y o  female with a history of chronic pain syndrome secondary to low back pain, sacroiliitis, right hip pain due to a right hip labral tear  She presents today with ongoing low back pain which is located on the right side  She states when standing and walking for long periods of time she will also feel pain shoot down the entire aspect of the right leg  The pain is constant occurring mostly in the evening at night  She describes as dull aching  She is currently rating her pain a 6/10 on the numeric rating scale  She had underwent a right intra-articular hip injection in February 2020 which did provide pain relief  Her last sacroiliac joint injection was April 2019, and she had a right L5-S1 transforaminal epidural steroid injection in May 2019 which provided little to no pain relief  She is currently taking Nucynta 75 mg 3 to 4 times a day depending upon her pain  The medication is providing 95% pain relief without    Pain Contract Signed: 1/6 2020  Last Urine Drug Screen: 6/8/2020  Last Dose: 8/27/2020 @ Noon    I have personally reviewed and/or updated the patient's past medical history, past surgical history, family history, social history, current medications, allergies, and vital signs today  Review of Systems:    Review of Systems   Constitutional: Negative for fever and unexpected weight change  HENT: Negative for trouble swallowing  Eyes: Negative for visual disturbance  Respiratory: Negative for shortness of breath and wheezing  Cardiovascular: Negative for chest pain and palpitations  Gastrointestinal: Negative for constipation, diarrhea, nausea and vomiting  Endocrine: Negative for cold intolerance, heat intolerance and polydipsia  Genitourinary: Negative for difficulty urinating and frequency  Musculoskeletal: Positive for back pain and joint swelling  Negative for arthralgias, gait problem and myalgias     Skin: Negative for rash  Neurological: Negative for dizziness, seizures, syncope, weakness and headaches  Hematological: Does not bruise/bleed easily  Psychiatric/Behavioral: Negative for dysphoric mood  All other systems reviewed and are negative  Past Medical History:   Diagnosis Date    Anxiety     BRCA gene mutation negative 06/20/2019    [facility]    Cancer [facility])     breast     Fibrocystic disease of breast     GERD (gastroesophageal reflux disease)     Hiatal hernia     Hyperlipidemia        Past Surgical History:   Procedure Laterality Date    APPENDECTOMY      BREAST BIOPSY      BREAST CYST ASPIRATION Left 2005    COLONOSCOPY      FOOT SURGERY Right 2011    right, hardware removal    IR PORT PLACEMENT  9/27/2019    IR PORT REMOVAL  2/19/2020    MASTECTOMY W/ SENTINEL NODE BIOPSY Left 8/9/2019    Procedure: BREAST MASTECTOMY, SENTINEL LYMPH NODE BIOPSY, LYMPHATIC MAPPING W/ BLUE DYE AND RADIOACTIVE DYE (INJECT AT 0730 BY DR WHITEHEAD IN THE O R );  Surgeon: Olivia Garcia MD;  [facility];  Service: Surgical Oncology    OTHER SURGICAL HISTORY Right 2010    Complete Excision of Fifth Metatarsal Head     AL COLONOSCOPY FLX DX W/COLLJ SPEC WHEN PFRMD N/A 7/20/2018    Procedure: EGD AND COLONOSCOPY;  Surgeon: Nevin Tuttle MD;  Location: [facility];   Service: Gastroenterology    AL MASTECTOMY, SIMPLE, COMPLETE Right 8/9/2019    Procedure: BREAST SIMPLE MASTECTOMY;  Surgeon: Olivia Garcia MD;  [facility];  Service: Surgical Oncology    US GUIDANCE BREAST BIOPSY LEFT EACH ADDITIONAL Left 6/13/2019    US GUIDED BREAST BIOPSY LEFT COMPLETE Left 6/13/2019       Family History   Problem Relation Age of Onset    Coronary artery disease Mother         CABG in her 62s    Other Mother         Dyslipidemia    Hypertension Mother     Heart attack Father 46        acute myocardial infarction    Other Father         Dyslipidemia    Hypertension Father     Prostate cancer Paternal Uncle 72    Thyroid disease Family         disorder    No Known Problems Maternal Aunt     No Known Problems Maternal Aunt        Social History     Occupational History    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, 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   Tobacco Use    Smoking status: Never Smoker    Smokeless tobacco: Never Used   Substance and Sexual Activity    Alcohol use: Yes     Frequency: 2-3 times a week     Drinks per session: 1 or 2    Drug use: No    Sexual activity: Yes     Partners: Male     Birth control/protection: Male Sterilization     Comment: Partner had vasectomy         Current Outpatient Medications:     escitalopram (LEXAPRO) 10 mg tablet, TAKE 1 TABLET BY MOUTH EVERY DAY, Disp: 90 tablet, Rfl: 1    famotidine (PEPCID) 20 mg tablet, Take 1 tablet (20 mg total) by mouth 2 (two) times a day, Disp: 180 tablet, Rfl: 1    metoprolol tartrate (LOPRESSOR) 25 mg tablet, Take 25 mg by mouth every 12 (twelve) hours, Disp: , Rfl:     simvastatin (ZOCOR) 10 mg tablet, Take 1 tablet (10 mg total) by mouth daily, Disp: 90 tablet, Rfl: 1    tamoxifen (NOLVADEX) 20 mg tablet, Take 20 mg by mouth daily , Disp: , Rfl:     tapentadol (NUCYNTA) 75 mg tablet, Take 1 tab PO Q 6 hours as needed for pain  Do not fill until 9/27/20  2nd month script, Disp: 120 tablet, Rfl: 0    cholecalciferol (VITAMIN D3) 1,000 units tablet, Take 1,000 Units by mouth daily, Disp: , Rfl:     fluticasone (FLONASE) 50 mcg/act nasal spray, 1 spray into each nostril as needed , Disp: , Rfl:     Allergies   Allergen Reactions    Sulfa Antibiotics Headache     Annotation - [id number]: Migraine;  Annotation - [id number]: cellular issues       Physical Exam:    /76   Pulse 60   Temp 98 1 °F (36 7 °C) (Oral)   Resp 16   Ht 5' 4" (1 626 m)   Wt 64 9 kg (143 lb)   BMI 24 55 kg/m²     Constitutional:normal, well developed, well nourished, alert, in no distress and non-toxic and no overt pain behavior  Eyes:anicteric  HEENT:grossly intact  Neck:supple, symmetric, trachea midline and no masses   Pulmonary:even and unlabored  Cardiovascular:No edema or pitting edema present  Skin:Normal without rashes or lesions and well hydrated  Psychiatric:Mood and affect appropriate  Neurologic:Cranial Nerves II-XII grossly intact  Musculoskeletal:normal    Tenderness right SI joint  Slight leg length discrepancy on the left leg    Imaging  MRI LUMBAR SPINE WITHOUT CONTRAST     INDICATION: [id number]: Radiculopathy, lumbar region      COMPARISON:  January 5, 2016     TECHNIQUE:  Sagittal T1, sagittal T2, sagittal inversion recovery, axial T1 and axial T2, coronal T2        IMAGE QUALITY:  Diagnostic     FINDINGS:     VERTEBRAL BODIES:  Normal alignment of the lumbar spine  No spondylolysis or spondylolisthesis  No scoliosis  No compression fracture  Normal marrow signal is identified within the visualized bony structures  No discrete marrow lesion      SACRUM:  Normal signal within the sacrum  No evidence of insufficiency or stress fracture      DISTAL CORD AND CONUS:  Normal size and signal within the distal cord and conus        PARASPINAL SOFT TISSUES:  Paraspinal soft tissues are unremarkable      LOWER THORACIC DISC SPACES:  Normal disc height and signal   No disc herniation, canal stenosis or foraminal narrowing      LUMBAR DISC SPACES:       Perineural cysts are identified at L5-S1 as well as within the sacrum      L1-L2:  Normal      L2-L3:  Normal      L3-L4:  Normal      L4-L5:  Normal      L5-S1:  Normal      IMPRESSION:     No significant central or foraminal narrowing    Multiple perineural cysts are again noted including L5-S1 as well as the sacrum which are typically of no clinical significance             CT leg length evaluation (scanogram)    (Results Pending)         Orders Placed This Encounter   Procedures    CT leg length evaluation (scanogram)

## 2020-08-28 ENCOUNTER — TRANSCRIBE ORDERS (OUTPATIENT)
Dept: PAIN MEDICINE | Facility: CLINIC | Age: 55
End: 2020-08-28

## 2020-10-10 ENCOUNTER — CLINICAL SUPPORT (OUTPATIENT)
Dept: INTERNAL MEDICINE CLINIC | Facility: CLINIC | Age: 55
End: 2020-10-10
Payer: COMMERCIAL

## 2020-10-10 DIAGNOSIS — Z23 ENCOUNTER FOR IMMUNIZATION: ICD-10-CM

## 2020-10-10 PROCEDURE — 90471 IMMUNIZATION ADMIN: CPT | Performed by: INTERNAL MEDICINE

## 2020-10-10 PROCEDURE — 90682 RIV4 VACC RECOMBINANT DNA IM: CPT | Performed by: INTERNAL MEDICINE

## 2020-11-03 ENCOUNTER — TELEPHONE (OUTPATIENT)
Dept: INTERNAL MEDICINE CLINIC | Facility: CLINIC | Age: 55
End: 2020-11-03

## 2020-11-03 DIAGNOSIS — M51.17 INTERVERTEBRAL DISC DISORDER WITH RADICULOPATHY OF LUMBOSACRAL REGION: Primary | ICD-10-CM

## 2020-11-03 RX ORDER — TRAMADOL HYDROCHLORIDE 50 MG/1
50 TABLET ORAL EVERY 8 HOURS PRN
Qty: 90 TABLET | Refills: 0 | Status: SHIPPED | OUTPATIENT
Start: 2020-11-03 | End: 2020-12-11 | Stop reason: SDUPTHER

## 2020-11-09 ENCOUNTER — TELEPHONE (OUTPATIENT)
Dept: INTERNAL MEDICINE CLINIC | Facility: CLINIC | Age: 55
End: 2020-11-09

## 2020-11-09 DIAGNOSIS — Z11.59 ENCOUNTER FOR SCREENING FOR OTHER VIRAL DISEASES: Primary | ICD-10-CM

## 2020-11-10 DIAGNOSIS — Z11.59 ENCOUNTER FOR SCREENING FOR OTHER VIRAL DISEASES: ICD-10-CM

## 2020-11-10 PROCEDURE — U0003 INFECTIOUS AGENT DETECTION BY NUCLEIC ACID (DNA OR RNA); SEVERE ACUTE RESPIRATORY SYNDROME CORONAVIRUS 2 (SARS-COV-2) (CORONAVIRUS DISEASE [COVID-19]), AMPLIFIED PROBE TECHNIQUE, MAKING USE OF HIGH THROUGHPUT TECHNOLOGIES AS DESCRIBED BY CMS-2020-01-R: HCPCS | Performed by: INTERNAL MEDICINE

## 2020-11-11 LAB — SARS-COV-2 RNA SPEC QL NAA+PROBE: NOT DETECTED

## 2020-12-09 ENCOUNTER — OFFICE VISIT (OUTPATIENT)
Dept: SURGICAL ONCOLOGY | Facility: CLINIC | Age: 55
End: 2020-12-09
Payer: COMMERCIAL

## 2020-12-09 ENCOUNTER — CLINICAL SUPPORT (OUTPATIENT)
Dept: INTERNAL MEDICINE CLINIC | Facility: CLINIC | Age: 55
End: 2020-12-09
Payer: COMMERCIAL

## 2020-12-09 VITALS
BODY MASS INDEX: 2.39 KG/M2 | SYSTOLIC BLOOD PRESSURE: 122 MMHG | WEIGHT: 14 LBS | TEMPERATURE: 98 F | HEIGHT: 64 IN | DIASTOLIC BLOOD PRESSURE: 80 MMHG | RESPIRATION RATE: 16 BRPM | HEART RATE: 58 BPM

## 2020-12-09 DIAGNOSIS — Z23 ENCOUNTER FOR IMMUNIZATION: Primary | ICD-10-CM

## 2020-12-09 DIAGNOSIS — Z79.810 USE OF TAMOXIFEN (NOLVADEX): ICD-10-CM

## 2020-12-09 DIAGNOSIS — Z17.0 MALIGNANT NEOPLASM OF LEFT BREAST IN FEMALE, ESTROGEN RECEPTOR POSITIVE, UNSPECIFIED SITE OF BREAST (HCC): Primary | ICD-10-CM

## 2020-12-09 DIAGNOSIS — C50.912 MALIGNANT NEOPLASM OF LEFT BREAST IN FEMALE, ESTROGEN RECEPTOR POSITIVE, UNSPECIFIED SITE OF BREAST (HCC): Primary | ICD-10-CM

## 2020-12-09 PROCEDURE — 3008F BODY MASS INDEX DOCD: CPT | Performed by: NURSE PRACTITIONER

## 2020-12-09 PROCEDURE — 1036F TOBACCO NON-USER: CPT | Performed by: NURSE PRACTITIONER

## 2020-12-09 PROCEDURE — 90471 IMMUNIZATION ADMIN: CPT

## 2020-12-09 PROCEDURE — 99214 OFFICE O/P EST MOD 30 MIN: CPT | Performed by: NURSE PRACTITIONER

## 2020-12-09 PROCEDURE — 90732 PPSV23 VACC 2 YRS+ SUBQ/IM: CPT

## 2020-12-09 RX ORDER — CLONAZEPAM 0.5 MG/1
0.5 TABLET ORAL 2 TIMES DAILY
COMMUNITY
End: 2021-02-24 | Stop reason: ALTCHOICE

## 2020-12-11 ENCOUNTER — PATIENT OUTREACH (OUTPATIENT)
Dept: CASE MANAGEMENT | Facility: HOSPITAL | Age: 55
End: 2020-12-11

## 2020-12-11 DIAGNOSIS — M51.17 INTERVERTEBRAL DISC DISORDER WITH RADICULOPATHY OF LUMBOSACRAL REGION: ICD-10-CM

## 2020-12-11 RX ORDER — TRAMADOL HYDROCHLORIDE 50 MG/1
50 TABLET ORAL EVERY 8 HOURS PRN
Qty: 90 TABLET | Refills: 0 | Status: SHIPPED | OUTPATIENT
Start: 2020-12-11 | End: 2021-01-12 | Stop reason: SDUPTHER

## 2021-01-04 ENCOUNTER — IMMUNIZATIONS (OUTPATIENT)
Dept: FAMILY MEDICINE CLINIC | Facility: HOSPITAL | Age: 56
End: 2021-01-04

## 2021-01-04 DIAGNOSIS — Z23 ENCOUNTER FOR IMMUNIZATION: ICD-10-CM

## 2021-01-04 PROCEDURE — 0011A SARS-COV-2 / COVID-19 MRNA VACCINE (MODERNA) 100 MCG: CPT

## 2021-01-04 PROCEDURE — 91301 SARS-COV-2 / COVID-19 MRNA VACCINE (MODERNA) 100 MCG: CPT

## 2021-01-12 DIAGNOSIS — M51.17 INTERVERTEBRAL DISC DISORDER WITH RADICULOPATHY OF LUMBOSACRAL REGION: ICD-10-CM

## 2021-01-12 RX ORDER — TRAMADOL HYDROCHLORIDE 50 MG/1
50 TABLET ORAL EVERY 8 HOURS PRN
Qty: 90 TABLET | Refills: 0 | Status: SHIPPED | OUTPATIENT
Start: 2021-01-12 | End: 2021-02-12 | Stop reason: SDUPTHER

## 2021-01-29 DIAGNOSIS — R45.4 IRRITABILITY AND ANGER: ICD-10-CM

## 2021-01-29 RX ORDER — ESCITALOPRAM OXALATE 10 MG/1
TABLET ORAL
Qty: 90 TABLET | Refills: 1 | Status: SHIPPED | OUTPATIENT
Start: 2021-01-29 | End: 2021-07-18

## 2021-02-06 ENCOUNTER — IMMUNIZATIONS (OUTPATIENT)
Dept: FAMILY MEDICINE CLINIC | Facility: HOSPITAL | Age: 56
End: 2021-02-06

## 2021-02-06 DIAGNOSIS — Z23 ENCOUNTER FOR IMMUNIZATION: Primary | ICD-10-CM

## 2021-02-06 PROCEDURE — 0012A SARS-COV-2 / COVID-19 MRNA VACCINE (MODERNA) 100 MCG: CPT

## 2021-02-06 PROCEDURE — 91301 SARS-COV-2 / COVID-19 MRNA VACCINE (MODERNA) 100 MCG: CPT

## 2021-02-12 DIAGNOSIS — M51.17 INTERVERTEBRAL DISC DISORDER WITH RADICULOPATHY OF LUMBOSACRAL REGION: ICD-10-CM

## 2021-02-12 RX ORDER — TRAMADOL HYDROCHLORIDE 50 MG/1
50 TABLET ORAL EVERY 8 HOURS PRN
Qty: 90 TABLET | Refills: 0 | Status: SHIPPED | OUTPATIENT
Start: 2021-02-12 | End: 2021-03-10 | Stop reason: SDUPTHER

## 2021-02-14 DIAGNOSIS — K21.9 GASTROESOPHAGEAL REFLUX DISEASE WITHOUT ESOPHAGITIS: ICD-10-CM

## 2021-02-14 RX ORDER — FAMOTIDINE 20 MG/1
TABLET, FILM COATED ORAL
Qty: 180 TABLET | Refills: 1 | Status: SHIPPED | OUTPATIENT
Start: 2021-02-14 | End: 2022-02-24

## 2021-02-20 ENCOUNTER — LAB (OUTPATIENT)
Dept: LAB | Age: 56
End: 2021-02-20
Payer: COMMERCIAL

## 2021-02-20 DIAGNOSIS — E53.8 VITAMIN B12 DEFICIENCY: ICD-10-CM

## 2021-02-20 DIAGNOSIS — R73.01 ABNORMAL FASTING GLUCOSE: ICD-10-CM

## 2021-02-20 DIAGNOSIS — E78.49 OTHER HYPERLIPIDEMIA: ICD-10-CM

## 2021-02-20 LAB
ALBUMIN SERPL BCP-MCNC: 3.9 G/DL (ref 3.5–5)
ALP SERPL-CCNC: 66 U/L (ref 46–116)
ALT SERPL W P-5'-P-CCNC: 34 U/L (ref 12–78)
ANION GAP SERPL CALCULATED.3IONS-SCNC: 4 MMOL/L (ref 4–13)
AST SERPL W P-5'-P-CCNC: 16 U/L (ref 5–45)
BILIRUB SERPL-MCNC: 0.54 MG/DL (ref 0.2–1)
BUN SERPL-MCNC: 16 MG/DL (ref 5–25)
CALCIUM SERPL-MCNC: 9.8 MG/DL (ref 8.3–10.1)
CHLORIDE SERPL-SCNC: 109 MMOL/L (ref 100–108)
CHOLEST SERPL-MCNC: 245 MG/DL (ref 50–200)
CO2 SERPL-SCNC: 31 MMOL/L (ref 21–32)
CREAT SERPL-MCNC: 0.86 MG/DL (ref 0.6–1.3)
EST. AVERAGE GLUCOSE BLD GHB EST-MCNC: 108 MG/DL
GFR SERPL CREATININE-BSD FRML MDRD: 77 ML/MIN/1.73SQ M
GLUCOSE P FAST SERPL-MCNC: 104 MG/DL (ref 65–99)
HBA1C MFR BLD: 5.4 %
HDLC SERPL-MCNC: 109 MG/DL
LDLC SERPL CALC-MCNC: 122 MG/DL (ref 0–100)
NONHDLC SERPL-MCNC: 136 MG/DL
POTASSIUM SERPL-SCNC: 4.5 MMOL/L (ref 3.5–5.3)
PROT SERPL-MCNC: 7.2 G/DL (ref 6.4–8.2)
SODIUM SERPL-SCNC: 144 MMOL/L (ref 136–145)
TRIGL SERPL-MCNC: 70 MG/DL
VIT B12 SERPL-MCNC: 848 PG/ML (ref 100–900)

## 2021-02-20 PROCEDURE — 83036 HEMOGLOBIN GLYCOSYLATED A1C: CPT

## 2021-02-20 PROCEDURE — 80061 LIPID PANEL: CPT

## 2021-02-20 PROCEDURE — 36415 COLL VENOUS BLD VENIPUNCTURE: CPT

## 2021-02-20 PROCEDURE — 80053 COMPREHEN METABOLIC PANEL: CPT

## 2021-02-20 PROCEDURE — 82607 VITAMIN B-12: CPT

## 2021-02-24 ENCOUNTER — TELEMEDICINE (OUTPATIENT)
Dept: INTERNAL MEDICINE CLINIC | Facility: CLINIC | Age: 56
End: 2021-02-24
Payer: COMMERCIAL

## 2021-02-24 DIAGNOSIS — M85.89 OSTEOPENIA OF MULTIPLE SITES: ICD-10-CM

## 2021-02-24 DIAGNOSIS — G89.4 PAIN SYNDROME, CHRONIC: ICD-10-CM

## 2021-02-24 DIAGNOSIS — D72.818 OTHER DECREASED WHITE BLOOD CELL (WBC) COUNT: ICD-10-CM

## 2021-02-24 DIAGNOSIS — E78.49 OTHER HYPERLIPIDEMIA: Primary | ICD-10-CM

## 2021-02-24 DIAGNOSIS — K21.9 GASTROESOPHAGEAL REFLUX DISEASE WITHOUT ESOPHAGITIS: ICD-10-CM

## 2021-02-24 DIAGNOSIS — I10 ESSENTIAL HYPERTENSION: ICD-10-CM

## 2021-02-24 DIAGNOSIS — Z79.810 USE OF TAMOXIFEN (NOLVADEX): ICD-10-CM

## 2021-02-24 DIAGNOSIS — F32.A ANXIETY AND DEPRESSION: ICD-10-CM

## 2021-02-24 DIAGNOSIS — R73.01 ABNORMAL FASTING GLUCOSE: ICD-10-CM

## 2021-02-24 DIAGNOSIS — Z71.9 HEALTH COUNSELING: ICD-10-CM

## 2021-02-24 DIAGNOSIS — F41.9 ANXIETY AND DEPRESSION: ICD-10-CM

## 2021-02-24 PROBLEM — Z79.899 MEDICAL MARIJUANA USE: Status: RESOLVED | Noted: 2019-10-14 | Resolved: 2021-02-24

## 2021-02-24 PROCEDURE — 3725F SCREEN DEPRESSION PERFORMED: CPT | Performed by: INTERNAL MEDICINE

## 2021-02-24 PROCEDURE — 99214 OFFICE O/P EST MOD 30 MIN: CPT | Performed by: INTERNAL MEDICINE

## 2021-02-24 RX ORDER — METOPROLOL SUCCINATE 25 MG/1
25 TABLET, EXTENDED RELEASE ORAL DAILY
Qty: 90 TABLET | Refills: 0
Start: 2021-02-24 | End: 2021-08-25 | Stop reason: ALTCHOICE

## 2021-02-24 NOTE — ASSESSMENT & PLAN NOTE
Still with frequent hot flashes  Consider changing tamoxifen, need to be on it for another year, per oncology  Discussed switching paroxetine or venlafaxine instead of Lexapro

## 2021-02-24 NOTE — PROGRESS NOTES
Virtual Regular Visit      Assessment/Plan:    Problem List Items Addressed This Visit        Digestive    Gastroesophageal reflux disease     On famotidine  Cardiovascular and Mediastinum    Essential hypertension    Relevant Medications    metoprolol succinate (TOPROL-XL) 25 mg 24 hr tablet    Other Relevant Orders    TSH, 3rd generation with Free T4 reflex       Musculoskeletal and Integument    Osteopenia of multiple sites     Taking supplements  Relevant Orders    Vitamin D 25 hydroxy       Other    Abnormal fasting glucose     A1c normal          Relevant Orders    Hemoglobin A1C    Anxiety and depression     Stable, on Lexapro  Hyperlipidemia - Primary     Lipids has improved  Maintain on low dose simvastatin for now, suspect due to tamoxifen  Relevant Orders    Comprehensive metabolic panel    Lipid panel    Leukopenia     Recent CBC normal          Pain syndrome, chronic     Takes tramadol bid  Use of tamoxifen (Nolvadex)     Still with frequent hot flashes  Consider changing tamoxifen, need to be on it for another year, per oncology  Discussed switching paroxetine or venlafaxine instead of Lexapro  Other Visit Diagnoses     Health counseling        Updated  Received COVID booster  Follow up in 6 months or as needed  Reason for visit is f/u  Chief Complaint   Patient presents with    Follow-up    Virtual Regular Visit        Encounter provider Dave Carlson MD    Provider located at [address] [place] [id number]      Recent Visits  No visits were found meeting these conditions     Showing recent visits within past 7 days and meeting all other requirements     Today's Visits  Date Type Provider Dept   02/24/21 Telemedicine Dave Carlson, [facility]   Showing today's visits and meeting all other requirements     Future Appointments  No visits were found meeting these conditions  Showing future appointments within next 150 days and meeting all other requirements        The patient was identified by name and date of birth  Rodrick Ayala was informed that this is a telemedicine visit and that the visit is being conducted through [facility] and patient was informed that this is a secure, HIPAA-compliant platform  She agrees to proceed     My office door was closed  No one else was in the room  She acknowledged consent and understanding of privacy and security of the video platform  The patient has agreed to participate and understands they can discontinue the visit at any time  Patient is aware this is a billable service  Subjective  Rodrick Ayala is a 50 y o  female f/u   She has been feeling all right  She remains frustrated about her continued weight gain, persistent hot flashes  She feels her energy improved  She is trying to cut carbs in processed foods watching portions  She continues to exercise 5-6 days a  She still experiences low back pain frequently, especially first thing in the morning  She does take regularly, at least twice a day  She had stop using medical since it did really help with the pain  She is concerned about her cholesterol, wondering if she should increase her simvastatin  She reports mood has been good, taking Lexapro  She saw her oncologist recently, would like her to stay on tamoxifen for year and evaluate if this needs to be changed due to her multiple symptoms         Past Medical History:   Diagnosis Date    Anxiety     [facility]20/2019    [facility]    Cancer [facility])     breast     Fibrocystic disease of breast     GERD (gastroesophageal reflux disease)     Hiatal hernia     Hyperlipidemia        Past Surgical History:   Procedure Laterality Date    APPENDECTOMY      BREAST BIOPSY      BREAST CYST ASPIRATION Left 2005    COLONOSCOPY  FOOT SURGERY Right 2011    right, hardware removal    IR PORT PLACEMENT  9/27/2019    IR PORT REMOVAL  2/19/2020    MASTECTOMY W/ SENTINEL NODE BIOPSY Left 8/9/2019    Procedure: BREAST MASTECTOMY, SENTINEL LYMPH NODE BIOPSY, LYMPHATIC MAPPING W/ BLUE DYE AND RADIOACTIVE DYE (INJECT AT 0730 BY DR WHITEHEAD IN THE O R );  Surgeon: Malcom Chance MD;  [facility];  Service: Surgical Oncology    OTHER SURGICAL HISTORY Right 2010    Complete Excision of Fifth Metatarsal Head     WI COLONOSCOPY FLX DX W/COLLJ SPEC WHEN PFRMD N/A 7/20/2018    Procedure: EGD AND COLONOSCOPY;  Surgeon: Doris Garces MD;  Location: [facility]; Service: Gastroenterology    WI MASTECTOMY, SIMPLE, COMPLETE Right 8/9/2019    Procedure: BREAST SIMPLE MASTECTOMY;  Surgeon: Malcom Chance MD;  [facility];  Service: Surgical Oncology    US GUIDANCE BREAST BIOPSY LEFT EACH ADDITIONAL Left 6/13/2019    US GUIDED BREAST BIOPSY LEFT COMPLETE Left 6/13/2019       Current Outpatient Medications   Medication Sig Dispense Refill    cholecalciferol (VITAMIN D3) 1,000 units tablet Take 1,000 Units by mouth daily      escitalopram (LEXAPRO) 10 mg tablet TAKE 1 TABLET BY MOUTH EVERY DAY 90 tablet 1    famotidine (PEPCID) 20 mg tablet TAKE 1 TABLET BY MOUTH TWO TIMES DAILY 180 tablet 1    fluticasone (FLONASE) 50 mcg/act nasal spray 1 spray into each nostril as needed       simvastatin (ZOCOR) 10 mg tablet Take 1 tablet (10 mg total) by mouth daily 90 tablet 1    tamoxifen (NOLVADEX) 20 mg tablet Take 20 mg by mouth daily       traMADol (ULTRAM) 50 mg tablet Take 1 tablet (50 mg total) by mouth every 8 (eight) hours as needed for moderate pain 90 tablet 0    metoprolol succinate (TOPROL-XL) 25 mg 24 hr tablet Take 1 tablet (25 mg total) by mouth daily 90 tablet 0     No current facility-administered medications for this visit           Allergies   Allergen Reactions    Sulfa Antibiotics Headache     Annotation - [id number]: Migraine; Annotation - [id number]: cellular issues       Review of Systems   Constitutional: Positive for unexpected weight change  Negative for activity change, appetite change and fatigue  HENT: Negative for congestion and ear pain  Eyes: Negative for visual disturbance  Respiratory: Negative for cough and shortness of breath  Cardiovascular: Negative for chest pain and palpitations  Gastrointestinal: Negative for abdominal pain, constipation and diarrhea  Genitourinary: Negative for dysuria, frequency and urgency  Musculoskeletal: Positive for arthralgias and back pain  Negative for myalgias  Neurological: Negative for dizziness and headaches  Psychiatric/Behavioral: Negative for dysphoric mood  The patient is not nervous/anxious  Video Exam    There were no vitals filed for this visit  Physical Exam  Constitutional:       General: She is not in acute distress  Appearance: Normal appearance  She is not ill-appearing  HENT:      Head: Normocephalic and atraumatic  Eyes:      Pupils: Pupils are equal, round, and reactive to light  Pulmonary:      Effort: Pulmonary effort is normal  No respiratory distress  Neurological:      General: No focal deficit present  Mental Status: She is alert and oriented to person, place, and time  Psychiatric:         Mood and Affect: Mood normal          Behavior: Behavior normal           I spent 12 minutes directly with the patient during this visit      [facility] acknowledges that she has consented to an online visit or consultation  She understands that the online visit is based solely on information provided by her, and that, in the absence of a face-to-face physical evaluation by the physician, the diagnosis she receives is both limited and provisional in terms of accuracy and completeness  This is not intended to replace a full medical face-to-face evaluation by the physician   Diane Huitron understands and accepts these terms  Lab results reviewed with patient  She reports CBC results:  WBC 5 2  RBC 4 12  Hgb 12 7  Hct 38 4  Plt 303    BMI Counseling: There is no height or weight on file to calculate BMI  The BMI is above normal  Nutrition recommendations include reducing portion sizes, 3-5 servings of fruits/vegetables daily, consuming healthier snacks and moderation in carbohydrate intake  Exercise recommendations include strength training exercises

## 2021-02-25 NOTE — PROGRESS NOTES
Assessment/Plan:    Calcium 1200-1500mg + 600-1000 IU Vit D daily  Exercise 150 minutes per week minimum including weight bearing exercises  Pap with high risk HPV Q 5 years-due 2025  Monthly breast self exam recommended  Colonoscopy-  Up to date  Kegels 20 times twice daily  Silicone based lubricant with sex  Vaginal moisturizers twice weekly as needed  Consider black cohosh  Dexa scan up to date  Diagnoses and all orders for this visit:    Encntr for gyn exam (general) (routine) w abnormal findings    Hot flashes    History of bilateral mastectomy    BRCA negative          Subjective:      Patient ID: Ralf Holm is a 50 y o  female  Ralf Holm is a 50 y o  female who is here today for her annual visit  No health concerns  She is dealing with her hot flashes throughout the day but none at night  6/18/19 diagnosed with left invasive ductal breast cancer  6/19 BRCA negative  8/10 left mastectomy; right prophylactic mastectomy  She has not had breast reconstruction post bilateral mastectomy and is pleased to "be flat"  No vaginal bleeding  Exercises daily rotating between the bike, treadmill and weights  Dexa scan 3/20 showed osteopenia  Ralf Holm is sexually active with male partner/ of 16 years  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 [employer]  Also works as a nurse at [employer]  Colonsocopy done 2018  Normal pap with negative HR HPV 2/20  The following portions of the patient's history were reviewed and updated as appropriate: allergies, current medications, past family history, past medical history, past social history, past surgical history and problem list     Review of Systems   Constitutional: Negative  Negative for activity change, appetite change, chills, diaphoresis, fatigue, fever and unexpected weight change  HENT: Negative for congestion, dental problem, sneezing, sore throat and trouble swallowing      Eyes: Negative for visual disturbance  Respiratory: Negative for chest tightness and shortness of breath  Cardiovascular: Negative for chest pain and leg swelling  Gastrointestinal: Negative for abdominal pain, constipation, diarrhea, nausea and vomiting  Genitourinary: Negative for difficulty urinating, dyspareunia, dysuria, frequency, hematuria, pelvic pain, urgency, vaginal bleeding, vaginal discharge and vaginal pain  Musculoskeletal: Negative for back pain and neck pain  Skin: Negative  Allergic/Immunologic: Negative  Neurological: Negative for weakness and headaches  Hematological: Negative for adenopathy  Psychiatric/Behavioral: Negative  Objective:      /70 (BP Location: Left arm, Patient Position: Sitting, Cuff Size: Standard)   Pulse 60   Ht 5' 3 5" (1 613 m)   Wt 68 kg (150 lb)   LMP  (LMP Unknown)   BMI 26 15 kg/m²          Physical Exam  Vitals signs and nursing note reviewed  Constitutional:       Appearance: Normal appearance  She is well-developed  HENT:      Head: Normocephalic and atraumatic  Eyes:      General:         Right eye: No discharge  Left eye: No discharge  Neck:      Musculoskeletal: Normal range of motion and neck supple  Thyroid: No thyromegaly  Trachea: Trachea normal    Cardiovascular:      Rate and Rhythm: Normal rate and regular rhythm  Heart sounds: Normal heart sounds  Pulmonary:      Effort: Pulmonary effort is normal       Breath sounds: Normal breath sounds  Chest:      Breasts: Breasts are symmetrical       Comments: Bilateral mastectomy  Abdominal:      Palpations: Abdomen is soft  Genitourinary:     General: Normal vulva  Exam position: Supine  Labia:         Right: No rash, tenderness, lesion or injury  Left: No rash, tenderness, lesion or injury  Urethra: No prolapse, urethral pain, urethral swelling or urethral lesion  Vagina: Normal  No signs of injury and foreign body   No vaginal discharge, erythema, tenderness or bleeding  Cervix: No cervical motion tenderness, discharge or friability  Uterus: Normal        Adnexa:         Right: No mass, tenderness or fullness  Left: No mass, tenderness or fullness  Rectum: No external hemorrhoid  Musculoskeletal: Normal range of motion  Lymphadenopathy:      Head:      Right side of head: No submental, submandibular or tonsillar adenopathy  Left side of head: No submental, submandibular or tonsillar adenopathy  Cervical: No cervical adenopathy  Upper Body:      Right upper body: No supraclavicular or axillary adenopathy  Left upper body: No supraclavicular or axillary adenopathy  Lower Body: No right inguinal adenopathy  No left inguinal adenopathy  Skin:     General: Skin is warm and dry  Neurological:      Mental Status: She is alert and oriented to person, place, and time  Psychiatric:         Mood and Affect: Mood normal          Behavior: Behavior normal          Thought Content:  Thought content normal          Judgment: Judgment normal

## 2021-03-01 ENCOUNTER — ANNUAL EXAM (OUTPATIENT)
Dept: OBGYN CLINIC | Facility: CLINIC | Age: 56
End: 2021-03-01
Payer: COMMERCIAL

## 2021-03-01 VITALS
SYSTOLIC BLOOD PRESSURE: 106 MMHG | BODY MASS INDEX: 25.61 KG/M2 | WEIGHT: 150 LBS | HEART RATE: 60 BPM | HEIGHT: 64 IN | DIASTOLIC BLOOD PRESSURE: 70 MMHG

## 2021-03-01 DIAGNOSIS — Z13.71 BRCA NEGATIVE: ICD-10-CM

## 2021-03-01 DIAGNOSIS — Z90.13 HISTORY OF BILATERAL MASTECTOMY: ICD-10-CM

## 2021-03-01 DIAGNOSIS — R23.2 HOT FLASHES: ICD-10-CM

## 2021-03-01 DIAGNOSIS — Z01.411 ENCNTR FOR GYN EXAM (GENERAL) (ROUTINE) W ABNORMAL FINDINGS: Primary | ICD-10-CM

## 2021-03-01 PROCEDURE — 3008F BODY MASS INDEX DOCD: CPT | Performed by: NURSE PRACTITIONER

## 2021-03-01 PROCEDURE — 1036F TOBACCO NON-USER: CPT | Performed by: NURSE PRACTITIONER

## 2021-03-01 PROCEDURE — 99396 PREV VISIT EST AGE 40-64: CPT | Performed by: NURSE PRACTITIONER

## 2021-03-01 NOTE — PATIENT INSTRUCTIONS
Calcium 1200-1500mg + 600-1000 IU Vit D daily  Exercise 150 minutes per week minimum including weight bearing exercises  Pap with high risk HPV Q 5 years-due 2025  Monthly breast self exam recommended  Colonoscopy-  Up to date  Kegels 20 times twice daily  Silicone based lubricant with sex  Vaginal moisturizers twice weekly as needed  Consider black cohosh

## 2021-03-10 DIAGNOSIS — M51.17 INTERVERTEBRAL DISC DISORDER WITH RADICULOPATHY OF LUMBOSACRAL REGION: ICD-10-CM

## 2021-03-10 RX ORDER — TRAMADOL HYDROCHLORIDE 50 MG/1
50 TABLET ORAL EVERY 8 HOURS PRN
Qty: 90 TABLET | Refills: 2 | Status: SHIPPED | OUTPATIENT
Start: 2021-03-10 | End: 2021-06-15 | Stop reason: SDUPTHER

## 2021-05-18 DIAGNOSIS — E78.2 MIXED HYPERLIPIDEMIA: ICD-10-CM

## 2021-05-18 RX ORDER — SIMVASTATIN 10 MG
TABLET ORAL
Qty: 90 TABLET | Refills: 1 | Status: SHIPPED | OUTPATIENT
Start: 2021-05-18 | End: 2021-10-29

## 2021-06-01 ENCOUNTER — TELEPHONE (OUTPATIENT)
Dept: SURGICAL ONCOLOGY | Facility: CLINIC | Age: 56
End: 2021-06-01

## 2021-06-15 DIAGNOSIS — M51.17 INTERVERTEBRAL DISC DISORDER WITH RADICULOPATHY OF LUMBOSACRAL REGION: ICD-10-CM

## 2021-06-15 RX ORDER — TRAMADOL HYDROCHLORIDE 50 MG/1
50 TABLET ORAL EVERY 8 HOURS PRN
Qty: 90 TABLET | Refills: 0 | Status: SHIPPED | OUTPATIENT
Start: 2021-06-15 | End: 2021-07-11 | Stop reason: SDUPTHER

## 2021-07-11 DIAGNOSIS — M51.17 INTERVERTEBRAL DISC DISORDER WITH RADICULOPATHY OF LUMBOSACRAL REGION: ICD-10-CM

## 2021-07-12 RX ORDER — TRAMADOL HYDROCHLORIDE 50 MG/1
50 TABLET ORAL EVERY 8 HOURS PRN
Qty: 90 TABLET | Refills: 0 | Status: SHIPPED | OUTPATIENT
Start: 2021-07-12 | End: 2021-08-12 | Stop reason: SDUPTHER

## 2021-07-17 DIAGNOSIS — R45.4 IRRITABILITY AND ANGER: ICD-10-CM

## 2021-07-18 RX ORDER — ESCITALOPRAM OXALATE 10 MG/1
TABLET ORAL
Qty: 90 TABLET | Refills: 1 | Status: SHIPPED | OUTPATIENT
Start: 2021-07-18 | End: 2021-12-29

## 2021-07-26 ENCOUNTER — HOSPITAL ENCOUNTER (OUTPATIENT)
Dept: RADIOLOGY | Age: 56
Discharge: HOME | End: 2021-07-26
Payer: COMMERCIAL

## 2021-07-26 DIAGNOSIS — K80.50 GALL BLADDER PAIN: ICD-10-CM

## 2021-07-26 PROCEDURE — 76700 US EXAM ABDOM COMPLETE: CPT

## 2021-08-12 DIAGNOSIS — M51.17 INTERVERTEBRAL DISC DISORDER WITH RADICULOPATHY OF LUMBOSACRAL REGION: ICD-10-CM

## 2021-08-12 RX ORDER — TRAMADOL HYDROCHLORIDE 50 MG/1
50 TABLET ORAL EVERY 8 HOURS PRN
Qty: 90 TABLET | Refills: 0 | Status: SHIPPED | OUTPATIENT
Start: 2021-08-12 | End: 2021-09-06 | Stop reason: SDUPTHER

## 2021-08-24 ENCOUNTER — OFFICE VISIT (OUTPATIENT)
Dept: SURGICAL ONCOLOGY | Facility: CLINIC | Age: 56
End: 2021-08-24
Payer: COMMERCIAL

## 2021-08-24 VITALS
RESPIRATION RATE: 16 BRPM | HEART RATE: 60 BPM | WEIGHT: 151 LBS | TEMPERATURE: 97.3 F | DIASTOLIC BLOOD PRESSURE: 88 MMHG | BODY MASS INDEX: 25.78 KG/M2 | HEIGHT: 64 IN | SYSTOLIC BLOOD PRESSURE: 138 MMHG

## 2021-08-24 DIAGNOSIS — C50.912 MALIGNANT NEOPLASM OF LEFT BREAST IN FEMALE, ESTROGEN RECEPTOR POSITIVE, UNSPECIFIED SITE OF BREAST (HCC): Primary | ICD-10-CM

## 2021-08-24 DIAGNOSIS — Z17.0 MALIGNANT NEOPLASM OF LEFT BREAST IN FEMALE, ESTROGEN RECEPTOR POSITIVE, UNSPECIFIED SITE OF BREAST (HCC): Primary | ICD-10-CM

## 2021-08-24 DIAGNOSIS — Z79.810 USE OF TAMOXIFEN (NOLVADEX): ICD-10-CM

## 2021-08-24 PROCEDURE — 3075F SYST BP GE 130 - 139MM HG: CPT | Performed by: NURSE PRACTITIONER

## 2021-08-24 PROCEDURE — 99214 OFFICE O/P EST MOD 30 MIN: CPT | Performed by: NURSE PRACTITIONER

## 2021-08-24 PROCEDURE — 3079F DIAST BP 80-89 MM HG: CPT | Performed by: NURSE PRACTITIONER

## 2021-08-24 NOTE — PROGRESS NOTES
Surgical Oncology Follow Up       [address] [place]  [facility]  [address] [address]    Jared Hooks  1965  [id number]  [id number] [facility]  [facility]  [address] [address]    Chief Complaint   Patient presents with    Follow-up       Assessment/Plan:  1  Malignant neoplasm of left breast in female, estrogen receptor positive, unspecified site of breast [facility])  - Ambulatory referral to Physical Therapy; Future  - 6 mo f/u visit    2  Use of tamoxifen (Nolvadex)  - Continue use per medical oncology      Discussion/Summary: Patient is a 61-year-old female who presents today for six-month follow-up visit for left breast cancer diagnosed in June of 2019  Her pathology revealed invasive ductal carcinoma, ER/KS 90%, her 2-   She underwent genetic testing which was negative   She underwent a left mastectomy and sentinel node biopsy by Dr Amrit Trent and underwent a right prophylactic mastectomy   Her tumor was high risk on Mammaprint   She therefore completed adjuvant chemotherapy and is currently taking tamoxifen  Her only complaint today is ongoing generalized arthralgias  I recommended the strength ABC program and the patient is agreeable, referral placed  There are no worrisome findings on her exam today  I will plan to see her back in 6 months or sooner should the need arise  She was instructed to call with any new concerns or symptoms prior to that time  All of her questions were answered today      History of Present Illness:     Oncology History   Malignant neoplasm of left breast in female, estrogen receptor positive ([facility] [address] )   6/13/2019 Biopsy    Left breast ultrasound-guided biopsy  12 o'clock, 4 cm from nipple  Invasive breast carcinoma of no special type (ductal NST)  Grade 2    2 o'clock, 5 cm from nipple  Invasive breast carcinoma of no special type (ductal NST)  Grade 2  ER 90  FL 90  HER2 1+     6/20/2019 Genetic Testing    The following genes were evaluated: JOSH, BRCA1, BRCA2, CDH1, CHEK2, PALB2, PTEN, STK11, TP53  Negative for genetic mutations or variants  [facility]     8/9/2019 Surgery    Left breast mastectomy with sentinel lymph node biopsy  Two foci of invasive mammary carcinoma of no special type (ductal)  Grade 2  2 4 cm  DCIS measures at least 8 2 cm  0/2 Lymph nodes  Margins negative  Anatomic Stage IIA  Prognostic Stage IA    Right breast mastectomy; prophylactic  Negative for malignancy  Benign fibrocystic changes with atypia consisting of multifocal lobular neoplasia (atypical lobular hyperplasia and focal lobular carcinoma in situ)     8/26/2019 Genomic Testing    MammaPrint for FLEX  High Risk     10/4/2019 - 10/4/2019 Chemotherapy    Adjuvant chemotherapy (Dr Rodríguez Baxter)     2/2020 -  Hormone Therapy    Tamoxifen          -Interval History:  Patient presents today for follow-up visit for left breast cancer  She notices no changes on self-exam   She continues to take tamoxifen  She continues to experience generalized arthralgias but denies any new or persistent headaches, back pain or bone pain, cough or shortness of breath, abdominal pain  Review of Systems:  Review of Systems   Constitutional: Negative for activity change, appetite change, chills, fatigue, fever and unexpected weight change  Respiratory: Negative for cough and shortness of breath  Cardiovascular: Negative for chest pain  Gastrointestinal: Negative for abdominal pain, constipation, diarrhea, nausea and vomiting  Endocrine: Positive for heat intolerance (hot flashes- improved)  Musculoskeletal: Positive for arthralgias (generalized)  Negative for back pain, gait problem and myalgias  Skin: Negative for color change and rash  Neurological: Negative for dizziness and headaches  Hematological: Negative for adenopathy  Psychiatric/Behavioral: Negative for agitation and confusion     All other systems reviewed and are negative        Patient Active Problem List   Diagnosis    Hiatal hernia    Hyperlipidemia    Pain syndrome, chronic    Sacroiliitis (HCC)    Anxiety and depression    Vitamin B12 deficiency    Seasonal allergic rhinitis due to pollen    Gastroesophageal reflux disease    Costochondritis    Intervertebral disc disorder with radiculopathy of lumbosacral region    Malignant neoplasm of left breast in female, estrogen receptor positive ([facility] [address] )    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    Tear of right acetabular labrum    Leukopenia    Leiomyoma of uterus, unspecified    S/P bilateral mastectomy    Pain of right hip joint    Encntr for gyn exam (general) (routine) w/o abn findings    Vaginal atrophy    History of bilateral mastectomy    Encounter for follow-up examination after completed treatment for malignant neoplasm    Use of tamoxifen (Nolvadex)    Essential hypertension    Abnormal fasting glucose    Osteopenia of multiple sites     Past Medical History:   Diagnosis Date    Anxiety     BRCA gene mutation negative 06/20/2019    [facility]    Cancer ([facility] [address] )     breast     Fibrocystic disease of breast     GERD (gastroesophageal reflux disease)     Hiatal hernia     Hyperlipidemia      Past Surgical History:   Procedure Laterality Date    APPENDECTOMY      BREAST BIOPSY      BREAST CYST ASPIRATION Left 2005    COLONOSCOPY      FOOT SURGERY Right 2011    right, hardware removal    IR PORT PLACEMENT  9/27/2019    IR PORT REMOVAL  2/19/2020    MASTECTOMY W/ SENTINEL NODE BIOPSY Left 8/9/2019    Procedure: BREAST MASTECTOMY, SENTINEL LYMPH NODE BIOPSY, LYMPHATIC MAPPING W/ BLUE DYE AND RADIOACTIVE DYE (INJECT AT 0730 BY DR WHITEHEAD IN THE O R );  Surgeon: Yulia Vera MD;  [facility];  Service: Surgical Oncology    OTHER SURGICAL HISTORY Right 2010    Complete Excision of Fifth Metatarsal Head     WY COLONOSCOPY FLX DX W/COLLJ SPEC WHEN PFRMD N/A 7/20/2018    Procedure: EGD AND COLONOSCOPY;  Surgeon: Marisela Rendon MD;  Location: [facility]; Service: Gastroenterology    CO MASTECTOMY, SIMPLE, COMPLETE Right 8/9/2019    Procedure: BREAST SIMPLE MASTECTOMY;  Surgeon: Martha Cho MD;  [facility];  Service: Surgical Oncology    US GUIDANCE BREAST BIOPSY LEFT EACH ADDITIONAL Left 6/13/2019    US GUIDED BREAST BIOPSY LEFT COMPLETE Left 6/13/2019     Family History   Problem Relation Age of Onset    Coronary artery disease Mother         CABG in her 62s   Learta January Other Mother         Dyslipidemia    Hypertension Mother     Heart attack Father 46        acute myocardial infarction    Other Father         Dyslipidemia    Hypertension Father     Prostate cancer Paternal Uncle 72    Thyroid disease Family         disorder    No Known Problems Maternal Aunt     No Known Problems Maternal Aunt      Social History     Socioeconomic History    Marital status: /Civil Union     Spouse name: Not on file    Number of children: Not on file    Years of education: Not on file    Highest education level: Not on file   Occupational History    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, 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   Tobacco Use    Smoking status: Never Smoker    Smokeless tobacco: Never Used   Vaping Use    Vaping Use: Never used   Substance and Sexual Activity    Alcohol use:  Yes    Drug use: No    Sexual activity: Yes     Partners: Male     Birth control/protection: Male Sterilization     Comment: Partner had vasectomy   Other Topics Concern    Not on file   Social History Narrative    Exercise habits    Working full-time - used to be GI RN     Social Determinants of Health     Financial Resource Strain:     Difficulty of Paying Living Expenses:    Food Insecurity:     Worried About Running Out of Food in the Last Year:     [address] in the Last Year:    Transportation Needs:     Lack of Transportation (Medical):  Lack of Transportation (Non-Medical):    Physical Activity:     Days of Exercise per Week:     Minutes of Exercise per Session:    Stress:     Feeling of Stress :    Social Connections:     Frequency of Communication with Friends and Family:     Frequency of Social Gatherings with Friends and Family:     Attends Zoroastrianism Services:     Active Member of Clubs or Organizations:     Attends Club or Organization Meetings:     Marital Status:    Intimate Partner Violence:     Fear of Current or Ex-Partner:     Emotionally Abused:     Physically Abused:     Sexually Abused:        Current Outpatient Medications:     cholecalciferol (VITAMIN D3) 1,000 units tablet, Take 1,000 Units by mouth daily, Disp: , Rfl:     escitalopram (LEXAPRO) 10 mg tablet, TAKE 1 TABLET BY MOUTH EVERY DAY, Disp: 90 tablet, Rfl: 1    famotidine (PEPCID) 20 mg tablet, TAKE 1 TABLET BY MOUTH TWO TIMES DAILY, Disp: 180 tablet, Rfl: 1    simvastatin (ZOCOR) 10 mg tablet, TAKE 1 TABLET BY MOUTH EVERY DAY, Disp: 90 tablet, Rfl: 1    tamoxifen (NOLVADEX) 20 mg tablet, Take 20 mg by mouth daily , Disp: , Rfl:     traMADol (ULTRAM) 50 mg tablet, Take 1 tablet (50 mg total) by mouth every 8 (eight) hours as needed for moderate pain, Disp: 90 tablet, Rfl: 0    fluticasone (FLONASE) 50 mcg/act nasal spray, 1 spray into each nostril as needed , Disp: , Rfl:     metoprolol succinate (TOPROL-XL) 25 mg 24 hr tablet, Take 1 tablet (25 mg total) by mouth daily, Disp: 90 tablet, Rfl: 0  Allergies   Allergen Reactions    Sulfa Antibiotics Headache     Annotation - [id number]: Migraine; Annotation - [id number]: cellular issues     Vitals:    08/24/21 1352   BP: 138/88   Pulse: 60   Resp: 16   Temp: (!) 97 3 °F (36 3 °C)       Physical Exam  Vitals reviewed  Constitutional:       General: She is not in acute distress  Appearance: Normal appearance  She is well-developed  She is not diaphoretic     HENT: Head: Normocephalic and atraumatic  Cardiovascular:      Rate and Rhythm: Normal rate and regular rhythm  Heart sounds: Normal heart sounds  Pulmonary:      Effort: Pulmonary effort is normal       Breath sounds: Normal breath sounds  Chest:      Comments: Bilateral mastectomy sites free of masses, skin changes, nodules  Abdominal:      Palpations: Abdomen is soft  There is no mass  Tenderness: There is no abdominal tenderness  Musculoskeletal:         General: Normal range of motion  Cervical back: Normal range of motion  Lymphadenopathy:      Upper Body:      Right upper body: No supraclavicular or axillary adenopathy  Left upper body: No supraclavicular or axillary adenopathy  Skin:     General: Skin is warm and dry  Findings: No rash  Neurological:      Mental Status: She is alert and oriented to person, place, and time  Psychiatric:         Speech: Speech normal          Advance Care Planning/Advance Directives:  Discussed disease status, cancer treatment plans and/or cancer treatment goals with the patient

## 2021-08-25 ENCOUNTER — OFFICE VISIT (OUTPATIENT)
Dept: INTERNAL MEDICINE CLINIC | Facility: CLINIC | Age: 56
End: 2021-08-25
Payer: COMMERCIAL

## 2021-08-25 VITALS
TEMPERATURE: 97.8 F | WEIGHT: 147.8 LBS | HEART RATE: 63 BPM | SYSTOLIC BLOOD PRESSURE: 106 MMHG | RESPIRATION RATE: 12 BRPM | BODY MASS INDEX: 25.23 KG/M2 | DIASTOLIC BLOOD PRESSURE: 80 MMHG | HEIGHT: 64 IN | OXYGEN SATURATION: 99 %

## 2021-08-25 DIAGNOSIS — I10 ESSENTIAL HYPERTENSION: ICD-10-CM

## 2021-08-25 DIAGNOSIS — Z90.13 S/P BILATERAL MASTECTOMY: ICD-10-CM

## 2021-08-25 DIAGNOSIS — Z11.59 NEED FOR HEPATITIS C SCREENING TEST: ICD-10-CM

## 2021-08-25 DIAGNOSIS — M85.89 OSTEOPENIA OF MULTIPLE SITES: ICD-10-CM

## 2021-08-25 DIAGNOSIS — F32.A ANXIETY AND DEPRESSION: ICD-10-CM

## 2021-08-25 DIAGNOSIS — Z00.00 HEALTH MAINTENANCE EXAMINATION: Primary | ICD-10-CM

## 2021-08-25 DIAGNOSIS — Z79.810 USE OF TAMOXIFEN (NOLVADEX): ICD-10-CM

## 2021-08-25 DIAGNOSIS — F41.9 ANXIETY AND DEPRESSION: ICD-10-CM

## 2021-08-25 PROCEDURE — 99396 PREV VISIT EST AGE 40-64: CPT | Performed by: INTERNAL MEDICINE

## 2021-08-25 PROCEDURE — 3008F BODY MASS INDEX DOCD: CPT | Performed by: INTERNAL MEDICINE

## 2021-08-25 PROCEDURE — 1036F TOBACCO NON-USER: CPT | Performed by: INTERNAL MEDICINE

## 2021-08-25 NOTE — PROGRESS NOTES
Assessment/Plan:    Essential hypertension  Stopped metoprolol several months ago  BP slightly low today  Anxiety and depression  Stable, on Lexapro  Gastroesophageal reflux disease  Takes famotidine bid  Hyperlipidemia  Lipids due, on statin  Use of tamoxifen (Nolvadex)  Increased arthralgia the last few months, on tamoxifen  She will try physical therapy  She will discuss with oncology of changing tamoxifen  Narcotic contract updated  Taking tramadol tid  Vitamin B12 deficiency  Takes B12  Osteopenia of multiple sites  On supplements, bone density updated  Pain syndrome, chronic  Stable, taking tramadol tid  Diagnoses and all orders for this visit:    Health maintenance examination  Comments:  Updated  She will check for Shingrix coverage  Use of tamoxifen (Nolvadex)    Osteopenia of multiple sites    S/P bilateral mastectomy    Anxiety and depression    Essential hypertension    Need for hepatitis C screening test  -     Hepatitis C antibody      Follow up in 6 months or as needed  Subjective:      Patient ID: Shaniqua Nieto is a 50 y o  female here for a physical     She complains of frequent joint pain  Pain worse when she wakes up in the morning, feels so sore and "like a [de-identified] years old " Symptoms gradually improve throughout the day  She would experience hand, wrist, shoulder, lower extremity pain  Denies any injury or swelling  She does take tramadol 3 times a day  She will discuss with her oncologist regarding the tamoxifen  She is looking for to physical therapy, which may help  Can use to exercise regularly  She reports no recent back or hip pain  She otherwise feels well, no other complaints  She stop metoprolol several months ago, did not feel well while she was on it  She reports intermittent sinus headaches especially with the change of weather  She does not use any nasal sprays      The following portions of the patient's history were reviewed and updated as appropriate: allergies, current medications, past medical history, past social history and problem list     Review of Systems   Constitutional: Negative for activity change, appetite change and fatigue  HENT: Negative for congestion, ear pain and postnasal drip  Eyes: Negative for visual disturbance  Respiratory: Negative for cough and shortness of breath  Cardiovascular: Negative for chest pain and leg swelling  Gastrointestinal: Negative for abdominal pain, constipation and diarrhea  Genitourinary: Negative for dysuria, frequency and urgency  Musculoskeletal: Positive for arthralgias  Negative for myalgias  Skin: Negative for rash and wound  Neurological: Negative for dizziness and headaches  Psychiatric/Behavioral: Negative for confusion and dysphoric mood  The patient is not nervous/anxious  Objective:      /80   Pulse 63   Temp 97 8 °F (36 6 °C)   Resp 12   Ht 5' 4" (1 626 m)   Wt 67 kg (147 lb 12 8 oz)   SpO2 99%   BMI 25 37 kg/m²          Physical Exam  Vitals and nursing note reviewed  Constitutional:       General: She is not in acute distress  Appearance: She is well-developed  HENT:      Head: Normocephalic and atraumatic  Right Ear: Tympanic membrane, ear canal and external ear normal       Left Ear: Tympanic membrane, ear canal and external ear normal    Eyes:      Pupils: Pupils are equal, round, and reactive to light  Cardiovascular:      Rate and Rhythm: Normal rate and regular rhythm  Heart sounds: Normal heart sounds  Pulmonary:      Effort: Pulmonary effort is normal       Breath sounds: Normal breath sounds  No wheezing  Abdominal:      General: Bowel sounds are normal       Palpations: Abdomen is soft  Musculoskeletal:         General: No swelling  Right lower leg: No edema  Left lower leg: No edema  Skin:     General: Skin is warm  Findings: No rash     Neurological:      General: No focal deficit present  Mental Status: She is alert and oriented to person, place, and time  Psychiatric:         Mood and Affect: Mood normal          Behavior: Behavior normal            Labs & imaging results reviewed with patient

## 2021-08-25 NOTE — ASSESSMENT & PLAN NOTE
Increased arthralgia the last few months, on tamoxifen  She will try physical therapy  She will discuss with oncology of changing tamoxifen  Narcotic contract updated  Taking tramadol tid

## 2021-09-02 ENCOUNTER — APPOINTMENT (OUTPATIENT)
Dept: PHYSICAL THERAPY | Facility: CLINIC | Age: 56
End: 2021-09-02

## 2021-09-06 DIAGNOSIS — M51.17 INTERVERTEBRAL DISC DISORDER WITH RADICULOPATHY OF LUMBOSACRAL REGION: ICD-10-CM

## 2021-09-07 RX ORDER — TRAMADOL HYDROCHLORIDE 50 MG/1
50 TABLET ORAL EVERY 8 HOURS PRN
Qty: 90 TABLET | Refills: 0 | Status: SHIPPED | OUTPATIENT
Start: 2021-09-07 | End: 2021-10-05 | Stop reason: SDUPTHER

## 2021-10-02 ENCOUNTER — CLINICAL SUPPORT (OUTPATIENT)
Dept: INTERNAL MEDICINE CLINIC | Facility: CLINIC | Age: 56
End: 2021-10-02
Payer: COMMERCIAL

## 2021-10-02 DIAGNOSIS — Z23 NEED FOR VACCINATION: Primary | ICD-10-CM

## 2021-10-02 PROCEDURE — 90682 RIV4 VACC RECOMBINANT DNA IM: CPT

## 2021-10-02 PROCEDURE — 90471 IMMUNIZATION ADMIN: CPT

## 2021-10-05 DIAGNOSIS — M51.17 INTERVERTEBRAL DISC DISORDER WITH RADICULOPATHY OF LUMBOSACRAL REGION: ICD-10-CM

## 2021-10-05 RX ORDER — TRAMADOL HYDROCHLORIDE 50 MG/1
50 TABLET ORAL EVERY 8 HOURS PRN
Qty: 90 TABLET | Refills: 0 | Status: SHIPPED | OUTPATIENT
Start: 2021-10-05 | End: 2021-11-08 | Stop reason: SDUPTHER

## 2021-10-27 ENCOUNTER — IMMUNIZATIONS (OUTPATIENT)
Dept: FAMILY MEDICINE CLINIC | Facility: HOSPITAL | Age: 56
End: 2021-10-27

## 2021-10-27 DIAGNOSIS — Z23 ENCOUNTER FOR IMMUNIZATION: Primary | ICD-10-CM

## 2021-10-29 DIAGNOSIS — E78.2 MIXED HYPERLIPIDEMIA: ICD-10-CM

## 2021-10-29 RX ORDER — SIMVASTATIN 10 MG
TABLET ORAL
Qty: 90 TABLET | Refills: 1 | Status: SHIPPED | OUTPATIENT
Start: 2021-10-29 | End: 2022-03-02 | Stop reason: SDUPTHER

## 2021-11-08 DIAGNOSIS — M51.17 INTERVERTEBRAL DISC DISORDER WITH RADICULOPATHY OF LUMBOSACRAL REGION: ICD-10-CM

## 2021-11-08 RX ORDER — TRAMADOL HYDROCHLORIDE 50 MG/1
50 TABLET ORAL EVERY 8 HOURS PRN
Qty: 90 TABLET | Refills: 0 | Status: SHIPPED | OUTPATIENT
Start: 2021-11-08 | End: 2021-12-06 | Stop reason: SDUPTHER

## 2021-12-06 DIAGNOSIS — M51.17 INTERVERTEBRAL DISC DISORDER WITH RADICULOPATHY OF LUMBOSACRAL REGION: ICD-10-CM

## 2021-12-06 RX ORDER — TRAMADOL HYDROCHLORIDE 50 MG/1
50 TABLET ORAL EVERY 8 HOURS PRN
Qty: 90 TABLET | Refills: 0 | Status: SHIPPED | OUTPATIENT
Start: 2021-12-06 | End: 2022-01-04 | Stop reason: SDUPTHER

## 2021-12-27 DIAGNOSIS — K21.9 GASTROESOPHAGEAL REFLUX DISEASE WITHOUT ESOPHAGITIS: Primary | ICD-10-CM

## 2021-12-27 RX ORDER — OMEPRAZOLE 40 MG/1
40 CAPSULE, DELAYED RELEASE ORAL
Qty: 90 CAPSULE | Refills: 0 | Status: SHIPPED | OUTPATIENT
Start: 2021-12-27

## 2021-12-29 DIAGNOSIS — R45.4 IRRITABILITY AND ANGER: ICD-10-CM

## 2021-12-29 RX ORDER — ESCITALOPRAM OXALATE 10 MG/1
TABLET ORAL
Qty: 90 TABLET | Refills: 1 | Status: SHIPPED | OUTPATIENT
Start: 2021-12-29 | End: 2022-02-24

## 2022-01-04 DIAGNOSIS — M51.17 INTERVERTEBRAL DISC DISORDER WITH RADICULOPATHY OF LUMBOSACRAL REGION: ICD-10-CM

## 2022-01-04 RX ORDER — TRAMADOL HYDROCHLORIDE 50 MG/1
50 TABLET ORAL EVERY 8 HOURS PRN
Qty: 90 TABLET | Refills: 0 | Status: SHIPPED | OUTPATIENT
Start: 2022-01-04 | End: 2022-02-02 | Stop reason: SDUPTHER

## 2022-02-01 ENCOUNTER — HOSPITAL ENCOUNTER (OUTPATIENT)
Dept: NON INVASIVE DIAGNOSTICS | Facility: CLINIC | Age: 57
Discharge: HOME | End: 2022-02-01
Attending: INTERNAL MEDICINE
Payer: COMMERCIAL

## 2022-02-01 VITALS
DIASTOLIC BLOOD PRESSURE: 80 MMHG | HEART RATE: 60 BPM | SYSTOLIC BLOOD PRESSURE: 106 MMHG | HEIGHT: 64 IN | BODY MASS INDEX: 25.1 KG/M2 | WEIGHT: 147 LBS

## 2022-02-01 DIAGNOSIS — Z92.21 STATUS POST CHEMOTHERAPY: ICD-10-CM

## 2022-02-01 LAB
AORTIC ROOT: 2.8 CM
APICAL FOUR CHAMBER EJECTION FRACTION: 69 %
E WAVE DECELERATION TIME: 187 MS
FRACTIONAL SHORTENING: 34 % (ref 28–44)
INTERVENTRICULAR SEPTUM IN DIASTOLE (PARASTERNAL SHORT AXIS VIEW): 0.7 CM (ref 0.51–0.95)
LEFT ATRIUM AREA SYSTOLE SINGLE PLANE A4C: 14 CM2
LEFT ATRIUM SIZE: 3.5 CM
LEFT INTERNAL DIMENSION IN SYSTOLE: 2.9 CM (ref 2.1–4)
LEFT VENTRICULAR INTERNAL DIMENSION IN DIASTOLE: 4.4 CM (ref 3.97–5.91)
LEFT VENTRICULAR POSTERIOR WALL IN END DIASTOLE: 0.8 CM (ref 0.49–0.93)
LEFT VENTRICULAR STROKE VOLUME: 54 ML
MV E'TISSUE VEL-SEP: 6 CM/S
MV PEAK A VEL: 0.63 M/S
MV PEAK E VEL: 88 CM/S
MV STENOSIS PRESSURE HALF TIME: 0 MS
RIGHT ATRIUM AREA SYSTOLE A4C: 12.1 CM2
RIGHT VENTRICLE ID DIMENSION: 2.9 CM
SL CV LV EF: 65
SL CV PED ECHO LEFT VENTRICLE DIASTOLIC VOLUME (MOD BIPLANE) 2D: 86 ML
SL CV PED ECHO LEFT VENTRICLE SYSTOLIC VOLUME (MOD BIPLANE) 2D: 32 ML
TR MAX PG: 27 MMHG
TRICUSPID VALVE PEAK REGURGITATION VELOCITY: 2.62 M/S
Z-SCORE OF INTERVENTRICULAR SEPTUM IN END DIASTOLE: -0.23
Z-SCORE OF LEFT VENTRICULAR DIMENSION IN END SYSTOLE: -0.96
Z-SCORE OF LEFT VENTRICULAR POSTERIOR WALL IN END DIASTOLE: 0.77

## 2022-02-01 PROCEDURE — 93306 TTE W/DOPPLER COMPLETE: CPT | Performed by: INTERNAL MEDICINE

## 2022-02-01 PROCEDURE — 93306 TTE W/DOPPLER COMPLETE: CPT

## 2022-02-02 DIAGNOSIS — M51.17 INTERVERTEBRAL DISC DISORDER WITH RADICULOPATHY OF LUMBOSACRAL REGION: ICD-10-CM

## 2022-02-02 DIAGNOSIS — R39.9 URINARY SYMPTOM OR SIGN: Primary | ICD-10-CM

## 2022-02-02 RX ORDER — TRAMADOL HYDROCHLORIDE 50 MG/1
50 TABLET ORAL EVERY 8 HOURS PRN
Qty: 90 TABLET | Refills: 1 | Status: SHIPPED | OUTPATIENT
Start: 2022-02-02 | End: 2022-04-01 | Stop reason: SDUPTHER

## 2022-02-22 DIAGNOSIS — M85.89 OSTEOPENIA OF MULTIPLE SITES: ICD-10-CM

## 2022-02-22 DIAGNOSIS — R73.01 ABNORMAL FASTING GLUCOSE: Primary | ICD-10-CM

## 2022-02-22 DIAGNOSIS — Z00.00 LABORATORY EXAMINATION ORDERED AS PART OF A ROUTINE GENERAL MEDICAL EXAMINATION: ICD-10-CM

## 2022-02-22 DIAGNOSIS — E78.49 OTHER HYPERLIPIDEMIA: ICD-10-CM

## 2022-02-22 DIAGNOSIS — E53.8 VITAMIN B12 DEFICIENCY: ICD-10-CM

## 2022-02-22 DIAGNOSIS — D72.818 OTHER DECREASED WHITE BLOOD CELL (WBC) COUNT: ICD-10-CM

## 2022-02-24 ENCOUNTER — APPOINTMENT (OUTPATIENT)
Dept: LAB | Age: 57
End: 2022-02-24
Payer: COMMERCIAL

## 2022-02-24 ENCOUNTER — OFFICE VISIT (OUTPATIENT)
Dept: SURGICAL ONCOLOGY | Facility: CLINIC | Age: 57
End: 2022-02-24
Payer: COMMERCIAL

## 2022-02-24 VITALS
HEIGHT: 64 IN | RESPIRATION RATE: 12 BRPM | WEIGHT: 160 LBS | BODY MASS INDEX: 27.31 KG/M2 | SYSTOLIC BLOOD PRESSURE: 104 MMHG | OXYGEN SATURATION: 98 % | HEART RATE: 70 BPM | DIASTOLIC BLOOD PRESSURE: 76 MMHG

## 2022-02-24 DIAGNOSIS — C50.912 MALIGNANT NEOPLASM OF LEFT BREAST IN FEMALE, ESTROGEN RECEPTOR POSITIVE, UNSPECIFIED SITE OF BREAST (HCC): Primary | ICD-10-CM

## 2022-02-24 DIAGNOSIS — Z79.810 USE OF TAMOXIFEN (NOLVADEX): ICD-10-CM

## 2022-02-24 DIAGNOSIS — Z17.0 MALIGNANT NEOPLASM OF LEFT BREAST IN FEMALE, ESTROGEN RECEPTOR POSITIVE, UNSPECIFIED SITE OF BREAST (HCC): Primary | ICD-10-CM

## 2022-02-24 LAB
25(OH)D3 SERPL-MCNC: 78.5 NG/ML (ref 30–100)
ALBUMIN SERPL BCP-MCNC: 3.5 G/DL (ref 3.5–5)
ALP SERPL-CCNC: 59 U/L (ref 46–116)
ALT SERPL W P-5'-P-CCNC: 34 U/L (ref 12–78)
ANION GAP SERPL CALCULATED.3IONS-SCNC: 5 MMOL/L (ref 4–13)
AST SERPL W P-5'-P-CCNC: 22 U/L (ref 5–45)
BASOPHILS # BLD AUTO: 0.03 THOUSANDS/ΜL (ref 0–0.1)
BASOPHILS NFR BLD AUTO: 1 % (ref 0–1)
BILIRUB SERPL-MCNC: 0.5 MG/DL (ref 0.2–1)
BUN SERPL-MCNC: 16 MG/DL (ref 5–25)
CALCIUM SERPL-MCNC: 9.2 MG/DL (ref 8.3–10.1)
CHLORIDE SERPL-SCNC: 108 MMOL/L (ref 100–108)
CHOLEST SERPL-MCNC: 198 MG/DL
CO2 SERPL-SCNC: 27 MMOL/L (ref 21–32)
CREAT SERPL-MCNC: 0.82 MG/DL (ref 0.6–1.3)
EOSINOPHIL # BLD AUTO: 0.15 THOUSAND/ΜL (ref 0–0.61)
EOSINOPHIL NFR BLD AUTO: 4 % (ref 0–6)
ERYTHROCYTE [DISTWIDTH] IN BLOOD BY AUTOMATED COUNT: 12.9 % (ref 11.6–15.1)
GFR SERPL CREATININE-BSD FRML MDRD: 81 ML/MIN/1.73SQ M
GLUCOSE P FAST SERPL-MCNC: 103 MG/DL (ref 65–99)
HCT VFR BLD AUTO: 39.6 % (ref 34.8–46.1)
HDLC SERPL-MCNC: 94 MG/DL
HGB BLD-MCNC: 13.1 G/DL (ref 11.5–15.4)
IMM GRANULOCYTES # BLD AUTO: 0 THOUSAND/UL (ref 0–0.2)
IMM GRANULOCYTES NFR BLD AUTO: 0 % (ref 0–2)
LDLC SERPL CALC-MCNC: 90 MG/DL (ref 0–100)
LYMPHOCYTES # BLD AUTO: 1.22 THOUSANDS/ΜL (ref 0.6–4.47)
LYMPHOCYTES NFR BLD AUTO: 35 % (ref 14–44)
MCH RBC QN AUTO: 30.9 PG (ref 26.8–34.3)
MCHC RBC AUTO-ENTMCNC: 33.1 G/DL (ref 31.4–37.4)
MCV RBC AUTO: 93 FL (ref 82–98)
MONOCYTES # BLD AUTO: 0.32 THOUSAND/ΜL (ref 0.17–1.22)
MONOCYTES NFR BLD AUTO: 9 % (ref 4–12)
NEUTROPHILS # BLD AUTO: 1.81 THOUSANDS/ΜL (ref 1.85–7.62)
NEUTS SEG NFR BLD AUTO: 51 % (ref 43–75)
NONHDLC SERPL-MCNC: 104 MG/DL
NRBC BLD AUTO-RTO: 0 /100 WBCS
PLATELET # BLD AUTO: 215 THOUSANDS/UL (ref 149–390)
PMV BLD AUTO: 9.1 FL (ref 8.9–12.7)
POTASSIUM SERPL-SCNC: 4 MMOL/L (ref 3.5–5.3)
PROT SERPL-MCNC: 6.5 G/DL (ref 6.4–8.2)
RBC # BLD AUTO: 4.24 MILLION/UL (ref 3.81–5.12)
SODIUM SERPL-SCNC: 140 MMOL/L (ref 136–145)
TRIGL SERPL-MCNC: 72 MG/DL
TSH SERPL DL<=0.05 MIU/L-ACNC: 1.69 UIU/ML (ref 0.36–3.74)
VIT B12 SERPL-MCNC: 568 PG/ML (ref 100–900)
WBC # BLD AUTO: 3.53 THOUSAND/UL (ref 4.31–10.16)

## 2022-02-24 PROCEDURE — 80053 COMPREHEN METABOLIC PANEL: CPT | Performed by: INTERNAL MEDICINE

## 2022-02-24 PROCEDURE — 80061 LIPID PANEL: CPT | Performed by: INTERNAL MEDICINE

## 2022-02-24 PROCEDURE — 83036 HEMOGLOBIN GLYCOSYLATED A1C: CPT | Performed by: INTERNAL MEDICINE

## 2022-02-24 PROCEDURE — 3008F BODY MASS INDEX DOCD: CPT | Performed by: NURSE PRACTITIONER

## 2022-02-24 PROCEDURE — 84443 ASSAY THYROID STIM HORMONE: CPT | Performed by: INTERNAL MEDICINE

## 2022-02-24 PROCEDURE — 85025 COMPLETE CBC W/AUTO DIFF WBC: CPT | Performed by: INTERNAL MEDICINE

## 2022-02-24 PROCEDURE — 82306 VITAMIN D 25 HYDROXY: CPT | Performed by: INTERNAL MEDICINE

## 2022-02-24 PROCEDURE — 1036F TOBACCO NON-USER: CPT | Performed by: NURSE PRACTITIONER

## 2022-02-24 PROCEDURE — 99213 OFFICE O/P EST LOW 20 MIN: CPT | Performed by: NURSE PRACTITIONER

## 2022-02-24 PROCEDURE — 82607 VITAMIN B-12: CPT | Performed by: INTERNAL MEDICINE

## 2022-02-24 PROCEDURE — 36415 COLL VENOUS BLD VENIPUNCTURE: CPT | Performed by: INTERNAL MEDICINE

## 2022-02-24 NOTE — PROGRESS NOTES
10.5 x 6  depth 6.5. Removed wet to dry dressing. Cleansed right hip wound with normal saline and patted dry. Applied moistened gauze, layering from left to right into wound bed. Continued until gauze was even with epidermis. Applied 2 abd pads folded in half and secured with tape. Patient tolerated well. Surgical Oncology Follow Up       [address] [facility]  [facility] [place]  [address] [facility] [address]  [place] [address]    Chidi Vega  1965  [id number]  [address] [facility]  [facility] [place]  [address] [address]    Chief Complaint   Patient presents with    Follow-up     6 month        Assessment/Plan:  1  Malignant neoplasm of left breast in female, estrogen receptor positive, unspecified site of breast ([facility] [address] )  - 6 mo f/u visit    2  Use of tamoxifen (Nolvadex)  - Continue use per medical oncology    Discussion/Summary: Patient is a 65-year-old female who presents today for six-month follow-up visit for left breast cancer diagnosed in June of 2019  Her pathology revealed invasive ductal carcinoma, ER/LA 90%, her 2-   She underwent genetic testing which was negative   She underwent a left mastectomy and sentinel node biopsy by Dr Jenniffer Harris and underwent a right prophylactic mastectomy   Her tumor was high risk on Mammaprint   She therefore completed adjuvant chemotherapy and is currently taking tamoxifen  She offers no new complaints today and there are no concerns on today's exam   Patient will contact me if she is interested in a referral to medical weight management as she reports weight gain secondary to tamoxifen use  I will plan to see her back in 6 months or sooner should the need arise  She was instructed to call with any new concerns or symptoms prior to that time  All of her questions were answered today      History of Present Illness:     Oncology History   Malignant neoplasm of left breast in female, estrogen receptor positive ([facility] [address] )   6/13/2019 Biopsy    Left breast ultrasound-guided biopsy  12 o'clock, 4 cm from nipple  Invasive breast carcinoma of no special type (ductal NST)  Grade 2    2 o'clock, 5 cm from nipple  Invasive breast carcinoma of no special type (ductal NST)  Grade 2  ER 90  LA 90  HER2 1+     6/20/2019 Genetic Testing    The following genes were evaluated: JOSH, BRCA1, BRCA2, CDH1, CHEK2, PALB2, PTEN, STK11, TP53  Negative for genetic mutations or variants  [facility]     8/9/2019 Surgery    Left breast mastectomy with sentinel lymph node biopsy  Two foci of invasive mammary carcinoma of no special type (ductal)  Grade 2  2 4 cm  DCIS measures at least 8 2 cm  0/2 Lymph nodes  Margins negative  Anatomic Stage IIA  Prognostic Stage IA    Right breast mastectomy; prophylactic  Negative for malignancy  Benign fibrocystic changes with atypia consisting of multifocal lobular neoplasia (atypical lobular hyperplasia and focal lobular carcinoma in situ)     8/26/2019 Genomic Testing    MammaPrint for FLEX  High Risk     10/4/2019 - 10/4/2019 Chemotherapy    Adjuvant chemotherapy (Dr Brock Alcantara)     2/2020 -  Hormone Therapy    Tamoxifen          -Interval History:  Patient presents today for follow-up visit for left breast cancer  She notices no changes on her self exam   She continues on tamoxifen  She reports weight gain which she attributes to tamoxifen use  She denies persistent headaches, back pain or bone pain, cough or shortness of breath, abdominal pain  Review of Systems:  Review of Systems   Constitutional: Positive for unexpected weight change (weight gain)  Negative for activity change, appetite change, chills, fatigue and fever  Respiratory: Negative for cough and shortness of breath  Cardiovascular: Negative for chest pain  Gastrointestinal: Negative for abdominal pain, constipation, diarrhea, nausea and vomiting  Musculoskeletal: Positive for arthralgias  Negative for back pain, gait problem and myalgias  Skin: Negative for color change and rash  Neurological: Negative for dizziness and headaches  Hematological: Negative for adenopathy  Psychiatric/Behavioral: Negative for agitation and confusion  All other systems reviewed and are negative        Patient Active Problem List Diagnosis    Hiatal hernia    Hyperlipidemia    Pain syndrome, chronic    Sacroiliitis (HCC)    Anxiety and depression    Vitamin B12 deficiency    Seasonal allergic rhinitis due to pollen    Gastroesophageal reflux disease    Costochondritis    Intervertebral disc disorder with radiculopathy of lumbosacral region    Malignant neoplasm of left breast in female, estrogen receptor positive (HCC)    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    Tear of right acetabular labrum    Leukopenia    Leiomyoma of uterus, unspecified    S/P bilateral mastectomy    Pain of right hip joint    Encntr for gyn exam (general) (routine) w/o abn findings    Vaginal atrophy    History of bilateral mastectomy    Encounter for follow-up examination after completed treatment for malignant neoplasm    Use of tamoxifen (Nolvadex)    Essential hypertension    Abnormal fasting glucose    Osteopenia of multiple sites     Past Medical History:   Diagnosis Date    Anxiety     BRCA gene mutation negative 06/20/2019    [facility]    Cancer ([facility] [address] )     breast     Fibrocystic disease of breast     GERD (gastroesophageal reflux disease)     Hiatal hernia     Hyperlipidemia      Past Surgical History:   Procedure Laterality Date    APPENDECTOMY      BREAST BIOPSY      BREAST CYST ASPIRATION Left 2005    COLONOSCOPY      FOOT SURGERY Right 2011    right, hardware removal    IR PORT PLACEMENT  9/27/2019    IR PORT REMOVAL  2/19/2020    LYMPH NODE BIOPSY      MASTECTOMY W/ SENTINEL NODE BIOPSY Left 8/9/2019    Procedure: BREAST MASTECTOMY, SENTINEL LYMPH NODE BIOPSY, LYMPHATIC MAPPING W/ BLUE DYE AND RADIOACTIVE DYE (INJECT AT 0730 BY DR WHITEHEAD IN THE O R );  Surgeon: Antoinette Simmons MD;  Location: [facility];  Service: Surgical Oncology    OTHER SURGICAL HISTORY Right 2010    Complete Excision of Fifth Metatarsal Head     IN COLONOSCOPY FLX DX W/COLLJ SPEC WHEN PFRMD N/A 7/20/2018    Procedure: EGD AND COLONOSCOPY;  Surgeon: David Bray MD;  Location: [facility]; Service: Gastroenterology    MN MASTECTOMY, SIMPLE, COMPLETE Right 8/9/2019    Procedure: BREAST SIMPLE MASTECTOMY;  Surgeon: Jody Erazo MD;  [facility];  Service: Surgical Oncology    US GUIDANCE BREAST BIOPSY LEFT EACH ADDITIONAL Left 6/13/2019    US GUIDED BREAST BIOPSY LEFT COMPLETE Left 6/13/2019     Family History   Problem Relation Age of Onset    Coronary artery disease Mother         CABG in her 62s   Margaret Odell Other Mother         Dyslipidemia    Hypertension Mother     Hyperlipidemia Mother     Heart attack Father 46        acute myocardial infarction    Other Father         Dyslipidemia    Hypertension Father     Hyperlipidemia Father     Prostate cancer Paternal Uncle 72    Thyroid disease Family         disorder    No Known Problems Maternal Aunt     No Known Problems Maternal Aunt      Social History     Socioeconomic History    Marital status: /Civil Union     Spouse name: Not on file    Number of children: 0    Years of education: Not on file    Highest education level: Not on file   Occupational History    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, 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   Tobacco Use    Smoking status: Never Smoker    Smokeless tobacco: Never Used   Vaping Use    Vaping Use: Never used   Substance and Sexual Activity    Alcohol use:  Yes     Alcohol/week: 2 0 standard drinks     Types: 2 Glasses of wine per week    Drug use: Not Currently     Types: Marijuana     Comment: oral usage/capsules    Sexual activity: Yes     Partners: Male     Birth control/protection: Male Sterilization     Comment: Partner had vasectomy   Other Topics Concern    Not on file   Social History Narrative    Exercise habits    Working full-time - used to be GI RN     Social Determinants of Health     Financial Resource Strain: Not on file   Food Insecurity: Not on file Transportation Needs: Not on file   Physical Activity: Not on file   Stress: Not on file   Social Connections: Not on file   Intimate Partner Violence: Not on file   Housing Stability: Not on file       Current Outpatient Medications:     cholecalciferol (VITAMIN D3) 1,000 units tablet, Take 1,000 Units by mouth daily, Disp: , Rfl:     gabapentin (NEURONTIN) 300 mg capsule, Take 1 capsule (300 mg total) by mouth 3 (three) times a day, Disp: 270 capsule, Rfl: 0    omeprazole (PriLOSEC) 40 MG capsule, Take 1 capsule (40 mg total) by mouth daily before breakfast, Disp: 90 capsule, Rfl: 0    simvastatin (ZOCOR) 10 mg tablet, TAKE 1 TABLET BY MOUTH EVERY DAY, Disp: 90 tablet, Rfl: 1    tamoxifen (NOLVADEX) 20 mg tablet, Take 20 mg by mouth daily , Disp: , Rfl:     traMADol (ULTRAM) 50 mg tablet, Take 1 tablet (50 mg total) by mouth every 8 (eight) hours as needed for moderate pain, Disp: 90 tablet, Rfl: 1  Allergies   Allergen Reactions    Sulfa Antibiotics Headache     Annotation - [id number]: Migraine; Annotation - [id number]: cellular issues     Vitals:    02/24/22 1417   BP: 104/76   Pulse: 70   Resp: 12   SpO2: 98%       Physical Exam  Vitals reviewed  Constitutional:       General: She is not in acute distress  Appearance: Normal appearance  She is well-developed  She is not diaphoretic  HENT:      Head: Normocephalic and atraumatic  Cardiovascular:      Rate and Rhythm: Normal rate and regular rhythm  Heart sounds: Normal heart sounds  Pulmonary:      Effort: Pulmonary effort is normal       Breath sounds: Normal breath sounds  Chest:   Breasts:      Right: No axillary adenopathy or supraclavicular adenopathy  Left: No axillary adenopathy or supraclavicular adenopathy  Comments: Bilateral mastectomy sites free of masses, skin changes, nodules  Abdominal:      Palpations: Abdomen is soft  There is no mass  Tenderness: There is no abdominal tenderness     Musculoskeletal: General: Normal range of motion  Cervical back: Normal range of motion  Lymphadenopathy:      Upper Body:      Right upper body: No supraclavicular or axillary adenopathy  Left upper body: No supraclavicular or axillary adenopathy  Skin:     General: Skin is warm and dry  Findings: No rash  Neurological:      Mental Status: She is alert and oriented to person, place, and time  Psychiatric:         Speech: Speech normal            Results:    Imaging  Echo complete w/ contrast if indicated    Result Date: 2/1/2022  Narrative: Shi Stephens  Left Ventricle: Left ventricular cavity size is normal  Wall thickness is normal  The left ventricular ejection fraction is 65%  Systolic function is normal  Wall motion is normal  Diastolic function is normal    Tricuspid Valve: There is mild regurgitation  I reviewed the above imaging data  Advance Care Planning/Advance Directives:  Discussed disease status, cancer treatment plans and/or cancer treatment goals with the patient

## 2022-02-25 LAB
EST. AVERAGE GLUCOSE BLD GHB EST-MCNC: 111 MG/DL
HBA1C MFR BLD: 5.5 %

## 2022-03-02 ENCOUNTER — OFFICE VISIT (OUTPATIENT)
Dept: INTERNAL MEDICINE CLINIC | Facility: CLINIC | Age: 57
End: 2022-03-02
Payer: COMMERCIAL

## 2022-03-02 VITALS
BODY MASS INDEX: 26.8 KG/M2 | OXYGEN SATURATION: 98 % | HEART RATE: 71 BPM | SYSTOLIC BLOOD PRESSURE: 130 MMHG | TEMPERATURE: 97.1 F | WEIGHT: 157 LBS | HEIGHT: 64 IN | DIASTOLIC BLOOD PRESSURE: 82 MMHG

## 2022-03-02 DIAGNOSIS — E78.2 MIXED HYPERLIPIDEMIA: ICD-10-CM

## 2022-03-02 DIAGNOSIS — I10 ESSENTIAL HYPERTENSION: ICD-10-CM

## 2022-03-02 DIAGNOSIS — R23.2 HOT FLASHES: ICD-10-CM

## 2022-03-02 DIAGNOSIS — R73.01 ABNORMAL FASTING GLUCOSE: ICD-10-CM

## 2022-03-02 DIAGNOSIS — C50.912 MALIGNANT NEOPLASM OF LEFT BREAST IN FEMALE, ESTROGEN RECEPTOR POSITIVE, UNSPECIFIED SITE OF BREAST (HCC): ICD-10-CM

## 2022-03-02 DIAGNOSIS — M51.17 INTERVERTEBRAL DISC DISORDER WITH RADICULOPATHY OF LUMBOSACRAL REGION: ICD-10-CM

## 2022-03-02 DIAGNOSIS — Z17.0 MALIGNANT NEOPLASM OF LEFT BREAST IN FEMALE, ESTROGEN RECEPTOR POSITIVE, UNSPECIFIED SITE OF BREAST (HCC): ICD-10-CM

## 2022-03-02 DIAGNOSIS — K21.9 GASTROESOPHAGEAL REFLUX DISEASE WITHOUT ESOPHAGITIS: ICD-10-CM

## 2022-03-02 DIAGNOSIS — M85.89 OSTEOPENIA OF MULTIPLE SITES: ICD-10-CM

## 2022-03-02 DIAGNOSIS — F32.A ANXIETY AND DEPRESSION: Primary | ICD-10-CM

## 2022-03-02 DIAGNOSIS — Z71.9 HEALTH COUNSELING: ICD-10-CM

## 2022-03-02 DIAGNOSIS — F41.9 ANXIETY AND DEPRESSION: Primary | ICD-10-CM

## 2022-03-02 PROCEDURE — 99214 OFFICE O/P EST MOD 30 MIN: CPT | Performed by: INTERNAL MEDICINE

## 2022-03-02 PROCEDURE — 3008F BODY MASS INDEX DOCD: CPT | Performed by: INTERNAL MEDICINE

## 2022-03-02 PROCEDURE — 3075F SYST BP GE 130 - 139MM HG: CPT | Performed by: INTERNAL MEDICINE

## 2022-03-02 PROCEDURE — 3079F DIAST BP 80-89 MM HG: CPT | Performed by: INTERNAL MEDICINE

## 2022-03-02 PROCEDURE — 1036F TOBACCO NON-USER: CPT | Performed by: INTERNAL MEDICINE

## 2022-03-02 RX ORDER — SIMVASTATIN 10 MG
10 TABLET ORAL DAILY
Qty: 90 TABLET | Refills: 1 | Status: SHIPPED | OUTPATIENT
Start: 2022-03-02

## 2022-03-02 RX ORDER — VENLAFAXINE HYDROCHLORIDE 37.5 MG/1
37.5 CAPSULE, EXTENDED RELEASE ORAL
Qty: 90 CAPSULE | Refills: 0 | Status: SHIPPED | OUTPATIENT
Start: 2022-03-02 | End: 2022-04-07 | Stop reason: SDUPTHER

## 2022-03-02 NOTE — PATIENT INSTRUCTIONS
Start Effexor today  Decrease gabapentin to 600 mg at bedtime, in 2 weeks  If feeling well, further decrease it to 300 mg at bedtime

## 2022-03-02 NOTE — PROGRESS NOTES
Assessment/Plan:    Anxiety and depression  Stopped Lexapro about 3 weeks ago due to weight gain  Trial of venlafaxine  Essential hypertension  Repeat BP normal     Gastroesophageal reflux disease  Back on daily PPI  Hyperlipidemia  Lipids improved, on low dose statin  Pain syndrome, chronic  On tramadol  Narcotic contract updated  PDMP reviewed  Malignant neoplasm of left breast in female, estrogen receptor positive ([facility] [address] )  On tamoxifen  Recently saw oncology  Abnormal fasting glucose  A1c is normal     Hot flashes  Taking gabapentin 900 mg qHS  Instructed to wean off gradually since gabapentin may be contributing to weight gain  Osteopenia of multiple sites  Bone density due, taking supplements  Leukopenia  Stable  Diagnoses and all orders for this visit:    Anxiety and depression  -     venlafaxine (EFFEXOR-XR) 37 5 mg 24 hr capsule; Take 1 capsule (37 5 mg total) by mouth daily with breakfast  -     CBC and differential; Future  -     TSH, 3rd generation with Free T4 reflex; Future    Mixed hyperlipidemia  -     simvastatin (ZOCOR) 10 mg tablet; Take 1 tablet (10 mg total) by mouth daily  -     Comprehensive metabolic panel; Future  -     Lipid panel; Future    Hot flashes    Essential hypertension    Gastroesophageal reflux disease without esophagitis    Malignant neoplasm of left breast in female, estrogen receptor positive, unspecified site of breast (HCC)    Abnormal fasting glucose  -     Hemoglobin A1C; Future    Osteopenia of multiple sites  -     DXA bone density spine hip and pelvis; Future    Intervertebral disc disorder with radiculopathy of lumbosacral region    Health counseling  Comments:  Gained 10 lbs since last visit  Screening updated  Follow up in 6 months or as needed  Subjective:      Patient ID: Kiet Cardona is a 49 y o  female here for a follow up  She stopped Lexapro about 3 weeks ago because she thinks it is the cause of her weight gain  She has been eating healthy and she has been exercising regularly but still gained weight  She is also on gabapentin which she takes 900 mg every night  She takes is mostly for the hot flashes  Stopping Lexapro, she feels she has more distracted  She does not have issues with sleep but admits staying awake frequently at night  She would like to try Effexor, so that she will not gain weight  She had stop taking her cholesterol medication for 2 months but restarted this when she heard about her brother's health  She does not always check her blood pressure at home but reports it usually is within normal       She continues to experience frequent joint and body aches  She would experience it in her fingers, wrists, elbows and frequently her back  She does take tramadol 3 times a day, denies any constipation  She does not need to take any stool softeners  She would like to change oncologist     The following portions of the patient's history were reviewed and updated as appropriate: allergies, current medications, past medical history, past social history and problem list     Review of Systems   Constitutional: Negative for activity change, appetite change, chills, fatigue and fever  HENT: Negative for ear pain  Eyes: Negative for pain and visual disturbance  Respiratory: Negative for cough and shortness of breath  Cardiovascular: Negative for chest pain, palpitations and leg swelling  Gastrointestinal: Negative for abdominal pain, constipation, diarrhea and vomiting  Genitourinary: Negative for dysuria, frequency, hematuria and urgency  Musculoskeletal: Positive for arthralgias  Negative for back pain and myalgias  Skin: Negative for color change, rash and wound  Neurological: Negative for dizziness, seizures, syncope, numbness and headaches  Psychiatric/Behavioral: Positive for decreased concentration and sleep disturbance  The patient is nervous/anxious      All other systems reviewed and are negative  Objective:      /82   Pulse 71   Temp (!) 97 1 °F (36 2 °C)   Ht 5' 4" (1 626 m)   Wt 71 2 kg (157 lb)   SpO2 98%   BMI 26 95 kg/m²          Physical Exam  Vitals and nursing note reviewed  Constitutional:       General: She is not in acute distress  Appearance: She is well-developed  HENT:      Head: Normocephalic and atraumatic  Eyes:      Pupils: Pupils are equal, round, and reactive to light  Cardiovascular:      Rate and Rhythm: Normal rate and regular rhythm  Heart sounds: Normal heart sounds  Pulmonary:      Effort: Pulmonary effort is normal       Breath sounds: Normal breath sounds  No wheezing  Abdominal:      General: Bowel sounds are normal       Palpations: Abdomen is soft  Musculoskeletal:         General: No swelling  Right lower leg: No edema  Left lower leg: No edema  Skin:     General: Skin is warm  Findings: No rash  Neurological:      General: No focal deficit present  Mental Status: She is alert and oriented to person, place, and time  Psychiatric:         Mood and Affect: Mood and affect normal  Mood is not anxious or depressed  Behavior: Behavior normal            Labs & imaging results reviewed with patient  BMI Counseling: Body mass index is 26 95 kg/m²  The BMI is above normal  Nutrition recommendations include 3-5 servings of fruits/vegetables daily  Exercise recommendations include moderate aerobic physical activity for 150 minutes/week and strength training exercises

## 2022-03-02 NOTE — ASSESSMENT & PLAN NOTE
Taking gabapentin 900 mg qHS  Instructed to wean off gradually since gabapentin may be contributing to weight gain

## 2022-04-01 DIAGNOSIS — M51.17 INTERVERTEBRAL DISC DISORDER WITH RADICULOPATHY OF LUMBOSACRAL REGION: ICD-10-CM

## 2022-04-01 RX ORDER — TRAMADOL HYDROCHLORIDE 50 MG/1
50 TABLET ORAL EVERY 8 HOURS PRN
Qty: 90 TABLET | Refills: 0 | Status: SHIPPED | OUTPATIENT
Start: 2022-04-01 | End: 2022-05-06 | Stop reason: SDUPTHER

## 2022-04-06 ENCOUNTER — TELEPHONE (OUTPATIENT)
Dept: INTERNAL MEDICINE CLINIC | Facility: CLINIC | Age: 57
End: 2022-04-06

## 2022-04-06 DIAGNOSIS — F41.9 ANXIETY AND DEPRESSION: ICD-10-CM

## 2022-04-06 DIAGNOSIS — F32.A ANXIETY AND DEPRESSION: ICD-10-CM

## 2022-04-06 NOTE — TELEPHONE ENCOUNTER
Please call patient  Ask how she is feeling since we started venlafaxine  Also, ask how much gabapentin she is taking now

## 2022-04-07 RX ORDER — VENLAFAXINE HYDROCHLORIDE 75 MG/1
75 CAPSULE, EXTENDED RELEASE ORAL DAILY
Qty: 90 CAPSULE | Refills: 1 | Status: SHIPPED | OUTPATIENT
Start: 2022-04-07 | End: 2022-05-10 | Stop reason: SDUPTHER

## 2022-04-07 NOTE — TELEPHONE ENCOUNTER
Increase venlafaxine to 75 mg  New prescription sent to the pharmacy, you can take 2 tablets of what you have until finished

## 2022-04-07 NOTE — TELEPHONE ENCOUNTER
Spoke w/ pt  She said she thinks the Venlafaxine should be doubled-not doing much  She is taking Gabapentin 300 at  night

## 2022-05-06 DIAGNOSIS — F32.A ANXIETY AND DEPRESSION: ICD-10-CM

## 2022-05-06 DIAGNOSIS — Z79.891 LONG-TERM CURRENT USE OF OPIATE ANALGESIC: ICD-10-CM

## 2022-05-06 DIAGNOSIS — F41.9 ANXIETY AND DEPRESSION: ICD-10-CM

## 2022-05-06 DIAGNOSIS — M51.17 INTERVERTEBRAL DISC DISORDER WITH RADICULOPATHY OF LUMBOSACRAL REGION: ICD-10-CM

## 2022-05-06 RX ORDER — TRAMADOL HYDROCHLORIDE 50 MG/1
50 TABLET ORAL EVERY 8 HOURS PRN
Qty: 90 TABLET | Refills: 0 | Status: SHIPPED | OUTPATIENT
Start: 2022-05-06 | End: 2022-06-08 | Stop reason: SDUPTHER

## 2022-05-06 RX ORDER — GABAPENTIN 300 MG/1
300 CAPSULE ORAL 3 TIMES DAILY
Qty: 270 CAPSULE | Refills: 0 | Status: SHIPPED | OUTPATIENT
Start: 2022-05-06

## 2022-05-10 RX ORDER — VENLAFAXINE HYDROCHLORIDE 37.5 MG/1
37.5 CAPSULE, EXTENDED RELEASE ORAL DAILY
Qty: 90 CAPSULE | Refills: 0 | Status: SHIPPED | OUTPATIENT
Start: 2022-05-10 | End: 2022-07-26 | Stop reason: ALTCHOICE

## 2022-05-24 ENCOUNTER — TELEPHONE (OUTPATIENT)
Dept: HEMATOLOGY ONCOLOGY | Facility: CLINIC | Age: 57
End: 2022-05-24

## 2022-06-08 DIAGNOSIS — M51.17 INTERVERTEBRAL DISC DISORDER WITH RADICULOPATHY OF LUMBOSACRAL REGION: ICD-10-CM

## 2022-06-08 RX ORDER — TRAMADOL HYDROCHLORIDE 50 MG/1
50 TABLET ORAL EVERY 8 HOURS PRN
Qty: 90 TABLET | Refills: 0 | Status: SHIPPED | OUTPATIENT
Start: 2022-06-08 | End: 2022-07-07 | Stop reason: SDUPTHER

## 2022-07-07 DIAGNOSIS — M51.17 INTERVERTEBRAL DISC DISORDER WITH RADICULOPATHY OF LUMBOSACRAL REGION: ICD-10-CM

## 2022-07-07 RX ORDER — TRAMADOL HYDROCHLORIDE 50 MG/1
50 TABLET ORAL EVERY 8 HOURS PRN
Qty: 90 TABLET | Refills: 0 | Status: SHIPPED | OUTPATIENT
Start: 2022-07-07 | End: 2022-08-05 | Stop reason: SDUPTHER

## 2022-07-26 DIAGNOSIS — R45.4 IRRITABILITY AND ANGER: ICD-10-CM

## 2022-07-26 RX ORDER — ESCITALOPRAM OXALATE 10 MG/1
10 TABLET ORAL DAILY
Qty: 90 TABLET | Refills: 0 | Status: SHIPPED | OUTPATIENT
Start: 2022-07-26

## 2022-08-05 DIAGNOSIS — M51.17 INTERVERTEBRAL DISC DISORDER WITH RADICULOPATHY OF LUMBOSACRAL REGION: ICD-10-CM

## 2022-08-05 RX ORDER — TRAMADOL HYDROCHLORIDE 50 MG/1
50 TABLET ORAL EVERY 8 HOURS PRN
Qty: 90 TABLET | Refills: 0 | Status: SHIPPED | OUTPATIENT
Start: 2022-08-05 | End: 2022-09-06 | Stop reason: SDUPTHER

## 2022-08-16 ENCOUNTER — LAB (OUTPATIENT)
Dept: LAB | Age: 57
End: 2022-08-16
Payer: COMMERCIAL

## 2022-08-16 DIAGNOSIS — K21.9 GASTROESOPHAGEAL REFLUX DISEASE WITHOUT ESOPHAGITIS: ICD-10-CM

## 2022-08-16 DIAGNOSIS — F41.9 ANXIETY AND DEPRESSION: ICD-10-CM

## 2022-08-16 DIAGNOSIS — R73.01 ABNORMAL FASTING GLUCOSE: ICD-10-CM

## 2022-08-16 DIAGNOSIS — E78.2 MIXED HYPERLIPIDEMIA: ICD-10-CM

## 2022-08-16 DIAGNOSIS — F32.A ANXIETY AND DEPRESSION: ICD-10-CM

## 2022-08-16 LAB
ALBUMIN SERPL BCP-MCNC: 3.4 G/DL (ref 3.5–5)
ALP SERPL-CCNC: 80 U/L (ref 46–116)
ALT SERPL W P-5'-P-CCNC: 44 U/L (ref 12–78)
ANION GAP SERPL CALCULATED.3IONS-SCNC: 4 MMOL/L (ref 4–13)
AST SERPL W P-5'-P-CCNC: 26 U/L (ref 5–45)
BASOPHILS # BLD AUTO: 0.02 THOUSANDS/ΜL (ref 0–0.1)
BASOPHILS NFR BLD AUTO: 1 % (ref 0–1)
BILIRUB SERPL-MCNC: 0.56 MG/DL (ref 0.2–1)
BUN SERPL-MCNC: 21 MG/DL (ref 5–25)
CALCIUM ALBUM COR SERPL-MCNC: 9.6 MG/DL (ref 8.3–10.1)
CALCIUM SERPL-MCNC: 9.1 MG/DL (ref 8.3–10.1)
CHLORIDE SERPL-SCNC: 107 MMOL/L (ref 96–108)
CHOLEST SERPL-MCNC: 228 MG/DL
CO2 SERPL-SCNC: 28 MMOL/L (ref 21–32)
CREAT SERPL-MCNC: 1 MG/DL (ref 0.6–1.3)
EOSINOPHIL # BLD AUTO: 0.16 THOUSAND/ΜL (ref 0–0.61)
EOSINOPHIL NFR BLD AUTO: 5 % (ref 0–6)
ERYTHROCYTE [DISTWIDTH] IN BLOOD BY AUTOMATED COUNT: 13.5 % (ref 11.6–15.1)
GFR SERPL CREATININE-BSD FRML MDRD: 64 ML/MIN/1.73SQ M
GLUCOSE P FAST SERPL-MCNC: 95 MG/DL (ref 65–99)
HCT VFR BLD AUTO: 38.6 % (ref 34.8–46.1)
HCV AB SER QL: NORMAL
HDLC SERPL-MCNC: 76 MG/DL
HGB BLD-MCNC: 12.9 G/DL (ref 11.5–15.4)
IMM GRANULOCYTES # BLD AUTO: 0 THOUSAND/UL (ref 0–0.2)
IMM GRANULOCYTES NFR BLD AUTO: 0 % (ref 0–2)
LDLC SERPL CALC-MCNC: 111 MG/DL (ref 0–100)
LYMPHOCYTES # BLD AUTO: 1.15 THOUSANDS/ΜL (ref 0.6–4.47)
LYMPHOCYTES NFR BLD AUTO: 35 % (ref 14–44)
MCH RBC QN AUTO: 31.8 PG (ref 26.8–34.3)
MCHC RBC AUTO-ENTMCNC: 33.4 G/DL (ref 31.4–37.4)
MCV RBC AUTO: 95 FL (ref 82–98)
MONOCYTES # BLD AUTO: 0.3 THOUSAND/ΜL (ref 0.17–1.22)
MONOCYTES NFR BLD AUTO: 9 % (ref 4–12)
NEUTROPHILS # BLD AUTO: 1.63 THOUSANDS/ΜL (ref 1.85–7.62)
NEUTS SEG NFR BLD AUTO: 50 % (ref 43–75)
NONHDLC SERPL-MCNC: 152 MG/DL
NRBC BLD AUTO-RTO: 0 /100 WBCS
PLATELET # BLD AUTO: 216 THOUSANDS/UL (ref 149–390)
PMV BLD AUTO: 9.3 FL (ref 8.9–12.7)
POTASSIUM SERPL-SCNC: 4.2 MMOL/L (ref 3.5–5.3)
PROT SERPL-MCNC: 6.8 G/DL (ref 6.4–8.4)
RBC # BLD AUTO: 4.06 MILLION/UL (ref 3.81–5.12)
SODIUM SERPL-SCNC: 139 MMOL/L (ref 135–147)
TRIGL SERPL-MCNC: 204 MG/DL
TSH SERPL DL<=0.05 MIU/L-ACNC: 1.13 UIU/ML (ref 0.45–4.5)
WBC # BLD AUTO: 3.26 THOUSAND/UL (ref 4.31–10.16)

## 2022-08-16 PROCEDURE — 80061 LIPID PANEL: CPT

## 2022-08-16 PROCEDURE — 85025 COMPLETE CBC W/AUTO DIFF WBC: CPT

## 2022-08-16 PROCEDURE — 83036 HEMOGLOBIN GLYCOSYLATED A1C: CPT

## 2022-08-16 PROCEDURE — 80053 COMPREHEN METABOLIC PANEL: CPT

## 2022-08-16 PROCEDURE — 84443 ASSAY THYROID STIM HORMONE: CPT

## 2022-08-16 RX ORDER — OMEPRAZOLE 40 MG/1
40 CAPSULE, DELAYED RELEASE ORAL
Qty: 90 CAPSULE | Refills: 0 | Status: SHIPPED | OUTPATIENT
Start: 2022-08-16

## 2022-08-17 ENCOUNTER — DOCUMENTATION (OUTPATIENT)
Dept: HEMATOLOGY ONCOLOGY | Facility: CLINIC | Age: 57
End: 2022-08-17

## 2022-08-17 ENCOUNTER — OFFICE VISIT (OUTPATIENT)
Dept: HEMATOLOGY ONCOLOGY | Facility: CLINIC | Age: 57
End: 2022-08-17
Payer: COMMERCIAL

## 2022-08-17 VITALS
SYSTOLIC BLOOD PRESSURE: 110 MMHG | DIASTOLIC BLOOD PRESSURE: 70 MMHG | WEIGHT: 155 LBS | RESPIRATION RATE: 16 BRPM | TEMPERATURE: 97.6 F | BODY MASS INDEX: 26.46 KG/M2 | OXYGEN SATURATION: 98 % | HEIGHT: 64 IN | HEART RATE: 63 BPM

## 2022-08-17 DIAGNOSIS — Z17.0 MALIGNANT NEOPLASM OF LEFT BREAST IN FEMALE, ESTROGEN RECEPTOR POSITIVE, UNSPECIFIED SITE OF BREAST (HCC): Primary | ICD-10-CM

## 2022-08-17 DIAGNOSIS — C50.912 MALIGNANT NEOPLASM OF LEFT BREAST IN FEMALE, ESTROGEN RECEPTOR POSITIVE, UNSPECIFIED SITE OF BREAST (HCC): Primary | ICD-10-CM

## 2022-08-17 LAB
EST. AVERAGE GLUCOSE BLD GHB EST-MCNC: 111 MG/DL
HBA1C MFR BLD: 5.5 %

## 2022-08-17 PROCEDURE — 99215 OFFICE O/P EST HI 40 MIN: CPT | Performed by: INTERNAL MEDICINE

## 2022-08-17 RX ORDER — TAMOXIFEN CITRATE 20 MG/1
20 TABLET ORAL DAILY
Qty: 90 TABLET | Refills: 3 | Status: SHIPPED | OUTPATIENT
Start: 2022-08-17

## 2022-08-17 NOTE — PROGRESS NOTES
Hematology / Oncology Outpatient Follow Up Note    Delma Cantrell 49 y o  female EGW:6/0/1974 [id number]         Date:  8/17/2022    Assessment / Plan:  A 61-year-old premenopausal woman with stage IIA right breast cancer, grade 2, ER 90% positive, IN 90% positive, HER2 negative disease  She is negative for BRCA gene mutation  She underwent right mastectomy and sentinel lymph node biopsy as well as left prophylactic mastectomy, resulting in ERIK  Her tumor was found to be high risk based on a MammaPrint  She underwent adjuvant chemotherapy with dose dense AC followed by dose dense paclitaxel  She is currently on adjuvant hormonal therapy with tamoxifen with excellent tolerance  Clinically, she has no evidence recurrent disease  Recommended her to continue tamoxifen 20 mg daily  My plan is to give her tamoxifen for 5 years followed by 5 years of aromatase inhibitors  She is in agreement with my recommendations  I will see her again in a year for routine follow-up           Subjective:      HPI:  A 48years old premenopausal woman who was found to have abnormality in her right breast, based on a screening mammography  Therefore, she underwent right breast biopsy in June 13, 2019 which showed invasive ductal carcinoma, grade 2  This was ER 90 % positive, IN 90 % positive, HER2 negative disease  She was referred to Dr Vangie Medrano  She was negative for BRCA gene mutation  Because of the multifocal disease, she underwent right mastectomy and sentinel lymph node biopsy as well as left prophylactic mastectomy in August 9, 2019  Left breast tissue did not show any malignancy  She had a 2 3 as well as 2 4 cm of invasive ductal carcinoma, grade 2  Surgical margin was negative  2 sentinel lymph nodes were negative for metastatic disease  She did not have reconstruction  She presents today to discuss adjuvant treatment options  Dr Vangie Medrano sent her tumor tissue for MammaPrint which came back as high risk disease  She feels well  She has no complaint of pain  Her weight is stable  She has no respiratory symptoms  She has no significant past medical history except chronic low back pain  She is a lifetime never smoker  She has no strong family history of breast cancer  Her performance status is normal              Interval History:  A 61-year-old premenopausal woman with stage IIA right breast cancer, grade 2, ER 90% positive, CO 90% positive, HER2 negative disease  She is negative for BRCA gene mutation  She underwent right mastectomy and sentinel lymph node biopsy as well as left prophylactic mastectomy, resulting in ERIK  Her tumor was found to be high risk based on a MammaPrint  She underwent adjuvant chemotherapy with dose dense AC followed by dose dense paclitaxel given by Dr Carlee Prater  Subsequently, she has been on adjuvant hormonal therapy with tamoxifen since February 2020  She presents today to transfer her medical oncology care to me  She feels well  She has moderate hot flashes  She also have diffuse mild musculoskeletal symptoms  She denied any pain  She has no respiratory symptoms  Her weight is stable  Her performance status is normal            Objective:      Primary Diagnosis:     1  Right breast cancer, stage IIA (pT2, pN0, M0) grade 2, ER 90% positive, CO 90% positive, HER2 negative disease  MammaPrint high risk  Diagnosed in August 2019    2   BRCA gene mutation negative      Cancer Staging:  Cancer Staging  Malignant neoplasm of upper-outer quadrant of left breast in female, estrogen receptor positive ([facility] [address] )  Staging form: Breast, AJCC 8th Edition  - Pathologic: Stage IA (pT2(2), pN0(sn), cM0, G2, ER+, CO+, HER2-) - Unsigned  Neoadjuvant therapy: No  Laterality: Left  Tumor size (mm): 24  Multiple tumors: Yes  Number of tumors: 2  Method of lymph node assessment: Conroy lymph node biopsy  Histologic grading system: 3 grade system           Previous Hematologic/ Oncologic Treatment:       1  Adjuvant chemotherapy with dose dense AC x4 followed by dose dense paclitaxel x4, completed in January 2020, given by Dr Thereasa Dakin      Current Hematologic/ Oncologic Treatment:       Adjuvant hormonal therapy with tamoxifen since February 2020      Disease Status:      ERIK status post right mastectomy and sentinel lymph node biopsy as well as left prophylactic mastectomy      Test Results:     Pathology:     2 3 and 2 4 cm of invasive ductal carcinoma, grade 2  2 sentinel lymph nodes were negative for metastatic disease  ER 90% positive, AK 90% positive, HER2 negative disease  MammaPrint high risk  Stage IIA (pT2, pN0, M0)       Radiology:     DEXA scan in March 2020 showed T-score-1 1, consistent with osteopenia  Laboratory:     See below      Physical Exam:        General Appearance:    Alert, oriented          Eyes:    PERRL   Ears:    Normal external ear canals, both ears   Nose:   Nares normal, septum midline   Throat:   Mucosa moist  Pharynx without injection  Neck:   Supple         Lungs:     Clear to auscultation bilaterally   Chest Wall:    No tenderness or deformity    Heart:    Regular rate and rhythm         Abdomen:     Soft, non-tender, bowel sounds +, no organomegaly               Extremities:   Extremities no cyanosis or edema         Skin:   no rash or icterus  Lymph nodes:   Cervical, supraclavicular, and axillary nodes normal   Neurologic:   CNII-XII intact, normal strength, sensation and reflexes     Throughout             Breast exam:   Status post bilateral mastectomy without palpable chest wall abnormalities  ROS: Review of Systems   All other systems reviewed and are negative  Imaging: No results found        Labs:   Lab Results   Component Value Date    WBC 3 26 (L) 08/16/2022    HGB 12 9 08/16/2022    HCT 38 6 08/16/2022    MCV 95 08/16/2022     08/16/2022     Lab Results   Component Value Date     02/05/2015    K 4 2 08/16/2022     08/16/2022    CO2 28 08/16/2022    ANIONGAP 6 02/05/2015    BUN 21 08/16/2022    CREATININE 1 00 08/16/2022    GLUCOSE 86 02/05/2015    GLUF 95 08/16/2022    CALCIUM 9 1 08/16/2022    CORRECTEDCA 9 6 08/16/2022    AST 26 08/16/2022    ALT 44 08/16/2022    ALKPHOS 80 08/16/2022    PROT 7 4 02/05/2015    BILITOT 0 69 02/05/2015    EGFR 64 08/16/2022       Lab Results   Component Value Date     (H) 12/30/2019         Lab Results   Component Value Date    TIBC 295 01/09/2020       Lab Results   Component Value Date    NOBUZAQP24 056 02/24/2022         Current Medications: Reviewed  Allergies: Reviewed  PMH/FH/SH:  Reviewed      Vital Sign:    Body surface area is 1 76 meters squared      Wt Readings from Last 3 Encounters:   08/17/22 70 3 kg (155 lb)   03/02/22 71 2 kg (157 lb)   02/24/22 72 6 kg (160 lb)        Temp Readings from Last 3 Encounters:   08/17/22 97 6 °F (36 4 °C) (Temporal)   03/02/22 (!) 97 1 °F (36 2 °C)   08/25/21 97 8 °F (36 6 °C)        BP Readings from Last 3 Encounters:   08/17/22 110/70   03/02/22 130/82   02/24/22 104/76         Pulse Readings from Last 3 Encounters:   08/17/22 63   03/02/22 71   02/24/22 70     @LASTSAO2(3)@

## 2022-08-18 ENCOUNTER — TELEPHONE (OUTPATIENT)
Dept: HEMATOLOGY ONCOLOGY | Facility: CLINIC | Age: 57
End: 2022-08-18

## 2022-08-18 NOTE — TELEPHONE ENCOUNTER
Scheduling Appointment     Who Is Calling to Schedule Patient   Doctor Dr Phuong Al   Date and Time 8/23/22 at 3   Reason for scheduling appointment 1 yr follow up   Patient verbalized understanding   Yes

## 2022-08-24 ENCOUNTER — OFFICE VISIT (OUTPATIENT)
Dept: SURGICAL ONCOLOGY | Facility: CLINIC | Age: 57
End: 2022-08-24
Payer: COMMERCIAL

## 2022-08-24 VITALS
WEIGHT: 156 LBS | RESPIRATION RATE: 14 BRPM | BODY MASS INDEX: 26.63 KG/M2 | TEMPERATURE: 97.4 F | HEART RATE: 82 BPM | OXYGEN SATURATION: 98 % | DIASTOLIC BLOOD PRESSURE: 72 MMHG | SYSTOLIC BLOOD PRESSURE: 118 MMHG | HEIGHT: 64 IN

## 2022-08-24 DIAGNOSIS — C50.112 MALIGNANT NEOPLASM OF CENTRAL PORTION OF LEFT BREAST IN FEMALE, ESTROGEN RECEPTOR POSITIVE (HCC): ICD-10-CM

## 2022-08-24 DIAGNOSIS — Z08 ENCOUNTER FOR FOLLOW-UP EXAMINATION AFTER COMPLETED TREATMENT FOR MALIGNANT NEOPLASM: Primary | ICD-10-CM

## 2022-08-24 DIAGNOSIS — Z17.0 MALIGNANT NEOPLASM OF CENTRAL PORTION OF LEFT BREAST IN FEMALE, ESTROGEN RECEPTOR POSITIVE (HCC): ICD-10-CM

## 2022-08-24 DIAGNOSIS — Z79.810 USE OF TAMOXIFEN (NOLVADEX): ICD-10-CM

## 2022-08-24 PROCEDURE — 99214 OFFICE O/P EST MOD 30 MIN: CPT

## 2022-08-24 NOTE — PROGRESS NOTES
Surgical Oncology Follow Up       [address]  [facility]  [address] [facility] [address]    Cayden Blades  1965  [id number]  [id number] [facility]  [facility]  [address] [facility] [address]    Chief Complaint   Patient presents with    Breast Cancer       Assessment/Plan:  1  Encounter for follow-up examination after completed treatment for malignant neoplasm  - 6 month follow up    2  Malignant neoplasm of central portion of left breast in female, estrogen receptor positive ([facility] [address] )    3  Use of tamoxifen (Nolvadex)  - continue use per medical oncology       Discussion/Summary:  Patient is a 53-year-old female presenting today for six-month follow-up for left breast cancer diagnosed in 2019  Pathology revealed invasive carcinoma ER/VA positive, HER2 negative  She had genetic testing which was negative  She had a left breast mastectomy with sentinel node biopsy and right prophylactic mastectomy  She completed adjuvant chemotherapy and is currently on tamoxifen  There are no concerns on her clinical breast exam   Patient had a few questions about changes with the body after chemotherapy  I discussed with her survivorship program with Demetrice Tolentino and she is interested at this time  She is not interested in mastectomy bras  I will have her see Demetrice Tolentino in 6 months  I instructed her to call with any questions or concerns prior to that time  All questions were answered today       History of Present Illness:     Oncology History   Malignant neoplasm of left breast in female, estrogen receptor positive ([facility] [address] )   6/13/2019 Biopsy    Left breast ultrasound-guided biopsy  12 o'clock, 4 cm from nipple  Invasive breast carcinoma of no special type (ductal NST)  Grade 2    2 o'clock, 5 cm from nipple  Invasive breast carcinoma of no special type (ductal NST)  Grade 2  ER 90  VA 90  HER2 1+     6/20/2019 Genetic Testing    The following genes were evaluated: JOSH, BRCA1, BRCA2, CDH1, CHEK2, PALB2, PTEN, STK11, TP53  Negative for genetic mutations or variants  [facility]     8/9/2019 Surgery    Left breast mastectomy with sentinel lymph node biopsy  Two foci of invasive mammary carcinoma of no special type (ductal)  Grade 2  2 4 cm  DCIS measures at least 8 2 cm  0/2 Lymph nodes  Margins negative  Anatomic Stage IIA  Prognostic Stage IA    Right breast mastectomy; prophylactic  Negative for malignancy  Benign fibrocystic changes with atypia consisting of multifocal lobular neoplasia (atypical lobular hyperplasia and focal lobular carcinoma in situ)     8/26/2019 Genomic Testing    MammaPrint for FLEX  High Risk     10/4/2019 - 10/4/2019 Chemotherapy    Adjuvant chemotherapy (Dr Torie Canseco)     2/2020 -  Hormone Therapy    Tamoxifen          -Interval History:  Patient is a 71-year-old female presenting today for six-month follow-up for left breast cancer diagnosed in 2019  She is currently on observation  Patient denies changes on her breast exam  She denies persistent headaches, bone pain, back pain, shortness of breath, or abdominal pain  Review of Systems:  Review of Systems   Constitutional: Negative for activity change, appetite change, fatigue and unexpected weight change  Respiratory: Negative for cough and shortness of breath  Cardiovascular: Negative for chest pain  Gastrointestinal: Negative for abdominal pain, diarrhea, nausea and vomiting  Endocrine: Positive for heat intolerance  Musculoskeletal: Negative for arthralgias, back pain and myalgias  Skin: Negative for rash  Neurological: Negative for weakness and headaches  Hematological: Negative for adenopathy         Patient Active Problem List   Diagnosis    Hiatal hernia    Hyperlipidemia    Pain syndrome, chronic    Sacroiliitis (HCC)    Anxiety and depression    Vitamin B12 deficiency    Seasonal allergic rhinitis due to pollen   [place]aley Gastroesophageal reflux disease    Costochondritis    Intervertebral disc disorder with radiculopathy of lumbosacral region    Malignant neoplasm of left breast in female, estrogen receptor positive (HCC)    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    Tear of right acetabular labrum    Leukopenia    Leiomyoma of uterus, unspecified    S/P bilateral mastectomy    Pain of right hip joint    Encntr for gyn exam (general) (routine) w/o abn findings    Vaginal atrophy    History of bilateral mastectomy    Encounter for follow-up examination after completed treatment for malignant neoplasm    Use of tamoxifen (Nolvadex)    Essential hypertension    Abnormal fasting glucose    Osteopenia of multiple sites    Hot flashes     Past Medical History:   Diagnosis Date    Anxiety     BRCA gene mutation negative 06/20/2019    [facility]    Cancer ([address] )     breast     Fibrocystic disease of breast     GERD (gastroesophageal reflux disease)     Hiatal hernia     Hyperlipidemia      Past Surgical History:   Procedure Laterality Date    APPENDECTOMY      BREAST BIOPSY      BREAST CYST ASPIRATION Left 2005    COLONOSCOPY      FOOT SURGERY Right 2011    right, hardware removal    IR PORT PLACEMENT  9/27/2019    IR PORT REMOVAL  2/19/2020    LYMPH NODE BIOPSY      MASTECTOMY W/ SENTINEL NODE BIOPSY Left 8/9/2019    Procedure: BREAST MASTECTOMY, SENTINEL LYMPH NODE BIOPSY, LYMPHATIC MAPPING W/ BLUE DYE AND RADIOACTIVE DYE (INJECT AT 0730 BY DR WHITEHEAD IN THE O R );  Surgeon: Stacia Tesfaye MD;  [facility]: [facility];  Service: Surgical Oncology    OTHER SURGICAL HISTORY Right 2010    Complete Excision of Fifth Metatarsal Head     NE COLONOSCOPY FLX DX W/COLLJ SPEC WHEN PFRMD N/A 7/20/2018    Procedure: EGD AND COLONOSCOPY;  Surgeon: Rigoberto Lam MD;  Location: [facility];   Service: Gastroenterology    NE MASTECTOMY, SIMPLE, COMPLETE Right 8/9/2019    Procedure: BREAST SIMPLE MASTECTOMY; Surgeon: Annalisa Reeder MD;  [facility];  Service: Surgical Oncology    US GUIDANCE BREAST BIOPSY LEFT EACH ADDITIONAL Left 6/13/2019    US GUIDED BREAST BIOPSY LEFT COMPLETE Left 6/13/2019     Family History   Problem Relation Age of Onset    Coronary artery disease Mother         CABG in her 62s   Lars Fournier Other Mother         Dyslipidemia    Hypertension Mother     Hyperlipidemia Mother     Heart attack Father 46        acute myocardial infarction    Other Father         Dyslipidemia    Hypertension Father     Hyperlipidemia Father     Prostate cancer Paternal Uncle 72    Thyroid disease Family         disorder    No Known Problems Maternal Aunt     No Known Problems Maternal Aunt      Social History     Socioeconomic History    Marital status: /Civil Union     Spouse name: Not on file    Number of children: 0    Years of education: Not on file    Highest education level: Not on file   Occupational History    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, 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   Tobacco Use    Smoking status: Never Smoker    Smokeless tobacco: Never Used   Vaping Use    Vaping Use: Never used   Substance and Sexual Activity    Alcohol use:  Yes     Alcohol/week: 2 0 standard drinks     Types: 2 Glasses of wine per week    Drug use: Not Currently     Types: Marijuana     Comment: oral usage/capsules    Sexual activity: Yes     Partners: Male     Birth control/protection: Male Sterilization     Comment: Partner had vasectomy   Other Topics Concern    Not on file   Social History Narrative    Exercise habits    Working full-time - used to be GI RN     Social Determinants of Health     Financial Resource Strain: Not on file   Food Insecurity: Not on file   Transportation Needs: Not on file   Physical Activity: Not on file   Stress: Not on file   Social Connections: Not on file   Intimate Partner Violence: Not on file   Housing Stability: Not on file Current Outpatient Medications:     cholecalciferol (VITAMIN D3) 1,000 units tablet, Take 1,000 Units by mouth daily, Disp: , Rfl:     escitalopram (LEXAPRO) 10 mg tablet, Take 1 tablet (10 mg total) by mouth daily, Disp: 90 tablet, Rfl: 0    gabapentin (NEURONTIN) 300 mg capsule, Take 1 capsule (300 mg total) by mouth 3 (three) times a day, Disp: 270 capsule, Rfl: 0    omeprazole (PriLOSEC) 40 MG capsule, Take 1 capsule (40 mg total) by mouth daily before breakfast, Disp: 90 capsule, Rfl: 0    simvastatin (ZOCOR) 10 mg tablet, Take 1 tablet (10 mg total) by mouth daily, Disp: 90 tablet, Rfl: 1    tamoxifen (NOLVADEX) 20 mg tablet, Take 1 tablet (20 mg total) by mouth daily, Disp: 90 tablet, Rfl: 3    traMADol (ULTRAM) 50 mg tablet, Take 1 tablet (50 mg total) by mouth every 8 (eight) hours as needed for moderate pain, Disp: 90 tablet, Rfl: 0  Allergies   Allergen Reactions    Sulfa Antibiotics Headache     Annotation - [id number]: Migraine; Annotation - [id number]: cellular issues     Vitals:    08/24/22 1254   BP: 118/72   Pulse: 82   Resp: 14   Temp: (!) 97 4 °F (36 3 °C)   SpO2: 98%       Physical Exam  Constitutional:       General: She is not in acute distress  Appearance: Normal appearance  Cardiovascular:      Rate and Rhythm: Normal rate and regular rhythm  Pulses: Normal pulses  Heart sounds: Normal heart sounds  Pulmonary:      Effort: Pulmonary effort is normal       Breath sounds: Normal breath sounds  Chest:      Chest wall: No mass  Breasts:      Right: No swelling, bleeding, mass, skin change, tenderness, axillary adenopathy or supraclavicular adenopathy  Left: No swelling, bleeding, mass, skin change, tenderness, axillary adenopathy or supraclavicular adenopathy  Comments: B/L mastectomy scars  No masses, nodularity, skin changes, or adenopathy appreciated on physical exam      Abdominal:      General: Abdomen is flat        Palpations: Abdomen is soft    Lymphadenopathy:      Upper Body:      Right upper body: No supraclavicular, axillary or pectoral adenopathy  Left upper body: No supraclavicular, axillary or pectoral adenopathy  Skin:     General: Skin is warm  Neurological:      General: No focal deficit present  Mental Status: She is alert and oriented to person, place, and time  Psychiatric:         Mood and Affect: Mood normal          Behavior: Behavior normal            Results:    Imaging  No results found  I reviewed the above imaging data  Advance Care Planning/Advance Directives:  Discussed disease status, cancer treatment plans and/or cancer treatment goals with the patient

## 2022-08-30 DIAGNOSIS — E78.2 MIXED HYPERLIPIDEMIA: ICD-10-CM

## 2022-08-30 RX ORDER — SIMVASTATIN 10 MG
TABLET ORAL
Qty: 90 TABLET | Refills: 1 | Status: SHIPPED | OUTPATIENT
Start: 2022-08-30 | End: 2022-09-07 | Stop reason: SDUPTHER

## 2022-09-06 DIAGNOSIS — M51.17 INTERVERTEBRAL DISC DISORDER WITH RADICULOPATHY OF LUMBOSACRAL REGION: ICD-10-CM

## 2022-09-06 RX ORDER — TRAMADOL HYDROCHLORIDE 50 MG/1
50 TABLET ORAL EVERY 8 HOURS PRN
Qty: 90 TABLET | Refills: 0 | Status: SHIPPED | OUTPATIENT
Start: 2022-09-06 | End: 2022-10-05 | Stop reason: SDUPTHER

## 2022-09-07 ENCOUNTER — OFFICE VISIT (OUTPATIENT)
Dept: INTERNAL MEDICINE CLINIC | Facility: CLINIC | Age: 57
End: 2022-09-07
Payer: COMMERCIAL

## 2022-09-07 VITALS
DIASTOLIC BLOOD PRESSURE: 78 MMHG | TEMPERATURE: 97.3 F | WEIGHT: 156 LBS | HEART RATE: 65 BPM | BODY MASS INDEX: 26.63 KG/M2 | HEIGHT: 64 IN | SYSTOLIC BLOOD PRESSURE: 124 MMHG | OXYGEN SATURATION: 97 %

## 2022-09-07 DIAGNOSIS — E53.8 VITAMIN B12 DEFICIENCY: ICD-10-CM

## 2022-09-07 DIAGNOSIS — G62.9 NEUROPATHY: ICD-10-CM

## 2022-09-07 DIAGNOSIS — D72.818 OTHER DECREASED WHITE BLOOD CELL (WBC) COUNT: ICD-10-CM

## 2022-09-07 DIAGNOSIS — G89.4 PAIN SYNDROME, CHRONIC: ICD-10-CM

## 2022-09-07 DIAGNOSIS — Z23 NEED FOR SHINGLES VACCINE: ICD-10-CM

## 2022-09-07 DIAGNOSIS — F32.A ANXIETY AND DEPRESSION: ICD-10-CM

## 2022-09-07 DIAGNOSIS — G47.09 OTHER INSOMNIA: ICD-10-CM

## 2022-09-07 DIAGNOSIS — E78.49 OTHER HYPERLIPIDEMIA: ICD-10-CM

## 2022-09-07 DIAGNOSIS — F11.20 UNCOMPLICATED OPIOID DEPENDENCE (HCC): ICD-10-CM

## 2022-09-07 DIAGNOSIS — K21.9 GASTROESOPHAGEAL REFLUX DISEASE WITHOUT ESOPHAGITIS: ICD-10-CM

## 2022-09-07 DIAGNOSIS — Z00.00 HEALTH MAINTENANCE EXAMINATION: Primary | ICD-10-CM

## 2022-09-07 DIAGNOSIS — E78.2 MIXED HYPERLIPIDEMIA: ICD-10-CM

## 2022-09-07 DIAGNOSIS — Z90.13 S/P BILATERAL MASTECTOMY: ICD-10-CM

## 2022-09-07 DIAGNOSIS — F41.9 ANXIETY AND DEPRESSION: ICD-10-CM

## 2022-09-07 PROBLEM — Z01.419 ENCNTR FOR GYN EXAM (GENERAL) (ROUTINE) W/O ABN FINDINGS: Status: RESOLVED | Noted: 2020-02-24 | Resolved: 2022-09-07

## 2022-09-07 PROBLEM — M94.0 COSTOCHONDRITIS: Status: RESOLVED | Noted: 2018-10-31 | Resolved: 2022-09-07

## 2022-09-07 PROBLEM — Z08 ENCOUNTER FOR FOLLOW-UP EXAMINATION AFTER COMPLETED TREATMENT FOR MALIGNANT NEOPLASM: Status: RESOLVED | Noted: 2020-05-27 | Resolved: 2022-09-07

## 2022-09-07 PROCEDURE — 99396 PREV VISIT EST AGE 40-64: CPT | Performed by: INTERNAL MEDICINE

## 2022-09-07 PROCEDURE — 90471 IMMUNIZATION ADMIN: CPT | Performed by: INTERNAL MEDICINE

## 2022-09-07 PROCEDURE — 90750 HZV VACC RECOMBINANT IM: CPT | Performed by: INTERNAL MEDICINE

## 2022-09-07 RX ORDER — SIMVASTATIN 20 MG
20 TABLET ORAL DAILY
Qty: 90 TABLET | Refills: 1 | Status: SHIPPED | OUTPATIENT
Start: 2022-09-07

## 2022-09-07 RX ORDER — TRAZODONE HYDROCHLORIDE 50 MG/1
50 TABLET ORAL
Qty: 30 TABLET | Refills: 0 | Status: SHIPPED | OUTPATIENT
Start: 2022-09-07 | End: 2022-10-05 | Stop reason: SDUPTHER

## 2022-09-07 NOTE — ASSESSMENT & PLAN NOTE
Symptoms worse recently, on low dose escitalopram   Trial of trazodone  Agrees to do in-lab sleep study, refer to sleep

## 2022-09-07 NOTE — PROGRESS NOTES
Assessment/Plan:    Anxiety and depression  Side effects with venlafaxine, on escitalopram again  Malignant neoplasm of left breast in female, estrogen receptor positive ([address] )  On tamoxifen  Will see oncology yearly  Osteopenia of multiple sites  Schedule bone density  Continue daily supplements  Hot flashes  Stable  Pain syndrome, chronic  Taking tramadol once to 3 times a day, as needed  Narcotic contract updated  PDMP reviewed  Gastroesophageal reflux disease  Taking PPI daily  Leukopenia  Stable  Hyperlipidemia  Lipids worse, increase simvastatin to 20 mg daily  Neuropathy  Intermittent mild symptoms, off gabapentin  Other insomnia  Symptoms worse recently, on low dose escitalopram   Trial of trazodone  Agrees to do in-lab sleep study, refer to sleep  Diagnoses and all orders for this visit:    Health maintenance examination  Comments:  Shingrix today  Anxiety and depression    Other hyperlipidemia    Other decreased white blood cell (WBC) count  -     CBC and differential; Future    Other insomnia  -     traZODone (DESYREL) 50 mg tablet; Take 1 tablet (50 mg total) by mouth daily at bedtime  -     Diagnostic Sleep Study; Future    Vitamin B12 deficiency  -     Vitamin B12; Future    Pain syndrome, chronic    Need for shingles vaccine  -     Zoster Vaccine Recombinant IM    Uncomplicated opioid dependence (HCC)    Neuropathy    Mixed hyperlipidemia  -     simvastatin (ZOCOR) 20 mg tablet; Take 1 tablet (20 mg total) by mouth daily  -     Comprehensive metabolic panel; Future  -     Lipid panel; Future    S/P bilateral mastectomy    Gastroesophageal reflux disease without esophagitis    Follow up in 6 months or as needed  Subjective:      Patient ID: Rao Martinez is a 56 y o  female here for a follow up  She continues to have issues with sleep  She has no issues falling asleep however she would wake up multiple times at night    She feels she is able to sleep to Good hours before needing to wake up in the morning  She has not tried any over-the-counter medications  She is only taking low-dose Lexapro  She has weaned herself off gabapentin  She does feel mild neuropathy symptoms the bottom of her feet, this does not occur regularly  She went to [facility] a few months ago, it was very hot and she noticed bilateral leg swelling  This lasted about a day or 2  She feels out of breath sometimes when talking, needing to take a deep breath in  Denies any chest pain or pressure, palpitations or wheezing  She reports no nocturnal symptoms  The following portions of the patient's history were reviewed and updated as appropriate: allergies, current medications, past medical history, past social history and problem list     Review of Systems   Constitutional: Negative for appetite change and fatigue  HENT: Negative for congestion, ear pain and postnasal drip  Eyes: Negative for visual disturbance  Respiratory: Negative for cough and shortness of breath  Cardiovascular: Negative for chest pain and leg swelling  Gastrointestinal: Negative for abdominal pain, constipation and diarrhea  Genitourinary: Negative for dysuria, frequency and urgency  Musculoskeletal: Positive for arthralgias  Negative for myalgias  Skin: Negative for rash and wound  Neurological: Negative for dizziness, tremors, numbness and headaches  Psychiatric/Behavioral: Positive for sleep disturbance  Negative for confusion  The patient is not nervous/anxious  Objective:      /78   Pulse 65   Temp (!) 97 3 °F (36 3 °C)   Ht 5' 4" (1 626 m)   Wt 70 8 kg (156 lb)   SpO2 97%   BMI 26 78 kg/m²          Physical Exam  Vitals and nursing note reviewed  Constitutional:       General: She is not in acute distress  Appearance: She is well-developed  HENT:      Head: Normocephalic and atraumatic     Eyes:      Pupils: Pupils are equal, round, and reactive to light  Cardiovascular:      Rate and Rhythm: Normal rate and regular rhythm  Heart sounds: Normal heart sounds  Pulmonary:      Effort: Pulmonary effort is normal       Breath sounds: Normal breath sounds  No wheezing  Abdominal:      General: Bowel sounds are normal       Palpations: Abdomen is soft  Musculoskeletal:         General: No swelling  Right lower leg: No edema  Left lower leg: No edema  Skin:     General: Skin is warm  Findings: No rash  Neurological:      General: No focal deficit present  Mental Status: She is alert and oriented to person, place, and time  Psychiatric:         Mood and Affect: Mood and affect normal  Mood is not anxious or depressed  Behavior: Behavior normal            Labs & imaging results reviewed with patient

## 2022-09-30 ENCOUNTER — TELEPHONE (OUTPATIENT)
Dept: SLEEP CENTER | Facility: CLINIC | Age: 57
End: 2022-09-30

## 2022-09-30 NOTE — TELEPHONE ENCOUNTER
----- Message from Ele Mccord MD sent at 9/30/2022  1:47 PM EDT -----  Approved    ----- Message -----  From: Morena Tavares  Sent: 8/90/8649   9:48 AM EDT  To: Sleep Medicine [facility] Provider    This Diagnostic sleep study needs approval      If approved please sign and return to clerical pool  If denied please include reasons why  Also provide alternative testing if warranted  Please sign and return to clerical pool

## 2022-10-08 ENCOUNTER — CLINICAL SUPPORT (OUTPATIENT)
Dept: INTERNAL MEDICINE CLINIC | Facility: CLINIC | Age: 57
End: 2022-10-08
Payer: COMMERCIAL

## 2022-10-08 DIAGNOSIS — Z23 ENCOUNTER FOR IMMUNIZATION: Primary | ICD-10-CM

## 2022-10-08 PROCEDURE — 90682 RIV4 VACC RECOMBINANT DNA IM: CPT

## 2022-10-08 PROCEDURE — 90471 IMMUNIZATION ADMIN: CPT

## 2022-11-04 DIAGNOSIS — M51.17 INTERVERTEBRAL DISC DISORDER WITH RADICULOPATHY OF LUMBOSACRAL REGION: ICD-10-CM

## 2022-11-04 RX ORDER — TRAMADOL HYDROCHLORIDE 50 MG/1
50 TABLET ORAL EVERY 8 HOURS PRN
Qty: 90 TABLET | Refills: 0 | Status: SHIPPED | OUTPATIENT
Start: 2022-11-04

## 2022-12-15 ENCOUNTER — OFFICE VISIT (OUTPATIENT)
Dept: INTERNAL MEDICINE CLINIC | Facility: CLINIC | Age: 57
End: 2022-12-15

## 2022-12-15 VITALS
DIASTOLIC BLOOD PRESSURE: 78 MMHG | SYSTOLIC BLOOD PRESSURE: 120 MMHG | HEART RATE: 77 BPM | OXYGEN SATURATION: 98 % | TEMPERATURE: 96.3 F

## 2022-12-15 DIAGNOSIS — F11.20 UNCOMPLICATED OPIOID DEPENDENCE (HCC): ICD-10-CM

## 2022-12-15 DIAGNOSIS — Z23 NEED FOR VACCINATION: ICD-10-CM

## 2022-12-15 DIAGNOSIS — G89.4 PAIN SYNDROME, CHRONIC: Primary | ICD-10-CM

## 2022-12-15 NOTE — PATIENT INSTRUCTIONS

## 2022-12-15 NOTE — PROGRESS NOTES
Assessment/Plan     Problem List Items Addressed This Visit        Other    Pain syndrome, chronic - Primary    Uncomplicated opioid dependence ([facility] [address] )   Other Visit Diagnoses     Need for vaccination        Relevant Orders    Zoster Vaccine Recombinant IM (Completed)         Treatment Plan: Continue tramadol as needed  Ibuprofen sparingly  Continue routine exercise regimen  Treatment Goals: Decrease pain  Improve quality of life       Opiate risks  There are risks associated with opioid medications, including dependence, addiction and tolerance  The patient understands and agrees to use these medications only as prescribed  Potential side effects of the medications include, but are not limited to, constipation, drowsiness, addiction, impaired judgment and risk of fatal overdose if not taken as prescribed  The patient was warned against driving while taking sedation medications  Sharing medications is a felony  At this point in time, the patient is showing no signs of addiction, abuse, diversion or suicidal ideation  Opioid agreement  Pain management agreement was reviewed with patient and signed/updated during visit      I have spent 25 minutes directly with the patient  Greater than 50% of this time was spent in counseling/coordination of care regarding: risks and benefits of treatment options, instructions for management and patient and family education  Subjective     Opioid Management:   Type of visit: Follow-up    Pain related diagnoses: chronic pain syndrome , low back SI joint pain  Interval history: No changes, pain comes and goes  Aberrant behavior?: No      Adverse effects from medication?: No      Screening Tools/Assessments:    PHQ-2/9:  PHQ-9 score: 2    Brief Pain Inventory (BPI):  1) Throughout our lives, most of us have had pain from time to time (such as minor headaches, sprains, and toothaches)  Have you had pain other than these everyday kinds of pain today?  Yes  2) Where is your pain located? low back pain, feet, hands    3) Rate your pain at its worst in the last 24 hours: 6  4) Rate your pain at its least in the last 24 hours: 2  5) Rate your average level of pain: 5  6) Rate your pain right now: 2  7) What treatments or medications are you receiving for your pain? tramadol ibuprofen  8) In the past 24 hours, how much relief have pain treatments or medication provided? 0%  9) During the past 24 hours, pain has interfered with your:     A) General activity: 0     B) Mood: 0     C) Walking ability: 0     D) Normal work (work outside the home & housework): 0     E) Relations with other people: 0     F) Sleep: 0     G) Enjoyment of life: 0    COMM:  Current COMM Score: 5 (negative, low risk patient)    Opioid agreement:  Active Opioid agreement on file?: Yes    Opioid agreement signed date: 3/2/2022  Opioid agreement expiration date: 3/2/2023    Naloxone:  Currently prescribed Naloxone (Narcan): No    Reason not prescribing Naloxone: patient declines    HPI  Pain Medications             escitalopram (LEXAPRO) 10 mg tablet Take 1 tablet (10 mg total) by mouth daily    traMADol (ULTRAM) 50 mg tablet Take 1 tablet (50 mg total) by mouth every 8 (eight) hours as needed for moderate pain    traZODone (DESYREL) 50 mg tablet Take 1 tablet (50 mg total) by mouth daily at bedtime         Outpatient Morphine Milligram Equivalents Per Day     12/15/22 and after 15 MME/Day    Order Name Dose Route Frequency Maximum MME/Day     traMADol (ULTRAM) 50 mg tablet 50 mg Oral Every 8 hours PRN 15 MME/Day    Total Potential Morphine Milligram Equivalents Per Day 15 MME/Day    Calculation Information        traMADol (ULTRAM) 50 mg tablet    traMADol 50 mg Tabs: single dose of 50 mg * 3 doses per day * morphine equivalence factor of 0 1 = 15 MME/Day                         PDMP Review       Value Time User    PDMP Reviewed  Yes 12/2/2022  3:43 PM Solange Lama MD         Review of Systems Constitutional: Negative for activity change, appetite change and fatigue  Respiratory: Negative for cough and shortness of breath  Cardiovascular: Negative for chest pain  Gastrointestinal: Negative for abdominal pain, constipation and diarrhea  Neurological: Negative for dizziness, light-headedness and headaches  Psychiatric/Behavioral: Negative for dysphoric mood and sleep disturbance  The patient is not nervous/anxious  Objective     /78 (BP Location: Left arm, Patient Position: Sitting, Cuff Size: Adult)   Pulse 77   Temp (!) 96 3 °F (35 7 °C)   SpO2 98%     Physical Exam  Vitals reviewed  Constitutional:       Appearance: Normal appearance  HENT:      Head: Normocephalic and atraumatic  Eyes:      Conjunctiva/sclera: Conjunctivae normal    Cardiovascular:      Rate and Rhythm: Normal rate and regular rhythm  Heart sounds: Normal heart sounds  Pulmonary:      Effort: Pulmonary effort is normal       Breath sounds: Normal breath sounds  Neurological:      Mental Status: She is alert and oriented to person, place, and time     Psychiatric:         Mood and Affect: Mood normal          Behavior: Behavior normal          RUDOLPH Paul

## 2022-12-16 DIAGNOSIS — Z00.00 ENCOUNTER FOR PREVENTIVE HEALTH EXAMINATION: ICD-10-CM

## 2022-12-20 DIAGNOSIS — Z00.00 ENCOUNTER FOR PREVENTATIVE ADULT HEALTH CARE EXAMINATION: Primary | ICD-10-CM

## 2023-01-09 DIAGNOSIS — M51.17 INTERVERTEBRAL DISC DISORDER WITH RADICULOPATHY OF LUMBOSACRAL REGION: ICD-10-CM

## 2023-01-09 RX ORDER — TRAMADOL HYDROCHLORIDE 50 MG/1
50 TABLET ORAL EVERY 8 HOURS PRN
Qty: 90 TABLET | Refills: 0 | Status: SHIPPED | OUTPATIENT
Start: 2023-01-09

## 2023-02-13 DIAGNOSIS — R45.4 IRRITABILITY AND ANGER: ICD-10-CM

## 2023-02-13 DIAGNOSIS — M51.17 INTERVERTEBRAL DISC DISORDER WITH RADICULOPATHY OF LUMBOSACRAL REGION: ICD-10-CM

## 2023-02-14 RX ORDER — ESCITALOPRAM OXALATE 10 MG/1
10 TABLET ORAL DAILY
Qty: 90 TABLET | Refills: 0 | Status: SHIPPED | OUTPATIENT
Start: 2023-02-14

## 2023-02-15 RX ORDER — TRAMADOL HYDROCHLORIDE 50 MG/1
50 TABLET ORAL EVERY 8 HOURS PRN
Qty: 90 TABLET | Refills: 0 | Status: SHIPPED | OUTPATIENT
Start: 2023-02-15

## 2023-02-21 ENCOUNTER — OFFICE VISIT (OUTPATIENT)
Dept: FAMILY MEDICINE CLINIC | Facility: CLINIC | Age: 58
End: 2023-02-21

## 2023-02-21 ENCOUNTER — LAB (OUTPATIENT)
Dept: LAB | Facility: CLINIC | Age: 58
End: 2023-02-21

## 2023-02-21 ENCOUNTER — HOSPITAL ENCOUNTER (OUTPATIENT)
Dept: VASCULAR ULTRASOUND | Facility: HOSPITAL | Age: 58
Discharge: HOME | End: 2023-02-21

## 2023-02-21 ENCOUNTER — HOSPITAL ENCOUNTER (OUTPATIENT)
Dept: NON INVASIVE DIAGNOSTICS | Facility: CLINIC | Age: 58
Discharge: HOME | End: 2023-02-21

## 2023-02-21 ENCOUNTER — HOSPITAL ENCOUNTER (OUTPATIENT)
Dept: CT IMAGING | Facility: HOSPITAL | Age: 58
Discharge: HOME | End: 2023-02-21

## 2023-02-21 ENCOUNTER — HOSPITAL ENCOUNTER (OUTPATIENT)
Dept: ULTRASOUND IMAGING | Facility: HOSPITAL | Age: 58
Discharge: HOME | End: 2023-02-21

## 2023-02-21 VITALS
RESPIRATION RATE: 14 BRPM | SYSTOLIC BLOOD PRESSURE: 136 MMHG | BODY MASS INDEX: 27.08 KG/M2 | DIASTOLIC BLOOD PRESSURE: 92 MMHG | WEIGHT: 158.6 LBS | HEIGHT: 64 IN | HEART RATE: 72 BPM

## 2023-02-21 VITALS
WEIGHT: 158.51 LBS | HEART RATE: 74 BPM | SYSTOLIC BLOOD PRESSURE: 136 MMHG | HEIGHT: 64 IN | BODY MASS INDEX: 27.06 KG/M2 | DIASTOLIC BLOOD PRESSURE: 92 MMHG

## 2023-02-21 DIAGNOSIS — Z00.00 WELL ADULT EXAM: Primary | ICD-10-CM

## 2023-02-21 DIAGNOSIS — F32.A ANXIETY AND DEPRESSION: ICD-10-CM

## 2023-02-21 DIAGNOSIS — M79.672 PAIN IN BOTH FEET: ICD-10-CM

## 2023-02-21 DIAGNOSIS — Z00.00 ENCOUNTER FOR PREVENTIVE HEALTH EXAMINATION: ICD-10-CM

## 2023-02-21 DIAGNOSIS — F11.20 UNCOMPLICATED OPIOID DEPENDENCE (HCC): ICD-10-CM

## 2023-02-21 DIAGNOSIS — Z00.00 ENCOUNTER FOR PREVENTATIVE ADULT HEALTH CARE EXAMINATION: ICD-10-CM

## 2023-02-21 DIAGNOSIS — E78.49 OTHER HYPERLIPIDEMIA: ICD-10-CM

## 2023-02-21 DIAGNOSIS — M79.671 PAIN IN BOTH FEET: ICD-10-CM

## 2023-02-21 DIAGNOSIS — C50.112 MALIGNANT NEOPLASM OF CENTRAL PORTION OF LEFT BREAST IN FEMALE, ESTROGEN RECEPTOR POSITIVE (HCC): ICD-10-CM

## 2023-02-21 DIAGNOSIS — D22.5 ATYPICAL NEVUS OF BACK: ICD-10-CM

## 2023-02-21 DIAGNOSIS — G47.09 OTHER INSOMNIA: ICD-10-CM

## 2023-02-21 DIAGNOSIS — R73.01 ABNORMAL FASTING GLUCOSE: ICD-10-CM

## 2023-02-21 DIAGNOSIS — F41.9 ANXIETY AND DEPRESSION: ICD-10-CM

## 2023-02-21 DIAGNOSIS — K21.9 GASTROESOPHAGEAL REFLUX DISEASE WITHOUT ESOPHAGITIS: ICD-10-CM

## 2023-02-21 DIAGNOSIS — Z17.0 MALIGNANT NEOPLASM OF CENTRAL PORTION OF LEFT BREAST IN FEMALE, ESTROGEN RECEPTOR POSITIVE (HCC): ICD-10-CM

## 2023-02-21 DIAGNOSIS — G89.4 PAIN SYNDROME, CHRONIC: ICD-10-CM

## 2023-02-21 LAB
25(OH)D3 SERPL-MCNC: 64.5 NG/ML (ref 30–100)
ALBUMIN SERPL BCP-MCNC: 4.5 G/DL (ref 3.5–5)
ALP SERPL-CCNC: 59 U/L (ref 34–104)
ALT SERPL W P-5'-P-CCNC: 21 U/L (ref 7–52)
ANION GAP SERPL CALCULATED.3IONS-SCNC: 8 MMOL/L (ref 4–13)
AORTIC ROOT: 2.9 CM
APICAL FOUR CHAMBER EJECTION FRACTION: 74 %
AST SERPL W P-5'-P-CCNC: 20 U/L (ref 13–39)
ATRIAL RATE: 59 BPM
BACTERIA UR QL AUTO: NORMAL /HPF
BASOPHILS # BLD AUTO: 0.02 THOUSANDS/ÂΜL (ref 0–0.1)
BASOPHILS NFR BLD AUTO: 0 % (ref 0–1)
BILIRUB SERPL-MCNC: 0.6 MG/DL (ref 0.2–1)
BILIRUB UR QL STRIP: NEGATIVE
BUN SERPL-MCNC: 24 MG/DL (ref 5–25)
CALCIUM SERPL-MCNC: 9.5 MG/DL (ref 8.4–10.2)
CHLORIDE SERPL-SCNC: 101 MMOL/L (ref 96–108)
CHOLEST SERPL-MCNC: 237 MG/DL
CLARITY UR: CLEAR
CO2 SERPL-SCNC: 29 MMOL/L (ref 21–32)
COLOR UR: NORMAL
CREAT SERPL-MCNC: 0.93 MG/DL (ref 0.6–1.3)
CRP SERPL HS-MCNC: 3.94 MG/L
E WAVE DECELERATION TIME: 230 MS
EOSINOPHIL # BLD AUTO: 0.19 THOUSAND/ÂΜL (ref 0–0.61)
EOSINOPHIL NFR BLD AUTO: 4 % (ref 0–6)
ERYTHROCYTE [DISTWIDTH] IN BLOOD BY AUTOMATED COUNT: 12.7 % (ref 11.6–15.1)
EST. AVERAGE GLUCOSE BLD GHB EST-MCNC: 105 MG/DL
FRACTIONAL SHORTENING: 36 % (ref 28–44)
GFR SERPL CREATININE-BSD FRML MDRD: 69 ML/MIN/1.73SQ M
GLOBAL LONGITUIDAL STRAIN: -23 %
GLUCOSE P FAST SERPL-MCNC: 106 MG/DL (ref 65–99)
GLUCOSE UR STRIP-MCNC: NEGATIVE MG/DL
HBA1C MFR BLD: 5.3 %
HCT VFR BLD AUTO: 41 % (ref 34.8–46.1)
HCV AB SER QL: NORMAL
HDLC SERPL-MCNC: 80 MG/DL
HGB BLD-MCNC: 13.9 G/DL (ref 11.5–15.4)
HGB UR QL STRIP.AUTO: NEGATIVE
IMM GRANULOCYTES # BLD AUTO: 0.02 THOUSAND/UL (ref 0–0.2)
IMM GRANULOCYTES NFR BLD AUTO: 0 % (ref 0–2)
INTERVENTRICULAR SEPTUM IN DIASTOLE (PARASTERNAL SHORT AXIS VIEW): 0.7 CM
INTERVENTRICULAR SEPTUM: 0.7 CM (ref 0.6–1.1)
KETONES UR STRIP-MCNC: NEGATIVE MG/DL
LAAS-AP2: 12.8 CM2
LAAS-AP4: 14.5 CM2
LDLC SERPL CALC-MCNC: 118 MG/DL (ref 0–100)
LEFT ATRIUM AREA SYSTOLE SINGLE PLANE A4C: 13.2 CM2
LEFT ATRIUM SIZE: 3.3 CM
LEFT INTERNAL DIMENSION IN SYSTOLE: 2.9 CM (ref 2.1–4)
LEFT VENTRICULAR INTERNAL DIMENSION IN DIASTOLE: 4.5 CM (ref 3.5–6)
LEFT VENTRICULAR POSTERIOR WALL IN END DIASTOLE: 0.7 CM
LEFT VENTRICULAR STROKE VOLUME: 60 ML
LEUKOCYTE ESTERASE UR QL STRIP: NEGATIVE
LVSV (TEICH): 60 ML
LYMPHOCYTES # BLD AUTO: 1.24 THOUSANDS/ÂΜL (ref 0.6–4.47)
LYMPHOCYTES NFR BLD AUTO: 28 % (ref 14–44)
MAX HR PERCENT: 92 %
MAX HR: 153 BPM
MCH RBC QN AUTO: 31.6 PG (ref 26.8–34.3)
MCHC RBC AUTO-ENTMCNC: 33.9 G/DL (ref 31.4–37.4)
MCV RBC AUTO: 93 FL (ref 82–98)
MONOCYTES # BLD AUTO: 0.36 THOUSAND/ÂΜL (ref 0.17–1.22)
MONOCYTES NFR BLD AUTO: 8 % (ref 4–12)
MV E'TISSUE VEL-SEP: 7 CM/S
MV PEAK A VEL: 0.86 M/S
MV PEAK E VEL: 72 CM/S
MV STENOSIS PRESSURE HALF TIME: 67 MS
MV VALVE AREA P 1/2 METHOD: 3.28 CM2
NEUTROPHILS # BLD AUTO: 2.67 THOUSANDS/ÂΜL (ref 1.85–7.62)
NEUTS SEG NFR BLD AUTO: 60 % (ref 43–75)
NITRITE UR QL STRIP: NEGATIVE
NON-SQ EPI CELLS URNS QL MICRO: NORMAL /HPF
NRBC BLD AUTO-RTO: 0 /100 WBCS
P AXIS: 25 DEGREES
PH UR STRIP.AUTO: 6.5 [PH]
PLATELET # BLD AUTO: 236 THOUSANDS/UL (ref 149–390)
PMV BLD AUTO: 8.8 FL (ref 8.9–12.7)
POTASSIUM SERPL-SCNC: 4.2 MMOL/L (ref 3.5–5.3)
PR INTERVAL: 180 MS
PROT SERPL-MCNC: 6.9 G/DL (ref 6.4–8.4)
PROT UR STRIP-MCNC: NEGATIVE MG/DL
QRS AXIS: 28 DEGREES
QRSD INTERVAL: 70 MS
QT INTERVAL: 484 MS
QTC INTERVAL: 479 MS
RATE PRESSURE PRODUCT: NORMAL
RBC # BLD AUTO: 4.4 MILLION/UL (ref 3.81–5.12)
RBC #/AREA URNS AUTO: NORMAL /HPF
RIGHT ATRIUM AREA SYSTOLE A4C: 9.4 CM2
RIGHT VENTRICLE ID DIMENSION: 2.7 CM
SL CV LEFT ATRIUM LENGTH A2C: 4.7 CM
SL CV LV EF: 65
SL CV LV EF: 65
SL CV PED ECHO LEFT VENTRICLE DIASTOLIC VOLUME (MOD BIPLANE) 2D: 93 ML
SL CV PED ECHO LEFT VENTRICLE SYSTOLIC VOLUME (MOD BIPLANE) 2D: 33 ML
SL CV STRESS RECOVERY BP: NORMAL MMHG
SL CV STRESS RECOVERY HR: 86 BPM
SL CV STRESS RECOVERY O2 SAT: 98 %
SODIUM SERPL-SCNC: 138 MMOL/L (ref 135–147)
SP GR UR STRIP.AUTO: 1.01 (ref 1–1.03)
STRESS ANGINA INDEX: 0
STRESS BASELINE BP: NORMAL MMHG
STRESS BASELINE HR: 65 BPM
STRESS O2 SAT REST: 98 %
STRESS PEAK HR: 153 BPM
STRESS POST ESTIMATED WORKLOAD: 11.7 METS
STRESS POST EXERCISE DUR MIN: 10 MIN
STRESS POST O2 SAT PEAK: 97 %
STRESS POST PEAK BP: 168 MMHG
T WAVE AXIS: 36 DEGREES
TR MAX PG: 27 MMHG
TR PEAK VELOCITY: 2.6 M/S
TRICUSPID VALVE PEAK REGURGITATION VELOCITY: 2.58 M/S
TRIGL SERPL-MCNC: 197 MG/DL
TSH SERPL DL<=0.05 MIU/L-ACNC: 2.21 UIU/ML (ref 0.45–4.5)
UROBILINOGEN UR STRIP-ACNC: <2 MG/DL
VENTRICULAR RATE: 59 BPM
WBC # BLD AUTO: 4.5 THOUSAND/UL (ref 4.31–10.16)
WBC #/AREA URNS AUTO: NORMAL /HPF

## 2023-02-21 RX ORDER — ROSUVASTATIN CALCIUM 20 MG/1
20 TABLET, COATED ORAL DAILY
Qty: 90 TABLET | Refills: 1 | Status: SHIPPED | OUTPATIENT
Start: 2023-02-21

## 2023-02-21 NOTE — PROGRESS NOTES
Nutritional Summary and Recommendations:     Patient Nutrition-Oriented Medical/Diet Hx  Celina Brightpearl presents today to [facility] for nutrition assessment and counseling  Per 24-hour dietary recall Celina Landing reports tracking calories and trying to make better nutrition choices but struggling to see weight loss  Discussion focused on adequate calories, strategies for weight loss, and adding more protein and heart healthy fats  Labs reviewed  Nutrition Related Medications/Supplements:  Vitamin D  Simvastatin     Labs:  A1C 5 3  Total Cholesterol 197  Triglycerides 197  HDL 80    Vitamin D 64 5      Anthropometrics:     Ht: 5 ft 4 in Wt: 156 4 lb   BMI: 26 8     BMR: 1379 kcals  Fat%: 38 9%  Acceptable Range: 23-34%     Fat Mass: 60 8 lb FFM: 95 6 lb  TBW: 70 lb     Estimated Energy Requirements:  7190-8593 kcals/day  58-86 g protein    Nutrition Diagnosis:    Altered nutrition related laboratory values r/t physiological events as evidenced by triglycerides 197 mg/dL       Nutrition Interventions:  Incorporate protein at every meal and snack  Add heart healthy fats to help with satiety such as avocado, Flax seed, nuts, seeds and fish  Check portion sizes and calories for accuracy within Meal tracking indy     Nutrition Recommendations:    1  Achieve desirable reduction in triglyceride levels to <150 mg/dL within 3-6 months  2  Improve lean body composition by next visit  3  Incorporate more heart healthy fats into meals to help with satiety and reduction in overall calories consumed  4  Contact RD with any questions or concerns  Nutrition Education     Healthy Snacking  and Intermittent Fasting; Does it work?       Perceived Comprehension: good     Expected Compliance: good

## 2023-02-21 NOTE — PROGRESS NOTES
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, [email or web address]      Allie's past medical history is significant for invasive ductal cancer of left breast   ER/CO receptor positive  MammaPrint results revealed "high risk"  Patient subsequently had bilateral mastectomies (right prophylactic) in 2019  She subsequently completed chemotherapy as well  She is on Tamoxifen due to ER receptor status  She is still being followed by her oncology team   She also has a  history of hyperlipidemia, depression, and insomnia  Her medications include tamoxifen, simvastatin, Lexapro, trazodone, and vitamin D3, 2000 IU daily  Her past surgical history is significant for bunionectomy in 2010, appendectomy in 2013, and bilateral mastectomies in 2019  Her family history is significant for cardiovascular disease, hyperlipidemia, hypertension, and atrial fibrillation on her father side  Cardiovascular disease, COPD, hypertension, hyperlipidemia, throat cancer on her mother side  Her brother has a history of cardiovascular disease  Joselito Monreal is a non-smoker  She drinks approximately 8 alcoholic drinks per week  She also drinks 3 to 4 cups of coffee per day  She considers her diet to be somewhat healthy  She exercises 5 days a week for 30 to 60 minutes at a time (biking, rowing, walking, and weights)  Her major concerns today revolve around her family history and stress level  Review of Systems   Constitutional: Negative for chills and fever  HENT: Negative for ear pain and sore throat  Eyes: Negative for pain and visual disturbance  Respiratory: Negative for cough and shortness of breath  Cardiovascular: Negative for chest pain and palpitations     Gastrointestinal: Negative for abdominal pain and vomiting  Genitourinary: Negative for dysuria and hematuria  Musculoskeletal: Positive for arthralgias  Negative for back pain  Skin: Negative for color change and rash  Neurological: Negative for seizures and syncope  All other systems reviewed and are negative          Active Ambulatory Problems     Diagnosis Date Noted   • Hiatal hernia 07/14/2012   • Hyperlipidemia 07/14/2012   • Pain syndrome, chronic 03/06/2015   • Sacroiliitis ([facility] ) 06/24/2013   • Anxiety and depression 04/30/2018   • Vitamin B12 deficiency 04/30/2018   • Seasonal allergic rhinitis due to pollen 04/30/2018   • Gastroesophageal reflux disease 05/14/2018   • Intervertebral disc disorder with radiculopathy of lumbosacral region    • Malignant neoplasm of left breast in female, estrogen receptor positive ([facility] ) 06/18/2019   • 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 10/14/2019   • Tear of right acetabular labrum 01/05/2020   • Leukopenia 02/14/2020   • Leiomyoma of uterus, unspecified 02/14/2020   • S/P bilateral mastectomy 02/14/2020   • Pain of right hip joint    • Well adult exam 02/24/2020   • Vaginal atrophy 02/24/2020   • Use of tamoxifen (Nolvadex) 05/27/2020   • Essential hypertension 02/24/2021   • Abnormal fasting glucose 02/24/2021   • Osteopenia of multiple sites 02/24/2021   • Hot flashes 03/02/2022   • Other insomnia 14/43/7530   • Uncomplicated opioid dependence ([facility] ) 09/07/2022   • Neuropathy 09/07/2022   • Atypical nevus of back 02/21/2023   • Pain in both feet 02/21/2023     Resolved Ambulatory Problems     Diagnosis Date Noted   • Anxiety 07/14/2012   • Esophageal reflux 10/21/2013   • Pulmonary valve disorder 07/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   • Screening for colon cancer 05/09/2018   • Costochondritis 10/31/2018   • Chemotherapy-induced nausea 10/03/2019   • Difficulty sleeping 10/03/2019   • Medical marijuana use 10/14/2019   • History of bilateral mastectomy 02/24/2020   • Encounter for follow-up examination after completed treatment for malignant neoplasm 05/27/2020     Past Medical History:   Diagnosis Date   • BRCA gene mutation negative 06/20/2019   • Cancer [facility])    • Fibrocystic disease of breast    • GERD (gastroesophageal reflux disease)        Past Surgical History:   Procedure Laterality Date   • APPENDECTOMY     • BREAST BIOPSY     • BREAST CYST ASPIRATION Left 2005   • COLONOSCOPY     • FOOT SURGERY Right 2011    right, hardware removal   • IR PORT PLACEMENT  9/27/2019   • IR PORT REMOVAL  2/19/2020   • LYMPH NODE BIOPSY     • MASTECTOMY W/ SENTINEL NODE BIOPSY Left 8/9/2019    Procedure: BREAST MASTECTOMY, SENTINEL LYMPH NODE BIOPSY, LYMPHATIC MAPPING W/ BLUE DYE AND RADIOACTIVE DYE (INJECT AT 0730 BY DR WHITEHEAD IN THE O R );  Surgeon: Cristina Belle MD;  Location: [facility];  Service: Surgical Oncology   • OTHER SURGICAL HISTORY Right 2010    Complete Excision of Fifth Metatarsal Head    • WA COLONOSCOPY FLX DX W/COLLJ SPEC WHEN PFRMD N/A 7/20/2018    Procedure: EGD AND COLONOSCOPY;  Surgeon: Emma Garcia MD;  Location: [facility] GI LAB;   Service: Gastroenterology   • WA MASTECTOMY SIMPLE COMPLETE Right 8/9/2019    Procedure: BREAST SIMPLE MASTECTOMY;  Surgeon: Cristina Belle MD;  Location: [facility];  Service: Surgical Oncology   • US GUIDANCE BREAST BIOPSY LEFT EACH ADDITIONAL Left 6/13/2019   • US GUIDED BREAST BIOPSY LEFT COMPLETE Left 6/13/2019       Family History   Problem Relation Age of Onset   • Coronary artery disease Mother         CABG in her 62s   • Other Mother         Dyslipidemia   • Hypertension Mother    • Hyperlipidemia Mother    • Heart attack Father 46        acute myocardial infarction   • Other Father         Dyslipidemia   • Hypertension Father    • Hyperlipidemia Father    • Prostate cancer Paternal Uncle 72   • Thyroid disease Family         disorder   • No Known Problems Maternal Aunt    • No Known Problems Maternal Aunt        Social History     Tobacco Use   Smoking Status Never   Smokeless Tobacco Never         Current Outpatient Medications:   •  rosuvastatin (CRESTOR) 20 MG tablet, Take 1 tablet (20 mg total) by mouth daily, Disp: 90 tablet, Rfl: 1  •  cholecalciferol (VITAMIN D3) 1,000 units tablet, Take 1,000 Units by mouth daily, Disp: , Rfl:   •  escitalopram (LEXAPRO) 10 mg tablet, Take 1 tablet (10 mg total) by mouth daily, Disp: 90 tablet, Rfl: 0  •  omeprazole (PriLOSEC) 40 MG capsule, Take 1 capsule (40 mg total) by mouth daily before breakfast, Disp: 90 capsule, Rfl: 1  •  tamoxifen (NOLVADEX) 20 mg tablet, Take 1 tablet (20 mg total) by mouth daily, Disp: 90 tablet, Rfl: 3  •  traMADol (ULTRAM) 50 mg tablet, Take 1 tablet (50 mg total) by mouth every 8 (eight) hours as needed for moderate pain, Disp: 90 tablet, Rfl: 0  •  traZODone (DESYREL) 50 mg tablet, Take 1 tablet (50 mg total) by mouth daily at bedtime, Disp: 90 tablet, Rfl: 1    Allergies   Allergen Reactions   • Sulfa Antibiotics Headache     Annotation - [id number]: Migraine; Annotation - [id number]: cellular issues         Objective:    Vitals:    02/21/23 0955   BP: 136/92   BP Location: Left arm   Pulse: 72   Resp: 14   Weight: 71 9 kg (158 lb 9 6 oz)   Height: 5' 3 5" (1 613 m)        Physical Exam  Vitals and nursing note reviewed  Constitutional:       Appearance: She is well-developed  HENT:      Head: Normocephalic and atraumatic  Right Ear: External ear normal       Left Ear: External ear normal    Eyes:      Pupils: Pupils are equal, round, and reactive to light  Cardiovascular:      Rate and Rhythm: Normal rate and regular rhythm  Heart sounds: Normal heart sounds  Pulmonary:      Effort: Pulmonary effort is normal       Breath sounds: Normal breath sounds  Abdominal:      General: Bowel sounds are normal       Palpations: Abdomen is soft  Musculoskeletal:         General: Normal range of motion  Cervical back: Normal range of motion and neck supple  Skin:     General: Skin is warm and dry  Neurological:      General: No focal deficit present  Mental Status: She is alert and oriented to person, place, and time  Cranial Nerves: No cranial nerve deficit  Sensory: No sensory deficit  Gait: Gait normal       Deep Tendon Reflexes: Reflexes are normal and symmetric  Psychiatric:         Mood and Affect: Mood normal          Behavior: Behavior normal          Thought Content: Thought content normal          Judgment: Judgment normal              Assessment/Plan:     Here are the findings from today's exam:(also see cardiology and dermatology reports)        1  Well adult exam  Assessment & Plan:  Overall, I think you are in pretty good health today, but please refer to  Individually listed diagnoses, cardiology, and Dermatology reports for more detail)  Your last colonoscopy was in 2018  You are due for repeat this year  You mentioned that you are in the process of scheduling this  Otherwise, you are current with all age-appropriate vaccinations including COVID, influenza, Pneumovax, tetanus, and Shingrix  I would recommend continuing healthy diet, including adequate daily vitamin D (2000 international units daily), and calcium (at least 1200 mg daily)  Would also recommend continuing regular exercise program               2  Malignant neoplasm of central portion of left breast in female, estrogen receptor positive ([facility] [address] )  Assessment & Plan:  You have a history of left intraductal breast cancer  He subsequently underwent bilateral mastectomies (right prophylactically)  You had MammaPrint which showed high risk  He subsequently completed chemotherapy and are now on tamoxifen 20 mg daily  I would recommend continued regular follow-up with your oncology team as directed  3  Other hyperlipidemia  Assessment & Plan: Your cholesterol today is 237, your LDL/bad cholesterol is 118  Fortunately your good cholesterol is high at 80    That being said, in light of your family history, I think it would be reasonable to try a change in medication to see if we can improve your control  I would like to recommend a switch to rosuvastatin 20 mg daily  You should have your cholesterol rechecked again in approximately 6 months  Orders:  -     rosuvastatin (CRESTOR) 20 MG tablet; Take 1 tablet (20 mg total) by mouth daily    4  Anxiety and depression  Assessment & Plan:  It appears that you are doing pretty well since starting Lexapro 10 mg daily  I think it is reasonable to continue this treatment      5  Other insomnia  Assessment & Plan: You stated that your sleep has improved since starting trazodone 50 mg nightly  I think it is reasonable to continue this treatment      6  Atypical nevus of back  Assessment & Plan:  You have a mole on your back that shows mild atypical features  Our dermatologist has recommended that this be biopsied  We will help to set this up for you  Orders:  -     Ambulatory Referral to Dermatology; Future    7  Pain syndrome, chronic  Assessment & Plan:  You have been experiencing ongoing chronic pain in your back  You have being followed by pain management and your PCP  It appears that your pain is currently fairly stable on tramadol 50 mg, twice daily  I would recommend continued follow-up with your care team as directed      8  Abnormal fasting glucose  Assessment & Plan: Your fasting blood sugar was mildly elevated this morning at 106 (normal is less than 100)  Fortunately your long-term sugar (A1c) was normal at 5 3%  I would recommend continuing to monitor this on a yearly basis  9  Pain in both feet  Assessment & Plan: You mentioned that you have been experiencing pain in both feet  Pain appears to be worse first thing in the morning, but also can bother you throughout the day even when you are not bearing weight  Symptoms may be due to Planter fasciitis, but cannot rule out other causes    I would like to refer you to podiatry for further evaluation  Orders:  -     Ambulatory Referral to Podiatry; Future    10  Gastroesophageal reflux disease without esophagitis  Assessment & Plan:  It appears that your reflux symptoms are fairly well controlled on omeprazole 40 mg daily as needed      11  Uncomplicated opioid dependence ([facility] [address] )          Alexis Gilliam,    Thank you for choosing [facility]  It was a pleasure meeting and getting to know you today  If you have any questions regarding today's exam, feel free to contact me  We hope to see you again in the future  Nallely Romeo ([address] Physician)  [phone] (cell)  [email or web address]

## 2023-02-21 NOTE — ASSESSMENT & PLAN NOTE
You mentioned that you have been experiencing pain in both feet  Pain appears to be worse first thing in the morning, but also can bother you throughout the day even when you are not bearing weight  Symptoms may be due to Planter fasciitis, but cannot rule out other causes  I would like to refer you to podiatry for further evaluation

## 2023-02-21 NOTE — ASSESSMENT & PLAN NOTE
Overall, I think you are in pretty good health today, but please refer to  Individually listed diagnoses, cardiology, and Dermatology reports for more detail)  Your last colonoscopy was in 2018  You are due for repeat this year  You mentioned that you are in the process of scheduling this  Otherwise, you are current with all age-appropriate vaccinations including COVID, influenza, Pneumovax, tetanus, and Shingrix  I would recommend continuing healthy diet, including adequate daily vitamin D (2000 international units daily), and calcium (at least 1200 mg daily)    Would also recommend continuing regular exercise program

## 2023-02-21 NOTE — PROGRESS NOTES
HEART AND VASCULAR SUMMARY    1  Lipids: Total 237, , HDL 80,     2  ECG: Normal Study    3  Echocardiogram: Normal Study    4  Stress ECHO: Normal Study    5  Coronary Calcium CT: 0    6  The 10-year ASCVD risk score (Nick VAZQUEZ, et al , 2019) is: 1 9%    Values used to calculate the score:      Age: 56 years      Sex: Female      Is Non- : No      Diabetic: No      Tobacco smoker: No      Systolic Blood Pressure: 654 mmHg      Is BP treated: No      HDL Cholesterol: 80 mg/dL      Total Cholesterol: 237 mg/dL     7  HSCRP:   Lab Results   Component Value Date    HSCRP 3 94 02/21/2023       Impression and Recommendations:    1  Normal cardiovascular exam   2  Continue heart healthy lifestyle

## 2023-02-21 NOTE — ASSESSMENT & PLAN NOTE
Your cholesterol today is 237, your LDL/bad cholesterol is 118  Fortunately your good cholesterol is high at 80  That being said, in light of your family history, I think it would be reasonable to try a change in medication to see if we can improve your control  I would like to recommend a switch to rosuvastatin 20 mg daily  You should have your cholesterol rechecked again in approximately 6 months

## 2023-02-21 NOTE — ASSESSMENT & PLAN NOTE
Your fasting blood sugar was mildly elevated this morning at 106 (normal is less than 100)  Fortunately your long-term sugar (A1c) was normal at 5 3%  I would recommend continuing to monitor this on a yearly basis

## 2023-02-21 NOTE — ASSESSMENT & PLAN NOTE
It appears that you are doing pretty well since starting Lexapro 10 mg daily    I think it is reasonable to continue this treatment

## 2023-02-21 NOTE — ASSESSMENT & PLAN NOTE
You have been experiencing ongoing chronic pain in your back  You have being followed by pain management and your PCP  It appears that your pain is currently fairly stable on tramadol 50 mg, twice daily    I would recommend continued follow-up with your care team as directed

## 2023-02-21 NOTE — PROGRESS NOTES
Your [address] [place] [address] Dermatologist's Name: Leslie Hatchet, MD    Skin Screening Exam:    A chaperone was present throughout the entire encounter  SKIN:  FULL ORGAN SYSTEM EXAM   Hair, Scalp, Ears, Face Normal except as noted below in Assessment   Neck, Cervical Chain Nodes Normal except as noted below in Assessment   Right Arm/Hand/Fingers Normal except as noted below in Assessment   Left Arm/Hand/Fingers Normal except as noted below in Assessment   Chest/Breasts/Axillae • Did the patient specifically refuse to have the areas "under-the-bra" examined by the Dermatologist? No  Examined areas normal except as noted below in Assessment   Abdomen, Umbilicus Normal except as noted below in Assessment   Back/Spine Normal except as noted below in Assessment   Groin/Genitalia/Buttocks • Did the patient specifically refuse to have the areas "under-the-underwear" examined by the Dermatologist? No  Examined areas normal except as noted below in Assessment   Right Leg, Foot, Toes Normal except as noted below in Assessment   Left Leg, Foot, Toes Normal except as noted below in Assessment        Assessment and Plan by Diagnosis:    • Use a moisturizer + sunscreen "combo" product such as Neutrogena Daily Defense SPF 50+ or CeraVe AM at least three times a day  • Follow-up with your private dermatologist or one of our board-certified [place] [id number] Dermatologists as discussed  • [facility]'s own Dr Jose Ramon Benitez is available for consultation for skin enhancement and revitalization services; please mention "[facility]" for expedited scheduling    MELANOCYTIC NEVI ("Moles")    Physical Exam:  • Anatomic Location Affected:   Mostly on sun-exposed areas of the trunk and extremities  • Morphological Description:  Scattered, 1-4mm round to ovoid, symmetric and evenly bordered, regularly pigmented macules/papules without outliers other than if noted elsewhere in today's note    • Pertinent Positives:  • Pertinent Negatives: Additional History of Present Condition:      Assessment and Plan:  Based on a thorough discussion of this condition and the management approach to it (including a comprehensive discussion of the known risks, side effects and potential benefits of treatment), the patient (family) agrees to implement the following specific plan:  • The patient was encouraged to use an SPF30+ broad spectrum sunscreen daily and re-apply every 2-3 outdoors while outside  The importance of sun protection, self-skin exams, and sun avoidance was emphasized  An annual full body skin exam is recommended, and the patient was encouraged to return to the office sooner for any new or changing lesions of concerns  • Benign, reassured  • Annual skin check     Melanocytic Nevi  Melanocytic nevi ("moles") are tan or brown, raised or flat areas of the skin which have an increased number of melanocytes  Melanocytes are the cells in our body which make pigment and account for skin color  Some moles are present at birth (I e , "congenital nevi"), while others come up later in life (i e , "acquired nevi")  The sun can stimulate the body to make more moles  Sunburns are not the only thing that triggers more moles  Chronic sun exposure can do it too  Clinically distinguishing a healthy mole from melanoma may be difficult, even for experienced dermatologists  The "ABCDE's" of moles have been suggested as a means of helping to alert a person to a suspicious mole and the possible increased risk of melanoma  The suggestions for raising alert are as follows:    Asymmetry: Healthy moles tend to be symmetric, while melanomas are often asymmetric  Asymmetry means if you draw a line through the mole, the two halves do not match in color, size, shape, or surface texture   Asymmetry can be a result of rapid enlargement of a mole, the development of a raised area on a previously flat lesion, scaling, ulceration, bleeding or scabbing within the mole  Any mole that starts to demonstrate "asymmetry" should be examined promptly by a board certified dermatologist      Border: Healthy moles tend to have discrete, even borders  The border of a melanoma often blends into the normal skin and does not sharply delineate the mole from normal skin  Any mole that starts to demonstrate "uneven borders" should be examined promptly by a board certified dermatologist      Color: Healthy moles tend to be one color throughout  Melanomas tend to be made up of different colors ranging from dark black, blue, white, or red  Any mole that demonstrates a color change should be examined promptly by a board certified dermatologist      Diameter: Healthy moles tend to be smaller than 0 6 cm in size; an exception are "congenital nevi" that can be larger  Melanomas tend to grow and can often be greater than 0 6 cm (1/4 of an inch, or the size of a pencil eraser)  This is only a guideline, and many normal moles may be larger than 0 6 cm without being unhealthy  Any mole that starts to change in size (small to bigger or bigger to smaller) should be examined promptly by a board certified dermatologist      Evolving: Healthy moles tend to "stay the same "  Melanomas may often show signs of change or evolution such as a change in size, shape, color, or elevation  Any mole that starts to itch, bleed, crust, burn, hurt, or ulcerate or demonstrate a change or evolution should be examined promptly by a board certified dermatologist         LENTIGO    Physical Exam:  • Anatomic Location Affected:  scattered  • Morphological Description:  Light brown well demarcated macules on sun exposed skin with reassuring dermoscopy  • Pertinent Positives:  • Pertinent Negatives:     Additional History of Present Condition:      Assessment and Plan:  Based on a thorough discussion of this condition and the management approach to it (including a comprehensive discussion of the known risks, side effects and potential benefits of treatment), the patient (family) agrees to implement the following specific plan:  • When outside we recommend using a wide brim hat, sunglasses, long sleeve and pants, sunscreen with SPF 84+ with reapplication every 2 hours, or SPF specific clothing       What is a lentigo? A lentigo is a pigmented flat or slightly raised lesion with a clearly defined edge  Unlike an ephelis (freckle), it does not fade in the winter months  There are several kinds of lentigo  The name lentigo originally referred to its appearance resembling a small lentil  The plural of lentigo is lentigines, although “lentigos” is also in common use  Who gets lentigines? Lentigines can affect males and females of all ages and races  Solar lentigines are especially prevalent in fair skinned adults  Lentigines associated with syndromes are present at birth or arise during childhood  What causes lentigines? Common forms of lentigo are due to exposure to ultraviolet radiation:  • Sun damage including sunburn   • Indoor tanning   • Phototherapy, especially photochemotherapy (PUVA)    Ionizing radiation, eg radiation therapy, can also cause lentigines  Several familial syndromes associated with widespread lentigines originate from mutations in Andre-MAP kinase, mTOR signaling and PTEN pathways  What is the treatment for lentigines? Most lentigines are left alone  Attempts to lighten them may not be successful  The following approaches are used:  • SPF 50+ broad-spectrum sunscreen   • Hydroquinone bleaching cream   • Alpha hydroxy acids   • Vitamin C   • Retinoids   • Azelaic acid   • Chemical peels  Individual lesions can be permanently removed using:  • Cryotherapy   • Intense pulsed light   • Pigment lasers    How can lentigines be prevented? Lentigines associated with exposure ultraviolet radiation can be prevented by very careful sun protection   Clothing is more successful at preventing new lentigines than are sunscreens  What is the outlook for lentigines? Lentigines usually persist  They may increase in number with age and sun exposure  Some in sun-protected sites may fade and disappear  ZARAGOZA ANGIOMAS    Physical Exam:  • Anatomic Location Affected:  trunk  • Morphological Description:  Scattered cherry red, variably sized papules  • Pertinent Positives:  • Pertinent Negatives: Additional History of Present Condition:      Assessment and Plan:  Based on a thorough discussion of this condition and the management approach to it (including a comprehensive discussion of the known risks, side effects and potential benefits of treatment), the patient (family) agrees to implement the following specific plan:  • Monitor for changes  • Benign, reassured  •     Assessment and Plan:    Cherry angioma, also known as Alysa Reel spots, are benign vascular skin lesions  A "cherry angioma" is a firm red, blue or purple papule, 0 1-1 cm in diameter  When thrombosed, they can appear black in colour until evaluated with a dermatoscope when the red or purple colour is more easily seen  Cherry angioma may develop on any part of the body but most often appear on the scalp, face, lips and trunk  An angioma is due to proliferating endothelial cells; these are the cells that line the inside of a blood vessel  Angiomas can arise in early life or later in life; the most common type of angioma is a cherry angioma  Cherry angiomas are very common in males and females of any age or race  They are more noticeable in white skin than in skin of colour  They markedly increase in number from about the age of 36  There may be a family history of similar lesions  Eruptive cherry angiomas have been rarely reported to be associated with internal malignancy  The cause of angiomas is unknown   Genetic analysis of cherry angiomas has shown that they frequently carry specific somatic missense mutations in the GNAQ and GNA11 (Q209H) genes, which are involved in other vascular and melanocytic proliferations  SEBORRHEIC KERATOSIS; NON-INFLAMED    Physical Exam:  • Anatomic Location Affected:  trunk  • Morphological Description:  Brown waxy variably sized "stuck-on" appearing papules with reassuring dermoscopy  • Pertinent Positives:  • Pertinent Negatives: Additional History of Present Condition:      Assessment and Plan:  Based on a thorough discussion of this condition and the management approach to it (including a comprehensive discussion of the known risks, side effects and potential benefits of treatment), the patient (family) agrees to implement the following specific plan:  • Monitor for changes  • Benign, reassured  •     Seborrheic Keratosis  A seborrheic keratosis is a harmless warty spot that appears during adult life as a common sign of skin aging  Seborrheic keratoses can arise on any area of skin, covered or uncovered, with the usual exception of the palms and soles  They do not arise from mucous membranes  Seborrheic keratoses can have highly variable appearance  Seborrheic keratoses are extremely common  It has been estimated that over 90% of adults over the age of 61 years have one or more of them  They occur in males and females of all races, typically beginning to erupt in the 35s or 45s  They are uncommon under the age of 21 years  The precise cause of seborrhoeic keratoses is not known  Seborrhoeic keratoses are considered degenerative in nature  As time goes by, seborrheic keratoses tend to become more numerous  Some people inherit a tendency to develop a very large number of them; some people may have hundreds of them  There is no easy way to remove multiple lesions on a single occasion  Unless a specific lesion is "inflamed" and is causing pain or stinging/burning or is bleeding, most insurance companies do not authorize treatment      RASH: IRRITANT CONTACT DERMATITIS    Physical Exam:  • (Anatomic Location); (Size and Morphological Description); (Differential Diagnosis):  o Erythematous papules scattered on chest and axillary vaults  • Pertinent Positives:  • Pertinent Negatives: Additional History of Present Condition:  Patient with history of mastectomy 3 years ago  Since that time, gets intermittent rash on the chest and axilla from friction  Has tried topical steroids without relief  Vaseline is most helpful  Fungal testing negative at outside derm office  Assessment and Plan:  Based on a thorough discussion of this condition and the management approach to it (including a comprehensive discussion of the known risks, side effects and potential benefits of treatment), the patient (family) agrees to implement the following specific plan:  • Trial zinc oxide TID and before exercise      NEOPLASM OF UNCERTAIN BEHAVIOR OF SKIN    Physical Exam:  • (Anatomic Location); (Size and Morphological Description); (Differential Diagnosis):  o Right mid paraspinal back: 2 mm irregular brown papule with peripheral pseudopods  • Pertinent Positives:  • Pertinent Negatives: Additional History of Present Condition:  None  Assessment and Plan:  • I have discussed with the patient that a sample of skin via a "skin biopsy” would be potentially helpful to further make a specific diagnosis under the microscope  • Based on a thorough discussion of this condition and the management approach to it (including a comprehensive discussion of the known risks, side effects and potential benefits of treatment), the patient (family) agrees to implement the following specific plan:    o Biopsy of the lesion recommended   Will schedule as outpatient

## 2023-02-21 NOTE — ASSESSMENT & PLAN NOTE
You stated that your sleep has improved since starting trazodone 50 mg nightly    I think it is reasonable to continue this treatment

## 2023-02-21 NOTE — ASSESSMENT & PLAN NOTE
You have a mole on your back that shows mild atypical features  Our dermatologist has recommended that this be biopsied  We will help to set this up for you

## 2023-02-21 NOTE — ASSESSMENT & PLAN NOTE
You have a history of left intraductal breast cancer  He subsequently underwent bilateral mastectomies (right prophylactically)  You had MammaPrint which showed high risk  He subsequently completed chemotherapy and are now on tamoxifen 20 mg daily  I would recommend continued regular follow-up with your oncology team as directed

## 2023-02-21 NOTE — PROGRESS NOTES
Hearing Assessment Summary:    Hearing Screening    250Hz 500Hz 1000Hz 2000Hz 3000Hz 4000Hz 6000Hz 8000Hz   Right ear 25 15 10 15 20 25 25 25   Left ear 20 15 10 10 25 25 25 50   Comments: HEARING EVALUATION    Name:  Brigette Baxter  :  1965  Age:  56 y o  Date of Evaluation: 23     History: [facility] Exam  Reason for visit: Brigette Baxter is being seen today for an evaluation of hearing as part of an [employer] examination  Patient reports no concern over hearing and denies ear pain and dizziness  She reports bilateral tinnitus (right more than left)  EVALUATION:    Otoscopic Evaluation:   Right Ear: Clear and healthy ear canal and tympanic membrane   Left Ear: Clear and healthy ear canal and tympanic membrane    Tympanometry:   Right: Type A - normal middle ear pressure and compliance   Left: Type A - normal middle ear pressure and compliance    Audiogram Results:  Right: Normal peripheral hearing sensitivity with very good speech discrimination ability  Left: Normal peripheral hearing sensitivity except for moderate high frequency loss at 8k Hz only  Excellent speech discrimination ability  NOTE: 25 dB asymmetry noted at 8k Hz  *see attached audiogram      RECOMMENDATIONS:  Annual hearing eval, Return to [facility]  for F/U, consider ENT consult due to asymmetry noted and Copy to Patient/Caregiver    PATIENT EDUCATION:   Discussed results and recommendations with patient  Questions were addressed and the patient was encouraged to contact our department should concerns arise        Judith Bonilla   Clinical Audiologist'        Vision Screening    Right eye Left eye Both eyes   Without correction 20/20 20/25 20/13   With correction

## 2023-02-21 NOTE — PROGRESS NOTES
Fitness Summary and Recommendations:  Malvin Gresham scored at the 5% on her body composition assessment with a bodyfat % of 38 9%  Malvin Gresham scored in the above average range for flexibility with a sit & reach score of 31 cm  Her Muscle Strength/Endurance scores placed her at the 95% (lower body) and 95% (upper body) with a chair stand score of 30 and arm curl score of 32 respectively  Debra’s Cardiovascular Score of 56 8 placed her at the 95%  Overall, Malvin Gresham would be considered to have a above average fitness level with an 71% score  Continued emphasis on regular exercise and sound nutrition practices will enable Malvin Gresham to reach her optimal level of fitness

## 2023-02-28 ENCOUNTER — PATIENT OUTREACH (OUTPATIENT)
Dept: CASE MANAGEMENT | Facility: OTHER | Age: 58
End: 2023-02-28

## 2023-02-28 ENCOUNTER — ONCOLOGY SURVIVORSHIP (OUTPATIENT)
Dept: SURGICAL ONCOLOGY | Facility: CLINIC | Age: 58
End: 2023-02-28

## 2023-02-28 VITALS
DIASTOLIC BLOOD PRESSURE: 78 MMHG | HEIGHT: 64 IN | BODY MASS INDEX: 26.98 KG/M2 | RESPIRATION RATE: 16 BRPM | TEMPERATURE: 98.6 F | HEART RATE: 70 BPM | WEIGHT: 158 LBS | OXYGEN SATURATION: 98 % | SYSTOLIC BLOOD PRESSURE: 114 MMHG

## 2023-02-28 DIAGNOSIS — Z79.810 USE OF TAMOXIFEN (NOLVADEX): ICD-10-CM

## 2023-02-28 DIAGNOSIS — C50.912 MALIGNANT NEOPLASM OF LEFT BREAST IN FEMALE, ESTROGEN RECEPTOR POSITIVE, UNSPECIFIED SITE OF BREAST (HCC): Primary | ICD-10-CM

## 2023-02-28 DIAGNOSIS — Z17.0 MALIGNANT NEOPLASM OF LEFT BREAST IN FEMALE, ESTROGEN RECEPTOR POSITIVE, UNSPECIFIED SITE OF BREAST (HCC): Primary | ICD-10-CM

## 2023-02-28 DIAGNOSIS — Z92.21 HISTORY OF CHEMOTHERAPY: ICD-10-CM

## 2023-02-28 DIAGNOSIS — Z82.49 FAMILY HISTORY OF CARDIAC DISORDER: ICD-10-CM

## 2023-02-28 NOTE — PROGRESS NOTES
Assessment/Plan:    Diagnoses and all orders for this visit:    Malignant neoplasm of left breast in female, estrogen receptor positive, unspecified site of breast [facility])    Patient is a 79-year-old female that was diagnosed with a left-sided breast cancer in 2019  Her pathology revealed invasive ductal carcinoma, ER/WY positive, HER2 negative  She underwent genetic testing which was negative  She underwent a left mastectomy and sentinel node biopsy and a right prophylactic mastectomy  Her tumor was high risk on MammaPrint  She therefore completed adjuvant chemotherapy with AC-T  She is currently maintained on tamoxifen  Overall she feels great but does mention a new lower back discomfort for the past few months  This is intermittent in nature and made worse with certain movements, she feels it's muscular in nature  Discussed obtaining a bone scan but patient would prefer to monitor for the time being and she will contact me with worsening symptoms  Breast cancer survivorship visit performed today and treatment summary and care plan were reviewed with patient  She is interested in meeting with cardio-oncology secondary to her history of chemotherapy with Adriamycin and her extensive family history of CVD in both parents and most recently her brother  Referral placed  Of note, patient recently completed the [employer] program and had a normal ekg and echo, with a coronary calcium score of 0  She does note that her cholesterol and TG are elevated  Reviewed its possible the TG are elevated from SERM therapy  She remains very active and is not interested in the [facility] program  She is up to date on colorectal cancer screening and will call to schedule a routine gyn follow up to ensure cervical cancer screening is up to date  We will see her back in six months for a routine follow up  She was instructed to contact us with any changes or concerns in the interim         -     Ambulatory referral to oncology ; Future  -     Ambulatory referral to Cardiology; Future    Use of tamoxifen (Nolvadex)    History of chemotherapy  -     Ambulatory referral to Cardiology; Future    Family history of cardiac disorder  -     Ambulatory referral to Cardiology; Future        REASON FOR VISIT:   Survivorship      Previous therapy:  Oncology History   Malignant neoplasm of left breast in female, estrogen receptor positive ([facility] [address] )   6/13/2019 Biopsy    Left breast ultrasound-guided biopsy  12 o'clock, 4 cm from nipple  Invasive breast carcinoma of no special type (ductal NST)  Grade 2    2 o'clock, 5 cm from nipple  Invasive breast carcinoma of no special type (ductal NST)  Grade 2  ER 90%  DC 90%  HER2 1+     6/20/2019 Genetic Testing    The following genes were evaluated: JOSH, BRCA1, BRCA2, CDH1, CHEK2, PALB2, PTEN, STK11, TP53  Negative for genetic mutations or variants  [facility]     8/9/2019 Surgery    Left breast mastectomy with sentinel lymph node biopsy  Two foci of invasive mammary carcinoma of no special type (ductal)  Grade 2  2 4 cm  DCIS measures at least 8 2 cm  0/2 Lymph nodes  Margins negative  Anatomic Stage IIA  Prognostic Stage IA    Right breast mastectomy; prophylactic  Dr Thaun Mg     8/26/2019 Genomic Testing    MammaPrint for FLEX  High Risk     10/4/2019 -  Chemotherapy    Adjuvant chemotherapy (Dr Comer Cranker)  [facility] followed by dose dense paclitaxel     2/2020 -  Hormone Therapy    Tamoxifen  8/2022 Transfer of care- Dr Aparna Philippe           Patient ID: Brooke Gutierrez is a 56 y o  female  Patient presents today for a survivorship visit  She notices no changes on her self breast exam  She continues on Tamoxifen  [facility]  She states that she got very good scores but does note that her cholesterol and triglycerides have increased recently   She is inquiring about meeting with our cardio-oncologist secondary to her extensive family history of CVD and her personal history of receiving Adriamycin  She also reports a new back pain for the past 4 months- it is intermittent and worse with certain movements  She notices it daily but it doesn't seem to be getting any better or worse  She reports generalized arthralgias which she attributes to Tamoxifen  Denies persistent headaches, cough, SOB, abdominal pain  Review of Systems   Constitutional: Negative for activity change, appetite change, chills, fatigue, fever and unexpected weight change  Respiratory: Negative for cough and shortness of breath  Cardiovascular: Negative for chest pain  Gastrointestinal: Negative for abdominal pain, constipation, diarrhea, nausea and vomiting  Musculoskeletal: Positive for back pain (chronic lower back pain, new intermittent pain upper lumbar region)  Negative for arthralgias, gait problem and myalgias  Skin: Negative for color change and rash  Neurological: Negative for dizziness and headaches  Hematological: Negative for adenopathy  Psychiatric/Behavioral: Negative for agitation and confusion  All other systems reviewed and are negative  Objective:    Blood pressure 114/78, pulse 70, temperature 98 6 °F (37 °C), temperature source Tympanic, resp  rate 16, height 5' 3 5" (1 613 m), weight 71 7 kg (158 lb), SpO2 98 %  Body mass index is 27 55 kg/m²  Physical Exam  Vitals reviewed  Constitutional:       General: She is not in acute distress  Appearance: Normal appearance  She is well-developed  She is not diaphoretic  HENT:      Head: Normocephalic and atraumatic  Cardiovascular:      Rate and Rhythm: Normal rate and regular rhythm  Heart sounds: Normal heart sounds  Pulmonary:      Effort: Pulmonary effort is normal       Breath sounds: Normal breath sounds  Chest:      Comments: Bilateral mastectomy sites free of masses, skin changes, nodules  Abdominal:      Palpations: Abdomen is soft  There is no mass  Tenderness:  There is no abdominal tenderness  Musculoskeletal:         General: Normal range of motion  Cervical back: Normal range of motion  Lymphadenopathy:      Upper Body:      Right upper body: No supraclavicular or axillary adenopathy  Left upper body: No supraclavicular or axillary adenopathy  Skin:     General: Skin is warm and dry  Findings: No rash  Neurological:      Mental Status: She is alert and oriented to person, place, and time  Psychiatric:         Speech: Speech normal              Discussed symptoms related to disease recurrence, Yes    Evaluated for late effects related to cancer treatment, Yes, describe: discussed effects of surgery, chemo, SERM therapy    Screening current for cervical cancer, Yes, describe: patient will f/u with gyn in near future    Screening current for colon cancer, Yes, describe: last colonoscopy 7/2018, repeat due this July- patient to schedule    Cancer rehabilitation services addressed, Yes, describe: [facility] not indicated    Screening current for osteoporosis, Yes, describe: had dxa in 2020, repeat ordered by patient's pcp    Oncology Treatment Summary reviewed with patient and copy provided, Yes    Referral placed for psychosocial evaluation/screening to oncology social work  Yes    I have spent 60 minutes with Patient  today in which greater than 50% of this time was spent in counseling/coordination of care regarding breast cancer survivorship

## 2023-03-06 ENCOUNTER — OFFICE VISIT (OUTPATIENT)
Dept: PODIATRY | Facility: CLINIC | Age: 58
End: 2023-03-06

## 2023-03-06 VITALS — BODY MASS INDEX: 27.55 KG/M2 | HEIGHT: 64 IN

## 2023-03-06 DIAGNOSIS — M72.2 PLANTAR FASCIITIS, LEFT: ICD-10-CM

## 2023-03-06 DIAGNOSIS — M96.0 NONUNION AFTER ARTHRODESIS: ICD-10-CM

## 2023-03-06 DIAGNOSIS — M79.672 PAIN IN BOTH FEET: Primary | ICD-10-CM

## 2023-03-06 DIAGNOSIS — M79.671 PAIN IN BOTH FEET: Primary | ICD-10-CM

## 2023-03-07 NOTE — PROGRESS NOTES
This patient was seen on 3/6/23  My role is Foot , Ankle, and Wound Specialist    SUBJECTIVE    Chief Complaint:  Bilateral foot pain     Patient ID: Frances Ellsworth is a 56 y o  female  Binh An is here for the first time with a cc of bilateral foot pain  On the Right foot she has a prior history of a lapidus bunionectomy 9 years ago with subsequent difficulties including screw failure and chronic pain  She notes the bunion didn't come back but she has chronic pain at the fusion site  She also gets pain at the tip of the 2nd toe Right due to it's long length  On the Left foot, she describes "all over foot pain" but admits that the pain basically centers around her heel  She admits to walking barefoot at home  The following portions of the patient's history were reviewed and updated as appropriate: allergies, current medications, past family history, past medical history, past social history, past surgical history and problem list     Review of Systems   Constitutional: Negative  Respiratory: Negative  Musculoskeletal: Positive for arthralgias and gait problem  OBJECTIVE      Ht 5' 3 5" (1 613 m)   BMI 27 55 kg/m²     Foot/Ankle Musculoskeletal Exam    General    Neurological: alert  General additional comments: Left foot: I note a large bunion deformity with hallux abductovalgus deformity  I note pain on palpation plantar heel at the fascial origin  Right foot: I note a well-corrected bunion deformity  I note pain on palpation of the first metatarsocuneiform joint  I note a long second toe  Physical Exam  Vitals and nursing note reviewed  Constitutional:       General: She is not in acute distress  Appearance: Normal appearance  She is not ill-appearing, toxic-appearing or diaphoretic  HENT:      Head: Normocephalic and atraumatic  Pulmonary:      Effort: Pulmonary effort is normal       Breath sounds: Normal breath sounds     Musculoskeletal:         General: Tenderness and deformity present  Comments: Left foot: I note a large bunion deformity with hallux abductovalgus deformity  I note pain on palpation plantar heel at the fascial origin  Right foot: I note a well-corrected bunion deformity  I note pain on palpation of the first metatarsocuneiform joint  I note a long second toe  Skin:     General: Skin is warm and dry  Capillary Refill: Capillary refill takes less than 2 seconds  Neurological:      General: No focal deficit present  Mental Status: She is alert and oriented to person, place, and time  Psychiatric:         Mood and Affect: Mood normal              ASSESSMENT     Diagnoses and all orders for this visit:    Pain in both feet  -     Ambulatory Referral to Podiatry  -     X-ray foot left 3+ views; Future  -     X-ray foot right 3+ views; Future    Plantar fasciitis, left    Nonunion after arthrodesis         Problem List Items Addressed This Visit        Musculoskeletal and Integument    Nonunion after arthrodesis    Plantar fasciitis, left       Other    Pain in both feet - Primary    Relevant Orders    X-ray foot left 3+ views    X-ray foot right 3+ views           PLAN  Xrays ordered bilateral feet  I recommended three times a day ice application to the heel and stretching exercises taught  I recommended to abstain from walking without a supportive shoe in the house  I discussed proper shoegear (a cross- type sneaker or workboot that is in good repair)  Will follow-up 3-4 weeks to assess xrays and also heel pain  If the heel pain isn't improved; will consider a corticosteroid injection  With regards to the probable non-union Right foot, we discussed surgical treatment including post-op recovery and she's very tentative about any surgery that would require non-weightbearing after surgery

## 2023-03-09 ENCOUNTER — PATIENT OUTREACH (OUTPATIENT)
Dept: CASE MANAGEMENT | Facility: OTHER | Age: 58
End: 2023-03-09

## 2023-03-13 ENCOUNTER — PATIENT OUTREACH (OUTPATIENT)
Dept: CASE MANAGEMENT | Facility: OTHER | Age: 58
End: 2023-03-13

## 2023-03-13 NOTE — PROGRESS NOTES
OSW received a VM from pt returning OSW's TC  OSW returned the TC and received her VM  Detailed VM was left

## 2023-03-16 ENCOUNTER — OFFICE VISIT (OUTPATIENT)
Dept: INTERNAL MEDICINE CLINIC | Facility: CLINIC | Age: 58
End: 2023-03-16

## 2023-03-16 VITALS
SYSTOLIC BLOOD PRESSURE: 136 MMHG | OXYGEN SATURATION: 99 % | BODY MASS INDEX: 26.63 KG/M2 | WEIGHT: 156 LBS | HEART RATE: 72 BPM | DIASTOLIC BLOOD PRESSURE: 84 MMHG | HEIGHT: 64 IN | TEMPERATURE: 97.6 F

## 2023-03-16 DIAGNOSIS — G89.4 CHRONIC PAIN SYNDROME: ICD-10-CM

## 2023-03-16 DIAGNOSIS — C50.912 MALIGNANT NEOPLASM OF LEFT BREAST IN FEMALE, ESTROGEN RECEPTOR POSITIVE, UNSPECIFIED SITE OF BREAST (HCC): ICD-10-CM

## 2023-03-16 DIAGNOSIS — E53.8 VITAMIN B12 DEFICIENCY: ICD-10-CM

## 2023-03-16 DIAGNOSIS — Z17.0 MALIGNANT NEOPLASM OF LEFT BREAST IN FEMALE, ESTROGEN RECEPTOR POSITIVE, UNSPECIFIED SITE OF BREAST (HCC): ICD-10-CM

## 2023-03-16 DIAGNOSIS — E78.49 OTHER HYPERLIPIDEMIA: ICD-10-CM

## 2023-03-16 DIAGNOSIS — M85.89 OSTEOPENIA OF MULTIPLE SITES: ICD-10-CM

## 2023-03-16 DIAGNOSIS — F41.9 ANXIETY AND DEPRESSION: ICD-10-CM

## 2023-03-16 DIAGNOSIS — F32.A ANXIETY AND DEPRESSION: ICD-10-CM

## 2023-03-16 DIAGNOSIS — K21.9 GASTROESOPHAGEAL REFLUX DISEASE WITHOUT ESOPHAGITIS: Primary | ICD-10-CM

## 2023-03-16 DIAGNOSIS — M51.17 INTERVERTEBRAL DISC DISORDER WITH RADICULOPATHY OF LUMBOSACRAL REGION: ICD-10-CM

## 2023-03-16 DIAGNOSIS — F11.20 CONTINUOUS OPIOID DEPENDENCE (HCC): ICD-10-CM

## 2023-03-16 DIAGNOSIS — G47.09 OTHER INSOMNIA: ICD-10-CM

## 2023-03-16 DIAGNOSIS — G89.4 PAIN SYNDROME, CHRONIC: ICD-10-CM

## 2023-03-16 RX ORDER — TRAMADOL HYDROCHLORIDE 50 MG/1
50 TABLET ORAL EVERY 8 HOURS PRN
Qty: 90 TABLET | Refills: 0 | Status: SHIPPED | OUTPATIENT
Start: 2023-03-16

## 2023-03-16 RX ORDER — OMEPRAZOLE 20 MG/1
20 CAPSULE, DELAYED RELEASE ORAL
Qty: 90 CAPSULE | Refills: 1
Start: 2023-03-16

## 2023-03-16 NOTE — PROGRESS NOTES
Assessment/Plan:    No problem-specific Assessment & Plan notes found for this encounter  {Assess/PlanSmartLinks:10900}      Subjective:      Patient ID: Zacarias Monroe is a 56 y o  female      No more gabape          {Common ambulatory SmartLinks:05002}    Review of Systems      Objective:      /84   Pulse 72   Temp 97 6 °F (36 4 °C)   Ht 5' 3 5" (1 613 m)   Wt 70 8 kg (156 lb)   SpO2 99%   BMI 27 20 kg/m²          Physical Exam

## 2023-03-16 NOTE — PROGRESS NOTES
Assessment/Plan     Problem List Items Addressed This Visit        Digestive    Gastroesophageal reflux disease - Primary    Relevant Medications    omeprazole (PriLOSEC) 20 mg delayed release capsule       Nervous and Auditory    Intervertebral disc disorder with radiculopathy of lumbosacral region    Relevant Medications    traMADol (ULTRAM) 50 mg tablet       Other    Anxiety and depression    Hyperlipidemia    Relevant Orders    Lipid panel    Comprehensive metabolic panel    Malignant neoplasm of left breast in female, estrogen receptor positive (HCC)    Vitamin B12 deficiency    Relevant Orders    Vitamin B12   Other Visit Diagnoses     Continuous opioid dependence ([facility] [address] )        Chronic pain syndrome                 Drug screen  Drug screen collected during today's visit          Subjective     Opioid Management:   Type of visit: Follow-up    Pain related diagnoses: chronic pain syndrome , low back SI joint pain       Opioid agreement:  Active Opioid agreement on file?: No    Opioid agreement signed date: 3/2/2022  Opioid agreement expiration date: 3/2/2023    Naloxone:  Currently prescribed Naloxone (Narcan): No      HPI  Pain Medications             traMADol (ULTRAM) 50 mg tablet Take 1 tablet (50 mg total) by mouth every 8 (eight) hours as needed for moderate pain    escitalopram (LEXAPRO) 10 mg tablet Take 1 tablet (10 mg total) by mouth daily    traZODone (DESYREL) 50 mg tablet Take 1 tablet (50 mg total) by mouth daily at bedtime         Outpatient Morphine Milligram Equivalents Per Day     3/16/23 and after 15 MME/Day    Order Name Dose Route Frequency Maximum MME/Day     traMADol (ULTRAM) 50 mg tablet 50 mg Oral Every 8 hours PRN 15 MME/Day    Total Potential Morphine Milligram Equivalents Per Day 15 MME/Day    Calculation Information        traMADol (ULTRAM) 50 mg tablet    traMADol 50 mg Tabs: single dose of 50 mg * 3 doses per day * morphine equivalence factor of 0 1 = 15 MME/Day PDMP Review       Value Time User    PDMP Reviewed  Yes 3/16/2023  1:29 PM Rickey Loving MD         Review of Systems  Objective     /84   Pulse 72   Temp 97 6 °F (36 4 °C)   Ht 5' 3 5" (1 613 m)   Wt 70 8 kg (156 lb)   SpO2 99%   BMI 27 20 kg/m²     Physical Exam    Rickey Loving MD

## 2023-03-16 NOTE — PROGRESS NOTES
Assessment/Plan     Problem List Items Addressed This Visit        Digestive    Gastroesophageal reflux disease - Primary     Taking PPI daily, decrease dose next refill  Relevant Medications    omeprazole (PriLOSEC) 20 mg delayed release capsule       Nervous and Auditory    Intervertebral disc disorder with radiculopathy of lumbosacral region    Relevant Medications    traMADol (ULTRAM) 50 mg tablet       Musculoskeletal and Integument    Osteopenia of multiple sites     Bone density due  Continue daily walks and supplements  Relevant Orders    DXA bone density spine hip and pelvis       Other    Anxiety and depression     Stable, on Lexapro  Hyperlipidemia     Simvastatin changed to rosuvastatin  Relevant Orders    Lipid panel    Comprehensive metabolic panel    Malignant neoplasm of left breast in female, estrogen receptor positive ([facility] [address] )     On tamoxifen, plan until 2024  Other insomnia     Doing well on trazodone  Pain syndrome, chronic     UDS today  PDMP reviewed  Taking tramadol at least bid  Vitamin B12 deficiency     Continue daily supplement           Relevant Orders    Vitamin B12   Other Visit Diagnoses     Continuous opioid dependence ([facility] [address] )        Relevant Orders    [facility] All Prescribed Meds and Special Instructions    Amphetamines, Methamphetamines    Butalbital    Phenobarbital    Secobarbital    Temazepam    Alprazolam    Clonazepam    Diazepam    Lorazepam    Oxazepam    Gabapentin    Pregabalin    Cocaine    Heroin    Buprenorphine    Levorphanol    Meperidine    Naltrexone    Fentanyl    Methadone    Oxycodone    Oxymorphone    Tapentadol    THC    Tramadol    Codeine, Hydrocodone, Hydropmorphone, Morphine    Bath Salts    Ethyl Glucuronide/Ethyl Sulfate    Kratom    Spice    Methylphenidate    Phentermine    Validity Oxidant    Validity Creatinine    Validity pH    Validity Specific    Chronic pain syndrome             Treatment Plan: No changes  Continue regular exercise at the gym, strength training  Treatment Goals: Maintain daily activities with minimal pain  Opiate risks  There are risks associated with opioid medications, including dependence, addiction and tolerance  The patient understands and agrees to use these medications only as prescribed  Potential side effects of the medications include, but are not limited to, constipation, drowsiness, addiction, impaired judgment and risk of fatal overdose if not taken as prescribed  The patient was warned against driving while taking sedation medications  Sharing medications is a felony  At this point in time, the patient is showing no signs of addiction, abuse, diversion or suicidal ideation  Opioid agreement  Pain management agreement was reviewed with patient and signed/updated during visit      Drug screen  Drug screen collected during today's visit      PDMP review  [place] or [place]  reviewed  No red flags were identified; safe to proceed with prescription      BMI Counseling: Body mass index is 27 2 kg/m²  The BMI is above normal  Nutrition recommendations include encouraging healthy choices of fruits and vegetables  Exercise recommendations include exercising 3-5 times per week and strength training exercises  Rationale for BMI follow-up plan is due to patient being overweight or obese  Subjective     Opioid Management:   Type of visit: Follow-up    Pain related diagnoses: chronic pain syndrome , low back SI joint pain  Interval history: Pain stable, good pain relief with tramadol  Taking tramadol at least bid  Aberrant behavior?: No      Adverse effects from medication?: No      Screening Tools/Assessments:    Brief Pain Inventory (BPI):  1) Throughout our lives, most of us have had pain from time to time (such as minor headaches, sprains, and toothaches)  Have you had pain other than these everyday kinds of pain today?  Yes  2) Where is your pain located? headaches , feet , knees  3) Rate your pain at its worst in the last 24 hours: 6  4) Rate your pain at its least in the last 24 hours: 1  5) Rate your average level of pain: 3  6) Rate your pain right now: 3  7) What treatments or medications are you receiving for your pain? tramadol, tylenol and motrin  8) In the past 24 hours, how much relief have pain treatments or medication provided? 70%  9) During the past 24 hours, pain has interfered with your:     A) General activity: 0     B) Mood: 2     C) Walking ability: 0     D) Normal work (work outside the home & housework): 0     E) Relations with other people: 0     F) Sleep: 5     G) Enjoyment of life: 1    COMM:  Current COMM Score: 14 (positive, at risk patient)    Opioid agreement:  Active Opioid agreement on file?: No    Opioid agreement signed date: 3/2/2022  Opioid agreement expiration date: 3/2/2023    Naloxone:  Currently prescribed Naloxone (Narcan): No    Reason not prescribing Naloxone: not clinically warranted    She feels well, no new complaints  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, the of the week with her family this  She did an executive checkup recently  It was offered by her company and so she had it done  She says her brother was diagnosed with both things and ended up having CABG  Denies any chest pain or shortness of breath  She is able to return to sleep with trazodone with no issues  She denies any recent anxiety or depression symptoms      Pain Medications             traMADol (ULTRAM) 50 mg tablet Take 1 tablet (50 mg total) by mouth every 8 (eight) hours as needed for moderate pain    escitalopram (LEXAPRO) 10 mg tablet Take 1 tablet (10 mg total) by mouth daily    traZODone (DESYREL) 50 mg tablet Take 1 tablet (50 mg total) by mouth daily at bedtime         Outpatient Morphine Milligram Equivalents Per Day     3/16/23 and after 15 MME/Day    Order Name Dose Route Frequency Maximum MME/Day traMADol (ULTRAM) 50 mg tablet 50 mg Oral Every 8 hours PRN 15 MME/Day    Total Potential Morphine Milligram Equivalents Per Day 15 MME/Day    Calculation Information        traMADol (ULTRAM) 50 mg tablet    traMADol 50 mg Tabs: single dose of 50 mg * 3 doses per day * morphine equivalence factor of 0 1 = 15 MME/Day                         PDMP Review       Value Time User    PDMP Reviewed  Yes 3/16/2023  1:29 PM Chaim Del Rio MD         Review of Systems   Constitutional: Negative for activity change, appetite change and fatigue  HENT: Negative for congestion, ear pain and postnasal drip  Eyes: Negative for visual disturbance  Respiratory: Negative for cough and shortness of breath  Cardiovascular: Negative for chest pain and leg swelling  Gastrointestinal: Negative for abdominal pain, constipation and diarrhea  Genitourinary: Negative for dysuria, frequency and urgency  Musculoskeletal: Positive for arthralgias and back pain  Negative for myalgias  Skin: Negative for rash and wound  Neurological: Negative for dizziness, numbness and headaches  Psychiatric/Behavioral: Negative for confusion, dysphoric mood and sleep disturbance  The patient is not nervous/anxious  Objective     /84   Pulse 72   Temp 97 6 °F (36 4 °C)   Ht 5' 3 5" (1 613 m)   Wt 70 8 kg (156 lb)   SpO2 99%   BMI 27 20 kg/m²     Physical Exam  Vitals and nursing note reviewed  Constitutional:       General: She is not in acute distress  Appearance: She is well-developed  HENT:      Head: Normocephalic and atraumatic  Mouth/Throat:      Mouth: Mucous membranes are moist    Eyes:      Conjunctiva/sclera: Conjunctivae normal    Cardiovascular:      Rate and Rhythm: Normal rate and regular rhythm  Heart sounds: No murmur heard  Pulmonary:      Effort: Pulmonary effort is normal  No respiratory distress  Breath sounds: Normal breath sounds  Abdominal:      Palpations: Abdomen is soft  Tenderness: There is no abdominal tenderness  Musculoskeletal:         General: No swelling  Cervical back: Neck supple  Skin:     General: Skin is warm and dry  Neurological:      General: No focal deficit present  Mental Status: She is alert and oriented to person, place, and time  Psychiatric:         Mood and Affect: Mood normal          Behavior: Behavior normal          Jose Luis Ross MD      Labs & imaging results reviewed with patient

## 2023-03-17 ENCOUNTER — PATIENT OUTREACH (OUTPATIENT)
Dept: CASE MANAGEMENT | Facility: OTHER | Age: 58
End: 2023-03-17

## 2023-03-17 NOTE — PROGRESS NOTES
OSW placed third outreach TC to pt this morning  OSW received pts VM  Detailed VM was left requesting a return TC  Contact information was provided

## 2023-03-21 LAB
6MAM UR QL CFM: NEGATIVE NG/ML
7AMINOCLONAZEPAM UR QL CFM: NEGATIVE NG/ML
A-OH ALPRAZ UR QL CFM: NEGATIVE NG/ML
ACCEPTABLE CREAT UR QL: NORMAL MG/DL
ACCEPTIBLE SP GR UR QL: NORMAL
AMPHET UR QL CFM: NEGATIVE NG/ML
BUPRENORPHINE UR QL CFM: NEGATIVE NG/ML
BUTALBITAL UR QL CFM: NEGATIVE NG/ML
BZE UR QL CFM: NEGATIVE NG/ML
CODEINE UR QL CFM: NEGATIVE NG/ML
EDDP UR QL CFM: NEGATIVE NG/ML
ETHYL GLUCURONIDE UR QL CFM: NEGATIVE NG/ML
ETHYL SULFATE UR QL SCN: NEGATIVE NG/ML
EUTYLONE UR QL: NEGATIVE NG/ML
FENTANYL UR QL CFM: NEGATIVE NG/ML
GLIADIN IGG SER IA-ACNC: NEGATIVE NG/ML
HYDROCODONE UR QL CFM: NEGATIVE NG/ML
HYDROMORPHONE UR QL CFM: NEGATIVE NG/ML
LORAZEPAM UR QL CFM: NEGATIVE NG/ML
ME-PHENIDATE UR QL CFM: NEGATIVE NG/ML
MEPERIDINE UR QL CFM: NEGATIVE NG/ML
METHADONE UR QL CFM: NEGATIVE NG/ML
METHAMPHET UR QL CFM: NEGATIVE NG/ML
MORPHINE UR QL CFM: NEGATIVE NG/ML
NALTREXONE UR QL CFM: NEGATIVE NG/ML
NITRITE UR QL: NORMAL UG/ML
NORBUPRENORPHINE UR QL CFM: NEGATIVE NG/ML
NORDIAZEPAM UR QL CFM: NEGATIVE NG/ML
NORFENTANYL UR QL CFM: NEGATIVE NG/ML
NORHYDROCODONE UR QL CFM: NEGATIVE NG/ML
NORMEPERIDINE UR QL CFM: NEGATIVE NG/ML
NOROXYCODONE UR QL CFM: NEGATIVE NG/ML
OXAZEPAM UR QL CFM: NEGATIVE NG/ML
OXYCODONE UR QL CFM: NEGATIVE NG/ML
OXYMORPHONE UR QL CFM: NEGATIVE NG/ML
OXYMORPHONE UR QL CFM: NEGATIVE NG/ML
PARA-FLUOROFENTANYL QUANTIFICATION: NORMAL NG/ML
PHENOBARB UR QL CFM: NEGATIVE NG/ML
RESULT ALL_PRESCRIBED MEDS AND SPECIAL INSTRUCTIONS: NORMAL
SECOBARBITAL UR QL CFM: NEGATIVE NG/ML
SL AMB 4-ANPP QUANTIFICATION: NORMAL NG/ML
SL AMB 5F-ADB-M7 METABOLITE QUANTIFICATION: NEGATIVE NG/ML
SL AMB 7-OH-MITRAGYNINE (KRATOM ALKALOID) QUANTIFICATION: NEGATIVE NG/ML
SL AMB AB-FUBINACA-M3 METABOLITE QUANTIFICATION: NEGATIVE NG/ML
SL AMB ACETYL FENTANYL QUANTIFICATION: NORMAL NG/ML
SL AMB ACETYL NORFENTANYL QUANTIFICATION: NORMAL NG/ML
SL AMB ACRYL FENTANYL QUANTIFICATION: NORMAL NG/ML
SL AMB CARFENTANIL QUANTIFICATION: NORMAL NG/ML
SL AMB CTHC (MARIJUANA METABOLITE) QUANTIFICATION: NEGATIVE NG/ML
SL AMB DEXTRORPHAN (DEXTROMETHORPHAN METABOLITE) QUANT: NEGATIVE NG/ML
SL AMB GABAPENTIN QUANTIFICATION: NEGATIVE
SL AMB JWH018 METABOLITE QUANTIFICATION: NEGATIVE NG/ML
SL AMB JWH073 METABOLITE QUANTIFICATION: NEGATIVE NG/ML
SL AMB MDMB-FUBINACA-M1 METABOLITE QUANTIFICATION: NEGATIVE NG/ML
SL AMB METHYLONE QUANTIFICATION: NEGATIVE NG/ML
SL AMB N-DESMETHYL-TRAMADOL QUANTIFICATION: NORMAL NG/ML
SL AMB PHENTERMINE QUANTIFICATION: NEGATIVE NG/ML
SL AMB PREGABALIN QUANTIFICATION: NEGATIVE
SL AMB RCS4 METABOLITE QUANTIFICATION: NEGATIVE NG/ML
SL AMB RITALINIC ACID QUANTIFICATION: NEGATIVE NG/ML
SMOOTH MUSCLE AB TITR SER IF: NEGATIVE NG/ML
SPECIMEN DRAWN SERPL: NEGATIVE NG/ML
SPECIMEN PH ACCEPTABLE UR: NORMAL
TAPENTADOL UR QL CFM: NEGATIVE NG/ML
TEMAZEPAM UR QL CFM: NEGATIVE NG/ML
TEMAZEPAM UR QL CFM: NEGATIVE NG/ML
TRAMADOL UR QL CFM: NORMAL NG/ML
URATE/CREAT 24H UR: NORMAL NG/ML

## 2023-03-22 ENCOUNTER — PATIENT OUTREACH (OUTPATIENT)
Dept: CASE MANAGEMENT | Facility: OTHER | Age: 58
End: 2023-03-22

## 2023-03-22 NOTE — PROGRESS NOTES
OSW placed three outreach TC's to pt  Unfortunately we were unable to connect, therefore the referral will be closed

## 2023-03-29 ENCOUNTER — TELEPHONE (OUTPATIENT)
Dept: CARDIOLOGY CLINIC | Facility: CLINIC | Age: 58
End: 2023-03-29

## 2023-03-29 NOTE — TELEPHONE ENCOUNTER
Left a vm for this patient to reschedule their 4/7/23 appointment as the provider will be out of the office, anyone may assist in rescheduling

## 2023-03-30 DIAGNOSIS — G47.09 OTHER INSOMNIA: ICD-10-CM

## 2023-03-30 RX ORDER — TRAZODONE HYDROCHLORIDE 50 MG/1
TABLET ORAL
Qty: 90 TABLET | Refills: 1 | Status: SHIPPED | OUTPATIENT
Start: 2023-03-30

## 2023-04-03 ENCOUNTER — HOSPITAL ENCOUNTER (OUTPATIENT)
Dept: RADIOLOGY | Facility: HOSPITAL | Age: 58
Discharge: HOME | End: 2023-04-03
Attending: PODIATRIST

## 2023-04-03 ENCOUNTER — TELEPHONE (OUTPATIENT)
Dept: CARDIOLOGY CLINIC | Facility: CLINIC | Age: 58
End: 2023-04-03

## 2023-04-03 DIAGNOSIS — M79.671 PAIN IN BOTH FEET: ICD-10-CM

## 2023-04-03 DIAGNOSIS — M79.672 PAIN IN BOTH FEET: ICD-10-CM

## 2023-04-03 NOTE — TELEPHONE ENCOUNTER
Called patient to offer a sooner appointment (on 4/4/23 at [facility] [address]) due to a cancellation  Did not leave a message  If appointment is still available, ok to offer

## 2023-04-22 PROBLEM — Z00.00 WELL ADULT EXAM: Status: RESOLVED | Noted: 2020-02-24 | Resolved: 2023-04-22

## 2023-04-26 DIAGNOSIS — M51.17 INTERVERTEBRAL DISC DISORDER WITH RADICULOPATHY OF LUMBOSACRAL REGION: ICD-10-CM

## 2023-04-26 RX ORDER — TRAMADOL HYDROCHLORIDE 50 MG/1
50 TABLET ORAL EVERY 8 HOURS PRN
Qty: 90 TABLET | Refills: 0 | Status: SHIPPED | OUTPATIENT
Start: 2023-04-26

## 2023-04-27 ENCOUNTER — PROCEDURE VISIT (OUTPATIENT)
Dept: DERMATOLOGY | Facility: CLINIC | Age: 58
End: 2023-04-27

## 2023-04-27 VITALS — HEIGHT: 64 IN | BODY MASS INDEX: 26.12 KG/M2 | WEIGHT: 153 LBS

## 2023-04-27 DIAGNOSIS — D48.5 NEOPLASM OF UNCERTAIN BEHAVIOR OF SKIN: Primary | ICD-10-CM

## 2023-04-27 NOTE — PATIENT INSTRUCTIONS
"  INFORMED CONSENT DISCUSSION AND POST-OPERATIVE INSTRUCTIONS FOR PATIENT    I   RATIONALE FOR PROCEDURE  I understand that a skin biopsy allows the Dermatologist to test a lesion or rash under the microscope to obtain a diagnosis  It usually involves numbing the area with numbing medication and removing a small piece of skin; sometimes the area will be closed with sutures  In this specific procedure, sutures are not usually needed  If any sutures are placed, then they are usually need to be removed in 2 weeks or less  I understand that my Dermatologist recommends that a skin \"shave\" biopsy be performed today  A local anesthetic, similar to the kind that a dentist uses when filling a cavity, will be injected with a very small needle into the skin area to be sampled  The injected skin and tissue underneath \"will go to sleep” and become numb so no pain should be felt afterwards  An instrument shaped like a tiny \"razor blade\" (shave biopsy instrument) will be used to cut a small piece of tissue and skin from the area so that a sample of tissue can be taken and examined more closely under the microscope  A slight amount of bleeding will occur, but it will be stopped with direct pressure and a pressure bandage and any other appropriate methods  I understands that a scar will form where the wound was created  Surgical ointment will be applied to help protect the wound  Sutures are not usually needed      II   RISKS AND POTENTIAL COMPLICATIONS   I understand the risks and potential complications of a skin biopsy include but are not limited to the following:  Bleeding  Infection  Pain  Scar/keloid  Skin discoloration  Incomplete Removal  Recurrence  Nerve Damage/Numbness/Loss of Function  Allergic Reaction to Anesthesia  Biopsies are diagnostic procedures and based on findings additional treatment or evaluation may be required  Loss or destruction of specimen resulting in no additional findings    My " "Dermatologist has explained to me the nature of the condition, the nature of the procedure, and the benefits to be reasonably expected compared with alternative approaches  My Dermatologist has discussed the likelihood of major risks or complications of this procedure including the specific risks listed above, such as bleeding, infection, and scarring/keloid  I understand that a scar is expected after this procedure  I understand that my physician cannot predict if the scar will form a \"keloid,\" which extends beyond the borders of the wound that is created  A keloid is a thick, painful, and bumpy scar  A keloid can be difficult to treat, as it does not always respond well to therapy, which includes injecting cortisone directly into the keloid every few weeks  While this usually reduces the pain and size of the scar, it does not eliminate it  I understand that photographs may be taken before and after the procedure  These will be maintained as part of the medical providers confidential records and may not be made available to me  I further authorize the medical provider to use the photographs for teaching purposes or to illustrate scientific papers, books, or lectures if in his/her judgment, medical research, education, or science may benefit from its use  I have had an opportunity to fully inquire about the risks and benefits of this procedure and its alternatives  I have been given ample time and opportunity to ask questions and to seek a second opinion if I wished to do so  I acknowledge that there have specifically been no guarantees as to the cosmetic results from the procedure  I am aware that with any procedure there is always the possibility of an unexpected complication  III  POST-PROCEDURAL CARE (WHAT YOU WILL NEED TO DO \"AFTER THE BIOPSY\" TO OPTIMIZE HEALING)    Keep the area clean and dry  Try NOT to remove the bandage or get it wet for the first 24 hours      Gently clean the area " "and apply surgical ointment (such as Vaseline petrolatum ointment, which is available \"over the counter\" and not a prescription) to the biopsy site for up to 2 weeks straight  This acts to protect the wound from the outside world  Sutures are not usually placed in this procedure  If any sutures were placed, return for suture removal as instructed (generally 1 week for the face, 2 weeks for the body)  Take Acetaminophen (Tylenol) for discomfort, if no contraindications  Ibuprofen or aspirin could make bleeding worse  Call our office immediately for signs of infection: fever, chills, increased redness, warmth, tenderness, discomfort/pain, or pus or foul smell coming from the wound  WHAT TO DO IF THERE IS ANY BLEEDING? If a small amount of bleeding is noticed, place a clean cloth over the area and apply firm pressure for ten minutes  Check the wound after 10 minutes of direct pressure  If bleeding persists, try one more time for an additional 10 minutes of direct pressure on the area  If the bleeding becomes heavier or does not stop after the second attempt, or if you have any other questions about this procedure, then please call your [facility]'s Dermatologist by calling [phone] ([phone])  I hereby acknowledge that I have reviewed and verified the site with my Dermatologist and have requested and authorized my Dermatologist to proceed with the procedure        "

## 2023-04-27 NOTE — PROGRESS NOTES
"[facility] Dermatology Clinic Note     Patient Name: Zbigniew Shell  Encounter Date: 04/27/2023     Have you been cared for by a [facility] Dermatologist in the last 3 years and, if so, which description applies to you? NO  I am considered a \"new\" patient and must complete all patient intake questions  I am FEMALE/of child-bearing potential     REVIEW OF SYSTEMS:  Have you recently had or currently have any of the following? · Recent fever or chills? No  · Any non-healing wound? No  · Are you pregnant or planning to become pregnant? No  · Are you currently or planning to be nursing or breast feeding? No   PAST MEDICAL HISTORY:  Have you personally ever had or currently have any of the following? If \"YES,\" then please provide more detail  · Skin cancer (such as Melanoma, Basal Cell Carcinoma, Squamous Cell Carcinoma? No  · Tuberculosis, HIV/AIDS, Hepatitis B or C: No  · Systemic Immunosuppression such as Diabetes, Biologic or Immunotherapy, Chemotherapy, Organ Transplantation, Bone Marrow Transplantation YES,Chemotherapy for breast cancer  · Radiation Treatment No   FAMILY HISTORY:  Any \"first degree relatives\" (parent, brother, sister, or child) with the following? • Skin Cancer, Pancreatic or Other Cancer? YES, Father had SCC   PATIENT EXPERIENCE:    • Do you want the Dermatologist to perform a COMPLETE skin exam today including a clinical examination under the \"bra and underwear\" areas? NO  • If necessary, do we have your permission to call and leave a detailed message on your Preferred Phone number that includes your specific medical information? Yes      Allergies   Allergen Reactions   • Sulfa Antibiotics Headache     Annotation - [id number]: Migraine;  Annotation - [id number]: cellular issues      Current Outpatient Medications:   •  cholecalciferol (VITAMIN D3) 1,000 units tablet, Take 1,000 Units by mouth daily, Disp: , Rfl:   •  escitalopram (LEXAPRO) 10 mg tablet, Take 1 tablet (10 mg total) by " "mouth daily, Disp: 90 tablet, Rfl: 0  •  omeprazole (PriLOSEC) 20 mg delayed release capsule, Take 1 capsule (20 mg total) by mouth daily before breakfast, Disp: 90 capsule, Rfl: 1  •  rosuvastatin (CRESTOR) 20 MG tablet, Take 1 tablet (20 mg total) by mouth daily, Disp: 90 tablet, Rfl: 1  •  tamoxifen (NOLVADEX) 20 mg tablet, Take 1 tablet (20 mg total) by mouth daily, Disp: 90 tablet, Rfl: 3  •  traMADol (ULTRAM) 50 mg tablet, Take 1 tablet (50 mg total) by mouth every 8 (eight) hours as needed for moderate pain, Disp: 90 tablet, Rfl: 0  •  traZODone (DESYREL) 50 mg tablet, TAKE 1 TABLET BY MOUTH AT BEDTIME, Disp: 90 tablet, Rfl: 1          • Whom besides the patient is providing clinical information about today's encounter?   o NO ADDITIONAL HISTORIAN (patient alone provided history)    Physical Exam and Assessment/Plan by Diagnosis:    NEOPLASM OF UNCERTAIN BEHAVIOR OF SKIN    Physical Exam:  • (Anatomic Location); (Size and Morphological Description); (Differential Diagnosis):  o Right mid paraspinal back: 2 mm irregular brown papule with peripheral pseudopod's; DDx Nevus vs Spitz; rule out atypia   • Pertinent Positives:  • Pertinent Negatives: Additional History of Present Condition:  Discovered upon exam on skin check at [facility]     Assessment and Plan:  • I have discussed with the patient that a sample of skin via a \"skin biopsy” would be potentially helpful to further make a specific diagnosis under the microscope  • Based on a thorough discussion of this condition and the management approach to it (including a comprehensive discussion of the known risks, side effects and potential benefits of treatment), the patient (family) agrees to implement the following specific plan:    o Procedure:  Skin Biopsy    After a thorough discussion of treatment options and risk/benefits/alternatives (including but not limited to local pain, scarring, dyspigmentation, blistering, possible superinfection, and " inability to confirm a diagnosis via histopathology), verbal and written consent were obtained and portion of the rash was biopsied for tissue sample  See below for consent that was obtained from patient and subsequent Procedure Note  PROCEDURE TANGENTIAL (SHAVE) BIOPSY NOTE:    • Performing Physician: Susy Mcmillan  • Anatomic Location; Clinical Description with size (cm); Pre-Op Diagnosis:   Right mid paraspinal back: 2 mm irregular brown papule with peripheral pseudopod's; DDx Nevus vs Spitz; rule out atypia   • Post-op diagnosis: Same     • Local anesthesia: 1% xylocaine with epi      • Topical anesthesia: None    • Hemostasis: Aluminum chloride       After obtaining informed consent  at which time there was a discussion about the purpose of biopsy  and low risks of infection and bleeding  The area was prepped and draped in the usual fashion  Anesthesia was obtained with 1% lidocaine with epinephrine  A shave biopsy to an appropriate sampling depth was obtained by Shave (Dermablade or 15 blade) The resulting wound was covered with surgical ointment and bandaged appropriately  The patient tolerated the procedure well without complications and was without signs of functional compromise  Specimen has been sent for review by Dermatopathology  Standard post-procedure care has been explained and has been included in written form within the patient's copy of Informed Consent  INFORMED CONSENT DISCUSSION AND POST-OPERATIVE INSTRUCTIONS FOR PATIENT    I   RATIONALE FOR PROCEDURE  I understand that a skin biopsy allows the Dermatologist to test a lesion or rash under the microscope to obtain a diagnosis  It usually involves numbing the area with numbing medication and removing a small piece of skin; sometimes the area will be closed with sutures  In this specific procedure, sutures are not usually needed  If any sutures are placed, then they are usually need to be removed in 2 weeks or less      I "understand that my Dermatologist recommends that a skin \"shave\" biopsy be performed today  A local anesthetic, similar to the kind that a dentist uses when filling a cavity, will be injected with a very small needle into the skin area to be sampled  The injected skin and tissue underneath \"will go to sleep” and become numb so no pain should be felt afterwards  An instrument shaped like a tiny \"razor blade\" (shave biopsy instrument) will be used to cut a small piece of tissue and skin from the area so that a sample of tissue can be taken and examined more closely under the microscope  A slight amount of bleeding will occur, but it will be stopped with direct pressure and a pressure bandage and any other appropriate methods  I understands that a scar will form where the wound was created  Surgical ointment will be applied to help protect the wound  Sutures are not usually needed  II   RISKS AND POTENTIAL COMPLICATIONS   I understand the risks and potential complications of a skin biopsy include but are not limited to the following:  • Bleeding  • Infection  • Pain  • Scar/keloid  • Skin discoloration  • Incomplete Removal  • Recurrence  • Nerve Damage/Numbness/Loss of Function  • Allergic Reaction to Anesthesia  • Biopsies are diagnostic procedures and based on findings additional treatment or evaluation may be required  • Loss or destruction of specimen resulting in no additional findings    My Dermatologist has explained to me the nature of the condition, the nature of the procedure, and the benefits to be reasonably expected compared with alternative approaches  My Dermatologist has discussed the likelihood of major risks or complications of this procedure including the specific risks listed above, such as bleeding, infection, and scarring/keloid  I understand that a scar is expected after this procedure    I understand that my physician cannot predict if the scar will form a \"keloid,\" which extends " "beyond the borders of the wound that is created  A keloid is a thick, painful, and bumpy scar  A keloid can be difficult to treat, as it does not always respond well to therapy, which includes injecting cortisone directly into the keloid every few weeks  While this usually reduces the pain and size of the scar, it does not eliminate it  I understand that photographs may be taken before and after the procedure  These will be maintained as part of the medical providers confidential records and may not be made available to me  I further authorize the medical provider to use the photographs for teaching purposes or to illustrate scientific papers, books, or lectures if in his/her judgment, medical research, education, or science may benefit from its use  I have had an opportunity to fully inquire about the risks and benefits of this procedure and its alternatives  I have been given ample time and opportunity to ask questions and to seek a second opinion if I wished to do so  I acknowledge that there have specifically been no guarantees as to the cosmetic results from the procedure  I am aware that with any procedure there is always the possibility of an unexpected complication  III  POST-PROCEDURAL CARE (WHAT YOU WILL NEED TO DO \"AFTER THE BIOPSY\" TO OPTIMIZE HEALING)    • Keep the area clean and dry  Try NOT to remove the bandage or get it wet for the first 24 hours  • Gently clean the area and apply surgical ointment (such as Vaseline petrolatum ointment, which is available \"over the counter\" and not a prescription) to the biopsy site for up to 2 weeks straight  This acts to protect the wound from the outside world  • Sutures are not usually placed in this procedure  If any sutures were placed, return for suture removal as instructed (generally 1 week for the face, 2 weeks for the body)  • Take Acetaminophen (Tylenol) for discomfort, if no contraindications    Ibuprofen or aspirin " could make bleeding worse  • Call our office immediately for signs of infection: fever, chills, increased redness, warmth, tenderness, discomfort/pain, or pus or foul smell coming from the wound  WHAT TO DO IF THERE IS ANY BLEEDING? If a small amount of bleeding is noticed, place a clean cloth over the area and apply firm pressure for ten minutes  Check the wound after 10 minutes of direct pressure  If bleeding persists, try one more time for an additional 10 minutes of direct pressure on the area  If the bleeding becomes heavier or does not stop after the second attempt, or if you have any other questions about this procedure, then please call your [address] [place] [address]'s Dermatologist by calling [phone] (SKIN)  I hereby acknowledge that I have reviewed and verified the site with my Dermatologist and have requested and authorized my Dermatologist to proceed with the procedure      Scribe Attestation    I,:  Margarito Reardon am acting as a scribe while in the presence of the attending physician :       I,:  Lashonda Clay MD personally performed the services described in this documentation    as scribed in my presence :

## 2023-05-02 ENCOUNTER — TELEPHONE (OUTPATIENT)
Dept: CARDIOLOGY CLINIC | Facility: CLINIC | Age: 58
End: 2023-05-02

## 2023-05-08 ENCOUNTER — TELEPHONE (OUTPATIENT)
Dept: DERMATOLOGY | Facility: CLINIC | Age: 58
End: 2023-05-08

## 2023-05-11 DIAGNOSIS — R45.4 IRRITABILITY AND ANGER: ICD-10-CM

## 2023-05-11 RX ORDER — ESCITALOPRAM OXALATE 10 MG/1
TABLET ORAL
Qty: 90 TABLET | Refills: 0 | Status: SHIPPED | OUTPATIENT
Start: 2023-05-11

## 2023-05-30 DIAGNOSIS — M51.17 INTERVERTEBRAL DISC DISORDER WITH RADICULOPATHY OF LUMBOSACRAL REGION: ICD-10-CM

## 2023-05-31 RX ORDER — TRAMADOL HYDROCHLORIDE 50 MG/1
50 TABLET ORAL EVERY 8 HOURS PRN
Qty: 90 TABLET | Refills: 0 | Status: SHIPPED | OUTPATIENT
Start: 2023-05-31

## 2023-06-08 ENCOUNTER — TELEPHONE (OUTPATIENT)
Dept: CARDIOLOGY CLINIC | Facility: CLINIC | Age: 58
End: 2023-06-08

## 2023-06-08 NOTE — TELEPHONE ENCOUNTER
LMOM for this patient  Informed her Dr Mackenzie Calzada now has office hours at the [address] and if she would like to reschedule for this location on 7/5/23 to give a call back

## 2023-06-09 DIAGNOSIS — R05.1 ACUTE COUGH: Primary | ICD-10-CM

## 2023-06-09 RX ORDER — BENZONATATE 100 MG/1
100 CAPSULE ORAL 3 TIMES DAILY PRN
Qty: 30 CAPSULE | Refills: 0 | Status: SHIPPED | OUTPATIENT
Start: 2023-06-09

## 2023-06-28 NOTE — PROGRESS NOTES
Cardio-Oncology Clinic Note    Karli Ng 56 y.o. female   MRN: [id number]  Encounter: [id number]        Assessment / Plan:    # Cardio-Oncology Pertinent History  Breast cancer  Dx 2019. Left sided. S/p surgery  Chemotherapy:  S/p AC-T  Current: Tamoxifen (self discontinued)    # Cardio-Oncology Survivorship Recommendations  Discussed that many cancer survivors are at increased risk of cardiovascular disease due to cancer treatments as well as comorbidities. Discussed importance of optimizing cardiovascular risk factors and post-treatment cardiac surveillance when appropriate. Anthracyclines    Annual history and physical  BP - addressed below  Lipids - addressed below  Diabetes screening q3 years - recent a1c normal  Weight loss discussed, optimize BMI  Exercise - yes - loves cycling  Healthy diet - doing well  Routine dental care  Post-treatment surveillance:  Post anthracycline echo normal. No further screening. # GO and exercise intolerance  Started after COVID (in May 2023)  Check CXR  If sx's not better soon with continued exercise, would check echo    # Palpitations  Offered holter or ziopatch. Wants to hold off. # HTN  On prob list. Not on meds. BP today 136/72. rec start home monitoring. She will let us know if elevated. # HLD  2/2023:  . . On crestor 20mg (changed from simvastatin earlier this year during an executive physical)  With very strong fam hx, recommend getting LDL < 100. Repeat lipids on crestor    # Fam hx CAD  Parents and brother with hx CAD  Patient has recent normal stress echo and calcium score of 0. Recommend lifestyle optimization and risk factor modification    # Calcification in right atrium  Coronary calcium scan noted "Unusual finding of calcification associated with the inferior aspect of the right atrium"  Seen on echo but not well visualized.   Patient reports seen on cardiac MRI at [facility] in [address] (incidental during research MRI for healthy subjects). Presumed calcified prominent rodeirck terminalis or eustachian valve. Today's Plan Summary:  See above assessment/plan for full details of today's plan. Briefly,    CXR  Repeat lipids            Reason For Visit / Chief Complaint:  Referred by Nithya Akhtar NP for a new patient visit for history of potentially cardiotoxic chemotherapy and family history of CAD. HPI:   Razia Boyce is a 56 y.o.  female with history as noted in the problem list and further detailed in the above assessment and plan. Referred by Nithya Akhtar NP for a new patient visit for history of potentially cardiotoxic chemotherapy and family history of CAD. As above, the patient has a history of breast cancer diagnosed in 2019. The patient had chemotherapy that included anthracyclines and is currently on tamoxifen. The patient also has high cholesterol on statin. She had an executive physical earlier this year and her simvastatin was changed to Crestor. Testing at that time included a normal EKG, echo, stress echo, and a calcium score of 0. The patient has a family history of CAD. She was referred to cardio-oncology for cardiovascular optimization and long-term follow-up. Today, reports had COVID in May (after all this testing). And after this, more GO, exercise intolerance. Gets occasional palpitation. No syncope. Makes it feel like she needs to cough. No chest pain or pressure. No SOB at rest.  No orthopnea or PND. No edema. 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 . No children. Non smoker. Social ETOH. Fam hx:   Mother CABG in 62s. Father had MI age 46, ischemic cardiomyopathy, ICD. Brother - CABG age 62. Cardiac Imaging personally reviewed:  EKG 2/21/23  Sinus rhythm at 59 bpm   Holter or event monitor    Echo 2-21-23  EF 65%. GLS -23%.   Mild MR  Mild TR     BRUCE    Cardiac MRI Stress testing Stress echo 2/2023  92% PMHR. 11.7 METs. 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   • Plantar fasciitis, left 03/06/2023   • Atypical nevus of back 02/21/2023   • Pain in both feet 02/21/2023   • Other insomnia 46/01/9302   • Uncomplicated opioid dependence ([address]) 09/07/2022   • Neuropathy 09/07/2022   • Hot flashes 03/02/2022   • Essential hypertension 02/24/2021   • Abnormal fasting glucose 02/24/2021   • Osteopenia of multiple sites 02/24/2021   • Use of tamoxifen (Nolvadex) 05/27/2020   • Vaginal atrophy 02/24/2020   • Leukopenia 02/14/2020   • Leiomyoma of uterus, unspecified 02/14/2020   • S/P bilateral mastectomy 02/14/2020   • Tear of right acetabular labrum 01/05/2020   • 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 10/14/2019   • Malignant neoplasm of left breast in female, estrogen receptor positive ([address]) 06/18/2019   • Intervertebral disc disorder with radiculopathy of lumbosacral region    • Gastroesophageal reflux disease 05/14/2018   • Anxiety and depression 04/30/2018   • Vitamin B12 deficiency 04/30/2018   • Seasonal allergic rhinitis due to pollen 04/30/2018   • Pain syndrome, chronic 03/06/2015   • Sacroiliitis ([address]) 06/24/2013   • Hiatal hernia 07/14/2012   • Hyperlipidemia 07/14/2012       Past Medical History:   Diagnosis Date   • Anxiety    • BRCA gene mutation negative 06/20/2019    [facility]   • Cancer [facility])     breast    • Fibrocystic disease of breast    • GERD (gastroesophageal reflux disease)    • Hiatal hernia    • Hyperlipidemia        Review of Systems   Constitutional: Negative for chills and fever. HENT: Negative for nosebleeds. Respiratory: Positive for shortness of breath. Gastrointestinal: Negative for abdominal distention, abdominal pain and blood in stool. Neurological: Negative for dizziness and syncope. Psychiatric/Behavioral: Negative for confusion. 14-point ROS completed and negative except as stated above and/or in the HPI. Allergies   Allergen Reactions   • Sulfa Antibiotics Headache     Annotation - [id number]: Migraine; Annotation - [id number]: cellular issues       Current Outpatient Medications   Medication Instructions   • benzonatate (TESSALON PERLES) 100 mg, Oral, 3 times daily PRN   • cholecalciferol (VITAMIN D3) 1,000 Units, Oral, Daily   • escitalopram (LEXAPRO) 10 mg tablet TAKE 1 TABLET BY MOUTH EVERY DAY   • omeprazole (PRILOSEC) 20 mg, Oral, Daily before breakfast   • rosuvastatin (CRESTOR) 20 mg, Oral, Daily   • tamoxifen (NOLVADEX) 20 mg, Oral, Daily   • traMADol (ULTRAM) 50 mg, Oral, Every 8 hours PRN   • traZODone (DESYREL) 50 mg tablet TAKE 1 TABLET BY MOUTH AT BEDTIME       Social History     Socioeconomic History   • Marital status: /Civil Union     Spouse name: Not on file   • Number of children: 0   • Years of education: Not on file   • Highest education level: Not on file   Occupational History   • 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   Tobacco Use   • Smoking status: Never   • Smokeless tobacco: Never   Vaping Use   • Vaping Use: Never used   Substance and Sexual Activity   • Alcohol use:  Yes     Alcohol/week: 2.0 standard drinks of alcohol     Types: 2 Glasses of wine per week   • Drug use: Not Currently     Types: Marijuana     Comment: oral usage/capsules   • Sexual activity: Yes     Partners: Male     Birth control/protection: Male Sterilization     Comment: Partner had vasectomy   Other Topics Concern   • Not on file   Social History Narrative    Exercise habits    Working full-time - used to be GI RN     Social Determinants of Health     Financial Resource Strain: Not on file   Food Insecurity: Not on file   Transportation Needs: Not on file   Physical Activity: Not on file   Stress: Not on file   Social Connections: Not on file   Intimate Partner Violence: Not on file   Housing Stability: Not on file       Family History   Problem Relation Age of Onset   • Coronary artery disease Mother         CABG in her 62s   • Other Mother         Dyslipidemia   • Hypertension Mother    • Hyperlipidemia Mother    • Heart attack Father 46        acute myocardial infarction   • Other Father         Dyslipidemia   • Hypertension Father    • Hyperlipidemia Father    • Coronary artery disease Brother    • No Known Problems Maternal Aunt    • No Known Problems Maternal Aunt    • Prostate cancer Paternal Uncle 72   • Thyroid disease Family         disorder       Physical Exam:  Blood pressure 136/72, pulse 63, height 5' 3" (1.6 m), weight 71.1 kg (156 lb 11.2 oz), SpO2 97 %. Body mass index is 27.76 kg/m². Wt Readings from Last 3 Encounters:   07/05/23 71.1 kg (156 lb 11.2 oz)   04/27/23 69.4 kg (153 lb)   04/10/23 72.3 kg (159 lb 6.4 oz)     Physical Exam  Vitals reviewed. Constitutional:       General: She is not in acute distress. Appearance: She is not toxic-appearing. HENT:      Head: Normocephalic and atraumatic. Eyes:      General: No scleral icterus. Conjunctiva/sclera: Conjunctivae normal.   Neck:      Vascular: No carotid bruit. Comments: No JVP elevation  Cardiovascular:      Rate and Rhythm: Normal rate and regular rhythm. Heart sounds: No murmur heard. No friction rub. No gallop. Pulmonary:      Breath sounds: Normal breath sounds. No wheezing, rhonchi or rales. Abdominal:      General: There is no distension. Palpations: Abdomen is soft. Tenderness: There is no abdominal tenderness. There is no guarding. Musculoskeletal:      Right lower leg: No edema. Left lower leg: No edema. Skin:     Coloration: Skin is not jaundiced or pale. Findings: No erythema. Neurological:      Mental Status: She is alert.  Mental status is at baseline. Psychiatric:         Mood and Affect: Mood normal.         Behavior: Behavior normal.          Labs & Results:  Lab Results   Component Value Date    SODIUM 138 02/21/2023    K 4.2 02/21/2023     02/21/2023    CO2 29 02/21/2023    BUN 24 02/21/2023    CREATININE 0.93 02/21/2023    GLUC 101 12/30/2019    CALCIUM 9.5 02/21/2023     No results found for: "NTBNP"     Thank you for the opportunity to participate in the care of this patient.     Karena Pires MD, [facility]  Staff Cardiologist  Director of [address] [place] [address]

## 2023-07-05 ENCOUNTER — OFFICE VISIT (OUTPATIENT)
Age: 58
End: 2023-07-05
Payer: COMMERCIAL

## 2023-07-05 VITALS
SYSTOLIC BLOOD PRESSURE: 136 MMHG | WEIGHT: 156.7 LBS | HEIGHT: 63 IN | BODY MASS INDEX: 27.77 KG/M2 | DIASTOLIC BLOOD PRESSURE: 72 MMHG | OXYGEN SATURATION: 97 % | HEART RATE: 63 BPM

## 2023-07-05 DIAGNOSIS — R06.09 DOE (DYSPNEA ON EXERTION): Primary | ICD-10-CM

## 2023-07-05 DIAGNOSIS — R00.2 PALPITATIONS: ICD-10-CM

## 2023-07-05 DIAGNOSIS — E78.2 MIXED HYPERLIPIDEMIA: ICD-10-CM

## 2023-07-05 DIAGNOSIS — C50.912 MALIGNANT NEOPLASM OF LEFT BREAST IN FEMALE, ESTROGEN RECEPTOR POSITIVE, UNSPECIFIED SITE OF BREAST (HCC): ICD-10-CM

## 2023-07-05 DIAGNOSIS — Z92.21 HISTORY OF CHEMOTHERAPY: ICD-10-CM

## 2023-07-05 DIAGNOSIS — Z17.0 MALIGNANT NEOPLASM OF LEFT BREAST IN FEMALE, ESTROGEN RECEPTOR POSITIVE, UNSPECIFIED SITE OF BREAST (HCC): ICD-10-CM

## 2023-07-05 DIAGNOSIS — E78.49 OTHER HYPERLIPIDEMIA: ICD-10-CM

## 2023-07-05 DIAGNOSIS — I10 PRIMARY HYPERTENSION: ICD-10-CM

## 2023-07-05 DIAGNOSIS — Z82.49 FAMILY HISTORY OF CARDIAC DISORDER: ICD-10-CM

## 2023-07-05 PROCEDURE — 99204 OFFICE O/P NEW MOD 45 MIN: CPT | Performed by: INTERNAL MEDICINE

## 2023-07-05 NOTE — LETTER
July 5, 2023     RUDOLPH Palmer  [address] [place] [address] [address] [facility]    Patient: Renu Hawkins   YOB: 1965   Date of Visit: 7/5/2023       Dear Dr. Alfredo Langford: Thank you for referring Yash Garg to me for evaluation. Below are my notes for this consultation. If you have questions, please do not hesitate to call me. I look forward to following your patient along with you. Sincerely,        Na Aguila MD        CC: MD Na Pérez MD  7/5/2023  2:10 PM  Sign when Signing Visit  Cardio-Oncology Clinic Note    Renu Hawkins 56 y.o. female   MRN: [id number]  Encounter: [id number]        Assessment / Plan:    # Cardio-Oncology Pertinent History  • Breast cancer  o Dx 2019. Left sided.  o S/p surgery  o Chemotherapy:  S/p AC-T  o Current: Tamoxifen (self discontinued)    # Cardio-Oncology Survivorship Recommendations  Discussed that many cancer survivors are at increased risk of cardiovascular disease due to cancer treatments as well as comorbidities. Discussed importance of optimizing cardiovascular risk factors and post-treatment cardiac surveillance when appropriate. Anthracyclines    • Annual history and physical  • BP - addressed below  • Lipids - addressed below  • Diabetes screening q3 years - recent a1c normal  • Weight loss discussed, optimize BMI  • Exercise - yes - loves cycling  • Healthy diet - doing well  • Routine dental care  • Post-treatment surveillance:  Post anthracycline echo normal. No further screening. # GO and exercise intolerance  · Started after COVID (in May 2023)  · Check CXR  · If sx's not better soon with continued exercise, would check echo    # Palpitations  · Offered holter or ziopatch. Wants to hold off. # HTN  · On prob list. Not on meds. · BP today 136/72. · rec start home monitoring. She will let us know if elevated. # HLD  · 2/2023:  . .   · On crestor 20mg (changed from simvastatin earlier this year during an executive physical)  · With very strong fam hx, recommend getting LDL < 100. · Repeat lipids on crestor    # Fam hx CAD  • Parents and brother with hx CAD  • Patient has recent normal stress echo and calcium score of 0.  • Recommend lifestyle optimization and risk factor modification    # Calcification in right atrium  · Coronary calcium scan noted "Unusual finding of calcification associated with the inferior aspect of the right atrium"  · Seen on echo but not well visualized. · Patient reports seen on cardiac MRI at [facility] in [address] [facility] (incidental during research MRI for healthy subjects). · Presumed calcified prominent roderick terminalis or eustachian valve. Today's Plan Summary:  See above assessment/plan for full details of today's plan. Briefly,    CXR  Repeat lipids            Reason For Visit / Chief Complaint:  Referred by Zeus Oconnor NP for a new patient visit for history of potentially cardiotoxic chemotherapy and family history of CAD. HPI:   Molly Marquez is a 56 y.o.  female with history as noted in the problem list and further detailed in the above assessment and plan. Referred by Zeus Oconnor NP for a new patient visit for history of potentially cardiotoxic chemotherapy and family history of CAD. As above, the patient has a history of breast cancer diagnosed in 2019. The patient had chemotherapy that included anthracyclines and is currently on tamoxifen. The patient also has high cholesterol on statin. She had an executive physical earlier this year and her simvastatin was changed to Crestor. Testing at that time included a normal EKG, echo, stress echo, and a calcium score of 0. The patient has a family history of CAD. She was referred to cardio-oncology for cardiovascular optimization and long-term follow-up. Today, reports had COVID in May (after all this testing).   And after this, more GO, exercise intolerance. Gets occasional palpitation. No syncope. Makes it feel like she needs to cough. No chest pain or pressure. No SOB at rest.  No orthopnea or PND. No edema. 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 . No children. Non smoker. Social ETOH. Fam hx:   Mother CABG in 62s. Father had MI age 46, ischemic cardiomyopathy, ICD. Brother - CABG age 62. Cardiac Imaging personally reviewed:  EKG 2/21/23  Sinus rhythm at 59 bpm   Holter or event monitor    Echo 2-21-23  EF 65%. GLS -23%. Mild MR  Mild TR     BRUCE    Cardiac MRI    Stress testing Stress echo 2/2023  92% PMHR. 11.7 METs.   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   • Plantar fasciitis, left 03/06/2023   • Atypical nevus of back 02/21/2023   • Pain in both feet 02/21/2023   • Other insomnia 00/74/5812   • Uncomplicated opioid dependence ([address]) 09/07/2022   • Neuropathy 09/07/2022   • Hot flashes 03/02/2022   • Essential hypertension 02/24/2021   • Abnormal fasting glucose 02/24/2021   • Osteopenia of multiple sites 02/24/2021   • Use of tamoxifen (Nolvadex) 05/27/2020   • Vaginal atrophy 02/24/2020   • Leukopenia 02/14/2020   • Leiomyoma of uterus, unspecified 02/14/2020   • S/P bilateral mastectomy 02/14/2020   • Tear of right acetabular labrum 01/05/2020   • 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 10/14/2019   • Malignant neoplasm of left breast in female, estrogen receptor positive ([address]) 06/18/2019   • Intervertebral disc disorder with radiculopathy of lumbosacral region    • Gastroesophageal reflux disease 05/14/2018   • Anxiety and depression 04/30/2018   • Vitamin B12 deficiency 04/30/2018   • Seasonal allergic rhinitis due to pollen 04/30/2018   • Pain syndrome, chronic 03/06/2015 • Sacroiliitis ([address] ) 06/24/2013   • Hiatal hernia 07/14/2012   • Hyperlipidemia 07/14/2012       Past Medical History:   Diagnosis Date   • Anxiety    • BRCA gene mutation negative 06/20/2019    [facility]   • Cancer [facility])     breast    • Fibrocystic disease of breast    • GERD (gastroesophageal reflux disease)    • Hiatal hernia    • Hyperlipidemia        Review of Systems   Constitutional: Negative for chills and fever. HENT: Negative for nosebleeds. Respiratory: Positive for shortness of breath. Gastrointestinal: Negative for abdominal distention, abdominal pain and blood in stool. Neurological: Negative for dizziness and syncope. Psychiatric/Behavioral: Negative for confusion. 14-point ROS completed and negative except as stated above and/or in the HPI. Allergies   Allergen Reactions   • Sulfa Antibiotics Headache     Annotation - [id number]: Migraine; Annotation - [id number]: cellular issues       Current Outpatient Medications   Medication Instructions   • benzonatate (TESSALON PERLES) 100 mg, Oral, 3 times daily PRN   • cholecalciferol (VITAMIN D3) 1,000 Units, Oral, Daily   • escitalopram (LEXAPRO) 10 mg tablet TAKE 1 TABLET BY MOUTH EVERY DAY   • omeprazole (PRILOSEC) 20 mg, Oral, Daily before breakfast   • rosuvastatin (CRESTOR) 20 mg, Oral, Daily   • tamoxifen (NOLVADEX) 20 mg, Oral, Daily   • traMADol (ULTRAM) 50 mg, Oral, Every 8 hours PRN   • traZODone (DESYREL) 50 mg tablet TAKE 1 TABLET BY MOUTH AT BEDTIME       Social History     Socioeconomic History   • Marital status: /Civil Union     Spouse name: Not on file   • Number of children: 0   • Years of education: Not on file   • Highest education level: Not on file   Occupational History   • 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 day with GI   Tobacco Use   • Smoking status: Never   • Smokeless tobacco: Never   Vaping Use   • Vaping Use: Never used   Substance and Sexual Activity   • Alcohol use: Yes     Alcohol/week: 2.0 standard drinks of alcohol     Types: 2 Glasses of wine per week   • Drug use: Not Currently     Types: Marijuana     Comment: oral usage/capsules   • Sexual activity: Yes     Partners: Male     Birth control/protection: Male Sterilization     Comment: Partner had vasectomy   Other Topics Concern   • Not on file   Social History Narrative    Exercise habits    Working full-time - used to be GI RN     Social Determinants of Health     Financial Resource Strain: Not on file   Food Insecurity: Not on file   Transportation Needs: Not on file   Physical Activity: Not on file   Stress: Not on file   Social Connections: Not on file   Intimate Partner Violence: Not on file   Housing Stability: Not on file       Family History   Problem Relation Age of Onset   • Coronary artery disease Mother         CABG in her 62s   • Other Mother         Dyslipidemia   • Hypertension Mother    • Hyperlipidemia Mother    • Heart attack Father 46        acute myocardial infarction   • Other Father         Dyslipidemia   • Hypertension Father    • Hyperlipidemia Father    • Coronary artery disease Brother    • No Known Problems Maternal Aunt    • No Known Problems Maternal Aunt    • Prostate cancer Paternal Uncle 72   • Thyroid disease Family         disorder       Physical Exam:  Blood pressure 136/72, pulse 63, height 5' 3" (1.6 m), weight 71.1 kg (156 lb 11.2 oz), SpO2 97 %. Body mass index is 27.76 kg/m². Wt Readings from Last 3 Encounters:   07/05/23 71.1 kg (156 lb 11.2 oz)   04/27/23 69.4 kg (153 lb)   04/10/23 72.3 kg (159 lb 6.4 oz)     Physical Exam  Vitals reviewed. Constitutional:       General: She is not in acute distress. Appearance: She is not toxic-appearing. HENT:      Head: Normocephalic and atraumatic. Eyes:      General: No scleral icterus. Conjunctiva/sclera: Conjunctivae normal.   Neck:      Vascular: No carotid bruit.       Comments: No JVP elevation  Cardiovascular:      Rate and Rhythm: Normal rate and regular rhythm. Heart sounds: No murmur heard. No friction rub. No gallop. Pulmonary:      Breath sounds: Normal breath sounds. No wheezing, rhonchi or rales. Abdominal:      General: There is no distension. Palpations: Abdomen is soft. Tenderness: There is no abdominal tenderness. There is no guarding. Musculoskeletal:      Right lower leg: No edema. Left lower leg: No edema. Skin:     Coloration: Skin is not jaundiced or pale. Findings: No erythema. Neurological:      Mental Status: She is alert. Mental status is at baseline. Psychiatric:         Mood and Affect: Mood normal.         Behavior: Behavior normal.          Labs & Results:  Lab Results   Component Value Date    SODIUM 138 02/21/2023    K 4.2 02/21/2023     02/21/2023    CO2 29 02/21/2023    BUN 24 02/21/2023    CREATININE 0.93 02/21/2023    GLUC 101 12/30/2019    CALCIUM 9.5 02/21/2023     No results found for: "NTBNP"     Thank you for the opportunity to participate in the care of this patient.     Stuart Barreto MD, [facility]  Staff Cardiologist  Director of [address] [place] [address]

## 2023-07-06 ENCOUNTER — LAB (OUTPATIENT)
Dept: LAB | Age: 58
End: 2023-07-06
Payer: COMMERCIAL

## 2023-07-06 ENCOUNTER — APPOINTMENT (OUTPATIENT)
Dept: RADIOLOGY | Age: 58
End: 2023-07-06
Payer: COMMERCIAL

## 2023-07-06 DIAGNOSIS — R06.09 DOE (DYSPNEA ON EXERTION): ICD-10-CM

## 2023-07-06 DIAGNOSIS — E78.2 MIXED HYPERLIPIDEMIA: ICD-10-CM

## 2023-07-06 LAB
CHOLEST SERPL-MCNC: 207 MG/DL
HDLC SERPL-MCNC: 100 MG/DL
LDLC SERPL CALC-MCNC: 73 MG/DL (ref 0–100)
NONHDLC SERPL-MCNC: 107 MG/DL
TRIGL SERPL-MCNC: 168 MG/DL

## 2023-07-06 PROCEDURE — 36415 COLL VENOUS BLD VENIPUNCTURE: CPT

## 2023-07-06 PROCEDURE — 80061 LIPID PANEL: CPT

## 2023-07-06 PROCEDURE — 71046 X-RAY EXAM CHEST 2 VIEWS: CPT

## 2023-07-10 ENCOUNTER — OFFICE VISIT (OUTPATIENT)
Dept: PODIATRY | Facility: CLINIC | Age: 58
End: 2023-07-10
Payer: COMMERCIAL

## 2023-07-10 ENCOUNTER — OFFICE VISIT (OUTPATIENT)
Dept: INTERNAL MEDICINE CLINIC | Facility: CLINIC | Age: 58
End: 2023-07-10
Payer: COMMERCIAL

## 2023-07-10 VITALS
TEMPERATURE: 98.3 F | WEIGHT: 153 LBS | HEART RATE: 73 BPM | HEIGHT: 64 IN | OXYGEN SATURATION: 94 % | DIASTOLIC BLOOD PRESSURE: 80 MMHG | BODY MASS INDEX: 26.12 KG/M2 | SYSTOLIC BLOOD PRESSURE: 114 MMHG

## 2023-07-10 VITALS
SYSTOLIC BLOOD PRESSURE: 123 MMHG | BODY MASS INDEX: 26.73 KG/M2 | HEIGHT: 64 IN | DIASTOLIC BLOOD PRESSURE: 75 MMHG | HEART RATE: 72 BPM | WEIGHT: 156.6 LBS

## 2023-07-10 DIAGNOSIS — R05.3 POST-COVID CHRONIC COUGH: ICD-10-CM

## 2023-07-10 DIAGNOSIS — M72.2 PLANTAR FASCIITIS, LEFT: Primary | ICD-10-CM

## 2023-07-10 DIAGNOSIS — R05.1 ACUTE COUGH: ICD-10-CM

## 2023-07-10 DIAGNOSIS — G89.4 PAIN SYNDROME, CHRONIC: Primary | ICD-10-CM

## 2023-07-10 DIAGNOSIS — U09.9 POST-COVID CHRONIC COUGH: ICD-10-CM

## 2023-07-10 DIAGNOSIS — F11.20 UNCOMPLICATED OPIOID DEPENDENCE (HCC): ICD-10-CM

## 2023-07-10 PROCEDURE — 20550 NJX 1 TENDON SHEATH/LIGAMENT: CPT | Performed by: PODIATRIST

## 2023-07-10 PROCEDURE — 99214 OFFICE O/P EST MOD 30 MIN: CPT | Performed by: NURSE PRACTITIONER

## 2023-07-10 PROCEDURE — 99213 OFFICE O/P EST LOW 20 MIN: CPT | Performed by: PODIATRIST

## 2023-07-10 RX ORDER — TRIAMCINOLONE ACETONIDE 40 MG/ML
40 INJECTION, SUSPENSION INTRA-ARTICULAR; INTRAMUSCULAR ONCE
Status: COMPLETED | OUTPATIENT
Start: 2023-07-10 | End: 2023-07-10

## 2023-07-10 RX ORDER — LIDOCAINE HYDROCHLORIDE 10 MG/ML
1 INJECTION, SOLUTION INFILTRATION; PERINEURAL ONCE
Status: COMPLETED | OUTPATIENT
Start: 2023-07-10 | End: 2023-07-10

## 2023-07-10 RX ORDER — BENZONATATE 100 MG/1
100 CAPSULE ORAL 3 TIMES DAILY PRN
Qty: 30 CAPSULE | Refills: 0 | Status: SHIPPED | OUTPATIENT
Start: 2023-07-10

## 2023-07-10 RX ADMIN — TRIAMCINOLONE ACETONIDE 40 MG: 40 INJECTION, SUSPENSION INTRA-ARTICULAR; INTRAMUSCULAR at 08:39

## 2023-07-10 RX ADMIN — LIDOCAINE HYDROCHLORIDE 1 ML: 10 INJECTION, SOLUTION INFILTRATION; PERINEURAL at 08:39

## 2023-07-10 NOTE — PROGRESS NOTES
This patient was seen on 7/10/23. My role is Foot , Ankle, and Wound Specialist    SUBJECTIVE    Chief Complaint:  Heel pain     Patient ID: Carrie Luther is a 56 y.o. female. Yaneth Lara is here for the cc of  pain basically centers around her heel. She presents wearing a dress flat. Her typical shoe at home are clogs. She does stretch the arch and avoids barefoot walking. The following portions of the patient's history were reviewed and updated as appropriate: allergies, current medications, past family history, past medical history, past social history, past surgical history and problem list.    Review of Systems   Constitutional: Negative. Respiratory: Negative. Musculoskeletal: Positive for arthralgias and gait problem. OBJECTIVE      /75   Pulse 72   Ht 5' 4" (1.626 m) Comment: verbal  Wt 71 kg (156 lb 9.6 oz)   BMI 26.88 kg/m²     Foot/Ankle Musculoskeletal Exam    General    Neurological: alert  General additional comments: Left foot: I note a large bunion deformity with hallux abductovalgus deformity. I note pain on palpation plantar heel at the fascial origin. Right foot: I note a well-corrected bunion deformity. I note pain on palpation of the first metatarsocuneiform joint. I note a long second toe. Physical Exam  Vitals and nursing note reviewed. Constitutional:       General: She is not in acute distress. Appearance: Normal appearance. She is not ill-appearing, toxic-appearing or diaphoretic. HENT:      Head: Normocephalic and atraumatic. Pulmonary:      Effort: Pulmonary effort is normal.      Breath sounds: Normal breath sounds. Musculoskeletal:         General: Tenderness and deformity present. Comments: Left foot: I note a large bunion deformity with hallux abductovalgus deformity. I note pain on palpation plantar heel at the fascial origin. Right foot: I note a well-corrected bunion deformity.  I note pain on palpation of the first metatarsocuneiform joint. I note a long second toe. Skin:     General: Skin is warm and dry. Capillary Refill: Capillary refill takes less than 2 seconds. Neurological:      General: No focal deficit present. Mental Status: She is alert and oriented to person, place, and time. Psychiatric:         Mood and Affect: Mood normal.             ASSESSMENT     Diagnoses and all orders for this visit:    Plantar fasciitis, left         Problem List Items Addressed This Visit        Musculoskeletal and Integument    Plantar fasciitis, left - Primary           PLAN    I reviewed proper shoewear and recommended a sneaker that encompasses the heel and ties. I recommend she continue her stretching exercises and avoid barefoot ambulation. I offered an injection and she consented. I recommended an injection of the heel and the patient gave consent. A formal timeout including patient identification, laterality and existing allergies using [facility] protocol was conducted. I injected (after sterile prep of the skin) a 1:1 mixture of 1% Lidocaine and Kenelog 40. The injection was given at the plantar origin of the medial fascial band at the periosteal level. The patient tolerated it well.

## 2023-07-10 NOTE — PROGRESS NOTES
Assessment/Plan     Problem List Items Addressed This Visit        Other    Pain syndrome, chronic - Primary     Pain has been better recently. Taking tramadol about once daily. Continue stretching exercises. Uncomplicated opioid dependence ([address])     See above. Pain contract up to date. Post-COVID chronic cough     Chest xray normal.   Continue claritin, mucinex, tessalon perles, and flonase. If symptoms persists over the next 3-4 weeks, discussed possible PFT's or CT chest. If throat clearing persists, discussed possible ENT evaluation. Other Visit Diagnoses     Acute cough        Relevant Medications    benzonatate (TESSALON PERLES) 100 mg capsule         Treatment Plan: Continue current plan. Recommend exercising/stretching 10-20 minutes daily     Treatment Goals: Continue daily activities with minimal pain. Opiate risks  There are risks associated with opioid medications, including dependence, addiction and tolerance. The patient understands and agrees to use these medications only as prescribed. Potential side effects of the medications include, but are not limited to, constipation, drowsiness, addiction, impaired judgment and risk of fatal overdose if not taken as prescribed. The patient was warned against driving while taking sedation medications. Sharing medications is a felony. At this point in time, the patient is showing no signs of addiction, abuse, diversion or suicidal ideation. instructions for management, patient and family education and importance of treatment compliance. Subjective     Opioid Management:   Type of visit: Follow-up    Pain related diagnoses: chronic pain syndrome , low back SI joint pain. Interval history: Has plantar fascitis. Had a recent injection. SI joint pain has been better- only taking 1 tramadol once a day currently.      Aberrant behavior?: No      Adverse effects from medication?: No      Screening Tools/Assessments:    PHQ-2/9:  PHQ-9 score: 3    Brief Pain Inventory (BPI):  1) Throughout our lives, most of us have had pain from time to time (such as minor headaches, sprains, and toothaches). Have you had pain other than these everyday kinds of pain today? Yes  2) Where is your pain located? low back , left foot  3) Rate your pain at its worst in the last 24 hours: 5  4) Rate your pain at its least in the last 24 hours: 1  5) Rate your average level of pain: 3  6) Rate your pain right now: 3  7) What treatments or medications are you receiving for your pain? tramadol  8) In the past 24 hours, how much relief have pain treatments or medication provided? 50%  9) During the past 24 hours, pain has interfered with your:     A) General activity: 0     B) Mood: 3     C) Walking ability: 0     D) Normal work (work outside the home & housework): 0     E) Relations with other people: 0     F) Sleep: 3     G) Enjoyment of life: 3    COMM:  Current COMM Score: 5 (negative, low risk patient)    Drug Screen:  Date of last drug screen: 3/21/2023    Opioid agreement:  Active Opioid agreement on file?: Yes    Opioid agreement signed date: 3/17/2023  Opioid agreement expiration date: 3/16/2024    Naloxone:  Currently prescribed Naloxone (Narcan): No      She had covid about 2 months ago. She continues to have mucous in her throat and constant throat clearing. She is still coughing. She takes tessalon perles with relief. She feels more fatigue with exertion. She recently saw a cardiologist. She had a chest xray which was normal.     Tried claritin, zyrtec, tessalon perles, and mucinex helped but not resolving.        Pain Medications             escitalopram (LEXAPRO) 10 mg tablet TAKE 1 TABLET BY MOUTH EVERY DAY    traMADol (ULTRAM) 50 mg tablet Take 1 tablet (50 mg total) by mouth every 8 (eight) hours as needed for moderate pain    traZODone (DESYREL) 50 mg tablet TAKE 1 TABLET BY MOUTH AT BEDTIME         Outpatient Morphine Milligram Equivalents Per Day     7/10/23 and after 15 MME/Day    Order Name Dose Route Frequency Maximum MME/Day     traMADol (ULTRAM) 50 mg tablet 50 mg Oral Every 8 hours PRN 15 MME/Day    Total Potential Morphine Milligram Equivalents Per Day 15 MME/Day    Calculation Information        traMADol (ULTRAM) 50 mg tablet    traMADol 50 mg Tabs: single dose of 50 mg * 3 doses per day * morphine equivalence factor of 0.1 = 15 MME/Day                         PDMP Review       Value Time User    PDMP Reviewed  Yes 5/31/2023  5:20 PM Elda Rinne, MD         Review of Systems   Constitutional: Positive for fatigue. Negative for activity change, appetite change and fever. HENT: Positive for postnasal drip. Negative for sore throat. Respiratory: Positive for cough. Negative for chest tightness, shortness of breath and wheezing. Cardiovascular: Negative for chest pain. Gastrointestinal: Negative for abdominal pain. Musculoskeletal: Positive for arthralgias and back pain. Neurological: Negative for dizziness, light-headedness and headaches. Objective     /80   Pulse 73   Temp 98.3 °F (36.8 °C)   Ht 5' 4" (1.626 m)   Wt 69.4 kg (153 lb)   SpO2 94%   BMI 26.26 kg/m²     Physical Exam  Vitals reviewed. Constitutional:       Appearance: Normal appearance. HENT:      Head: Normocephalic and atraumatic. Right Ear: Tympanic membrane, ear canal and external ear normal.      Left Ear: Tympanic membrane, ear canal and external ear normal.      Mouth/Throat:      Pharynx: Posterior oropharyngeal erythema present. Eyes:      Conjunctiva/sclera: Conjunctivae normal.   Cardiovascular:      Rate and Rhythm: Normal rate and regular rhythm. Heart sounds: Normal heart sounds. Pulmonary:      Effort: Pulmonary effort is normal.      Breath sounds: Normal breath sounds. Skin:     General: Skin is warm and dry.    Neurological:      Mental Status: She is alert and oriented to person, place, and time.    Psychiatric:         Mood and Affect: Mood normal.         Behavior: Behavior normal.         Severo Spence, CRNP

## 2023-07-10 NOTE — ASSESSMENT & PLAN NOTE
Chest xray normal.   Continue claritin, mucinex, tessalon perles, and flonase. If symptoms persists over the next 3-4 weeks, discussed possible PFT's or CT chest. If throat clearing persists, discussed possible ENT evaluation.

## 2023-07-10 NOTE — PATIENT INSTRUCTIONS
Goals of care:  Maximize your health and quality of life by:   · Increasing your level of function and activity  · Decreasing the negative effects of pain on your life  · Minimizing the risks and side effects of medications and ensuring safe use of opioid medication     Ways for you to help meet your goals:  Maintain a healthy lifestyle. This includes proper nutrition, regular physical activity as able, try for 8 hours of sleep per night, use stress reduction strategies, avoid triggers. Risks and side effects of opioid use:  Prescription opioids carry serious risks of addiction and  overdose, especially with prolonged use. An opioid overdose,  often marked by slowed breathing, can cause sudden death. The  use of prescription opioids can have a number of side effects as  well, even when taken as directed:  · Tolerance--meaning you might need to take more of a medication for the same pain relief  · Physical dependence--meaning you have symptoms of withdrawal when a medication is stopped  · Increased sensitivity to pain  · Constipation  · Nausea, vomiting, dry mouth  · Sleepiness and dizziness   · Confusion  · Depression  · Low levels of testosterone that can result in lower sex drive, energy, and strength  · Itching and sweating    If you are prescribed opioids for pain:  · Never take opioids in greater amounts or more often than prescribed. · Help prevent misuse and abuse. - Never sell or share prescription opioids.        - Never use another person’s prescription opioids. · ‡Store prescription opioids in a secure place and out of reach of others (this may include visitors, children, friends, and family). · Safely dispose of unused prescription opioids: Find your community drug take-back program or your pharmacy mail-back program, or flush them down the toilet, following guidance from the Food and Drug Administration (www.fda.gov/Drugs/ResourcesForYou).   · ‡Visit www.cdc.gov/drugoverdose to learn about the risks of opioid abuse and overdose. · If you believe you may be struggling with addiction, tell your health care provider and ask for guidance or call [facility]’s National Helpline at 8-162-286-YBSU.

## 2023-07-28 DIAGNOSIS — M51.17 INTERVERTEBRAL DISC DISORDER WITH RADICULOPATHY OF LUMBOSACRAL REGION: ICD-10-CM

## 2023-07-28 RX ORDER — TRAMADOL HYDROCHLORIDE 50 MG/1
50 TABLET ORAL EVERY 8 HOURS PRN
Qty: 90 TABLET | Refills: 0 | Status: SHIPPED | OUTPATIENT
Start: 2023-07-28

## 2023-08-06 DIAGNOSIS — R45.4 IRRITABILITY AND ANGER: ICD-10-CM

## 2023-08-06 RX ORDER — ESCITALOPRAM OXALATE 10 MG/1
TABLET ORAL
Qty: 90 TABLET | Refills: 0 | Status: SHIPPED | OUTPATIENT
Start: 2023-08-06

## 2023-08-17 DIAGNOSIS — E78.49 OTHER HYPERLIPIDEMIA: ICD-10-CM

## 2023-08-17 RX ORDER — ROSUVASTATIN CALCIUM 20 MG/1
20 TABLET, COATED ORAL DAILY
Qty: 90 TABLET | Refills: 0 | Status: SHIPPED | OUTPATIENT
Start: 2023-08-17

## 2023-08-22 ENCOUNTER — TELEPHONE (OUTPATIENT)
Dept: HEMATOLOGY ONCOLOGY | Facility: CLINIC | Age: 58
End: 2023-08-22

## 2023-08-22 NOTE — TELEPHONE ENCOUNTER
Appointment Change  Cancel, Reschedule, Change to Virtual      Who are you speaking with? Patient   If it is not the patient, are they listed on an active communication consent form? N/A   Which provider is the appointment scheduled with? Dr. Chou Area   When is the appointment scheduled? Please list date and time   08/23/2023 @3PM    At which location is the appointment scheduled to take place? [facility]   Was the appointment rescheduled or changed from an in person visit to a virtual visit? If so, please list the details of the change. Yes, 10/04/2023 @1:40PM    What is the reason for the appointment change? Yearly follow up    Was STAR transport scheduled for this visit? No   Does STAR transport need to be scheduled for the new visit (if applicable) No   Does the patient need an infusion appointment rescheduled? No   Does the patient have an infusion appointment scheduled? If so, when? No   Is the patient undergoing chemotherapy? No   Was the no-show policy reviewed for appointments being changed with less then 24 hours of notice?  No

## 2023-08-30 ENCOUNTER — OFFICE VISIT (OUTPATIENT)
Dept: SURGICAL ONCOLOGY | Facility: CLINIC | Age: 58
End: 2023-08-30
Payer: COMMERCIAL

## 2023-08-30 VITALS
OXYGEN SATURATION: 96 % | BODY MASS INDEX: 25.95 KG/M2 | SYSTOLIC BLOOD PRESSURE: 126 MMHG | RESPIRATION RATE: 16 BRPM | WEIGHT: 152 LBS | HEART RATE: 64 BPM | HEIGHT: 64 IN | TEMPERATURE: 97.9 F | DIASTOLIC BLOOD PRESSURE: 84 MMHG

## 2023-08-30 DIAGNOSIS — Z90.13 S/P BILATERAL MASTECTOMY: ICD-10-CM

## 2023-08-30 DIAGNOSIS — C50.812 MALIGNANT NEOPLASM OF OVERLAPPING SITES OF LEFT BREAST IN FEMALE, ESTROGEN RECEPTOR POSITIVE (HCC): Primary | ICD-10-CM

## 2023-08-30 DIAGNOSIS — Z17.0 MALIGNANT NEOPLASM OF OVERLAPPING SITES OF LEFT BREAST IN FEMALE, ESTROGEN RECEPTOR POSITIVE (HCC): Primary | ICD-10-CM

## 2023-08-30 PROCEDURE — 99213 OFFICE O/P EST LOW 20 MIN: CPT

## 2023-08-30 NOTE — PROGRESS NOTES
Surgical Oncology Follow Up       [address] [facility] [place]  [address] [facility] [address]  [place] [address]    Rosieor Ancelmo  1965  [id number]  1969 [address] [facility]GICAL [facility]COL[facility] [place]  [address] [place] [address]    No chief complaint on file. Assessment/Plan:  1. Malignant neoplasm of overlapping sites of left breast in female, estrogen receptor positive ([address])  - 6 mo follow up    2. S/P bilateral mastectomy      Discussion/Summary: Patient is a 61-year-old female presenting today for six-month follow-up for left breast cancer diagnosed in 2019. Pathology revealed invasive carcinoma ER/OH positive, HER2 negative. She had genetic testing which was negative. She had a left breast mastectomy with sentinel node biopsy and right prophylactic mastectomy. She completed adjuvant chemotherapy. She took tamoxifen for roughly 3 years before discontinuing a few months ago secondary to fatigue and weight gain. There are no concerns on her clinical breast exam. I will see the patient back in 6 months or sooner should the need arise. She was instructed to call with any questions or concerns prior to this time. All questions were answered today.         History of Present Illness:     Oncology History   Malignant neoplasm of left breast in female, estrogen receptor positive ([address])   6/13/2019 Biopsy    Left breast ultrasound-guided biopsy  12 o'clock, 4 cm from nipple  Invasive breast carcinoma of no special type (ductal NST)  Grade 2    2 o'clock, 5 cm from nipple  Invasive breast carcinoma of no special type (ductal NST)  Grade 2  ER 90%  OH 90%  HER2 1+     6/20/2019 Genetic Testing    The following genes were evaluated: JOSH, BRCA1, BRCA2, CDH1, CHEK2, PALB2, PTEN, STK11, TP53  Negative for genetic mutations or variants  [facility]     8/9/2019 Surgery    Left breast mastectomy with sentinel lymph node biopsy  Two foci of invasive mammary carcinoma of no special type (ductal)  Grade 2  2.4 cm  DCIS measures at least 8.2 cm  0/2 Lymph nodes  Margins negative  Anatomic Stage IIA  Prognostic Stage IA    Right breast mastectomy; prophylactic  Dr. Kayley Ardon     8/26/2019 Genomic Testing    MammaPrint for FLEX  High Risk     10/4/2019 -  Chemotherapy    Adjuvant chemotherapy (Dr Shani Guadalupe)  [facility] followed by dose dense paclitaxel     2/2020 -  Hormone Therapy    Tamoxifen  8/2022 Transfer of care- Dr. Primo Coronel          -Interval History: Patient is a 56-year-old female presenting today for six-month follow-up for left breast cancer diagnosed in 2019. She took tamoxifen for roughly 3 years before discontinuing a few months ago secondary to fatigue and weight gain. She is feeling a lot better off the medication. Patient denies changes on her breast exam. She denies persistent headaches, bone pain, back pain, shortness of breath, or abdominal pain. Review of Systems:  Review of Systems   Constitutional: Negative for activity change, appetite change, fatigue and unexpected weight change. Respiratory: Negative for cough and shortness of breath. Cardiovascular: Negative for chest pain. Gastrointestinal: Negative for abdominal pain, diarrhea, nausea and vomiting. Endocrine: Positive for heat intolerance. Musculoskeletal: Negative for arthralgias, back pain and myalgias. Skin: Negative for rash. Neurological: Negative for weakness and headaches. Hematological: Negative for adenopathy.        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   • Tear of right acetabular labrum   • Leukopenia   • Leiomyoma of uterus, unspecified   • S/P bilateral mastectomy   • Vaginal atrophy   • Use of tamoxifen (Nolvadex)   • Essential hypertension   • Abnormal fasting glucose   • Osteopenia of multiple sites   • Hot flashes   • Other insomnia   • Uncomplicated opioid dependence (HCC)   • Neuropathy   • Atypical nevus of back   • Pain in both feet   • Nonunion after arthrodesis   • Plantar fasciitis, left   • Palpitations   • Post-COVID chronic cough     Past Medical History:   Diagnosis Date   • Anxiety    • BRCA gene mutation negative 06/20/2019    [facility]   • Cancer [facility])     breast    • Fibrocystic disease of breast    • GERD (gastroesophageal reflux disease)    • Hiatal hernia    • Hyperlipidemia      Past Surgical History:   Procedure Laterality Date   • APPENDECTOMY     • BREAST BIOPSY     • BREAST CYST ASPIRATION Left 2005   • COLONOSCOPY     • FOOT SURGERY Right 2011    right, hardware removal   • IR PORT PLACEMENT  9/27/2019   • IR PORT REMOVAL  2/19/2020   • LYMPH NODE BIOPSY     • MASTECTOMY W/ SENTINEL NODE BIOPSY Left 8/9/2019    Procedure: BREAST MASTECTOMY, SENTINEL LYMPH NODE BIOPSY, LYMPHATIC MAPPING W/ BLUE DYE AND RADIOACTIVE DYE (INJECT AT 0730 BY DR WHITEHEAD IN THE O.R.);  Surgeon: Enrrique Vlales MD;  Location[facility];  Service: Surgical Oncology   • OTHER SURGICAL HISTORY Right 2010    Complete Excision of Fifth Metatarsal Head    • MN COLONOSCOPY FLX DX W/COLLJ SPEC WHEN PFRMD N/A 7/20/2018    Procedure: EGD AND COLONOSCOPY;  Surgeon: Felisha Garcia MD;  Location: [facility] GI LAB;   Service: Gastroenterology   • MN MASTECTOMY SIMPLE COMPLETE Right 8/9/2019    Procedure: BREAST SIMPLE MASTECTOMY;  Surgeon: Enrrique Valles MD;  Location: [facility];  Service: Surgical Oncology   • US GUIDANCE BREAST BIOPSY LEFT EACH ADDITIONAL Left 6/13/2019   • US GUIDED BREAST BIOPSY LEFT COMPLETE Left 6/13/2019     Family History   Problem Relation Age of Onset   • Coronary artery disease Mother         CABG in her 62s   • Other Mother         Dyslipidemia   • Hypertension Mother    • Hyperlipidemia Mother    • Heart attack Father 46        acute myocardial infarction   • Other Father         Dyslipidemia   • Hypertension Father    • Hyperlipidemia Father    • Coronary artery disease Brother    • No Known Problems Maternal Aunt    • No Known Problems Maternal Aunt    • Prostate cancer Paternal Uncle 72   • Thyroid disease Family         disorder     Social History     Socioeconomic History   • Marital status: /Civil Union     Spouse name: Not on file   • Number of children: 0   • Years of education: Not on file   • Highest education level: Not on file   Occupational History   • 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   Tobacco Use   • Smoking status: Never   • Smokeless tobacco: Never   Vaping Use   • Vaping Use: Never used   Substance and Sexual Activity   • Alcohol use:  Yes     Alcohol/week: 2.0 standard drinks of alcohol     Types: 2 Glasses of wine per week   • Drug use: Not Currently     Types: Marijuana     Comment: oral usage/capsules   • Sexual activity: Yes     Partners: Male     Birth control/protection: Male Sterilization     Comment: Partner had vasectomy   Other Topics Concern   • Not on file   Social History Narrative    Exercise habits    Working full-time - used to be GI RN     Social Determinants of Health     Financial Resource Strain: Not on file   Food Insecurity: Not on file   Transportation Needs: Not on file   Physical Activity: Not on file   Stress: Not on file   Social Connections: Not on file   Intimate Partner Violence: Not on file   Housing Stability: Not on file       Current Outpatient Medications:   •  benzonatate (TESSALON PERLES) 100 mg capsule, Take 1 capsule (100 mg total) by mouth 3 (three) times a day as needed for cough, Disp: 30 capsule, Rfl: 0  • cholecalciferol (VITAMIN D3) 1,000 units tablet, Take 1,000 Units by mouth daily, Disp: , Rfl:   •  escitalopram (LEXAPRO) 10 mg tablet, TAKE 1 TABLET BY MOUTH EVERY DAY, Disp: 90 tablet, Rfl: 0  •  rosuvastatin (CRESTOR) 20 MG tablet, TAKE 1 TABLET BY MOUTH EVERY DAY, Disp: 90 tablet, Rfl: 0  •  traMADol (ULTRAM) 50 mg tablet, Take 1 tablet (50 mg total) by mouth every 8 (eight) hours as needed for moderate pain, Disp: 90 tablet, Rfl: 0  •  traZODone (DESYREL) 50 mg tablet, TAKE 1 TABLET BY MOUTH AT BEDTIME, Disp: 90 tablet, Rfl: 1  Allergies   Allergen Reactions   • Sulfa Antibiotics Headache     Annotation - [id number]: Migraine; Annotation - [id number]: cellular issues     Vitals:    08/30/23 1325   BP: 126/84   Pulse: 64   Resp: 16   Temp: 97.9 °F (36.6 °C)   SpO2: 96%       Physical Exam  Constitutional:       General: She is not in acute distress. Appearance: Normal appearance. Cardiovascular:      Rate and Rhythm: Normal rate and regular rhythm. Pulses: Normal pulses. Heart sounds: Normal heart sounds. Pulmonary:      Effort: Pulmonary effort is normal.      Breath sounds: Normal breath sounds. Chest:      Chest wall: No mass. Breasts:     Right: No swelling, bleeding, inverted nipple, mass, nipple discharge, skin change or tenderness. Left: No swelling, bleeding, inverted nipple, mass, nipple discharge, skin change or tenderness. Abdominal:      General: Abdomen is flat. Palpations: Abdomen is soft. Lymphadenopathy:      Upper Body:      Right upper body: No supraclavicular, axillary or pectoral adenopathy. Left upper body: No supraclavicular, axillary or pectoral adenopathy. Skin:     General: Skin is warm. Neurological:      General: No focal deficit present. Mental Status: She is alert and oriented to person, place, and time.    Psychiatric:         Mood and Affect: Mood normal.         Behavior: Behavior normal.           Results:    Imaging  No results found.    I reviewed the above imaging data. Advance Care Planning/Advance Directives:  Discussed disease status, cancer treatment plans and/or cancer treatment goals with the patient.

## 2023-09-14 DIAGNOSIS — M51.17 INTERVERTEBRAL DISC DISORDER WITH RADICULOPATHY OF LUMBOSACRAL REGION: ICD-10-CM

## 2023-09-14 RX ORDER — TRAMADOL HYDROCHLORIDE 50 MG/1
50 TABLET ORAL EVERY 8 HOURS PRN
Qty: 90 TABLET | Refills: 0 | Status: SHIPPED | OUTPATIENT
Start: 2023-09-14

## 2023-09-21 ENCOUNTER — TELEPHONE (OUTPATIENT)
Dept: HEMATOLOGY ONCOLOGY | Facility: CLINIC | Age: 58
End: 2023-09-21

## 2023-09-21 NOTE — TELEPHONE ENCOUNTER
I called [facility] Patient regarding an appointment that they have scheduled with Dr. Haque Covert scheduled on 10/4/23     I left a voicemail explaining to patient that this appointment will need to be rescheduled due to a change in the providers schedule. Patient was advised to call [facility] to reschedule.   Another attempt will be made to reschedule

## 2023-09-22 ENCOUNTER — TELEPHONE (OUTPATIENT)
Dept: HEMATOLOGY ONCOLOGY | Facility: CLINIC | Age: 58
End: 2023-09-22

## 2023-09-22 DIAGNOSIS — G47.09 OTHER INSOMNIA: ICD-10-CM

## 2023-09-22 RX ORDER — TRAZODONE HYDROCHLORIDE 50 MG/1
TABLET ORAL
Qty: 90 TABLET | Refills: 1 | Status: SHIPPED | OUTPATIENT
Start: 2023-09-22

## 2023-09-22 NOTE — TELEPHONE ENCOUNTER
EVANGELINA  DR ernestina MARIN   Who are you speaking with? Patient   If it is not the patient, are they listed on an active communication consent form? N/A   Is this a EVANGELINA or DR ernestina MARIN EVANGELINA   Which provider is patient currently scheduled or established with? Dr. Fabi Dunn   What is the original appointment date and time? 10/4/23 1:40PM   At which location is the appointment scheduled to take place? [facility]   Which provider is the patient transitioning care to? Dr. Carmen [place]   What is the new appointment date and time? 11/13/23 1:20PM   At which location is the new appointment scheduled to take place? [facility]   What is the reason for this change?  Provider

## 2023-10-15 ENCOUNTER — APPOINTMENT (OUTPATIENT)
Dept: LAB | Age: 58
End: 2023-10-15
Payer: COMMERCIAL

## 2023-10-15 DIAGNOSIS — E53.8 VITAMIN B12 DEFICIENCY: ICD-10-CM

## 2023-10-15 DIAGNOSIS — E78.49 OTHER HYPERLIPIDEMIA: ICD-10-CM

## 2023-10-15 LAB
ALBUMIN SERPL BCP-MCNC: 4.2 G/DL (ref 3.5–5)
ALP SERPL-CCNC: 49 U/L (ref 34–104)
ALT SERPL W P-5'-P-CCNC: 22 U/L (ref 7–52)
ANION GAP SERPL CALCULATED.3IONS-SCNC: 7 MMOL/L
AST SERPL W P-5'-P-CCNC: 17 U/L (ref 13–39)
BILIRUB SERPL-MCNC: 0.46 MG/DL (ref 0.2–1)
BUN SERPL-MCNC: 21 MG/DL (ref 5–25)
CALCIUM SERPL-MCNC: 9.4 MG/DL (ref 8.4–10.2)
CHLORIDE SERPL-SCNC: 103 MMOL/L (ref 96–108)
CHOLEST SERPL-MCNC: 194 MG/DL
CO2 SERPL-SCNC: 31 MMOL/L (ref 21–32)
CREAT SERPL-MCNC: 0.9 MG/DL (ref 0.6–1.3)
GFR SERPL CREATININE-BSD FRML MDRD: 71 ML/MIN/1.73SQ M
GLUCOSE P FAST SERPL-MCNC: 90 MG/DL (ref 65–99)
HDLC SERPL-MCNC: 86 MG/DL
LDLC SERPL CALC-MCNC: 81 MG/DL (ref 0–100)
NONHDLC SERPL-MCNC: 108 MG/DL
POTASSIUM SERPL-SCNC: 4.3 MMOL/L (ref 3.5–5.3)
PROT SERPL-MCNC: 6.6 G/DL (ref 6.4–8.4)
SODIUM SERPL-SCNC: 141 MMOL/L (ref 135–147)
TRIGL SERPL-MCNC: 135 MG/DL
VIT B12 SERPL-MCNC: 280 PG/ML (ref 180–914)

## 2023-10-15 PROCEDURE — 80053 COMPREHEN METABOLIC PANEL: CPT

## 2023-10-15 PROCEDURE — 80061 LIPID PANEL: CPT

## 2023-10-15 PROCEDURE — 82607 VITAMIN B-12: CPT

## 2023-10-15 PROCEDURE — 36415 COLL VENOUS BLD VENIPUNCTURE: CPT

## 2023-10-17 ENCOUNTER — OFFICE VISIT (OUTPATIENT)
Dept: INTERNAL MEDICINE CLINIC | Facility: CLINIC | Age: 58
End: 2023-10-17
Payer: COMMERCIAL

## 2023-10-17 VITALS
HEART RATE: 79 BPM | WEIGHT: 150 LBS | HEIGHT: 64 IN | DIASTOLIC BLOOD PRESSURE: 72 MMHG | OXYGEN SATURATION: 98 % | TEMPERATURE: 97.3 F | BODY MASS INDEX: 25.61 KG/M2 | SYSTOLIC BLOOD PRESSURE: 120 MMHG

## 2023-10-17 DIAGNOSIS — G62.9 NEUROPATHY: ICD-10-CM

## 2023-10-17 DIAGNOSIS — Z00.00 HEALTH MAINTENANCE EXAMINATION: ICD-10-CM

## 2023-10-17 DIAGNOSIS — R73.01 ABNORMAL FASTING GLUCOSE: ICD-10-CM

## 2023-10-17 DIAGNOSIS — Z23 NEED FOR INFLUENZA VACCINATION: ICD-10-CM

## 2023-10-17 DIAGNOSIS — E53.8 VITAMIN B12 DEFICIENCY: ICD-10-CM

## 2023-10-17 DIAGNOSIS — Z00.00 LABORATORY EXAMINATION ORDERED AS PART OF A ROUTINE GENERAL MEDICAL EXAMINATION: ICD-10-CM

## 2023-10-17 DIAGNOSIS — G47.09 OTHER INSOMNIA: ICD-10-CM

## 2023-10-17 DIAGNOSIS — M46.1 SACROILIITIS (HCC): ICD-10-CM

## 2023-10-17 DIAGNOSIS — M51.17 INTERVERTEBRAL DISC DISORDER WITH RADICULOPATHY OF LUMBOSACRAL REGION: Primary | ICD-10-CM

## 2023-10-17 DIAGNOSIS — F41.9 ANXIETY AND DEPRESSION: ICD-10-CM

## 2023-10-17 DIAGNOSIS — F32.A ANXIETY AND DEPRESSION: ICD-10-CM

## 2023-10-17 DIAGNOSIS — D72.818 OTHER DECREASED WHITE BLOOD CELL (WBC) COUNT: ICD-10-CM

## 2023-10-17 DIAGNOSIS — E78.49 OTHER HYPERLIPIDEMIA: ICD-10-CM

## 2023-10-17 DIAGNOSIS — M85.89 OSTEOPENIA OF MULTIPLE SITES: ICD-10-CM

## 2023-10-17 PROBLEM — M79.672 PAIN IN BOTH FEET: Status: RESOLVED | Noted: 2023-02-21 | Resolved: 2023-10-17

## 2023-10-17 PROBLEM — U09.9 POST-COVID CHRONIC COUGH: Status: RESOLVED | Noted: 2023-07-10 | Resolved: 2023-10-17

## 2023-10-17 PROBLEM — M79.671 PAIN IN BOTH FEET: Status: RESOLVED | Noted: 2023-02-21 | Resolved: 2023-10-17

## 2023-10-17 PROBLEM — R05.3 POST-COVID CHRONIC COUGH: Status: RESOLVED | Noted: 2023-07-10 | Resolved: 2023-10-17

## 2023-10-17 PROBLEM — M72.2 PLANTAR FASCIITIS, LEFT: Status: RESOLVED | Noted: 2023-03-06 | Resolved: 2023-10-17

## 2023-10-17 PROBLEM — Z79.810 USE OF TAMOXIFEN (NOLVADEX): Status: RESOLVED | Noted: 2020-05-27 | Resolved: 2023-10-17

## 2023-10-17 PROCEDURE — 90686 IIV4 VACC NO PRSV 0.5 ML IM: CPT

## 2023-10-17 PROCEDURE — 90471 IMMUNIZATION ADMIN: CPT

## 2023-10-17 PROCEDURE — 99214 OFFICE O/P EST MOD 30 MIN: CPT | Performed by: INTERNAL MEDICINE

## 2023-10-17 PROCEDURE — 99396 PREV VISIT EST AGE 40-64: CPT | Performed by: INTERNAL MEDICINE

## 2023-10-17 RX ORDER — METHYLPREDNISOLONE 4 MG/1
TABLET ORAL
Qty: 21 EACH | Refills: 0 | Status: SHIPPED | OUTPATIENT
Start: 2023-10-17

## 2023-10-17 RX ORDER — TRAMADOL HYDROCHLORIDE 50 MG/1
50 TABLET ORAL EVERY 8 HOURS PRN
Qty: 90 TABLET | Refills: 0 | Status: SHIPPED | OUTPATIENT
Start: 2023-10-17

## 2023-10-17 RX ORDER — CHOLECALCIFEROL (VITAMIN D3) 125 MCG
500 CAPSULE ORAL DAILY
Qty: 90 TABLET | Refills: 1 | Status: SHIPPED | OUTPATIENT
Start: 2023-10-17

## 2023-10-17 NOTE — PROGRESS NOTES
Assessment/Plan:    Anxiety and depression  Stable, on Lexapro and trazodone. Hyperlipidemia  Improved, on rosuvastatin 20 mg. If lipids remain low next visit, consider decreasing rosuvastatin. Malignant neoplasm of left breast in female, estrogen receptor positive   Stopped tamoxifen earlier this year. Other insomnia  Stable, on trazodone. Pain syndrome, chronic  PDMP reviewed. On tramadol bid. Gastroesophageal reflux disease  Off PPI. Vitamin B12 deficiency  Restart daily B12. Osteopenia of multiple sites  Schedule bone density. Taking D3 only. Intervertebral disc disorder with radiculopathy of lumbosacral region  Symptoms worse recently. Discussed starting gabapentin again. Medrol given as requested. Refer back to pain management. Neuropathy  Worse recently, restart daily B12. Previously on gabapentin. Diagnoses and all orders for this visit:    Intervertebral disc disorder with radiculopathy of lumbosacral region  -     XR spine lumbar minimum 4 views non injury; Future  -     traMADol (ULTRAM) 50 mg tablet; Take 1 tablet (50 mg total) by mouth every 8 (eight) hours as needed for moderate pain    Anxiety and depression    Abnormal fasting glucose  -     Hemoglobin A1C; Future    Other decreased white blood cell (WBC) count  -     CBC and differential; Future    Neuropathy    Other insomnia    Sacroiliitis (HCC)  -     XR sacroiliac joints < 3 views; Future    Osteopenia of multiple sites  -     Vitamin D 25 hydroxy; Future    Laboratory examination ordered as part of a routine general medical examination  -     Vitamin D 25 hydroxy; Future  -     Vitamin B12; Future  -     TSH, 3rd generation with Free T4 reflex; Future  -     Lipid panel; Future  -     Hemoglobin A1C; Future  -     Comprehensive metabolic panel;  Future  -     CBC and differential; Future  -     methylPREDNISolone 4 MG tablet therapy pack; Use as directed on package    Other hyperlipidemia  -     TSH, 3rd generation with Free T4 reflex; Future  -     Lipid panel; Future  -     Comprehensive metabolic panel; Future    Vitamin B12 deficiency  -     Vitamin B12; Future  -     CBC and differential; Future  -     vitamin B-12 (VITAMIN B-12) 500 mcg tablet; Take 1 tablet (500 mcg total) by mouth daily    Need for influenza vaccination  -     influenza vaccine, quadrivalent, 0.5 mL, preservative-free, for adult and pediatric patients 6 mos+ (AFLURIA, FLUARIX, FLULAVAL, FLUZONE)    Health maintenance examination  Comments:  Flu vaccine today. Lost 6 lbs since last visit. Follow up in 6 months or as needed. Subjective:      Patient ID: Mary Reardon is a 66 y.o. female here for a physical.    She has a few concerns. A few months ago, she started to experience some tingling and numbness around her left cheek area. She reports no sinus congestion, fullness or pain. She has her usual allergies. She reports no ear pain, tinnitus. Denies any headaches. She had checked for stroke symptoms, no weakness or drooling. She reports it would occur most days in the past few months, would last first few seconds to a few minutes. It is not bothersome. She did not apply or take any medication for it. She complains of more frequent right low back or SI joint pain. She is experiencing more frequent pain radiating down her right leg. She has had injections for her hip and her back several years ago. She had tried several NSAIDs which did not really help. She is requesting for steroids. She continues to take tramadol regularly. She complains of more frequent neuropathy symptoms at the bottom of her feet. She reports it is worse at night, reports frequent burning sensation. She had stopped taking her vitamin B12 several months ago. She had stopped taking tamoxifen few months ago. She had noted that her muscle and joint pains went away immediately.   She thinks that her cholesterol also improved because of it.      The following portions of the patient's history were reviewed and updated as appropriate: allergies, current medications, past medical history, past social history, and problem list.    Review of Systems   Constitutional:  Negative for appetite change and fatigue. HENT:  Negative for congestion, ear pain, postnasal drip, sinus pressure and sinus pain. Eyes:  Negative for visual disturbance. Respiratory:  Negative for cough and shortness of breath. Cardiovascular:  Negative for chest pain and leg swelling. Gastrointestinal:  Negative for abdominal pain, constipation and diarrhea. Genitourinary:  Negative for dysuria, frequency and urgency. Musculoskeletal:  Positive for arthralgias and back pain. Negative for gait problem, joint swelling and myalgias. Skin:  Negative for rash and wound. Neurological:  Positive for numbness. Negative for dizziness, tremors and headaches. Hematological:  Does not bruise/bleed easily. Psychiatric/Behavioral:  Negative for confusion, dysphoric mood and sleep disturbance. The patient is not nervous/anxious. Objective:      /72   Pulse 79   Temp (!) 97.3 °F (36.3 °C)   Ht 5' 4" (1.626 m)   Wt 68 kg (150 lb)   SpO2 98%   BMI 25.75 kg/m²          Physical Exam  Vitals and nursing note reviewed. Constitutional:       General: She is not in acute distress. Appearance: She is well-developed. HENT:      Head: Normocephalic and atraumatic. Right Ear: Tympanic membrane, ear canal and external ear normal.      Left Ear: Tympanic membrane, ear canal and external ear normal.      Nose: Nose normal.      Mouth/Throat:      Mouth: Mucous membranes are moist.   Eyes:      Pupils: Pupils are equal, round, and reactive to light. Cardiovascular:      Rate and Rhythm: Normal rate and regular rhythm. Heart sounds: Normal heart sounds. Pulmonary:      Effort: Pulmonary effort is normal.      Breath sounds: Normal breath sounds.  No wheezing. Abdominal:      General: Bowel sounds are normal.      Palpations: Abdomen is soft. Musculoskeletal:         General: No swelling. Cervical back: Spasms present. No tenderness. Thoracic back: Spasms present. No tenderness. Lumbar back: Spasms present. No tenderness. Right lower leg: No edema. Left lower leg: No edema. Skin:     General: Skin is warm. Findings: No rash. Neurological:      General: No focal deficit present. Mental Status: She is alert and oriented to person, place, and time. Cranial Nerves: Cranial nerves 2-12 are intact. Sensory: No sensory deficit. Motor: Motor function is intact. Psychiatric:         Mood and Affect: Mood and affect normal. Mood is not anxious or depressed. Behavior: Behavior normal.           Labs & imaging results reviewed with patient.

## 2023-10-17 NOTE — ASSESSMENT & PLAN NOTE
Symptoms worse recently. Discussed starting gabapentin again. Medrol given as requested. Refer back to pain management.

## 2023-10-20 ENCOUNTER — APPOINTMENT (OUTPATIENT)
Dept: RADIOLOGY | Age: 58
End: 2023-10-20
Payer: COMMERCIAL

## 2023-10-20 DIAGNOSIS — M51.17 INTERVERTEBRAL DISC DISORDER WITH RADICULOPATHY OF LUMBOSACRAL REGION: ICD-10-CM

## 2023-10-20 DIAGNOSIS — M46.1 SACROILIITIS (HCC): ICD-10-CM

## 2023-10-20 PROCEDURE — 72200 X-RAY EXAM SI JOINTS: CPT

## 2023-10-20 PROCEDURE — 72110 X-RAY EXAM L-2 SPINE 4/>VWS: CPT

## 2023-11-03 DIAGNOSIS — R45.4 IRRITABILITY AND ANGER: ICD-10-CM

## 2023-11-03 RX ORDER — ESCITALOPRAM OXALATE 10 MG/1
TABLET ORAL
Qty: 90 TABLET | Refills: 0 | Status: SHIPPED | OUTPATIENT
Start: 2023-11-03

## 2023-11-12 NOTE — PROGRESS NOTES
Jose Armando Shelby  1965  [facility] [place] [facility] [id number]    Chief Complaint   Patient presents with    Follow-up     Assessment/plan:   1. Right breast cancer, stage IIA (pT2, pN0, M0) grade 2, ER 90% positive, KS 90% positive, HER2 negative disease. MammaPrint high risk. Diagnosed in August 2019.   2. BRCA gene mutation negative. Sangita Hameed is a 79-year-old premenopausal female with stage IIA right breast cancer, grade 2, ER 90% positive, KS 90% positive, HER2 negative disease. She is negative for BRCA gene mutation. She underwent right mastectomy and sentinel lymph node biopsy as well as left prophylactic mastectomy, resulting in ERIK. Her tumor was found to be high risk based on a MammaPrint. She underwent adjuvant chemotherapy with dose dense AC followed by dose dense paclitaxel. She was on adjuvant endocrine therapy with tamoxifen from February 2020-March 2023 at which time she stopped taking the medication on her own. She states she was experiencing weight gain and joint pain. Patient does note history of chronic SI joint issues and chronic pain for which she was following with pain management in the past.  Prior plan was to complete 5 years of tamoxifen and switch to an aromatase inhibitor for another 5 years. At this juncture, due to intolerance of tamoxifen recommend patient switch to aromatase inhibitor. We discussed treatment with Arimidex 1 mg p.o. daily. We also discussed low suspicion of tamoxifen causing her SI joint pain as this is a chronic problem for her. We reviewed the side effects of aromatase inhibitors including, but not limited to hot flashes, vaginal dryness, mood swings, weight gain, difficulty sleeping, decreased sexual interest, arthralgias/myalgias and worsening of osteopenia/osteoporosis. Patient signed informed consent for Arimidex.   She will follow closely with her PCP for bone health monitoring. She will follow-up in 3 months to assess tolerance. Patient aware to contact the office in the interim. We will obtain baseline estradiol/FSH/LH to assess menopausal status. Oncology history:   Primary Diagnosis:  1. Right breast cancer, stage IIA (pT2, pN0, M0) grade 2, ER 90% positive, ME 90% positive, HER2 negative disease. MammaPrint high risk. Diagnosed in August 2019.   2. BRCA gene mutation negative. Cancer Staging:    Malignant neoplasm of upper-outer quadrant of left breast in female, estrogen receptor positive ([address])  Staging form: Breast, AJCC 8th Edition  - Pathologic: Stage IA (pT2(2), pN0(sn), cM0, G2, ER+, ME+, HER2-) - Unsigned  Neoadjuvant therapy: No  Laterality: Left  Tumor size (mm): 24  Multiple tumors: Yes  Number of tumors: 2  Method of lymph node assessment: [place] lymph node biopsy  Histologic grading system: 3 grade system      Previous Hematologic/ Oncologic Treatment:   1. Adjuvant chemotherapy with dose dense AC x4 followed by dose dense paclitaxel x4, completed in January 2020, given by Dr. Sharon Castrejon. 2.  Adjuvant hormonal therapy with tamoxifen February 2020-March 2023. Current Hematologic/ Oncologic Treatment:     Recommend Arimidex 1 mg p.o. daily     Disease Status:    ERIK status post right mastectomy and sentinel lymph node biopsy as well as left prophylactic mastectomy. Test Results:   Pathology:   2.3 and 2.4 cm of invasive ductal carcinoma, grade 2. 2 sentinel lymph nodes were negative for metastatic disease. ER 90% positive, ME 90% positive, HER2 negative disease. MammaPrint high risk. Stage IIA (pT2, pN0, M0)       Radiology:   DEXA scan in March 2020 showed T-score-1.1, consistent with osteopenia.      History of present illness:   Metro [place]ara is a 79-year-old premenopausal female who was initially found to have an abnormality in her right breast based on screening mammogram. Therefore, she underwent right breast biopsy in June 13, 2019 which showed invasive ductal carcinoma, grade 2. This was ER 90 % positive, NV 90 % positive, HER2 negative disease. She was referred to Dr. Melisa Calloway. She was negative for BRCA gene mutation. Because of the multifocal disease, she underwent right mastectomy and sentinel lymph node biopsy as well as left prophylactic mastectomy in August 9, 2019. Left breast tissue did not show any malignancy. She had a 2.3 as well as 2.4 cm of invasive ductal carcinoma, grade 2. Surgical margin was negative. 2 sentinel lymph nodes were negative for metastatic disease. She did not have reconstruction. Her tumor was found to be high risk based on a MammaPrint. She underwent adjuvant chemotherapy with dose dense AC followed by dose dense paclitaxel given by Dr. Lety Radford. Subsequently, she has been on adjuvant hormonal therapy with tamoxifen since February 2020. She was previously following with Dr. Margaux Fournier and last seen in the office on 8/17/2022. He presents today for transfer of care/follow-up visit. Pt self stopped tamoxifen in March 2023. Was having joint pains and weight gain. LMP was October 2019. She does note a history of chronic bone pain. Has chronic SI joint issues. Was previously following with pain management. She denies any chest pain, shortness of breath, abdominal pain, nausea, vomiting, diarrhea. Review of systems:   Review of Systems   Constitutional:  Negative for chills, fatigue and fever. Eyes:  Negative for pain and visual disturbance. Respiratory:  Negative for cough and shortness of breath. Cardiovascular:  Negative for chest pain and palpitations. Gastrointestinal:  Negative for abdominal pain and vomiting. Genitourinary:  Negative for dysuria and hematuria. Musculoskeletal:  Positive for arthralgias (chronic SI joint). Negative for back pain. Skin:  Negative for color change and rash. Neurological:  Negative for seizures and syncope.    All other systems reviewed and are negative. 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    Tear of right acetabular labrum    Leukopenia    Leiomyoma of uterus, unspecified    S/P bilateral mastectomy    Vaginal atrophy    Essential hypertension    Abnormal fasting glucose    Osteopenia of multiple sites    Hot flashes    Other insomnia    Uncomplicated opioid dependence ([address])    Neuropathy    Atypical nevus of back    Nonunion after arthrodesis    Palpitations     Past Medical History:   Diagnosis Date    Anxiety     BRCA gene mutation negative 06/20/2019    Invitae    Cancer ([address])     breast     Fibrocystic disease of breast     GERD (gastroesophageal reflux disease)     Hiatal hernia     Hyperlipidemia      Past Surgical History:   Procedure Laterality Date    APPENDECTOMY      BREAST BIOPSY      BREAST CYST ASPIRATION Left 2005    COLONOSCOPY      FOOT SURGERY Right 2011    right, hardware removal    IR PORT PLACEMENT  9/27/2019    IR PORT REMOVAL  2/19/2020    LYMPH NODE BIOPSY      MASTECTOMY W/ SENTINEL NODE BIOPSY Left 8/9/2019    Procedure: BREAST MASTECTOMY, SENTINEL LYMPH NODE BIOPSY, LYMPHATIC MAPPING W/ BLUE DYE AND RADIOACTIVE DYE (INJECT AT 0730 BY DR WHITEHEAD IN THE O.R.);  Surgeon: Luisana Zarate MD;  Location: [facility];  Service: Surgical Oncology    OTHER SURGICAL HISTORY Right 2010    Complete Excision of Fifth Metatarsal Head     AL COLONOSCOPY FLX DX W/COLLJ SPEC WHEN PFRMD N/A 7/20/2018    Procedure: EGD AND COLONOSCOPY;  Surgeon: Bassam Rodriguez MD;  Location: [facility] GI LAB;   Service: Gastroenterology    AL MASTECTOMY SIMPLE COMPLETE Right 8/9/2019    Procedure: BREAST SIMPLE MASTECTOMY;  Surgeon: Mattie Umanzor MD;  Location: [facility];  Service: Surgical Oncology    US GUIDANCE BREAST BIOPSY LEFT EACH ADDITIONAL Left 6/13/2019    US GUIDED BREAST BIOPSY LEFT COMPLETE Left 6/13/2019     Family History   Problem Relation Age of Onset    Coronary artery disease Mother         CABG in her 62s    Other Mother         Dyslipidemia    Hypertension Mother     Hyperlipidemia Mother     Heart attack Father 46        acute myocardial infarction    Other Father         Dyslipidemia    Hypertension Father     Hyperlipidemia Father     Coronary artery disease Brother     No Known Problems Maternal Aunt     No Known Problems Maternal Aunt     Prostate cancer Paternal Uncle 72    Thyroid disease Family         disorder     Social History     Socioeconomic History    Marital status: /Civil Union     Spouse name: Not on file    Number of children: 0    Years of education: Not on file    Highest education level: Not on file   Occupational History    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   Tobacco Use    Smoking status: Never    Smokeless tobacco: Never   Vaping Use    Vaping Use: Never used   Substance and Sexual Activity    Alcohol use:  Yes     Alcohol/week: 2.0 standard drinks of alcohol     Types: 2 Glasses of wine per week    Drug use: Not Currently     Types: Marijuana     Comment: oral usage/capsules    Sexual activity: Yes     Partners: Male     Birth control/protection: Male Sterilization     Comment: Partner had vasectomy   Other Topics Concern    Not on file   Social History Narrative    Exercise habits    Working full-time - used to be GI RN     Social Determinants of Health     Financial Resource Strain: Not on file   Food Insecurity: Not on file   Transportation Needs: Not on file   Physical Activity: Not on file   Stress: Not on file   Social Connections: Not on file   Intimate Partner Violence: Not on file   Housing Stability: Not on file       Current Outpatient Medications:     anastrozole (ARIMIDEX) 1 mg tablet, Take 1 tablet (1 mg total) by mouth daily, Disp: 90 tablet, Rfl: 1    cholecalciferol (VITAMIN D3) 1,000 units tablet, Take 1,000 Units by mouth daily, Disp: , Rfl:     escitalopram (LEXAPRO) 10 mg tablet, TAKE 1 TABLET BY MOUTH EVERY DAY, Disp: 90 tablet, Rfl: 0    methylPREDNISolone 4 MG tablet therapy pack, Use as directed on package, Disp: 21 each, Rfl: 0    rosuvastatin (CRESTOR) 20 MG tablet, TAKE 1 TABLET BY MOUTH EVERY DAY, Disp: 90 tablet, Rfl: 0    traMADol (ULTRAM) 50 mg tablet, Take 1 tablet (50 mg total) by mouth every 8 (eight) hours as needed for moderate pain, Disp: 90 tablet, Rfl: 0    traZODone (DESYREL) 50 mg tablet, TAKE 1 TABLET BY MOUTH AT BEDTIME, Disp: 90 tablet, Rfl: 1    vitamin B-12 (VITAMIN B-12) 500 mcg tablet, Take 1 tablet (500 mcg total) by mouth daily, Disp: 90 tablet, Rfl: 1  Allergies   Allergen Reactions    Sulfa Antibiotics Headache     Annotation - [id number]: Migraine; Annotation - [id number]: cellular issues     Vitals:    11/13/23 1323   BP: 118/80   Pulse: 68   Resp: 18   Temp: 98 °F (36.7 °C)   SpO2: 100%     Wt Readings from Last 3 Encounters:   11/13/23 71.7 kg (158 lb)   10/17/23 68 kg (150 lb)   08/30/23 68.9 kg (152 lb)     Performance Status: ECOG/Zubrod/WHO: 0 - Asymptomatic  Physical Exam  Vitals reviewed. Constitutional:       General: She is not in acute distress. Appearance: Normal appearance. HENT:      Head: Normocephalic and atraumatic. Mouth/Throat:      Mouth: Mucous membranes are moist.   Eyes:      Extraocular Movements: Extraocular movements intact. Conjunctiva/sclera: Conjunctivae normal.   Cardiovascular:      Rate and Rhythm: Normal rate. Pulmonary:      Effort: Pulmonary effort is normal. No respiratory distress. Breath sounds:  No wheezing, rhonchi or rales.   Chest:      Comments: S/p bilateral mastectomy. No palpable skin lesions or erythema. No palpable supra/infraclavicular/axillary LAD. Abdominal:      General: Abdomen is flat. There is no distension. Palpations: Abdomen is soft. Musculoskeletal:      Cervical back: Normal range of motion and neck supple. Right lower leg: No edema. Left lower leg: No edema. Skin:     General: Skin is warm. Coloration: Skin is not pale. Neurological:      Mental Status: She is alert and oriented to person, place, and time. Mental status is at baseline. Cranial Nerves: No cranial nerve deficit. Psychiatric:         Thought Content: Thought content normal.       Labs:  10/15/2023: AST/ALT: 17/22. Creatinine: 0.90. Calcium: 9.4. Total protein: 6.6    Orders entered this encounter:  - Estradiol; Future  - Luteinizing hormone; Future  - Follicle stimulating hormone; Future  - anastrozole (ARIMIDEX) 1 mg tablet; Take 1 tablet (1 mg total) by mouth daily  Dispense: 90 tablet; Refill: 1    Return in about 3 months (around 2/13/2024) for Office Visit, Labs - See Treatment Plan.

## 2023-11-13 ENCOUNTER — OFFICE VISIT (OUTPATIENT)
Dept: HEMATOLOGY ONCOLOGY | Facility: CLINIC | Age: 58
End: 2023-11-13
Payer: COMMERCIAL

## 2023-11-13 VITALS
OXYGEN SATURATION: 100 % | SYSTOLIC BLOOD PRESSURE: 118 MMHG | BODY MASS INDEX: 26.98 KG/M2 | HEART RATE: 68 BPM | WEIGHT: 158 LBS | TEMPERATURE: 98 F | HEIGHT: 64 IN | RESPIRATION RATE: 18 BRPM | DIASTOLIC BLOOD PRESSURE: 80 MMHG

## 2023-11-13 DIAGNOSIS — Z17.0 MALIGNANT NEOPLASM OF LEFT BREAST IN FEMALE, ESTROGEN RECEPTOR POSITIVE, UNSPECIFIED SITE OF BREAST: Primary | ICD-10-CM

## 2023-11-13 DIAGNOSIS — C50.912 MALIGNANT NEOPLASM OF LEFT BREAST IN FEMALE, ESTROGEN RECEPTOR POSITIVE, UNSPECIFIED SITE OF BREAST: Primary | ICD-10-CM

## 2023-11-13 PROCEDURE — 99215 OFFICE O/P EST HI 40 MIN: CPT | Performed by: INTERNAL MEDICINE

## 2023-11-13 RX ORDER — ANASTROZOLE 1 MG/1
1 TABLET ORAL DAILY
Qty: 90 TABLET | Refills: 1 | Status: SHIPPED | OUTPATIENT
Start: 2023-11-13

## 2023-11-14 ENCOUNTER — APPOINTMENT (OUTPATIENT)
Dept: LAB | Age: 58
End: 2023-11-14
Payer: COMMERCIAL

## 2023-11-14 DIAGNOSIS — C50.912 MALIGNANT NEOPLASM OF LEFT BREAST IN FEMALE, ESTROGEN RECEPTOR POSITIVE, UNSPECIFIED SITE OF BREAST: ICD-10-CM

## 2023-11-14 DIAGNOSIS — Z17.0 MALIGNANT NEOPLASM OF LEFT BREAST IN FEMALE, ESTROGEN RECEPTOR POSITIVE, UNSPECIFIED SITE OF BREAST: ICD-10-CM

## 2023-11-14 LAB
ESTRADIOL SERPL-MCNC: 20.1 PG/ML
FSH SERPL-ACNC: 83.6 MIU/ML
LH SERPL-ACNC: 58.9 MIU/ML

## 2023-11-14 PROCEDURE — 82670 ASSAY OF TOTAL ESTRADIOL: CPT

## 2023-11-14 PROCEDURE — 83001 ASSAY OF GONADOTROPIN (FSH): CPT

## 2023-11-14 PROCEDURE — 36415 COLL VENOUS BLD VENIPUNCTURE: CPT

## 2023-11-14 PROCEDURE — 83002 ASSAY OF GONADOTROPIN (LH): CPT

## 2023-11-19 DIAGNOSIS — M51.17 INTERVERTEBRAL DISC DISORDER WITH RADICULOPATHY OF LUMBOSACRAL REGION: ICD-10-CM

## 2023-11-20 DIAGNOSIS — E78.49 OTHER HYPERLIPIDEMIA: ICD-10-CM

## 2023-11-20 RX ORDER — TRAMADOL HYDROCHLORIDE 50 MG/1
50 TABLET ORAL EVERY 8 HOURS PRN
Qty: 90 TABLET | Refills: 0 | Status: SHIPPED | OUTPATIENT
Start: 2023-11-20

## 2023-11-20 RX ORDER — ROSUVASTATIN CALCIUM 20 MG/1
20 TABLET, COATED ORAL DAILY
Qty: 90 TABLET | Refills: 1 | Status: SHIPPED | OUTPATIENT
Start: 2023-11-20

## 2023-11-20 NOTE — TELEPHONE ENCOUNTER
Reason for call:   [x] Refill   [] Prior Auth  [] Other:     Office:   [x] [facility]  [] Specialty/Provider -     Medication: Rosuvastatin 20 mg    Quantity: 90    Pharmacy: [address] [place] [address] [phone]    Does the patient have enough for 3 days?    [x] Yes   [] No - Send as HP to POD

## 2023-11-25 NOTE — TELEPHONE ENCOUNTER
Patient called back is due for Mammogram and would like to get one      Can we please send out new script to Patient 2

## 2023-12-07 ENCOUNTER — COSMETIC (OUTPATIENT)
Dept: DERMATOLOGY | Facility: CLINIC | Age: 58
End: 2023-12-07

## 2023-12-07 ENCOUNTER — OFFICE VISIT (OUTPATIENT)
Dept: DERMATOLOGY | Facility: CLINIC | Age: 58
End: 2023-12-07
Payer: COMMERCIAL

## 2023-12-07 VITALS — WEIGHT: 158 LBS | BODY MASS INDEX: 26.98 KG/M2 | HEIGHT: 64 IN

## 2023-12-07 DIAGNOSIS — L81.4 LENTIGINES: ICD-10-CM

## 2023-12-07 DIAGNOSIS — L57.3 POIKILODERMA OF CIVATTE: ICD-10-CM

## 2023-12-07 DIAGNOSIS — D48.5 NEOPLASM OF UNCERTAIN BEHAVIOR OF SKIN: Primary | ICD-10-CM

## 2023-12-07 DIAGNOSIS — L81.1 MELASMA: Primary | ICD-10-CM

## 2023-12-07 PROCEDURE — 88305 TISSUE EXAM BY PATHOLOGIST: CPT | Performed by: STUDENT IN AN ORGANIZED HEALTH CARE EDUCATION/TRAINING PROGRAM

## 2023-12-07 PROCEDURE — 11102 TANGNTL BX SKIN SINGLE LES: CPT | Performed by: STUDENT IN AN ORGANIZED HEALTH CARE EDUCATION/TRAINING PROGRAM

## 2023-12-07 PROCEDURE — 99214 OFFICE O/P EST MOD 30 MIN: CPT | Performed by: STUDENT IN AN ORGANIZED HEALTH CARE EDUCATION/TRAINING PROGRAM

## 2023-12-07 PROCEDURE — LESIONRML10 LESION REMOVAL FIRST 10: Performed by: STUDENT IN AN ORGANIZED HEALTH CARE EDUCATION/TRAINING PROGRAM

## 2023-12-07 RX ORDER — TRETINOIN 0.5 MG/G
CREAM TOPICAL
Qty: 30 G | Refills: 3 | Status: CANCELLED
Start: 2023-12-07

## 2023-12-07 NOTE — PATIENT INSTRUCTIONS
DYSCHROMIA (Favor melasma on face, poikiloderma, acquired brachiocutaneous dyschromatosis)  LENTIGINES  -Discussed diagnosis, etiology, and prognosis - dyschromias are difficult to treat and improve slowly. Treatment reviewed in detail with patient.    -Sunscreen and sun protection are the #1 treatments. Emphasized importance of sunscreen use, every single day, regardless of weather or outdoor plans. Sunscreen with mineral blockers (Zinc, Titanium) are best.  -Recommend [employer] High Protection Tinted Compact SPF 50 or other iron oxide containing physical based sunscreen for daily use, apply every morning. It contains > 3% iron oxide, a sufficient amount of this important ingredient has been shown to best protect against worsening of melasma/hyperpigmentation, especially in combination with the mineral sunscreen. Most other tinted sunscreens do not offer this high percentage of iron oxide (which gives it an opaque tint). See below for other options of sunscreens. Sunblocks with iron oxide (not an all inclusive list!)  [employer] High Protection Tinted Compact   Isdinceutics Mineral Brush   Neutrogena Purescreen+ Tinted Mineral Sunscreen- $24.99  UV Elements Tinted Broad-Spectrum SPF 40 -- $39.00  Unsun Cosmetics Mineral Tinted Broad Spectrum Face Sunscreen SPF 30 -- $29.00  SkinCeuticals Physical Fusion UV Defense SPF 48 -- $36.00  Neostrata Sheer physical protection SPF 50  Exuviance sheer daily protection SPF 48  Supergoop! Daily Correct CC Cream SPF35  SkinMedica Essential Defense Mineral Shield SPF 32  Dermalogica Light Sheer Tint Moisture SPF 20 Face Moisturiser  Tizo 3 Tinted Face Mineral SPF 40 Sunscreen  Cotz Face Natural Skin Tone SPF 36  MDSolarSciences Mineral Crème Broad Spectrum SPF 48 Sunscreen    - Discussed starting a retinoid/lightening compounded cream to assist with lightening.  Recommend the use of a tretinoin/hydroquinone (HQ)-containing cream once daily two months on, two month off and use of a tretinoin/tranexamic acid containing cream once daily during the "off months." Side effects of hydroquinone reviewed including, but not limited to, erythema, irritation, paradoxical ochronsis and innefficacy. Side effects to tretinoin include but are not limited to dryness, peeling, irritation, redness, and photosensitivity discussed. Do not use when pregnant or breastfeeding. Stressed importance of strict photoprotection and photo avoidance. -Recommend starting with the hydroquinone (HQ)-containing cream.   Start the cream as follows: week 1-2: 2 nights/wk. Week 3: 3 nights per week, then increase frequency to nightly as tolerated. Apply  Hydroquinone compounded cream to entire face for 3 months, stop for 1 month, reassess and restart if needed. Side effects of hydroquinone reviewed including, but not limited to, erythema, irritation, paradoxical ochronsis and innefficacy. Stressed importance of strict photoprotection and photo avoidance. These creams will be compounded and sent to [address] [place] [address]- a compounding pharmacy that will call you or text you and then ship the medication directly to you.      - Discussed overall treatment with IPL energy based device treatment (series of 3-4) will help with pink and brown pigment (from lentigos)  IPL face: $300 for one treatment; $900 for 4 treatments (this is a buy 3, get 1 treatment free deal)  IPL arms: $400 for one treatment: $1200 for 4 treatments (this is a buy 3, get 1 treatment free deal)

## 2023-12-07 NOTE — PROGRESS NOTES
[facility]     Patient Name: Jose Armando Tesfaye  Encounter Date: 12/7/2023     Have you been cared for by a [facility] Dermatologist in the last 3 years and, if so, which description applies to you? Yes. I have been here within the last 3 years, and my medical history has NOT changed since that time. I am FEMALE/of child-bearing potential.    REVIEW OF SYSTEMS:  Have you recently had or currently have any of the following? No changes in my recent health. PAST MEDICAL HISTORY:  Have you personally ever had or currently have any of the following? If "YES," then please provide more detail. No changes in my medical history. HISTORY OF IMMUNOSUPPRESSION: Do you have a history of any of the following:  Systemic Immunosuppression such as Diabetes, Biologic or Immunotherapy, Chemotherapy, Organ Transplantation, Bone Marrow Transplantation? No     Answering "YES" requires the addition of the dotphrase "IMMUNOSUPPRESSED" as the first diagnosis of the patient's visit. FAMILY HISTORY:  Any "first degree relatives" (parent, brother, sister, or child) with the following? No changes in my family's known health. PATIENT EXPERIENCE:    Do you want the Dermatologist to perform a COMPLETE skin exam today including a clinical examination under the "bra and underwear" areas? NO  If necessary, do we have your permission to call and leave a detailed message on your Preferred Phone number that includes your specific medical information? Yes      Allergies   Allergen Reactions    Sulfa Antibiotics Headache     Annotation - [id number]: Migraine;  Annotation - [id number]: cellular issues      Current Outpatient Medications:     anastrozole (ARIMIDEX) 1 mg tablet, Take 1 tablet (1 mg total) by mouth daily, Disp: 90 tablet, Rfl: 1    cholecalciferol (VITAMIN D3) 1,000 units tablet, Take 1,000 Units by mouth daily, Disp: , Rfl:     escitalopram (LEXAPRO) 10 mg tablet, TAKE 1 TABLET BY MOUTH EVERY DAY, Disp: 90 tablet, Rfl: 0    methylPREDNISolone 4 MG tablet therapy pack, Use as directed on package, Disp: 21 each, Rfl: 0    rosuvastatin (CRESTOR) 20 MG tablet, Take 1 tablet (20 mg total) by mouth daily, Disp: 90 tablet, Rfl: 1    traMADol (ULTRAM) 50 mg tablet, Take 1 tablet (50 mg total) by mouth every 8 (eight) hours as needed for moderate pain, Disp: 90 tablet, Rfl: 0    traZODone (DESYREL) 50 mg tablet, TAKE 1 TABLET BY MOUTH AT BEDTIME, Disp: 90 tablet, Rfl: 1    vitamin B-12 (VITAMIN B-12) 500 mcg tablet, Take 1 tablet (500 mcg total) by mouth daily, Disp: 90 tablet, Rfl: 1          Whom besides the patient is providing clinical information about today's encounter? NO ADDITIONAL HISTORIAN (patient alone provided history)    Physical Exam and Assessment/Plan by Diagnosis:    NEOPLASM OF UNCERTAIN BEHAVIOR OF SKIN    Physical Exam:  (Anatomic Location); (Size and Morphological Description); (Differential Diagnosis):  Left nasal side wall; 0.9 mm x 0.7 mm pearly papule with visible telangiectasias and focal erosion;DDX: BCC vs SCC vs other   Pertinent Positives:  Pertinent Negatives: Additional History of Present Condition:  Noted on exam. On further questioning, present on nose for several years. Recently has started to bother her in the past two weeks. Assessment and Plan:  I have discussed with the patient that a sample of skin via a "skin biopsy” would be potentially helpful to further make a specific diagnosis under the microscope. Based on a thorough discussion of this condition and the management approach to it (including a comprehensive discussion of the known risks, side effects and potential benefits of treatment), the patient (family) agrees to implement the following specific plan:    Procedure:  Skin Biopsy.   After a thorough discussion of treatment options and risk/benefits/alternatives (including but not limited to local pain, scarring, dyspigmentation, blistering, possible superinfection, and inability to confirm a diagnosis via histopathology), verbal and written consent were obtained and portion of the rash was biopsied for tissue sample. See below for consent that was obtained from patient and subsequent Procedure Note. PROCEDURE TANGENTIAL (SHAVE) BIOPSY NOTE:    Performing Physician:   Anatomic Location; Clinical Description with size (cm); Pre-Op Diagnosis:   Left nasal side wall; 0.9 mm x 0.7 mm pearly papule with visible telangiectasias and focal erosion;DDX: BCC vs SCC vs other   Post-op diagnosis: Same     Local anesthesia: 1% xylocaine with epi      Topical anesthesia: None    Hemostasis: Aluminum chloride       After obtaining informed consent  at which time there was a discussion about the purpose of biopsy  and low risks of infection and bleeding. The area was prepped and draped in the usual fashion. Anesthesia was obtained with 1% lidocaine with epinephrine. A shave biopsy to an appropriate sampling depth was obtained by Shave (Dermablade or 15 blade) The resulting wound was covered with surgical ointment and bandaged appropriately. The patient tolerated the procedure well without complications and was without signs of functional compromise. Specimen has been sent for review by Dermatopathology. Standard post-procedure care has been explained and has been included in written form within the patient's copy of Informed Consent. INFORMED CONSENT DISCUSSION AND POST-OPERATIVE INSTRUCTIONS FOR PATIENT    I.  RATIONALE FOR PROCEDURE  I understand that a skin biopsy allows the Dermatologist to test a lesion or rash under the microscope to obtain a diagnosis. It usually involves numbing the area with numbing medication and removing a small piece of skin; sometimes the area will be closed with sutures. In this specific procedure, sutures are not usually needed. If any sutures are placed, then they are usually need to be removed in 2 weeks or less.     I understand that my Dermatologist recommends that a skin "shave" biopsy be performed today. A local anesthetic, similar to the kind that a dentist uses when filling a cavity, will be injected with a very small needle into the skin area to be sampled. The injected skin and tissue underneath "will go to sleep” and become numb so no pain should be felt afterwards. An instrument shaped like a tiny "razor blade" (shave biopsy instrument) will be used to cut a small piece of tissue and skin from the area so that a sample of tissue can be taken and examined more closely under the microscope. A slight amount of bleeding will occur, but it will be stopped with direct pressure and a pressure bandage and any other appropriate methods. I understands that a scar will form where the wound was created. Surgical ointment will be applied to help protect the wound. Sutures are not usually needed. II.  RISKS AND POTENTIAL COMPLICATIONS   I understand the risks and potential complications of a skin biopsy include but are not limited to the following:  Bleeding  Infection  Pain  Scar/keloid  Skin discoloration  Incomplete Removal  Recurrence  Nerve Damage/Numbness/Loss of Function  Allergic Reaction to Anesthesia  Biopsies are diagnostic procedures and based on findings additional treatment or evaluation may be required  Loss or destruction of specimen resulting in no additional findings    My Dermatologist has explained to me the nature of the condition, the nature of the procedure, and the benefits to be reasonably expected compared with alternative approaches. My Dermatologist has discussed the likelihood of major risks or complications of this procedure including the specific risks listed above, such as bleeding, infection, and scarring/keloid. I understand that a scar is expected after this procedure.   I understand that my physician cannot predict if the scar will form a "keloid," which extends beyond the borders of the wound that is created. A keloid is a thick, painful, and bumpy scar. A keloid can be difficult to treat, as it does not always respond well to therapy, which includes injecting cortisone directly into the keloid every few weeks. While this usually reduces the pain and size of the scar, it does not eliminate it. I understand that photographs may be taken before and after the procedure. These will be maintained as part of the medical providers confidential records and may not be made available to me. I further authorize the medical provider to use the photographs for teaching purposes or to illustrate scientific papers, books, or lectures if in his/her judgment, medical research, education, or science may benefit from its use. I have had an opportunity to fully inquire about the risks and benefits of this procedure and its alternatives. I have been given ample time and opportunity to ask questions and to seek a second opinion if I wished to do so. I acknowledge that there have specifically been no guarantees as to the cosmetic results from the procedure. I am aware that with any procedure there is always the possibility of an unexpected complication. III. POST-PROCEDURAL CARE (WHAT YOU WILL NEED TO DO "AFTER THE BIOPSY" TO OPTIMIZE HEALING)    Keep the area clean and dry. Try NOT to remove the bandage or get it wet for the first 24 hours. Gently clean the area and apply surgical ointment (such as Vaseline petrolatum ointment, which is available "over the counter" and not a prescription) to the biopsy site for up to 2 weeks straight. This acts to protect the wound from the outside world. Sutures are not usually placed in this procedure. If any sutures were placed, return for suture removal as instructed (generally 1 week for the face, 2 weeks for the body). Take Acetaminophen (Tylenol) for discomfort, if no contraindications. Ibuprofen or aspirin could make bleeding worse.     Call our office immediately for signs of infection: fever, chills, increased redness, warmth, tenderness, discomfort/pain, or pus or foul smell coming from the wound. WHAT TO DO IF THERE IS ANY BLEEDING? If a small amount of bleeding is noticed, place a clean cloth over the area and apply firm pressure for ten minutes. Check the wound after 10 minutes of direct pressure. If bleeding persists, try one more time for an additional 10 minutes of direct pressure on the area. If the bleeding becomes heavier or does not stop after the second attempt, or if you have any other questions about this procedure, then please call your [facility] [place] Dermatologist by calling [phone] (SKIN). I hereby acknowledge that I have reviewed and verified the site with my Dermatologist and have requested and authorized my Dermatologist to proceed with the procedure.     Scribe Attestation      I,:  Zoë Winston MA am acting as a scribe while in the presence of the attending physician.:       I,:  Last Orta MD personally performed the services described in this documentation    as scribed in my presence.:

## 2023-12-07 NOTE — PROGRESS NOTES
[facility] Note     Patient Name: Amandeep Soto  Encounter Date: 12/7/2023     Have you been cared for by a [facility]atologist in the last 3 years and, if so, which description applies to you? Yes. I have been here within the last 3 years, and my medical history has NOT changed since that time. I am FEMALE/of child-bearing potential.    REVIEW OF SYSTEMS:  Have you recently had or currently have any of the following? No changes in my recent health. PAST MEDICAL HISTORY:  Have you personally ever had or currently have any of the following? If "YES," then please provide more detail. No changes in my medical history. HISTORY OF IMMUNOSUPPRESSION: Do you have a history of any of the following:  Systemic Immunosuppression such as Diabetes, Biologic or Immunotherapy, Chemotherapy, Organ Transplantation, Bone Marrow Transplantation? No     Answering "YES" requires the addition of the dotphrase "IMMUNOSUPPRESSED" as the first diagnosis of the patient's visit. FAMILY HISTORY:  Any "first degree relatives" (parent, brother, sister, or child) with the following? No changes in my family's known health. PATIENT EXPERIENCE:    Do you want the Dermatologist to perform a COMPLETE skin exam today including a clinical examination under the "bra and underwear" areas? NO  If necessary, do we have your permission to call and leave a detailed message on your Preferred Phone number that includes your specific medical information? Yes      Allergies   Allergen Reactions    Sulfa Antibiotics Headache     Annotation - [id number]: Migraine;  Annotation - [id number]: cellular issues      Current Outpatient Medications:     anastrozole (ARIMIDEX) 1 mg tablet, Take 1 tablet (1 mg total) by mouth daily, Disp: 90 tablet, Rfl: 1    cholecalciferol (VITAMIN D3) 1,000 units tablet, Take 1,000 Units by mouth daily, Disp: , Rfl:     escitalopram (LEXAPRO) 10 mg tablet, TAKE 1 TABLET BY MOUTH EVERY DAY, Disp: 90 tablet, Rfl: 0    methylPREDNISolone 4 MG tablet therapy pack, Use as directed on package, Disp: 21 each, Rfl: 0    rosuvastatin (CRESTOR) 20 MG tablet, Take 1 tablet (20 mg total) by mouth daily, Disp: 90 tablet, Rfl: 1    traMADol (ULTRAM) 50 mg tablet, Take 1 tablet (50 mg total) by mouth every 8 (eight) hours as needed for moderate pain, Disp: 90 tablet, Rfl: 0    traZODone (DESYREL) 50 mg tablet, TAKE 1 TABLET BY MOUTH AT BEDTIME, Disp: 90 tablet, Rfl: 1    vitamin B-12 (VITAMIN B-12) 500 mcg tablet, Take 1 tablet (500 mcg total) by mouth daily, Disp: 90 tablet, Rfl: 1          Whom besides the patient is providing clinical information about today's encounter? 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 Has been going on for many years. Patient does us sun screen and SPF long sleeve clothing during outside activities. Hormones/OCPs? No  Pregnancy? No  SPF/UPF use? Uses SPF  Treatments tried: N/A    Assessment and Plan:  Based on a thorough discussion of this condition and the management approach to it (including a comprehensive discussion of the known risks, side effects and potential benefits of treatment), the patient (family) agrees to implement the following specific plan:    -Discussed diagnosis, etiology, and prognosis - dyschromias are very difficult to treat and improve slowly. Treatment reviewed in detail with patient. All questions answered.   -Sunscreen and sun protection are the #1 treatments.  Emphasized importance of sunscreen use, every single day, regardless of weather or outdoor plans. Sunscreen with mineral blockers (Zinc, Titanium) are best.  -Recommend A[employer] High Protection Tinted Compact SPF 50 or other iron oxide containing physical based sunscreen for daily use, apply every morning. It contains > 3% iron oxide, a sufficient amount of this important ingredient has been shown to best protect against worsening of melasma/hyperpigmentation, especially in combination with the mineral sunscreen. Most other tinted sunscreens do not offer this high percentage of iron oxide (which gives it an opaque tint). See below for other options of sunscreens. Sunblocks with iron oxide (not an all inclusive list!)  Avene Mineral High Protection Tinted Compact   Isdinceutics Mineral Brush   Neutrogena Purescreen+ Tinted Mineral Sunscreen- $24.99  UV Elements Tinted Broad-Spectrum SPF 40 -- $39.00  Unsun Cosmetics Mineral Tinted Broad Spectrum Face Sunscreen SPF 30 -- $29.00  SkinCeuticals Physical Fusion UV Defense SPF 48 -- $36.00  Neostrata Sheer physical protection SPF 50  Exuviance sheer daily protection SPF 48  Supergoop! Daily Correct CC Cream SPF35  SkinMedica Essential Defense Mineral Shield SPF 32  Dermalogica Light Sheer Tint Moisture SPF 20 Face Moisturiser  Tizo 3 Tinted Face Mineral SPF 40 Sunscreen  Cotz Face Natural Skin Tone SPF 36  MDSolarSciences Mineral Crème Broad Spectrum SPF 48 Sunscreen    - Discussed starting a retinoid/lightening compounded cream to assist with lightening. Recommend the use of a tretinoin/hydroquinone (HQ)-containing cream once daily two months on, two month off and use of a tretinoin/tranexamic acid containing cream once daily during the "off months." Side effects of hydroquinone reviewed including, but not limited to, erythema, irritation, paradoxical ochronsis and innefficacy. Side effects to tretinoin include but are not limited to dryness, peeling, irritation, redness, and photosensitivity discussed.  Do not use when pregnant or breastfeeding. Stressed importance of strict photoprotection and photo avoidance. -Recommend starting with the hydroquinone (HQ)-containing cream.   Start the cream as follows: week 1-2: 2 nights/wk. Week 3: 3 nights per week, then increase frequency to nightly as tolerated. Apply  Hydroquinone compounded cream to entire face for 3 months, stop for 1 month, reassess and restart if needed. Side effects of hydroquinone reviewed including, but not limited to, erythema, irritation, paradoxical ochronsis and innefficacy. Stressed importance of strict photoprotection and photo avoidance. These creams will be compounded and sent to [address] [place] [address]- a compounding pharmacy that will call you or text you and then ship the medication directly to you. - Discussed overall treatment with IPL treatment (series of 3-4) will help with pink and brown pigment (from lentigos)  IPL face: $300 for one treatment; $900 for 4 treatments (this is a buy 3, get 1 treatment free deal)  IPL arms: $400 for one treatment: $1200 for 4 treatments (this is a buy 3, get 1 treatment free deal)    SKIN TAGS ([place]) COSMETIC CONSULT    Physical Exam:  Anatomic Location Affected & Morphological Description:  skin colored pedunculated papules on the neck, chest  Pertinent Positives:  Pertinent Negatives: Additional History of Present Condition:  present for years, has snipped others off in past    Assessment and Plan:  Based on a thorough discussion of this condition and the management approach to it (including a comprehensive discussion of the known risks, side effects and potential benefits of treatment), the patient (family) agrees to implement the following specific plan:  Discussed removal via snip or shave removal, cryotherapy and/or light electrodessication  Discussed cosmetic charge: $150 charge for up to 10 lesions, $10 for each additional over 10  Pt agreeable to do procedure today.  Consent signed, see procedure note below    Skin tags are common, soft, harmless skin lesions that are also called, in the appropriate settings, papillomas, fibroepithelial polyps, and soft fibromas. They are made up of loosely arranged collagen fibers and blood vessels surrounded by a thickened or thinned-out epidermis. Skin tags tend to develop in both men and women as we grow older. They are usually found on the skin folds (neck, armpits, groin). It is not known what specifically causes skin tags. Certain factors, though, do appear to play a role:  Chaffing and irritation from skin rubbing together  High levels of growth factors (as seen, for example, in pregnancy or in acromegaly/gigantism)  Insulin resistance  Human papillomavirus (wart virus)    We discussed that most skin tags do not need to be treated unless they are specifically causing the patient physical distress or limitation or pose a risk for a larger problem such as an infection that forms secondary to excoriation or chronic irritation. We had a thorough discussion of treatment options and specific risks (including that any procedural treatment may not be covered by insurance and would then be the patient's responsibility) and benefits/alternatives including but not limited to the following:  Cryotherapy (freezing)  Shave removal  Surgical excision (snip excision with scissors)  Electrosurgery  Ligation (we do not do this procedure and counseled against it due to risk of tissue necrosis and infection)     PROCEDURE NOTE     Removal of fibroepithelial polyps (skin tags) with snip removal and light electrodessication  After a thorough discussion of treatment options and risk/benefits/alternatives (including but not limited to local pain, scarring, dyspigmentation, persistance/recurrence of lesions), verbal and written consent were obtained.  More peduncualted lesions were treated on with snip removal after anesthesia with lidocaine with 1:100,000 epinephrine and flatter lesions were treated with light electrodesiccation after anesthesia with lidocaine with 1:100,000 epinephrine   TOTAL NUMBER of  10 lesions were treated today on the ANATOMIC LOCATION: neck, upper chest.              The patient tolerated the procedure well, and after-care instructions were provided.      This was cosmetic visit that patient paid out of pocket for.    $150 charge for up to 10 lesions  $10 for each additional over 10    Total number of lesions treated: 10  Total cost: $150     DO NOT BILL INSURANCE         Scribe Attestation      I,:  Mata López MA am acting as a scribe while in the presence of the attending physician.:       I,:  Koki Rachel MD personally performed the services described in this documentation    as scribed in my presence.:

## 2023-12-12 PROCEDURE — 88305 TISSUE EXAM BY PATHOLOGIST: CPT | Performed by: STUDENT IN AN ORGANIZED HEALTH CARE EDUCATION/TRAINING PROGRAM

## 2023-12-19 DIAGNOSIS — C44.311 BASAL CELL CARCINOMA (BCC) OF SKIN OF NOSE: Primary | ICD-10-CM

## 2023-12-28 DIAGNOSIS — M51.17 INTERVERTEBRAL DISC DISORDER WITH RADICULOPATHY OF LUMBOSACRAL REGION: ICD-10-CM

## 2023-12-28 RX ORDER — TRAMADOL HYDROCHLORIDE 50 MG/1
50 TABLET ORAL EVERY 8 HOURS PRN
Qty: 90 TABLET | Refills: 0 | Status: SHIPPED | OUTPATIENT
Start: 2023-12-28

## 2024-01-11 ENCOUNTER — TELEPHONE (OUTPATIENT)
Dept: HEMATOLOGY ONCOLOGY | Facility: CLINIC | Age: 59
End: 2024-01-11

## 2024-01-11 NOTE — TELEPHONE ENCOUNTER
EVANGELINA  DR ernestina MARIN   Who are you speaking with? Patient   If it is not the patient, are they listed on an active communication consent form? Yes   Is this a EVANGELINA or DR ernestina MARIN EVANGELINA   Which provider is patient currently scheduled or established with? Dr. Boyd   What is the original appointment date and time? 2/13/24   At which location is the appointment scheduled to take place? [place]   Which provider is the patient transitioning care to? Dr Monika Clayton   What is the new appointment date and time? 2/22/24   At which location is the new appointment scheduled to take place? [place]   What is the reason for this change? provider

## 2024-01-17 ENCOUNTER — OFFICE VISIT (OUTPATIENT)
Age: 59
End: 2024-01-17
Payer: COMMERCIAL

## 2024-01-17 VITALS
WEIGHT: 161.7 LBS | DIASTOLIC BLOOD PRESSURE: 76 MMHG | BODY MASS INDEX: 27.61 KG/M2 | SYSTOLIC BLOOD PRESSURE: 110 MMHG | OXYGEN SATURATION: 100 % | HEART RATE: 69 BPM | HEIGHT: 64 IN

## 2024-01-17 DIAGNOSIS — R06.09 DOE (DYSPNEA ON EXERTION): Primary | ICD-10-CM

## 2024-01-17 DIAGNOSIS — E78.2 MIXED HYPERLIPIDEMIA: ICD-10-CM

## 2024-01-17 DIAGNOSIS — I10 PRIMARY HYPERTENSION: ICD-10-CM

## 2024-01-17 DIAGNOSIS — R00.2 PALPITATIONS: ICD-10-CM

## 2024-01-17 DIAGNOSIS — Z82.49 FAMILY HISTORY OF CARDIAC DISORDER: ICD-10-CM

## 2024-01-17 PROCEDURE — 99214 OFFICE O/P EST MOD 30 MIN: CPT | Performed by: INTERNAL MEDICINE

## 2024-01-17 NOTE — PROGRESS NOTES
"Cardio-Oncology Clinic Note    Debra Escoto 58 y.o. female   MRN: [id number]  Encounter: [id number]        Assessment / Plan:    # Cardio-Oncology Pertinent History  Breast cancer  Dx 2019.  Left sided.  S/p surgery  Chemotherapy:  S/p AC-T  Current:  arimidex    # Cardio-Oncology Survivorship Recommendations  Discussed that many cancer survivors are at increased risk of cardiovascular disease due to cancer treatments as well as comorbidities.  Discussed importance of optimizing cardiovascular risk factors and post-treatment cardiac surveillance when appropriate.    Anthracyclines    Annual history and physical  BP - SEE BELOW  Lipids - SEE BELOW  Diabetes screening with PCP  Exercise - yes - loves cycling  Healthy diet - doing well  Post-treatment surveillance:   complete.    # GO post COVID  Started after COVID (in May 2023)  Checked CXR last visit, no concerns  Sx's have resolved    # Palpitations  Offered ziopatch.  Wants to hold off.  She will let me know if sx's worsen and we will check ziopatch.    # HTN  On prob list. Not on meds.  BP today at goal      # HLD  With very strong fam hx, recommend LDL < 100.  Most recent LDL 81  Continue crestor 20    # Fam hx CAD  Parents and brother with hx CAD  Patient has recent normal stress echo and calcium score of 0.  Recommend lifestyle optimization and risk factor modification as above    # Calcification in right atrium  Coronary calcium scan noted \"Unusual finding of calcification associated with the inferior aspect of the right atrium\"  Seen on echo but not well visualized.  Patient reports seen on cardiac MRI at [place] in 1999 (incidental during research MRI for healthy subjects).    Presumed calcified prominent roderick terminalis or eustachian valve.      Today's Plan Summary:  See above assessment/plan for full details of today's plan.  Briefly,    No med changes  F/u 1 year              Reason For Visit / Chief Complaint:  F/u palpitations, HLD, fam hx " CAD    HPI:   Debra Escoto is a 58 y.o.  female with history as noted in the problem list and further detailed in the above assessment and plan.    Initial:  July 2023  Referred by Helio De Anda NP for a new patient visit for history of potentially cardiotoxic chemotherapy and family history of CAD.  As above, the patient has a history of breast cancer diagnosed in 2019.  The patient had chemotherapy that included anthracyclines.  She had an executive physical earlier this year and her simvastatin was changed to Crestor.  Testing at that time included a normal EKG, echo, stress echo, and a calcium score of 0.  The patient has a family history of CAD.  She was referred to cardio-oncology for cardiovascular optimization and long-term follow-up.  reports had COVID in May (after all this testing).  And after this, more GO, exercise intolerance.   Gets occasional palpitation.  No syncope.   Makes it feel like she needs to cough.    Nurse - per ronnie with GI.  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   .  No children.  Fam hx:   Mother CABG in 60s.   Father had MI age 51, ischemic cardiomyopathy, ICD.  Brother - CABG age 57.    Interval:  At initial visit recommended -->   CXR  Repeat lipids    CXR - no acute process   Repeat lipids in July -  .  LDL 73.   Repeat lipids in Oct -  .  LDL 81.     Today - reports the post COVID GO has resolved.   Head feels pressure when gets up quickly - lasts for a few seconds.    Not every time, just sometimes getting up.    No CP or chest pressure.    Very rare palpitations.  No syncope.         Cardiac Imaging personally reviewed:  EKG 2/21/23  Sinus rhythm at 59 bpm   Holter or event monitor    Echo 2-21-23  EF 65%.  GLS -23%.  Mild MR  Mild TR     BRUCE    Cardiac MRI    Stress testing Stress echo 2/2023  92% PMHR.  11.7 METs.  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   • Atypical nevus of back 02/21/2023   • Other insomnia 09/07/2022   • Uncomplicated opioid dependence (HCC) 09/07/2022   • Neuropathy 09/07/2022   • Hot flashes 03/02/2022   • Essential hypertension 02/24/2021   • Abnormal fasting glucose 02/24/2021   • Osteopenia of multiple sites 02/24/2021   • Vaginal atrophy 02/24/2020   • Leukopenia 02/14/2020   • Leiomyoma of uterus, unspecified 02/14/2020   • S/P bilateral mastectomy 02/14/2020   • Tear of right acetabular labrum 01/05/2020   • 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 10/14/2019   • Malignant neoplasm of left breast in female, estrogen receptor positive  06/18/2019   • Intervertebral disc disorder with radiculopathy of lumbosacral region    • Gastroesophageal reflux disease 05/14/2018   • Anxiety and depression 04/30/2018   • Vitamin B12 deficiency 04/30/2018   • Seasonal allergic rhinitis due to pollen 04/30/2018   • Pain syndrome, chronic 03/06/2015   • Sacroiliitis (HCC) 06/24/2013   • Hiatal hernia 07/14/2012   • Hyperlipidemia 07/14/2012       Past Medical History:   Diagnosis Date   • Anxiety    • BRCA gene mutation negative 06/20/2019    [facility]   • Cancer (HCC)     breast    • Fibrocystic disease of breast    • GERD (gastroesophageal reflux disease)    • Hiatal hernia    • Hyperlipidemia        Allergies   Allergen Reactions   • Sulfa Antibiotics Headache     Annotation - [id number]: Migraine; Annotation - [id number]: cellular issues       Current Outpatient Medications   Medication Instructions   • anastrozole (ARIMIDEX) 1 mg, Oral, Daily   • cholecalciferol (VITAMIN D3) 1,000 Units, Oral, Daily   • escitalopram (LEXAPRO) 10 mg tablet TAKE 1 TABLET BY MOUTH EVERY DAY   • rosuvastatin (CRESTOR) 20 mg, Oral, Daily   • traMADol (ULTRAM) 50 mg, Oral, Every 8 hours PRN   • traZODone (DESYREL) 50 mg tablet TAKE  1 TABLET BY MOUTH AT BEDTIME   • tretinoin (RETIN-A) 0.025 % cream Topical, Daily at bedtime   • vitamin B-12 (VITAMIN B-12) 500 mcg, Oral, Daily       Social History     Socioeconomic History   • Marital status: /Civil Union     Spouse name: Not on file   • Number of children: 0   • Years of education: Not on file   • Highest education level: Not on file   Occupational History   • 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   Tobacco Use   • Smoking status: Never   • Smokeless tobacco: Never   Vaping Use   • Vaping status: Never Used   Substance and Sexual Activity   • Alcohol use: Yes     Alcohol/week: 2.0 standard drinks of alcohol     Types: 2 Glasses of wine per week   • Drug use: Not Currently     Types: Marijuana     Comment: oral usage/capsules   • Sexual activity: Yes     Partners: Male     Birth control/protection: Male Sterilization     Comment: Partner had vasectomy   Other Topics Concern   • Not on file   Social History Narrative    Exercise habits    Working full-time - used to be GI RN     Social Determinants of Health     Financial Resource Strain: Not on file   Food Insecurity: Not on file   Transportation Needs: Not on file   Physical Activity: Not on file   Stress: Not on file   Social Connections: Not on file   Intimate Partner Violence: Not on file   Housing Stability: Not on file       Family History   Problem Relation Age of Onset   • Coronary artery disease Mother         CABG in her 60s   • Other Mother         Dyslipidemia   • Hypertension Mother    • Hyperlipidemia Mother    • Heart attack Father 51        acute myocardial infarction   • Other Father         Dyslipidemia   • Hypertension Father    • Hyperlipidemia Father    • Coronary artery disease Brother    • No Known Problems Maternal Aunt    • No Known Problems Maternal Aunt    • Prostate cancer Paternal Uncle 65   • Thyroid disease Family         disorder  "      Physical Exam:  Blood pressure 110/76, pulse 69, height 5' 4\" (1.626 m), weight 73.3 kg (161 lb 11.2 oz), SpO2 100%.  Body mass index is 27.76 kg/m².  Wt Readings from Last 3 Encounters:   01/17/24 73.3 kg (161 lb 11.2 oz)   12/07/23 71.7 kg (158 lb)   11/13/23 71.7 kg (158 lb)     Physical Exam  Vitals reviewed.   Constitutional:       General: She is not in acute distress.     Appearance: She is not toxic-appearing.   Neck:      Comments: No JVD  Cardiovascular:      Rate and Rhythm: Normal rate and regular rhythm.      Heart sounds: No murmur heard.     No friction rub. No gallop.   Musculoskeletal:      Right lower leg: No edema.      Left lower leg: No edema.   Neurological:      Mental Status: She is alert.          Labs & Results:  Lab Results   Component Value Date    SODIUM 141 10/15/2023    K 4.3 10/15/2023     10/15/2023    CO2 31 10/15/2023    BUN 21 10/15/2023    CREATININE 0.90 10/15/2023    GLUC 101 12/30/2019    CALCIUM 9.4 10/15/2023     No results found for: \"NTBNP\"     Thank you for the opportunity to participate in the care of this patient.    Cody Rivera MD, [facility]  Staff Cardiologist  Director of Cardio-Oncology  [facility]  "

## 2024-01-23 ENCOUNTER — OFFICE VISIT (OUTPATIENT)
Dept: INTERNAL MEDICINE CLINIC | Facility: CLINIC | Age: 59
End: 2024-01-23
Payer: COMMERCIAL

## 2024-01-23 VITALS
OXYGEN SATURATION: 97 % | WEIGHT: 161 LBS | TEMPERATURE: 95.8 F | SYSTOLIC BLOOD PRESSURE: 116 MMHG | HEART RATE: 59 BPM | HEIGHT: 64 IN | BODY MASS INDEX: 27.49 KG/M2 | DIASTOLIC BLOOD PRESSURE: 80 MMHG

## 2024-01-23 DIAGNOSIS — K21.9 GASTROESOPHAGEAL REFLUX DISEASE WITHOUT ESOPHAGITIS: ICD-10-CM

## 2024-01-23 DIAGNOSIS — M51.17 INTERVERTEBRAL DISC DISORDER WITH RADICULOPATHY OF LUMBOSACRAL REGION: ICD-10-CM

## 2024-01-23 DIAGNOSIS — F11.20 UNCOMPLICATED OPIOID DEPENDENCE (HCC): Primary | ICD-10-CM

## 2024-01-23 PROCEDURE — 99214 OFFICE O/P EST MOD 30 MIN: CPT | Performed by: INTERNAL MEDICINE

## 2024-01-23 RX ORDER — OMEPRAZOLE 40 MG/1
40 CAPSULE, DELAYED RELEASE ORAL
Qty: 90 CAPSULE | Refills: 1 | Status: CANCELLED | OUTPATIENT
Start: 2024-01-23

## 2024-01-23 RX ORDER — GABAPENTIN 100 MG/1
100 CAPSULE ORAL 3 TIMES DAILY PRN
Qty: 90 CAPSULE | Refills: 2 | Status: SHIPPED | OUTPATIENT
Start: 2024-01-23

## 2024-01-23 RX ORDER — TRAMADOL HYDROCHLORIDE 50 MG/1
50 TABLET ORAL EVERY 8 HOURS PRN
Qty: 90 TABLET | Refills: 0 | Status: SHIPPED | OUTPATIENT
Start: 2024-01-23

## 2024-01-23 RX ORDER — OMEPRAZOLE 20 MG/1
20 CAPSULE, DELAYED RELEASE ORAL
Qty: 90 CAPSULE | Refills: 1 | Status: SHIPPED | OUTPATIENT
Start: 2024-01-23

## 2024-01-23 NOTE — PROGRESS NOTES
Name: Debra Escoto      : 1965      MRN: [id number]  Encounter Provider: Bernarda Wei MD  Encounter Date: 2024   Encounter department: [facility] INTERNAL MEDICINE    Assessment & Plan     1. Uncomplicated opioid dependence (HCC)    2. Intervertebral disc disorder with radiculopathy of lumbosacral region  Assessment & Plan:  Has not seen pain management since pain has not been constant.  Discussed starting gabapentin again, may take prn.    Orders:  -     traMADol (ULTRAM) 50 mg tablet; Take 1 tablet (50 mg total) by mouth every 8 (eight) hours as needed for moderate pain  -     gabapentin (NEURONTIN) 100 mg capsule; Take 1 capsule (100 mg total) by mouth 3 (three) times a day as needed (moderate pain)    3. Gastroesophageal reflux disease without esophagitis  Assessment & Plan:  Restart daily PPI.  Discussed NSAID use.    Orders:  -     omeprazole (PriLOSEC) 20 mg delayed release capsule; Take 1 capsule (20 mg total) by mouth daily before breakfast           Subjective      She reports the winter when the cold weather and storms have been affecting her back pain.  She has had some flareups which would last between 1 to 3 days.  She would usually take ibuprofen 400 mg as needed.  She has found that she is unable to take this regularly without experiencing abdominal pain.  She is requesting a refill of her Protonix.  She takes about 400 mg 6 times a week.  She recalls being on gabapentin in the past, does not want to start taking this regularly again.  She is afraid she may gain weight.    She had plan to call pain management for an appointment but pain has not been as severe or as constant.  She reports that the steroid pack really helped the last time.      Review of Systems   Constitutional:  Negative for activity change and appetite change.   Musculoskeletal:  Positive for arthralgias and back pain.   Neurological:  Negative for numbness.   Psychiatric/Behavioral:  Positive for  "sleep disturbance.        Current Outpatient Medications on File Prior to Visit   Medication Sig   • anastrozole (ARIMIDEX) 1 mg tablet Take 1 tablet (1 mg total) by mouth daily   • cholecalciferol (VITAMIN D3) 1,000 units tablet Take 1,000 Units by mouth daily   • escitalopram (LEXAPRO) 10 mg tablet TAKE 1 TABLET BY MOUTH EVERY DAY   • rosuvastatin (CRESTOR) 20 MG tablet Take 1 tablet (20 mg total) by mouth daily   • traZODone (DESYREL) 50 mg tablet TAKE 1 TABLET BY MOUTH AT BEDTIME   • tretinoin (RETIN-A) 0.025 % cream Apply topically daily at bedtime   • vitamin B-12 (VITAMIN B-12) 500 mcg tablet Take 1 tablet (500 mcg total) by mouth daily   • [DISCONTINUED] traMADol (ULTRAM) 50 mg tablet Take 1 tablet (50 mg total) by mouth every 8 (eight) hours as needed for moderate pain       Objective     /80   Pulse 59   Temp (!) 95.8 °F (35.4 °C)   Ht 5' 4\" (1.626 m)   Wt 73 kg (161 lb)   SpO2 97%   BMI 27.64 kg/m²     Physical Exam  Constitutional:       Appearance: Normal appearance.   HENT:      Head: Normocephalic and atraumatic.   Eyes:      Pupils: Pupils are equal, round, and reactive to light.   Pulmonary:      Effort: Pulmonary effort is normal. No respiratory distress.   Neurological:      General: No focal deficit present.      Mental Status: She is alert and oriented to person, place, and time.   Psychiatric:         Mood and Affect: Mood normal.         Behavior: Behavior normal.       Bernarda eWi MD    "

## 2024-01-23 NOTE — ASSESSMENT & PLAN NOTE
Has not seen pain management since pain has not been constant.  Discussed starting gabapentin again, may take prn.

## 2024-01-23 NOTE — PROGRESS NOTES
Assessment/Plan     Problem List Items Addressed This Visit        Digestive    Gastroesophageal reflux disease     Restart daily PPI.  Discussed NSAID use.         Relevant Medications    omeprazole (PriLOSEC) 20 mg delayed release capsule       Nervous and Auditory    Intervertebral disc disorder with radiculopathy of lumbosacral region     Has not seen pain management since pain has not been constant.  Discussed starting gabapentin again, may take prn.         Relevant Medications    traMADol (ULTRAM) 50 mg tablet    gabapentin (NEURONTIN) 100 mg capsule       Other    Uncomplicated opioid dependence (HCC) - Primary      Treatment Plan: Add gabapentin to take prn    Treatment Goals: Control pain to continue daily activities, work    Opiate risks  There are risks associated with opioid medications, including dependence, addiction and tolerance. The patient understands and agrees to use these medications only as prescribed. Potential side effects of the medications include, but are not limited to, constipation, drowsiness, addiction, impaired judgment and risk of fatal overdose if not taken as prescribed. The patient was warned against driving while taking sedation medications.  Sharing medications is a felony. At this point in time, the patient is showing no signs of addiction, abuse, diversion or suicidal ideation.      PDMP review  [place] PDMP or [place]  reviewed. No red flags were identified; safe to proceed with prescription          Subjective     Opioid Management:   Type of visit: Follow-up    Pain related diagnoses: chronic pain syndrome , low back SI joint pain.     Interval history: Intermittent flare ups  Taking ibuprofen, developing epigatric pain    Aberrant behavior?: No      Adverse effects from medication?: No      Screening Tools/Assessments:    PHQ-2/9:  PHQ-9 score: 2    Brief Pain Inventory (BPI):  1) Throughout our lives, most of us have had pain from time to time (such as minor headaches, sprains,  and toothaches). Have you had pain other than these everyday kinds of pain today? Yes  2) Where is your pain located? right side of body, back to foot  3) Rate your pain at its worst in the last 24 hours: 8  4) Rate your pain at its least in the last 24 hours: 1  5) Rate your average level of pain: 4  6) Rate your pain right now: 6  7) What treatments or medications are you receiving for your pain? tramadol, tylenol and heat  8) In the past 24 hours, how much relief have pain treatments or medication provided? 50%  9) During the past 24 hours, pain has interfered with your:     A) General activity: 1     B) Mood: 3     C) Walking ability: 0     D) Normal work (work outside the home & housework): 0     E) Relations with other people: 0     F) Sleep: 4     G) Enjoyment of life: 2    COMM:  Current COMM Score: 3 (negative, low risk patient)    Drug Screen:  Date of last drug screen: 3/21/2023    Opioid agreement:  Active Opioid agreement on file?: Yes    Opioid agreement signed date: 3/17/2023  Opioid agreement expiration date: 3/16/2024    Naloxone:  Currently prescribed Naloxone (Narcan): No      HPI  Pain Medications             gabapentin (NEURONTIN) 100 mg capsule Take 1 capsule (100 mg total) by mouth 3 (three) times a day as needed (moderate pain)    traMADol (ULTRAM) 50 mg tablet Take 1 tablet (50 mg total) by mouth every 8 (eight) hours as needed for moderate pain    escitalopram (LEXAPRO) 10 mg tablet TAKE 1 TABLET BY MOUTH EVERY DAY    traZODone (DESYREL) 50 mg tablet TAKE 1 TABLET BY MOUTH AT BEDTIME         Outpatient Morphine Milligram Equivalents Per Day     1/23/24 and after 15 MME/Day    Order Name Dose Route Frequency Maximum MME/Day     traMADol (ULTRAM) 50 mg tablet 50 mg Oral Every 8 hours PRN 15 MME/Day    Total Potential Morphine Milligram Equivalents Per Day 15 MME/Day    Calculation Information        traMADol (ULTRAM) 50 mg tablet    traMADol 50 mg Tabs: single dose of 50 mg * 3 doses per day  "* morphine equivalence factor of 0.1 = 15 MME/Day                         PDMP Review       Value Time User    PDMP Reviewed  Yes 1/23/2024  4:20 PM Bernarda Wei MD         Review of Systems   Constitutional:  Negative for activity change and appetite change.   Gastrointestinal:  Negative for constipation.   Musculoskeletal:  Positive for arthralgias and back pain.   Neurological:  Negative for weakness and numbness.   Psychiatric/Behavioral:  Negative for sleep disturbance.      Objective     /80   Pulse 59   Temp (!) 95.8 °F (35.4 °C)   Ht 5' 4\" (1.626 m)   Wt 73 kg (161 lb)   SpO2 97%   BMI 27.64 kg/m²     Physical Exam  Vitals reviewed.   Constitutional:       Appearance: Normal appearance.   HENT:      Head: Normocephalic and atraumatic.      Mouth/Throat:      Mouth: Mucous membranes are moist.   Eyes:      Pupils: Pupils are equal, round, and reactive to light.   Pulmonary:      Effort: Pulmonary effort is normal. No respiratory distress.   Neurological:      General: No focal deficit present.      Mental Status: She is alert and oriented to person, place, and time.   Psychiatric:         Mood and Affect: Mood normal.         Behavior: Behavior normal.         Bernarda Wei MD          "

## 2024-01-24 ENCOUNTER — PROCEDURE VISIT (OUTPATIENT)
Dept: DERMATOLOGY | Facility: CLINIC | Age: 59
End: 2024-01-24

## 2024-01-24 DIAGNOSIS — Z41.1 ENCOUNTER FOR COSMETIC LASER PROCEDURE: Primary | ICD-10-CM

## 2024-01-24 PROCEDURE — ACNEIPLARMS3: Performed by: STUDENT IN AN ORGANIZED HEALTH CARE EDUCATION/TRAINING PROGRAM

## 2024-01-24 NOTE — PATIENT INSTRUCTIONS
POST TREATMENT INSTRUCTIONS FOR IPL (INTENSE PULSED LIGHT)    Most patients experience minimal to no side effects after this treatment. However, a small percentage (less than 1%) of patients may experience minor side effects such as swelling, slight scabbing, blisters, bruising, hyper/hypopigmentation. Listed below are some simple but important instructions that will help minimize side effects and help you achieve the best possible result from your treatment.    1. Avoid direct/indirect sun exposure. This means no tanning of any kind, including tanning booths or artificial tanning otions. We recommend applying sunblock daily to the treated area, especially facial areas with minimum of 30 SPF. Your sunblock should contain Zinc Oxide and/or Titanium Dioxide as the active ingredients. Make sure to reapply sunblock every 2-3 hours outdoors or in direct sunlight.    2. After a treatment your skin may feel slightly sunburned. You may use a cold pack for comfort and to relieve swelling as needed. Cold Aloe Vera gel works well. Avoid hot baths or showering for the first 24-48 hours. Call if you notice the sensation of excessive or uncomfortable heat.    3. Very rarely, a small blister or scab may form. Call immediately if you have any blistering. Leave it alone if intact. If skin breaks or a blister opens, apply an over the counter topical antibiotic ointment to the area and reapply regularly to keep area moist and well covered with medication. To reduce the risk of scarring you must not pick at your skin after an IPL treatment.    4. If you experience itching as the skin heals, you may use an over the counter topical hydrocortisone cream. Do not use Retin A products or other irritating products including glycolic acids or other acids until instructed to do so.    5. You may apply make up on the treated area as long as the skin is not broken or blistered.    6. Discontinue use of any Retin A or hydroquinone products 5-7  days prior to your next treatment.    Please do not hesitate to call our office at [phone] if you have any questions or concerns about your treatments.

## 2024-01-24 NOTE — PROGRESS NOTES
"[facility] Dermatology Clinic Note     Patient Name: Debra Escoto  Encounter Date: 1/24/2024     Have you been cared for by a [facility] Dermatologist in the last 3 years and, if so, which description applies to you?    Yes.  I have been here within the last 3 years, and my medical history has NOT changed since that time.  I am FEMALE/of child-bearing potential.    REVIEW OF SYSTEMS:  Have you recently had or currently have any of the following? No changes in my recent health.   PAST MEDICAL HISTORY:  Have you personally ever had or currently have any of the following?  If \"YES,\" then please provide more detail. No changes in my medical history.   HISTORY OF IMMUNOSUPPRESSION: Do you have a history of any of the following:  Systemic Immunosuppression such as Diabetes, Biologic or Immunotherapy, Chemotherapy, Organ Transplantation, Bone Marrow Transplantation?  No     Answering \"YES\" requires the addition of the dotphrase \"IMMUNOSUPPRESSED\" as the first diagnosis of the patient's visit.   FAMILY HISTORY:  Any \"first degree relatives\" (parent, brother, sister, or child) with the following?    No changes in my family's known health.   PATIENT EXPERIENCE:    Do you want the Dermatologist to perform a COMPLETE skin exam today including a clinical examination under the \"bra and underwear\" areas?  NO  If necessary, do we have your permission to call and leave a detailed message on your Preferred Phone number that includes your specific medical information?  Yes      Allergies   Allergen Reactions    Sulfa Antibiotics Headache     Annotation - [id number]: Migraine; Annotation - [id number]: cellular issues      Current Outpatient Medications:     anastrozole (ARIMIDEX) 1 mg tablet, Take 1 tablet (1 mg total) by mouth daily, Disp: 90 tablet, Rfl: 1    cholecalciferol (VITAMIN D3) 1,000 units tablet, Take 1,000 Units by mouth daily, Disp: , Rfl:     escitalopram (LEXAPRO) 10 mg tablet, TAKE 1 TABLET BY MOUTH EVERY DAY, Disp: " 90 tablet, Rfl: 0    gabapentin (NEURONTIN) 100 mg capsule, Take 1 capsule (100 mg total) by mouth 3 (three) times a day as needed (moderate pain), Disp: 90 capsule, Rfl: 2    omeprazole (PriLOSEC) 20 mg delayed release capsule, Take 1 capsule (20 mg total) by mouth daily before breakfast, Disp: 90 capsule, Rfl: 1    rosuvastatin (CRESTOR) 20 MG tablet, Take 1 tablet (20 mg total) by mouth daily, Disp: 90 tablet, Rfl: 1    traMADol (ULTRAM) 50 mg tablet, Take 1 tablet (50 mg total) by mouth every 8 (eight) hours as needed for moderate pain, Disp: 90 tablet, Rfl: 0    traZODone (DESYREL) 50 mg tablet, TAKE 1 TABLET BY MOUTH AT BEDTIME, Disp: 90 tablet, Rfl: 1    tretinoin (RETIN-A) 0.025 % cream, Apply topically daily at bedtime, Disp: 30 g, Rfl: 3    vitamin B-12 (VITAMIN B-12) 500 mcg tablet, Take 1 tablet (500 mcg total) by mouth daily, Disp: 90 tablet, Rfl: 1        Whom besides the patient is providing clinical information about today's encounter?   NO ADDITIONAL HISTORIAN (patient alone provided history)    Physical Exam and Assessment/Plan by Diagnosis:    Intense Pulse Light Treatment Note  1/24/2024    Device: Intelenllar M22   Place of Treatment: [facility]    Surgeon and : Fadumo Gilbert MD  Assistant: Micki Benitez    Treatment number: 1  Site of treatment: bilateral arms  Skin type: Garza 2    Pre-operative Diagnosis: Dyschromia  Indications for Procedure:  Acquired Brachiocutaneous dyschromatosis and   Post-operative Diagnosis: same as pre-operative     Safety Precautions:  Fire extinguisher persent/Window covered/Staff and patient eyes covered/Warning sign posted.      Interim History/Comments:  none  Percent Improvement: N/A    Parameters:     First Pass over entire b/l dorsal distal and proximal arms  The following filter was used: 560nm   Fluence: 15 J/cm2   Spot size/shape: rectangular 15 x 35mm     Second Pass to more discrete, larger lentigos:  The following filter was  used: 515nm   Fluence: 15 J/cm2   Spot size/shape: rectangular 15 x 35mm and  rectangular 8 x 15mm       Procedure Note:   After discussing potential procedure related risks including but not limited to pain, purpura, blistering, scarring, discoloration, “footprinting,” recurrence, inefficacy, need for additional treatment, and undesirable cosmetic results, written and verbal consent were obtained.  Patient was brought back to the operating room. Time out was performed.  Patient's name, identification and intended procedure were verified. Prior to the procedure, the patient cleansed the treatment area. The treatment area was re-cleansed with alcohol. Eye coverings eye shield inserted/placed. Ultrasound gel was applied to areas of treatment and re-applied as necessary throughout treatment.     Tolerance: Patient tolerated the procedure well with no immediate significant complications and left in stable condition.   Complications: one spot on distal forearm at second pass with persistent erythema in shape of spot size and more focally tender than other areas treated. Discussed superficial burn possibility with likely superficial sloughing but epidermis appears intact at this time. Pt knows to reach out with any concerns.   Other procedures: none  Photography: no    Post-op Care: Aftercare was discussed. Continue to ice at home and keep the area moist with a gentle moisturizer. Advised the patient to be cautious with sun exposure over the next week. Advised patient to call the office if there is excessive swelling or with any concerns.   Follow-up:  Patient is aware that it will take multiple treatments to treat this condition. Return to clinic for re-treatment in 4-6 weeks.      THIS WAS A COSMETIC TREATMENT AND PATIENT PAID OUT OF POCKET, DO NOT BILL INSURANCE.   AMOUNT PAID: $1200 for package of 3          Scribe Attestation      I,:  Lisa Benitez am acting as a scribe while in the presence of the attending  physician.:       I,:  Fadumo Gilbert MD personally performed the services described in this documentation    as scribed in my presence.:

## 2024-01-30 ENCOUNTER — TELEPHONE (OUTPATIENT)
Dept: DERMATOLOGY | Facility: CLINIC | Age: 59
End: 2024-01-30

## 2024-01-30 NOTE — TELEPHONE ENCOUNTER
Pre- operative Mohs Telephone Scheduling Note    Do you have a pacemaker, defibrillator, spinal or brain stimulator? no    Do you take antibiotics before skin or dental procedures? no  If yes, will likely require pre-operative antibiotics. Ask  the patient why they take the antibiotics (usually because of joint replacement).    Do you have a history of a joint replacements within the past 2 years? no   If yes, will likely require pre-operative antibiotics. Ask if orthopaedic surgeon has prescribed pre-operative antibiotics to take before procedures/dental work?    Do you take any OTC medications that thin your blood (Aspirin, Aleve, Ibuprofen) or supplements that thin your blood (fish oil, garlic, vitamin E, Ginko Biloba)? no    Do you take any prescribed medications that thin your blood (Coumadin, Plavix, Xarelto, Eliquis or another prescribed blood thinner)? no    Do you have an allergy to lidocaine or epinephrine? no    Do you have allergies to Iodine? no    Do you wear a lidocaine patch? no    Have you ever been diagnosed with HIV, AIDS, Hep B and Hep C? no    Do you use a cane, walker or wheelchair? no    Is the patient from a nursing home? no If yes, Is there any special accommodations that is needed for patient n/a    Do you smoke? no      If yes,  patient to try and stop 2 days before surgery and 7 days after the surgery. Minimizing smoking as much as possible during this time will improve healing and the cosmetic result after surgery.    Do you use supplemental oxygen? If so, how many liters and can you be off it for a short period of time? N/a    Date scheduled: March 19, 2024 @ 9:00 am with Dr. Gilbert    Coordination of Care with other provider (Oculoplastics, Plastics, ENT) required? no   IF YES, PLEASE FORWARD TO APPROPRIATE PERSONNEL TO HELP COORDINATE.    Are there remaining tumors to be scheduled? no    Was Prior Authorization obtained? No (please use [email or web address] to document prior auth)

## 2024-02-20 ENCOUNTER — TELEPHONE (OUTPATIENT)
Dept: HEMATOLOGY ONCOLOGY | Facility: CLINIC | Age: 59
End: 2024-02-20

## 2024-02-20 ENCOUNTER — PATIENT MESSAGE (OUTPATIENT)
Dept: HEMATOLOGY ONCOLOGY | Facility: CLINIC | Age: 59
End: 2024-02-20

## 2024-02-20 DIAGNOSIS — Z17.0 MALIGNANT NEOPLASM OF LEFT BREAST IN FEMALE, ESTROGEN RECEPTOR POSITIVE, UNSPECIFIED SITE OF BREAST: Primary | ICD-10-CM

## 2024-02-20 DIAGNOSIS — C50.912 MALIGNANT NEOPLASM OF LEFT BREAST IN FEMALE, ESTROGEN RECEPTOR POSITIVE, UNSPECIFIED SITE OF BREAST: Primary | ICD-10-CM

## 2024-02-21 ENCOUNTER — APPOINTMENT (OUTPATIENT)
Dept: LAB | Age: 59
End: 2024-02-21
Payer: COMMERCIAL

## 2024-02-21 DIAGNOSIS — Z17.0 MALIGNANT NEOPLASM OF LEFT BREAST IN FEMALE, ESTROGEN RECEPTOR POSITIVE, UNSPECIFIED SITE OF BREAST: ICD-10-CM

## 2024-02-21 DIAGNOSIS — C50.912 MALIGNANT NEOPLASM OF LEFT BREAST IN FEMALE, ESTROGEN RECEPTOR POSITIVE, UNSPECIFIED SITE OF BREAST: ICD-10-CM

## 2024-02-21 LAB
ALBUMIN SERPL BCP-MCNC: 4.6 G/DL (ref 3.5–5)
ALP SERPL-CCNC: 58 U/L (ref 34–104)
ALT SERPL W P-5'-P-CCNC: 25 U/L (ref 7–52)
ANION GAP SERPL CALCULATED.3IONS-SCNC: 10 MMOL/L
AST SERPL W P-5'-P-CCNC: 20 U/L (ref 13–39)
BASOPHILS # BLD AUTO: 0.02 THOUSANDS/ÂΜL (ref 0–0.1)
BASOPHILS NFR BLD AUTO: 0 % (ref 0–1)
BILIRUB SERPL-MCNC: 0.48 MG/DL (ref 0.2–1)
BUN SERPL-MCNC: 21 MG/DL (ref 5–25)
CALCIUM SERPL-MCNC: 9.8 MG/DL (ref 8.4–10.2)
CHLORIDE SERPL-SCNC: 100 MMOL/L (ref 96–108)
CO2 SERPL-SCNC: 29 MMOL/L (ref 21–32)
CREAT SERPL-MCNC: 0.9 MG/DL (ref 0.6–1.3)
EOSINOPHIL # BLD AUTO: 0.15 THOUSAND/ÂΜL (ref 0–0.61)
EOSINOPHIL NFR BLD AUTO: 3 % (ref 0–6)
ERYTHROCYTE [DISTWIDTH] IN BLOOD BY AUTOMATED COUNT: 13.1 % (ref 11.6–15.1)
ESTRADIOL SERPL-MCNC: <15 PG/ML
FSH SERPL-ACNC: 77.4 MIU/ML
GFR SERPL CREATININE-BSD FRML MDRD: 71 ML/MIN/1.73SQ M
GLUCOSE P FAST SERPL-MCNC: 87 MG/DL (ref 65–99)
HCT VFR BLD AUTO: 40.1 % (ref 34.8–46.1)
HGB BLD-MCNC: 13 G/DL (ref 11.5–15.4)
IMM GRANULOCYTES # BLD AUTO: 0.01 THOUSAND/UL (ref 0–0.2)
IMM GRANULOCYTES NFR BLD AUTO: 0 % (ref 0–2)
LH SERPL-ACNC: 42.5 MIU/ML
LYMPHOCYTES # BLD AUTO: 1.36 THOUSANDS/ÂΜL (ref 0.6–4.47)
LYMPHOCYTES NFR BLD AUTO: 29 % (ref 14–44)
MCH RBC QN AUTO: 31.1 PG (ref 26.8–34.3)
MCHC RBC AUTO-ENTMCNC: 32.4 G/DL (ref 31.4–37.4)
MCV RBC AUTO: 96 FL (ref 82–98)
MONOCYTES # BLD AUTO: 0.32 THOUSAND/ÂΜL (ref 0.17–1.22)
MONOCYTES NFR BLD AUTO: 7 % (ref 4–12)
NEUTROPHILS # BLD AUTO: 2.92 THOUSANDS/ÂΜL (ref 1.85–7.62)
NEUTS SEG NFR BLD AUTO: 61 % (ref 43–75)
NRBC BLD AUTO-RTO: 0 /100 WBCS
PLATELET # BLD AUTO: 257 THOUSANDS/UL (ref 149–390)
PMV BLD AUTO: 9.9 FL (ref 8.9–12.7)
POTASSIUM SERPL-SCNC: 3.8 MMOL/L (ref 3.5–5.3)
PROT SERPL-MCNC: 6.7 G/DL (ref 6.4–8.4)
RBC # BLD AUTO: 4.18 MILLION/UL (ref 3.81–5.12)
SODIUM SERPL-SCNC: 139 MMOL/L (ref 135–147)
WBC # BLD AUTO: 4.78 THOUSAND/UL (ref 4.31–10.16)

## 2024-02-21 PROCEDURE — 83002 ASSAY OF GONADOTROPIN (LH): CPT

## 2024-02-21 PROCEDURE — 83001 ASSAY OF GONADOTROPIN (FSH): CPT

## 2024-02-21 PROCEDURE — 36415 COLL VENOUS BLD VENIPUNCTURE: CPT

## 2024-02-21 PROCEDURE — 85025 COMPLETE CBC W/AUTO DIFF WBC: CPT

## 2024-02-21 PROCEDURE — 82670 ASSAY OF TOTAL ESTRADIOL: CPT

## 2024-02-21 PROCEDURE — 80053 COMPREHEN METABOLIC PANEL: CPT

## 2024-02-22 ENCOUNTER — OFFICE VISIT (OUTPATIENT)
Dept: HEMATOLOGY ONCOLOGY | Facility: CLINIC | Age: 59
End: 2024-02-22
Payer: COMMERCIAL

## 2024-02-22 VITALS
HEART RATE: 52 BPM | SYSTOLIC BLOOD PRESSURE: 116 MMHG | TEMPERATURE: 97.4 F | OXYGEN SATURATION: 98 % | BODY MASS INDEX: 27.91 KG/M2 | RESPIRATION RATE: 18 BRPM | DIASTOLIC BLOOD PRESSURE: 64 MMHG | HEIGHT: 64 IN | WEIGHT: 163.5 LBS

## 2024-02-22 DIAGNOSIS — Z17.0 MALIGNANT NEOPLASM OF LEFT BREAST IN FEMALE, ESTROGEN RECEPTOR POSITIVE, UNSPECIFIED SITE OF BREAST: Primary | ICD-10-CM

## 2024-02-22 DIAGNOSIS — Z79.811 AROMATASE INHIBITOR USE: ICD-10-CM

## 2024-02-22 DIAGNOSIS — Z17.0 MALIGNANT NEOPLASM OF LEFT BREAST IN FEMALE, ESTROGEN RECEPTOR POSITIVE, UNSPECIFIED SITE OF BREAST: ICD-10-CM

## 2024-02-22 DIAGNOSIS — C50.912 MALIGNANT NEOPLASM OF LEFT BREAST IN FEMALE, ESTROGEN RECEPTOR POSITIVE, UNSPECIFIED SITE OF BREAST: Primary | ICD-10-CM

## 2024-02-22 DIAGNOSIS — C50.912 MALIGNANT NEOPLASM OF LEFT BREAST IN FEMALE, ESTROGEN RECEPTOR POSITIVE, UNSPECIFIED SITE OF BREAST: ICD-10-CM

## 2024-02-22 PROCEDURE — 99215 OFFICE O/P EST HI 40 MIN: CPT | Performed by: INTERNAL MEDICINE

## 2024-02-22 RX ORDER — ANASTROZOLE 1 MG/1
1 TABLET ORAL DAILY
Qty: 90 TABLET | Refills: 1 | Status: SHIPPED | OUTPATIENT
Start: 2024-02-22

## 2024-02-22 NOTE — PROGRESS NOTES
HEMATOLOGY / ONCOLOGY CLINIC FOLLOW UP NOTE    Patient Debra Escoto  MRN: [id number]  : 1965  Date of Encounter 2024      Referring Provider:      Reason for Encounter: follow up       Oncology History   Malignant neoplasm of left breast in female, estrogen receptor positive    2019 Biopsy    Left breast ultrasound-guided biopsy  12 o'clock, 4 cm from nipple  Invasive breast carcinoma of no special type (ductal NST)  Grade 2    2 o'clock, 5 cm from nipple  Invasive breast carcinoma of no special type (ductal NST)  Grade 2  ER 90%  OR 90%  HER2 1+     2019 Genetic Testing    The following genes were evaluated: JOSH, BRCA1, BRCA2, CDH1, CHEK2, PALB2, PTEN, STK11, TP53  Negative for genetic mutations or variants  [facility]     2019 Surgery    Left breast mastectomy with sentinel lymph node biopsy  Two foci of invasive mammary carcinoma of no special type (ductal)  Grade 2  2.4 cm  DCIS measures at least 8.2 cm  0/2 Lymph nodes  Margins negative  Anatomic Stage IIA  Prognostic Stage IA    Right breast mastectomy; prophylactic  Dr. Sadler     2019 Genomic Testing    MammaPrint for FLEX  High Risk     10/4/2019 -  Chemotherapy    Adjuvant chemotherapy (Dr Carreno)  AC followed by dose dense paclitaxel     2020 -  Hormone Therapy    Tamoxifen  2022 Transfer of care- Dr. Ojeda             Assessment / Plan:    1. Right breast cancer, stage IIA (pT2, pN0, M0) grade 2, ER 90% positive, OR 90% positive, HER2 negative disease.  MammaPrint high risk.  Diagnosed in 2019.   2. BRCA gene mutation negative.     Allie Escoto is a 58-year-old premenopausal female with stage IIA right breast cancer, grade 2, ER 90% positive, OR 90% positive, HER2 negative disease.  She is negative for BRCA gene mutation.  She underwent right mastectomy and sentinel lymph node biopsy as well as left prophylactic mastectomy, resulting in ERIK.  Her tumor was found to be high risk based on a MammaPrint.  She underwent  adjuvant chemotherapy with dose dense AC followed by dose dense paclitaxel.  She was on adjuvant endocrine therapy with tamoxifen from February 2020-March 2023 at which time she stopped taking the medication on her own.  She states she was experiencing weight gain and joint pain.  Patient does note history of chronic SI joint issues and chronic pain for which she was following with pain management in the past.  Prior plan was to complete 5 years of tamoxifen and switch to an aromatase inhibitor for another 5 years.  At this juncture, due to intolerance of tamoxifen recommend patient switch to aromatase inhibitor.  We discussed treatment with Arimidex 1 mg p.o. daily.  We also discussed low suspicion of tamoxifen causing her SI joint pain as this is a chronic problem for her.    Plan:     We reviewed the side effects of aromatase inhibitors including, but not limited to hot flashes, vaginal dryness, mood swings, weight gain, difficulty sleeping, decreased sexual interest, arthralgias/myalgias and worsening of osteopenia/osteoporosis.  Patient signed informed consent for Arimidex.  She will follow closely with her PCP for bone health monitoring.  She will follow-up in 3 months to assess tolerance.  Patient aware to contact the office in the interim.  We will obtain baseline estradiol/FSH/LH to assess menopausal status.    2/22/2024    Breast Cancer:  Stage Iia pT2pN0 M0 ER/MI + Her neg on adjuvant endocrine therapy, now Arimidex after starting CLARK in Feb 2020     Continue on Arimidex    Repeat Dexa scan-last done in 2020    Labs acceptable; post menopausal hormone levels      Follow up 3 months; if continues to tolerate, will then move to every 6 months.         HPI:    Allie Escoto is a 58-year-old premenopausal female who was initially found to have an abnormality in her right breast based on screening mammogram. Therefore, she underwent right breast biopsy in June 13, 2019 which showed invasive ductal carcinoma, grade  2.  This was ER 90 % positive, CO 90 % positive, HER2 negative disease.  She was referred to Dr. Sadler.  She was negative for BRCA gene mutation.  Because of the multifocal disease, she underwent right mastectomy and sentinel lymph node biopsy as well as left prophylactic mastectomy in August 9, 2019. Left breast tissue did not show any malignancy.  She had a 2.3 as well as 2.4 cm of invasive ductal carcinoma, grade 2. Surgical margin was negative.  2 sentinel lymph nodes were negative for metastatic disease.  She did not have reconstruction. Her tumor was found to be high risk based on a MammaPrint.  She underwent adjuvant chemotherapy with dose dense AC followed by dose dense paclitaxel given by Dr. Ev Augustin.  Subsequently, she has been on adjuvant hormonal therapy with tamoxifen since February 2020.  She was previously following with Dr. Ojeda and last seen in the office on 8/17/2022.  He presents today for transfer of care/follow-up visit.    Last Visit Nov 2023     Pt self stopped tamoxifen in March 2023. Was having joint pains and weight gain.  LMP was October 2019.   She does note a history of chronic bone pain. Has chronic SI joint issues. Was previously following with pain management.  She denies any chest pain, shortness of breath, abdominal pain, nausea, vomiting, diarrhea.      Interval History:  2/22/2024    Ms Escoto has been on adjuvant arimidex since Nov 2023.  She has been on adjuvant endocrine therapy since Feb 2020 so at the 4 year renee.  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  She cannot tolerate NSAIDs and uses Tramadol for pain control.  She has no sleeping issues.  She otherwise is doing well.  Labs were unremarkable.  Her FSH, estradiol consistent with post menopausal levels          Review of Systems    Constitutional:  Negative for chills, fatigue and fever.   Eyes:  Negative for pain and visual disturbance.   Respiratory:  Negative for cough and shortness of breath.    Cardiovascular:  Negative for chest pain and palpitations.   Gastrointestinal:  Negative for abdominal pain and vomiting.   Genitourinary:  Negative for dysuria and hematuria.   Musculoskeletal:  Positive for arthralgias (chronic SI joint). Negative for back pain.   Skin:  Negative for color change and rash.   Neurological:  Negative for seizures and syncope.   All other systems reviewed and are negative.  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 Right breast cancer, stage IIA (pT2, pN0, M0) grade 2, ER 90% positive, PA 90% positive, HER2 negative disease.  MammaPrint high risk.  Diagnosed in August 2019.   2. BRCA gene mutation negative.     Cancer Staging:    Malignant neoplasm of upper-outer quadrant of left breast in female, estrogen receptor positive (HCC)  Staging form: Breast, AJCC 8th Edition  - Pathologic: Stage IA (pT2(2), pN0(sn), cM0, G2,  ER+, PA+, HER2-) - Unsigned  Neoadjuvant therapy: No  Laterality: Left  Tumor size (mm): 24  Multiple tumors: Yes  Number of tumors: 2  Method of lymph node assessment: Sigel lymph node biopsy  Histologic grading system: 3 grade system      Previous Hematologic/ Oncologic Treatment:   1. Adjuvant chemotherapy with dose dense AC x4 followed by dose dense paclitaxel x4, completed in January 2020, given by Dr. Ev Augustin.  2.  Adjuvant hormonal therapy with tamoxifen February 2020-March 2023.     Current Hematologic/ Oncologic Treatment:     Recommend Arimidex 1 mg p.o. daily     Disease Status:    ERIK status post right mastectomy and sentinel lymph node biopsy as well as left prophylactic mastectomy.     Test Results:   Pathology:   2.3 and 2.4 cm of invasive ductal carcinoma, grade 2. 2 sentinel lymph nodes were negative for metastatic disease.  ER 90% positive, PA 90% positive, HER2 negative disease.  MammaPrint high risk.  Stage IIA (pT2, pN0, M0)       Radiology:   DEXA scan in March 2020 showed T-score-1.1, consistent with osteopenia.  Past Medical History:   has a past medical history of Allergic, Anxiety, BRCA gene mutation negative (06/20/2019), Cancer (HCC), Fibrocystic disease of breast, GERD (gastroesophageal reflux disease), Hiatal hernia, and Hyperlipidemia.    PAST SURGICAL HISTORY:   has a past surgical history that includes Appendectomy; Other surgical history (Right, 2010); Foot surgery (Right, 2011); pr colonoscopy flx dx w/collj spec when pfrmd (N/A, 7/20/2018); Breast cyst aspiration (Left, 2005); US guided breast biopsy left complete (Left, 6/13/2019); US guidance breast biopsy left each additional (Left, 6/13/2019); Breast biopsy; Colonoscopy; pr mastectomy simple complete (Right, 8/9/2019); Mastectomy w/ sentinel node biopsy (Left, 8/9/2019); IR port placement (9/27/2019); IR port removal (2/19/2020); and Lymph node biopsy.    CURRENT MEDICATIONS  Current Outpatient Medications    Medication Sig Dispense Refill    anastrozole (ARIMIDEX) 1 mg tablet Take 1 tablet (1 mg total) by mouth daily 90 tablet 1    cholecalciferol (VITAMIN D3) 1,000 units tablet Take 1,000 Units by mouth daily      escitalopram (LEXAPRO) 10 mg tablet TAKE 1 TABLET BY MOUTH EVERY DAY 90 tablet 0    gabapentin (NEURONTIN) 100 mg capsule Take 1 capsule (100 mg total) by mouth 3 (three) times a day as needed (moderate pain) 90 capsule 2    omeprazole (PriLOSEC) 20 mg delayed release capsule Take 1 capsule (20 mg total) by mouth daily before breakfast 90 capsule 1    rosuvastatin (CRESTOR) 20 MG tablet Take 1 tablet (20 mg total) by mouth daily 90 tablet 1    traMADol (ULTRAM) 50 mg tablet Take 1 tablet (50 mg total) by mouth every 8 (eight) hours as needed for moderate pain 90 tablet 0    traZODone (DESYREL) 50 mg tablet TAKE 1 TABLET BY MOUTH AT BEDTIME 90 tablet 1    tretinoin (RETIN-A) 0.025 % cream Apply topically daily at bedtime 30 g 3    vitamin B-12 (VITAMIN B-12) 500 mcg tablet Take 1 tablet (500 mcg total) by mouth daily 90 tablet 1     No current facility-administered medications for this visit.     [unfilled]    SOCIAL HISTORY:   reports that she has never smoked. She has never used smokeless tobacco. She reports current alcohol use of about 2.0 standard drinks of alcohol per week. She reports that she does not currently use drugs after having used the following drugs: Marijuana.     FAMILY HISTORY:  family history includes Coronary artery disease in her brother and mother; Heart attack (age of onset: 51) in her father; Hyperlipidemia in her father and mother; Hypertension in her father and mother; No Known Problems in her maternal aunt and maternal aunt; Other in her father and mother; Prostate cancer (age of onset: 65) in her paternal uncle; Thyroid disease in her family.     ALLERGIES:  is allergic to sulfa antibiotics.      Physical Exam:  Vital Signs:   Visit Vitals  OB Status Chemotherapy/radiation    Smoking Status Never     There is no height or weight on file to calculate BMI.  There is no height or weight on file to calculate BSA.    GEN: Alert, awake oriented x3, in no acute distress  HEENT- No pallor, icterus, cyanosis, no oral mucosal lesions,   LAD - no palpable cervical, clavicle, axillary, inguinal LAD  Heart- normal S1 S2, regular rate and rhythm, No murmur, rubs.   Lungs- clear breathing sound bilateral.   Abdomen- soft, Non tender, bowel sounds present  Extremities- No cyanosis, clubbing, edema  Neuro- No focal neurological deficit    Labs:  Lab Results   Component Value Date    WBC 4.78 02/21/2024    HGB 13.0 02/21/2024    HCT 40.1 02/21/2024    MCV 96 02/21/2024     02/21/2024     Lab Results   Component Value Date     02/05/2015    SODIUM 139 02/21/2024    K 3.8 02/21/2024     02/21/2024    CO2 29 02/21/2024    ANIONGAP 6 02/05/2015    AGAP 10 02/21/2024    BUN 21 02/21/2024    CREATININE 0.90 02/21/2024    GLUC 101 12/30/2019    GLUF 87 02/21/2024    CALCIUM 9.8 02/21/2024    AST 20 02/21/2024    ALT 25 02/21/2024    ALKPHOS 58 02/21/2024    PROT 7.4 02/05/2015    TP 6.7 02/21/2024    BILITOT 0.69 02/05/2015    TBILI 0.48 02/21/2024    EGFR 71 02/21/2024      Latest Reference Range & Units 11/14/23 07:51   LUTEINIZING HORMONE See Comment mIU/mL 58.9   FSH, POC See Comment mIU/mL 83.6   ESTRADIOL LEVEL See Comment pg/mL 20.1        Latest Reference Range & Units 02/21/24 12:21   Sodium 135 - 147 mmol/L 139   Potassium 3.5 - 5.3 mmol/L 3.8   Chloride 96 - 108 mmol/L 100   Carbon Dioxide 21 - 32 mmol/L 29   ANION GAP mmol/L 10   BUN 5 - 25 mg/dL 21   Creatinine 0.60 - 1.30 mg/dL 0.90   GLUCOSE, FASTING 65 - 99 mg/dL 87   Calcium 8.4 - 10.2 mg/dL 9.8   AST 13 - 39 U/L 20   ALT 7 - 52 U/L 25   ALK PHOS 34 - 104 U/L 58   Total Protein 6.4 - 8.4 g/dL 6.7   Albumin 3.5 - 5.0 g/dL 4.6   Total Bilirubin 0.20 - 1.00 mg/dL 0.48   GFR, Calculated ml/min/1.73sq m 71   WBC 4.31 - 10.16  Thousand/uL 4.78   RBC 3.81 - 5.12 Million/uL 4.18   Hemoglobin 11.5 - 15.4 g/dL 13.0   Hematocrit 34.8 - 46.1 % 40.1   MCV 82 - 98 fL 96   MCH 26.8 - 34.3 pg 31.1   MCHC 31.4 - 37.4 g/dL 32.4   RDW 11.6 - 15.1 % 13.1   Platelet Count 149 - 390 Thousands/uL 257   MPV 8.9 - 12.7 fL 9.9   nRBC /100 WBCs 0   Neutrophils % 43 - 75 % 61   Immat GRANS % 0 - 2 % 0   Lymphocytes Relative 14 - 44 % 29   Monocytes Relative 4 - 12 % 7   Eosinophils 0 - 6 % 3   Basophils Relative 0 - 1 % 0   Immature Grans Absolute 0.00 - 0.20 Thousand/uL 0.01   Absolute Neutrophils 1.85 - 7.62 Thousands/µL 2.92   Lymphocytes Absolute 0.60 - 4.47 Thousands/µL 1.36   Absolute Monocytes 0.17 - 1.22 Thousand/µL 0.32   Absolute Eosinophils 0.00 - 0.61 Thousand/µL 0.15   Basophils Absolute 0.00 - 0.10 Thousands/µL 0.02   LUTEINIZING HORMONE See Comment mIU/mL 42.5   FSH, POC See Comment mIU/mL 77.4   ESTRADIOL LEVEL See Comment pg/mL <15.0       I spent 40 minutes on chart review, face to face counseling time, coordination of care and documentation.    Monika Clayton MD PhD

## 2024-02-24 DIAGNOSIS — R45.4 IRRITABILITY AND ANGER: ICD-10-CM

## 2024-02-24 RX ORDER — ESCITALOPRAM OXALATE 10 MG/1
10 TABLET ORAL DAILY
Qty: 90 TABLET | Refills: 0 | Status: SHIPPED | OUTPATIENT
Start: 2024-02-24

## 2024-03-06 ENCOUNTER — OFFICE VISIT (OUTPATIENT)
Dept: SURGICAL ONCOLOGY | Facility: CLINIC | Age: 59
End: 2024-03-06
Payer: COMMERCIAL

## 2024-03-06 VITALS
BODY MASS INDEX: 28.06 KG/M2 | HEIGHT: 64 IN | SYSTOLIC BLOOD PRESSURE: 110 MMHG | HEART RATE: 75 BPM | DIASTOLIC BLOOD PRESSURE: 82 MMHG | TEMPERATURE: 95.9 F | OXYGEN SATURATION: 98 %

## 2024-03-06 DIAGNOSIS — Z79.811 USE OF ANASTROZOLE: ICD-10-CM

## 2024-03-06 DIAGNOSIS — C50.412 MALIGNANT NEOPLASM OF UPPER-OUTER QUADRANT OF LEFT BREAST IN FEMALE, ESTROGEN RECEPTOR POSITIVE: Primary | ICD-10-CM

## 2024-03-06 DIAGNOSIS — Z90.13 S/P BILATERAL MASTECTOMY: ICD-10-CM

## 2024-03-06 DIAGNOSIS — Z17.0 MALIGNANT NEOPLASM OF UPPER-OUTER QUADRANT OF LEFT BREAST IN FEMALE, ESTROGEN RECEPTOR POSITIVE: Primary | ICD-10-CM

## 2024-03-06 PROCEDURE — 99214 OFFICE O/P EST MOD 30 MIN: CPT

## 2024-03-06 NOTE — PROGRESS NOTES
Surgical Oncology Follow Up       [address] [facility] SURGICAL ONCOLOGY [place]  [address]  [place] [address]    Debra Escoto  1965  [id number]  [address] [facility] SURGICAL ONCOLOGY [place]  [address]  [place] [address]    Chief Complaint   Patient presents with   • Follow-up       Assessment/Plan:  1. Malignant neoplasm of upper-outer quadrant of left breast in female, estrogen receptor positive   - 1 year f/u    2. S/P bilateral mastectomy    3. Use of anastrozole  - continue use per medical oncology    Discussion/Summary: Patient is a 58-year-old female presenting today for six-month follow-up for left breast cancer diagnosed in 2019. Pathology revealed invasive carcinoma ER/GA positive, HER2 negative. She had genetic testing which was negative. She had a left breast mastectomy with sentinel node biopsy and right prophylactic mastectomy. She completed adjuvant chemotherapy. She took tamoxifen for roughly 3 years before discontinuing a few months ago secondary to fatigue and weight gain. She was recently put on anastrozole by med/onc. She notes arthralgias and chronic back pain near her SI joint. She is seeing spine/pain in April. There are no concerns on her clinical breast exam. I will see the patient back in 1 year or sooner should the need arise. She was instructed to call with any questions or concerns prior to this time. All questions were answered today.     History of Present Illness:     Oncology History   Malignant neoplasm of left breast in female, estrogen receptor positive    6/13/2019 Biopsy    Left breast ultrasound-guided biopsy  12 o'clock, 4 cm from nipple  Invasive breast carcinoma of no special type (ductal NST)  Grade 2    2 o'clock, 5 cm from nipple  Invasive breast carcinoma of no special type (ductal NST)  Grade 2  ER 90%  GA 90%  HER2 1+     6/20/2019 Genetic Testing    The following genes were  evaluated: JOSH, BRCA1, BRCA2, CDH1, CHEK2, PALB2, PTEN, STK11, TP53  Negative for genetic mutations or variants  [employer]     8/9/2019 Surgery    Left breast mastectomy with sentinel lymph node biopsy  Two foci of invasive mammary carcinoma of no special type (ductal)  Grade 2  2.4 cm  DCIS measures at least 8.2 cm  0/2 Lymph nodes  Margins negative  Anatomic Stage IIA  Prognostic Stage IA    Right breast mastectomy; prophylactic  Dr. Sadler     8/26/2019 Genomic Testing    MammaPrint for FLEX  High Risk     9/3/2019 -  Cancer Staged    Staging form: Breast, AJCC 8th Edition  - Pathologic stage from 9/3/2019: Stage IA (pT2(2), pN0(sn), cM0, G2, ER+, IL+, HER2-) - Signed by RUDOLPH Beasley on 3/6/2024  Stage prefix: Initial diagnosis  Neoadjuvant therapy: No  Method of lymph node assessment: [place] lymph node biopsy  Multigene prognostic tests performed: MammaPrint  Histologic grading system: 3 grade system  Laterality: Left  Tumor size (mm): 24  Multiple tumors: Yes  Number of tumors: 2       10/4/2019 -  Chemotherapy    Adjuvant chemotherapy (Dr Carreno)  AC followed by dose dense paclitaxel     2/2020 -  Hormone Therapy    Tamoxifen  8/2022 Transfer of care- Dr. Ojeda          -Interval History: Patient is a 58-year-old female presenting today for six-month follow-up for left breast cancer diagnosed in 2019. She is on obs. She was recently put on anastrozole by med/onc. Patient denies changes on her breast exam. She has been dealing with chronic SI back pain. She denies persistent headaches, back pain, shortness of breath, or abdominal pain.       Review of Systems:  Review of Systems   Constitutional:  Negative for activity change, appetite change, fatigue and unexpected weight change.   Respiratory:  Negative for cough and shortness of breath.    Cardiovascular:  Negative for chest pain.   Gastrointestinal:  Negative for abdominal pain, diarrhea, nausea and vomiting.   Endocrine: Negative for heat  intolerance.   Musculoskeletal:  Positive for arthralgias and back pain. Negative for myalgias.   Skin:  Negative for rash.   Neurological:  Negative for weakness and headaches.   Hematological:  Negative for adenopathy.       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   • Tear of right acetabular labrum   • Leukopenia   • Leiomyoma of uterus, unspecified   • S/P bilateral mastectomy   • Vaginal atrophy   • Essential hypertension   • Abnormal fasting glucose   • Osteopenia of multiple sites   • Hot flashes   • Other insomnia   • Uncomplicated opioid dependence (HCC)   • Neuropathy   • Atypical nevus of back   • Nonunion after arthrodesis   • Palpitations     Past Medical History:   Diagnosis Date   • Allergic    • Anxiety    • BRCA gene mutation negative 06/20/2019    [employer]   • Cancer (HCC)     breast    • Fibrocystic disease of breast    • GERD (gastroesophageal reflux disease)    • Hiatal hernia    • Hyperlipidemia      Past Surgical History:   Procedure Laterality Date   • APPENDECTOMY     • BREAST BIOPSY     • BREAST CYST ASPIRATION Left 2005   • COLONOSCOPY     • FOOT SURGERY Right 2011    right, hardware removal   • IR PORT PLACEMENT  9/27/2019   • IR PORT REMOVAL  2/19/2020   • LYMPH NODE BIOPSY     • MASTECTOMY W/ SENTINEL NODE BIOPSY Left 8/9/2019    Procedure: BREAST MASTECTOMY, SENTINEL LYMPH NODE BIOPSY, LYMPHATIC MAPPING W/ BLUE DYE AND RADIOACTIVE DYE (INJECT AT 0730 BY DR SADLER IN THE O.R.);  Surgeon: Deshaun Sadler MD;  [facility];  Service: Surgical Oncology   • OTHER  SURGICAL HISTORY Right 2010    Complete Excision of Fifth Metatarsal Head    • ND COLONOSCOPY FLX DX W/COLLJ SPEC WHEN PFRMD N/A 7/20/2018    Procedure: EGD AND COLONOSCOPY;  Surgeon: Ilan Aponte MD;  Location: [facility] GI LAB;  Service: Gastroenterology   • ND MASTECTOMY SIMPLE COMPLETE Right 8/9/2019    Procedure: BREAST SIMPLE MASTECTOMY;  Surgeon: Deshaun Sadler MD;  [facility];  Service: Surgical Oncology   • US GUIDANCE BREAST BIOPSY LEFT EACH ADDITIONAL Left 6/13/2019   • US GUIDED BREAST BIOPSY LEFT COMPLETE Left 6/13/2019     Family History   Problem Relation Age of Onset   • Coronary artery disease Mother         CABG in her 60s   • Other Mother         Dyslipidemia   • Hypertension Mother    • Hyperlipidemia Mother    • Heart attack Father 51        acute myocardial infarction   • Other Father         Dyslipidemia   • Hypertension Father    • Hyperlipidemia Father    • Coronary artery disease Brother    • No Known Problems Maternal Aunt    • No Known Problems Maternal Aunt    • Prostate cancer Paternal Uncle 65   • Thyroid disease Family         disorder     Social History     Socioeconomic History   • Marital status: /Civil Union     Spouse name: Not on file   • Number of children: 0   • Years of education: Not on file   • Highest education level: Not on file   Occupational History   • 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   Tobacco Use   • Smoking status: Never   • Smokeless tobacco: Never   Vaping Use   • Vaping status: Never Used   Substance and Sexual Activity   • Alcohol use: Yes     Alcohol/week: 2.0 standard drinks of alcohol     Types: 2 Glasses of wine per week   • Drug use: Not Currently     Types: Marijuana     Comment: oral usage/capsules   • Sexual activity: Yes     Partners: Male     Birth control/protection: Post-menopausal, Male Sterilization     Comment: Partner had vasectomy   Other Topics Concern   • Not on  file   Social History Narrative    Exercise habits    Working full-time - used to be GI RN     Social Determinants of Health     Financial Resource Strain: Not on file   Food Insecurity: Not on file   Transportation Needs: Not on file   Physical Activity: Not on file   Stress: Not on file   Social Connections: Not on file   Intimate Partner Violence: Not on file   Housing Stability: Not on file       Current Outpatient Medications:   •  anastrozole (ARIMIDEX) 1 mg tablet, Take 1 tablet (1 mg total) by mouth daily, Disp: 90 tablet, Rfl: 1  •  cholecalciferol (VITAMIN D3) 1,000 units tablet, Take 1,000 Units by mouth daily, Disp: , Rfl:   •  escitalopram (LEXAPRO) 10 mg tablet, Take 1 tablet (10 mg total) by mouth daily, Disp: 90 tablet, Rfl: 0  •  gabapentin (NEURONTIN) 100 mg capsule, Take 1 capsule (100 mg total) by mouth 3 (three) times a day as needed (moderate pain), Disp: 90 capsule, Rfl: 2  •  omeprazole (PriLOSEC) 20 mg delayed release capsule, Take 1 capsule (20 mg total) by mouth daily before breakfast, Disp: 90 capsule, Rfl: 1  •  rosuvastatin (CRESTOR) 20 MG tablet, Take 1 tablet (20 mg total) by mouth daily, Disp: 90 tablet, Rfl: 1  •  traMADol (ULTRAM) 50 mg tablet, Take 1 tablet (50 mg total) by mouth every 8 (eight) hours as needed for moderate pain, Disp: 90 tablet, Rfl: 0  •  traZODone (DESYREL) 50 mg tablet, TAKE 1 TABLET BY MOUTH AT BEDTIME, Disp: 90 tablet, Rfl: 1  •  tretinoin (RETIN-A) 0.025 % cream, Apply topically daily at bedtime, Disp: 30 g, Rfl: 3  •  vitamin B-12 (VITAMIN B-12) 500 mcg tablet, Take 1 tablet (500 mcg total) by mouth daily, Disp: 90 tablet, Rfl: 1  Allergies   Allergen Reactions   • Sulfa Antibiotics Headache     Annotation - [id number]: Migraine; Annotation - [id number]: cellular issues     Vitals:    03/06/24 1326   BP: 110/82   Pulse: 75   Temp: (!) 95.9 °F (35.5 °C)   SpO2: 98%       Physical Exam  Constitutional:       General: She is not in acute distress.      Appearance: Normal appearance.   Cardiovascular:      Rate and Rhythm: Normal rate and regular rhythm.      Pulses: Normal pulses.      Heart sounds: Normal heart sounds.   Pulmonary:      Effort: Pulmonary effort is normal.      Breath sounds: Normal breath sounds.   Chest:      Chest wall: No mass.   Breasts:     Right: No swelling, bleeding, mass, skin change or tenderness.      Left: No swelling, bleeding, mass, skin change or tenderness.       Abdominal:      General: Abdomen is flat.      Palpations: Abdomen is soft.   Lymphadenopathy:      Upper Body:      Right upper body: No supraclavicular, axillary or pectoral adenopathy.      Left upper body: No supraclavicular, axillary or pectoral adenopathy.   Skin:     General: Skin is warm.   Neurological:      General: No focal deficit present.      Mental Status: She is alert and oriented to person, place, and time.   Psychiatric:         Mood and Affect: Mood normal.         Behavior: Behavior normal.           Results:    Imaging  No results found.    I reviewed the above imaging data.      Advance Care Planning/Advance Directives:  Discussed disease status, cancer treatment plans and/or cancer treatment goals with the patient.

## 2024-03-10 DIAGNOSIS — M51.17 INTERVERTEBRAL DISC DISORDER WITH RADICULOPATHY OF LUMBOSACRAL REGION: ICD-10-CM

## 2024-03-11 RX ORDER — TRAMADOL HYDROCHLORIDE 50 MG/1
50 TABLET ORAL EVERY 8 HOURS PRN
Qty: 90 TABLET | Refills: 0 | Status: SHIPPED | OUTPATIENT
Start: 2024-03-11

## 2024-03-19 ENCOUNTER — PROCEDURE VISIT (OUTPATIENT)
Dept: DERMATOLOGY | Facility: CLINIC | Age: 59
End: 2024-03-19
Payer: COMMERCIAL

## 2024-03-19 VITALS
BODY MASS INDEX: 27.31 KG/M2 | WEIGHT: 160 LBS | OXYGEN SATURATION: 99 % | DIASTOLIC BLOOD PRESSURE: 78 MMHG | SYSTOLIC BLOOD PRESSURE: 118 MMHG | HEIGHT: 64 IN | HEART RATE: 61 BPM | TEMPERATURE: 97.2 F

## 2024-03-19 DIAGNOSIS — C44.311 BASAL CELL CARCINOMA (BCC) OF SKIN OF NOSE: ICD-10-CM

## 2024-03-19 PROCEDURE — 14060 TIS TRNFR E/N/E/L 10 SQ CM/<: CPT | Performed by: STUDENT IN AN ORGANIZED HEALTH CARE EDUCATION/TRAINING PROGRAM

## 2024-03-19 PROCEDURE — 17312 MOHS ADDL STAGE: CPT | Performed by: STUDENT IN AN ORGANIZED HEALTH CARE EDUCATION/TRAINING PROGRAM

## 2024-03-19 PROCEDURE — 17311 MOHS 1 STAGE H/N/HF/G: CPT | Performed by: STUDENT IN AN ORGANIZED HEALTH CARE EDUCATION/TRAINING PROGRAM

## 2024-03-19 NOTE — PROGRESS NOTES
MOHS Procedure Note    Patient: Debra Escoto  : 1965  MRN: [id number]  Date: 3/19/2024    History of Present Illness: The patient is a 58 y.o. female who presents with complaints of basal cell carcinoma on left nasal side wall. Proven positive by biopsy.     Past Medical History:   Diagnosis Date    Allergic     Anxiety     Basal cell carcinoma 2023    left nasal side wall-Mohs    BRCA gene mutation negative 2019    [facility]    Cancer (HCC)     breast     Fibrocystic disease of breast     GERD (gastroesophageal reflux disease)     Hiatal hernia     Hyperlipidemia        Past Surgical History:   Procedure Laterality Date    APPENDECTOMY      BREAST BIOPSY      BREAST CYST ASPIRATION Left     COLONOSCOPY      FOOT SURGERY Right     right, hardware removal    IR PORT PLACEMENT  2019    IR PORT REMOVAL  2020    LYMPH NODE BIOPSY      MASTECTOMY W/ SENTINEL NODE BIOPSY Left 2019    Procedure: BREAST MASTECTOMY, SENTINEL LYMPH NODE BIOPSY, LYMPHATIC MAPPING W/ BLUE DYE AND RADIOACTIVE DYE (INJECT AT 0730 BY DR SADLER IN THE O.R.);  Surgeon: Deshaun Sadler MD;  [facility];  Service: Surgical Oncology    MOHS SURGERY Left 2024    BCC left nasal side wall    OTHER SURGICAL HISTORY Right     Complete Excision of Fifth Metatarsal Head     HI COLONOSCOPY FLX DX W/COLLJ SPEC WHEN PFRMD N/A 2018    Procedure: EGD AND COLONOSCOPY;  Surgeon: Ilan Aponte MD;  Location: [facility] GI LAB;  Service: Gastroenterology    HI MASTECTOMY SIMPLE COMPLETE Right 2019    Procedure: BREAST SIMPLE MASTECTOMY;  Surgeon: Deshaun Sadler MD;  [facility];  Service: Surgical Oncology    US GUIDANCE BREAST BIOPSY LEFT EACH ADDITIONAL Left 2019    US GUIDED BREAST BIOPSY LEFT COMPLETE Left 2019         Current Outpatient Medications:     anastrozole (ARIMIDEX) 1 mg tablet, Take 1 tablet (1 mg total) by mouth daily, Disp: 90 tablet, Rfl: 1    cholecalciferol (VITAMIN  D3) 1,000 units tablet, Take 1,000 Units by mouth daily, Disp: , Rfl:     escitalopram (LEXAPRO) 10 mg tablet, Take 1 tablet (10 mg total) by mouth daily, Disp: 90 tablet, Rfl: 0    gabapentin (NEURONTIN) 100 mg capsule, Take 1 capsule (100 mg total) by mouth 3 (three) times a day as needed (moderate pain), Disp: 90 capsule, Rfl: 2    omeprazole (PriLOSEC) 20 mg delayed release capsule, Take 1 capsule (20 mg total) by mouth daily before breakfast, Disp: 90 capsule, Rfl: 1    rosuvastatin (CRESTOR) 20 MG tablet, Take 1 tablet (20 mg total) by mouth daily, Disp: 90 tablet, Rfl: 1    traMADol (ULTRAM) 50 mg tablet, Take 1 tablet (50 mg total) by mouth every 8 (eight) hours as needed for moderate pain, Disp: 90 tablet, Rfl: 0    traZODone (DESYREL) 50 mg tablet, TAKE 1 TABLET BY MOUTH AT BEDTIME, Disp: 90 tablet, Rfl: 1    tretinoin (RETIN-A) 0.025 % cream, Apply topically daily at bedtime, Disp: 30 g, Rfl: 3    vitamin B-12 (VITAMIN B-12) 500 mcg tablet, Take 1 tablet (500 mcg total) by mouth daily, Disp: 90 tablet, Rfl: 1    Allergies   Allergen Reactions    Sulfa Antibiotics Headache     Annotation - [id number]: Migraine; Annotation - [id number]: cellular issues       Physical Exam:   Vitals:    03/19/24 1111   BP: 118/78   Pulse: 61   Temp: (!) 97.2 °F (36.2 °C)   SpO2: 99%     General: Awake, Alert, Oriented x 3, Mood and affect appropriate  Respiratory: Respirations even and unlabored  Cardiovascular: Peripheral pulses intact; no edema  Musculoskeletal Exam: N/A  Skin: 0.9 x 1.0 cm atrophic pink patch on the left nasal sidewall    Assessment: Basal cell carcinoma on left nasal side wall. Proven positive by biopsy.    Plan: Mohs    Time of H&P Completion:115    MOHS Procedure Timeout      Flowsheet Row Most Recent Value   Timeout: 1115   Patient Identity Verified: Yes   Correct Site Verified: Yes   Correct Procedure Verified: Yes            MOHS Diagnosis/Indication/Location/ID      Flowsheet Row Most Recent Value    Pathology Type Basal cell carcinoma   Anatomic Site --  [Left nasal side wall]   Indications for MOHS tumor location   MOHS ID [id number]            MOHS Site/Accession/Pre-Post      Flowsheet Row Most Recent Value   Original Site Identified (as submitted by referring clinician) Referral, Photo   Biopsy Accession/Specimen # (as submitted by referring clincian) [id number]   Pre-MOHS Size Length (cm) 0.9   Pre-MOHS Size Width (cm) 1   Post-MOHS Size-Length (cm) 1.9   Post MOHS Size-Width (cm) 1   Repair Type Advancement flap   Suture Type Fast absorbing gut, Monocryl plus, Vicryl   Fast Absorbing Suture Size 5   Monocryl Plus Suture Size 5   Vicryl Suture Size 4   Flap/Graft area (cm2) 3   Anesthetic Used 1% Lidocaine with epinephrine  [Buffered]            MOHS Tumor Stage 1 Information      Flowsheet Row Most Recent Value   Tissue Sections (blocks) 2   Microscopic Exam Section 1: Irregularly shaped cords and strands of basaloid keratinocytes infiltrate the dermis with a spiky growth pattern. The cells have scant cytoplasm and round dark nuclei. Mitotic figures and apoptotic bodies are evident. The nuclei at the periphery of the islands have a palisaded arrangement. The islands are associated with a fibromyxoid stroma and there is cleft formation between some of the islands and stroma. The pathology is consistent with infiltrative BCC.     Microscopic Exam Section 2: Irregularly shaped cords and strands of basaloid keratinocytes infiltrate the dermis with a spiky growth pattern. The cells have scant cytoplasm and round dark nuclei. Mitotic figures and apoptotic bodies are evident. The nuclei at the periphery of the islands have a palisaded arrangement. The islands are associated with a fibromyxoid stroma and there is cleft formation between some of the islands and stroma. The pathology is consistent with infiltrative BCC.   Tumor Clear After Stage I? No            MOHS Tumor Stage 2 Information      Flowsheet Row  Most Recent Value   Tissue Sections (blocks) 2   Microscopic Exam Section 1: No tumor identified in section.   Microscopic Exam Section 2: No tumor identified in section.   Tumor Clear After Stage II? Yes                      Patient identified, procedure verified, site identified and verified. Time out completed. Surgical removal of the lesion discussed with the patient (risks and benefits, including possibility of scarring, infection, recurrence or potential for further treatment)  I have specifically identified the site with the patient. I have discussed the fact that the patient will have a scar after the procedure regardless of granulation or repair with sutures. I have discussed that the repair options can range from granulation in some cases to linear or curvilinear closures to larger flaps or grafts.  There are sometimes flaps or grafts used that require multiples stages of surgery and will not be completed today, rather be completed over a series of appointments. I have discussed that occasionally due to location, size or depth of the lesion I may recommend consultation with and transfer of care for further removal or the reconstruction to another provider such as ophthalmology surgery, plastic surgery, ENT surgery, or surgical oncology. There are cases in which other testing such as imaging with MRI or CT scan or testing of lymph nodes is recommended because of the nature/depth/location of tumor seen during the removal. There is a risk of injury to nerves causing temporary or permanent numbness or the inability to move muscles full such as the inability to lift eyebrows. Questions answered and verbal and written consent was obtained.    The tumor qualifies for Mohs based on AUC criteria. Dr. Gilbert served as the surgeon and pathologist during the procedure.    With the patient in the supine position and under adequate local anesthesia with 1% lidocaine with epinephrine 1:100,000, the defect was scrubbed  with Chlorhexidine. Sterile drapes were placed from the sterile tray.      Because of the location of the surgical defect,  a V to Y advancement flap was judged to give the best possible cosmetic and functional result.  The edges of the defect were carefully debrided removing any dead or coagulated tissue.  The edges of the defect and the area to be used for advancement of tissue at the base of the flap were minimally undermined.  The advancement flap was freed up and draped over the defect. The flap was secured in place by a dermal layer of sutures and the cutaneous margins were approximated and closed by superficial sutures, as noted above.  The standing cones of tissue at the end of the excision were excised with a #15 scalpel blade and closed in two layers as described previously.      The patient tolerated the procedure well. The wound was dressed with petrolatum, a non-stick pad, and a compression dressing.     Fadumo Gilbert MD served as the surgeon and pathologist during the procedure.    Postoperative care: Wound care discussed at length.  I urged the patient to call us if any problems or question should arise.     Complications: none  Post-op medications: none  Patient condition after procedure: stable  Discharge plans: Plan for follow up as planned with general dermatology for skin checks or sooner if needed for healing Mohs site.       Scribe Attestation      I,:  Morena Raza MA am acting as a scribe while in the presence of the attending physician.:       I,:  aFdumo Gilbert MD personally performed the services described in this documentation    as scribed in my presence.:

## 2024-03-19 NOTE — PATIENT INSTRUCTIONS
"[facility] Microscopic Surgery After Care    WOUND CARE AFTER SURGERY:    Do NOT to remove the pressure bandage for 48 hours. Keep the area clean and dry while this bandage is on.    After removing the bandage for the first time, gently clean the area with soap and water. If the bandage is difficult to remove, getting the bandage wet in the shower will sometimes help soften the adhesive and allow it to be removed more easily.     You will now need to cleanse this area daily in the shower with gentle soap. There is no need to scrub the area. Apply plain Vaseline ointment (this is over the counter and not a prescription) to the site followed by a clean appropriately sized bandage to area.  Non stick dressing and paper tape (or Hypafix) are recommended for sensitive skin but a bandaid is fine if it covers the area well.    You will need to continue the above daily wound care until you return for suture removal in 7 days (generally 7 days for the face, 10-14 days off the face)      RESTRICTIONS:     For two DAYS:   - You will need to take it very easy as this time is highest risk for bleeding. Being a \"couch potato\" during these two days is generally recommended.   - For surgeries on the face/neck/scalp: Avoid leaning down to pick things up off the floor as this brings blood up to your head. Instead, squat down to pick things up.     For two WEEKS:   - No heavy lifting (anything greater than 10 pounds)   - You can start to do slow, gentle activities such as slow walking but nothing to increase your heart rate and blood pressure too much (such as cardiovascular exercise). It is important to take it easy as there is still a risk for bleeding and a high risk popping of stitches open during this time.     If we did surgery near the eyes (including the nose, forehead, front part of your scalp, cheeks): It is VERY common to get a large amount of swelling around your eyes (puffy eyes). Although less frequent, this can be enough to " swell your eyes shut and can also come along with bruising. This should not hurt and is very expected and normal. It is typically worst at ~ 3 days out from your surgery and dramatically better 1 week post-operatively.     If we did surgery around your nose: No blowing your nose as this puts you at higher risk of popping stitches durign this time. Instead dab under your nose with a tissue or use a Q-tip inside your nose.    MANAGING YOUR PAIN AFTER SURGERY     You can expect to have some pain after surgery. This is normal. The pain is typically worse the first two days after surgery, and quickly begins to get better.     The best strategy for controlling your pain after surgery is around the clock pain control. You can take over the counter Acetaminophen (Tylenol) for discomfort, if no contraindications.     If you are taking this at the maximum dose, you can alternate this with Motrin (ibuprofen or Advil) as well. Alternating these medications with each other allows you to maximize your pain control. In addition to Tylenol and Motrin, you can use heating pads or ice packs on your incisions to help reduce your pain.     How will I alternate your regular strength over-the-counter pain medication?  You will take a dose of pain medication every three hours.   Start by taking 650 mg of Tylenol (2 pills of 325 mg)   3 hours later take 600 mg of Motrin (3 pills of 200 mg)   3 hours after taking the Motrin take 650 mg of Tylenol   3 hours after that take 600 mg of Motrin.    See example - if your first dose of Tylenol is at 12:00 PM     12:00 PM  Tylenol 650 mg (2 pills of 325 mg)    3:00 PM  Motrin 600 mg (3 pills of 200 mg)    6:00 PM  Tylenol 650 mg (2 pills of 325 mg)    9:00 PM  Motrin 600 mg (3 pills of 200 mg)    Continue alternating every 3 hours      Important:   Do not take more than 4000mg of Tylenol or 3200mg of Motrin in a 24-hour period.     What if I still have pain?   If you have pain that is not controlled  with the over-the-counter pain medications (Tylenol and Motrin or Advil), don't hesitate to call our staff using the number provided. We will help make sure you are managing your pain in the best way possible, and if necessary, we can provide a prescription for additional pain medication.     CALL OUR OFFICE IMMEDIATELY FOR ANY SIGNS OF INFECTION:    This includes fever, chills, increased redness, warmth, tenderness, severe discomfort/pain, or pus or foul smell coming from the wound. If you are experiencing any of the above, please call  [facility] directly at [phone].    IF BLEEDING IS NOTICED:    Place a clean cloth over the area and apply firm pressure for thirty minutes.  Check the wound ONLY after 30 minutes of direct pressure; do not cheat and sneak a peak, as that does not count.  If bleeding persists after 30 minutes of legitimate direct pressure, then try one more round of direct pressure to the area.  Should the bleeding become heavier or not stop after the second attempt, call [facility] directly at [phone]. Your call will get routed to the dermatology surgeon on call even after hours.

## 2024-03-20 ENCOUNTER — PROCEDURE VISIT (OUTPATIENT)
Dept: DERMATOLOGY | Facility: CLINIC | Age: 59
End: 2024-03-20

## 2024-03-20 DIAGNOSIS — Z41.1 ENCOUNTER FOR COSMETIC LASER PROCEDURE: Primary | ICD-10-CM

## 2024-03-20 PROCEDURE — BUNDLE PR BUNDLE: Performed by: STUDENT IN AN ORGANIZED HEALTH CARE EDUCATION/TRAINING PROGRAM

## 2024-03-20 NOTE — PROGRESS NOTES
"[facility] Clinic Note     Patient Name: Debra Escoto  Encounter Date: 3/20/24     Have you been cared for by a [facility] Dermatologist in the last 3 years and, if so, which description applies to you?    Yes.  I have been here within the last 3 years, and my medical history has NOT changed since that time.  I am FEMALE/of child-bearing potential.    REVIEW OF SYSTEMS:  Have you recently had or currently have any of the following? No changes in my recent health.   PAST MEDICAL HISTORY:  Have you personally ever had or currently have any of the following?  If \"YES,\" then please provide more detail. No changes in my medical history.   HISTORY OF IMMUNOSUPPRESSION: Do you have a history of any of the following:  Systemic Immunosuppression such as Diabetes, Biologic or Immunotherapy, Chemotherapy, Organ Transplantation, Bone Marrow Transplantation?  No     Answering \"YES\" requires the addition of the dotphrase \"IMMUNOSUPPRESSED\" as the first diagnosis of the patient's visit.   FAMILY HISTORY:  Any \"first degree relatives\" (parent, brother, sister, or child) with the following?    No changes in my family's known health.   PATIENT EXPERIENCE:    Do you want the Dermatologist to perform a COMPLETE skin exam today including a clinical examination under the \"bra and underwear\" areas?  NO  If necessary, do we have your permission to call and leave a detailed message on your Preferred Phone number that includes your specific medical information?  Yes      Allergies   Allergen Reactions    Sulfa Antibiotics Headache     Annotation - [id number]: Migraine; Annotation - [id number]: cellular issues      Current Outpatient Medications:     anastrozole (ARIMIDEX) 1 mg tablet, Take 1 tablet (1 mg total) by mouth daily, Disp: 90 tablet, Rfl: 1    cholecalciferol (VITAMIN D3) 1,000 units tablet, Take 1,000 Units by mouth daily, Disp: , Rfl:     escitalopram (LEXAPRO) 10 mg tablet, Take 1 tablet (10 mg total) by mouth daily, " Disp: 90 tablet, Rfl: 0    gabapentin (NEURONTIN) 100 mg capsule, Take 1 capsule (100 mg total) by mouth 3 (three) times a day as needed (moderate pain), Disp: 90 capsule, Rfl: 2    omeprazole (PriLOSEC) 20 mg delayed release capsule, Take 1 capsule (20 mg total) by mouth daily before breakfast, Disp: 90 capsule, Rfl: 1    rosuvastatin (CRESTOR) 20 MG tablet, Take 1 tablet (20 mg total) by mouth daily, Disp: 90 tablet, Rfl: 1    traMADol (ULTRAM) 50 mg tablet, Take 1 tablet (50 mg total) by mouth every 8 (eight) hours as needed for moderate pain, Disp: 90 tablet, Rfl: 0    traZODone (DESYREL) 50 mg tablet, TAKE 1 TABLET BY MOUTH AT BEDTIME, Disp: 90 tablet, Rfl: 1    tretinoin (RETIN-A) 0.025 % cream, Apply topically daily at bedtime, Disp: 30 g, Rfl: 3    vitamin B-12 (VITAMIN B-12) 500 mcg tablet, Take 1 tablet (500 mcg total) by mouth daily, Disp: 90 tablet, Rfl: 1          Whom besides the patient is providing clinical information about today's encounter?   NO ADDITIONAL HISTORIAN (patient alone provided history)    Physical Exam and Assessment/Plan by Diagnosis:    Intense Pulse Light Treatment Note  3/20/2024    Device: EndorphMeis Stellar M22   Place of Treatment: [facility]    Surgeon and : Fadumo Gilbert MD  Assistant: SHILA Coker    Treatment number: 2  Site of treatment: bilateral arms  Skin type: Garza 2    Pre-operative Diagnosis: Dyschromia  Indications for Procedure:  Acquired Brachiocutaneous dyschromatosis   Post-operative Diagnosis: same as pre-operative    Anesthetic: Mikala cooler     Safety Precautions:  Fire extinguisher persent/Window covered/Staff and patient eyes covered/Warning sign posted.      Interim History/Comments:  none  Percent Improvement: N/A    Parameters:     First pass over entire b/l dorsal distal and proximal arms:  The following filter was used: 590nm    Fluence: 17 J/cm2  Spot size/shape: rectangular 15 x 35mm  Pulse count: 126       Second pass to more  discrete, larger lentigos:   The following filter was used: 560nm    Fluence: 10 J/cm2  Spot size/shape: circular 6mm   Pulse count: 40     Procedure Note:   After discussing potential procedure related risks including but not limited to pain, purpura, blistering, scarring, discoloration, “footprinting,” recurrence, inefficacy, need for additional treatment, and undesirable cosmetic results, written and verbal consent were obtained.  Patient was brought back to the operating room. Time out was performed.  Patient's name, identification and intended procedure were verified. Prior to the procedure, the patient cleansed the treatment area. The treatment area was re-cleansed with alcohol. Eye coverings eye shield inserted/placed. Ultrasound gel was applied to areas of treatment and re-applied as necessary throughout treatment.     Tolerance: Patient tolerated the procedure well with no immediate significant complications and left in stable condition.   Complications: none  Other procedures: none  Photography: no    Post-op Care:   As discussed, when we treat pigment in the skin with IPL, gradual darkening of pigmentation will occur. We advised not to rub the treatment area or try to scrub the area away. This is part of the healing process and should start to slough off in three to four days.    Most patients experience minimal to no side effects after this treatment. However, a small percentage (less than 1%) of patients may experience minor side effects such as swelling, slight scabbing, blisters, bruising, hyper/hypopigmentation. Listed below are some simple but important instructions that will help minimize side effects and help you achieve the best possible result from your treatment.    1. Avoid direct/indirect sun exposure. Direct sunlight should be avoided for around four weeks post-treatment. This means no tanning of any kind, including tanning booths or artificial tanning otions. We recommend applying sunblock  daily to the treated area, especially facial areas with minimum of 30 SPF. Your sunblock should contain Zinc Oxide and/or Titanium Dioxide as the active ingredients. Make sure to reapply sunblock every 2-3 hours outdoors or in direct sunlight.    2. After a treatment your skin may feel slightly sunburned for up to 24-48 hours. You may use a cold pack for comfort and to relieve swelling as needed. To minimize swelling, you may apply cold compresses to the treatment area for 10 minutes of every hour on the day of the treatment. Sleeping on your back with your head elevated on the first night post-treatment also helps. Cold Aloe Vera gel works well.  Avoid hot baths or showering for the first 24-48 hours. Call if you notice the sensation of excessive or uncomfortable heat.    3. Very rarely, a small blister or scab may form. Call immediately if you have any blistering. Leave it alone if intact. If skin breaks or a blister opens, apply an over the counter topical antibiotic ointment to the area and reapply regularly to keep area moist and well covered with medication. To reduce the risk of scarring you must not pick at your skin after an IPL treatment.    4. If you experience itching as the skin heals, you may use an over the counter topical hydrocortisone cream. Do not use Retin A products or other irritating products including glycolic acids or other acids until instructed to do so.    5. You may apply make up on the treated area as long as the skin is not broken or blistered.    6. Discontinue use of any Retin A or hydroquinone products 5-7 days prior to your next treatment.    7. Resume a regular skin regimen once the treatment area is back to “normal.”    IPL should be scheduled every three to six weeks (depending on treatment reaction) and can be done for a series of multiple treatments. Please do not hesitate to call our office at [phone] if you have any questions or concerns about your  treatments.    Follow-up:  Patient is aware that it will take multiple treatments to treat this condition. Return to clinic for re-treatment in 4-6 weeks.      THIS WAS A COSMETIC TREATMENT AND PATIENT PAID OUT OF POCKET, DO NOT BILL INSURANCE.   AMOUNT PAID: $1200 for package of 3 paid at last visit. This is Tx 2 of 3.         Scribe Attestation      I,:  Je Estrada am acting as a scribe while in the presence of the attending physician.:       I,:  Fadumo Gilbert MD personally performed the services described in this documentation    as scribed in my presence.:

## 2024-03-23 DIAGNOSIS — G47.09 OTHER INSOMNIA: ICD-10-CM

## 2024-03-24 RX ORDER — TRAZODONE HYDROCHLORIDE 50 MG/1
50 TABLET ORAL
Qty: 90 TABLET | Refills: 1 | Status: SHIPPED | OUTPATIENT
Start: 2024-03-24

## 2024-03-26 ENCOUNTER — OFFICE VISIT (OUTPATIENT)
Dept: DERMATOLOGY | Facility: CLINIC | Age: 59
End: 2024-03-26

## 2024-03-26 DIAGNOSIS — Z48.02 ENCOUNTER FOR REMOVAL OF SUTURES: Primary | ICD-10-CM

## 2024-03-26 PROCEDURE — 99024 POSTOP FOLLOW-UP VISIT: CPT | Performed by: STUDENT IN AN ORGANIZED HEALTH CARE EDUCATION/TRAINING PROGRAM

## 2024-03-26 NOTE — PROGRESS NOTES
"Suture removal    Date/Time: 3/26/2024 1:00 PM    Performed by: Isamar Lyman RN  Authorized by: Fadumo Gilbert MD  Universal Protocol:  Consent: Verbal consent obtained. Written consent not obtained.  Risks and benefits: risks, benefits and alternatives were discussed  Consent given by: patient  Time out: Immediately prior to procedure a \"time out\" was called to verify the correct patient, procedure, equipment, support staff and site/side marked as required.  Timeout called at: 3/26/2024 1:15 PM.  Patient understanding: patient states understanding of the procedure being performed  Patient consent: the patient's understanding of the procedure matches consent given  Procedure consent: procedure consent matches procedure scheduled  Relevant documents: relevant documents present and verified  Test results: test results not available  Site marked: the operative site was not marked  Radiology Images displayed and confirmed. If images not available, report reviewed: imaging studies not available  Patient identity confirmed: verbally with patient      Patient location:  Clinic  Location:     Laterality:  Left    Location:  Head/neck    Head/neck location:  Nose (left nasal sidewall)  Procedure details:     Tools used:  Suture removal kit    Wound appearance:  No sign(s) of infection, good wound healing, nonpurulent and nontender (crusted)    Number of sutures removed:  20  Post-procedure details:     Post-procedure assessment: vaseline ointment applied.    Patient tolerance of procedure:  Tolerated well, no immediate complications  Comments:      Patient was encouraged to continue to clean and care for the wound until fully healed. Patient was encouraged to continue to follow up for regular full body skin exams as scheduled.             Discussed at least superficial necrosis but will plan to leave skin as biologic bandage and continued daily wound care with Vaseline and bandage. In f/up for face IPL may consider PDL " and CO2 of body of flap.   Scribe Attestation      I,:  Isamar Lyman RN am acting as a scribe while in the presence of the attending physician.:       I,:  Fadumo Gilbert MD personally performed the services described in this documentation    as scribed in my presence.:

## 2024-03-31 NOTE — PROGRESS NOTES
"Assessment/Plan:  Calcium 7130-0318 mg (in divided doses-max 600 mg at one time) + 600-1000 IU Vit D daily.   DEXA scan already ordered  Exercise 150-300 minutes per week minimum including weight bearing exercises.   Pap with high risk HPV Q 5 years, if normal. Due    Call your insurance company to verify coverage prior to completing any ordered tests.   Monthly chest self exam recommended.    Colonoscopy- Has follow up planned.          Kegels 20 times twice daily.   Silicone based lubricant with sex. (Use water based lubricant with condoms or sexual toys.)    Vaginal moisturizers twice weekly as needed.   Return to office in one year or sooner, if needed.        1. Encntr for gyn exam (general) (routine) w/o abn findings    2. S/P bilateral mastectomy               Subjective:      Patient ID: Debra Escoto is a 58 y.o. female.    HPI    Debra Escoto is a 58 y.o.   female who is here today for her annual visit. No gynecologic health concerns.  Last annual exam 3/21. Recent mohs procedure of left nose with basal cell results.   21 diagnosed with left invasive ductal breast cancer.  BRCA negative. 8/10 left mastectomy; right prophylactic mastectomy. She has not had breast reconstruction post bilateral mastectomy and is pleased to \"be flat\".   LMP since 10/2019.   She discontinued her arimidex due to side effects.   Exercise- 5 times per week.   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 Also works as a nurse at [employer].   Debra Escoto is sexually active with male partner/  of 20 years. Monogamous and feels safe in this relationship. Denies vaginal bleeding or dryness.  Does admit to insertional dyspareunia. Does use a lubricant.   She is not interested in STD screening today.   She denies vaginal discharge, itching or pelvic pain.   She has no urinary concerns, does not have incontinence.  No bowel concerns.  No breast concerns.     Last " "pap: 02/24/2020 Normal pap with negative HR HPV   DEXA scan: 03/18/2020 osteopenia  Mammogram: 05/01/2019   Colonoscopy: 07/20/2018 recall 5 years. Has planned follow up with Dr Aponte.    Family history of cancer:   Cancer-related family history includes Prostate cancer (age of onset: 65) in her paternal uncle.      The following portions of the patient's history were reviewed and updated as appropriate: allergies, current medications, past family history, past medical history, past social history, past surgical history, and problem list.    Review of Systems   Constitutional: Negative.  Negative for activity change, appetite change, chills, diaphoresis, fatigue, fever and unexpected weight change.   HENT:  Negative for congestion, dental problem, sneezing, sore throat and trouble swallowing.    Eyes:  Negative for visual disturbance.   Respiratory:  Negative for chest tightness and shortness of breath.    Cardiovascular:  Negative for chest pain and leg swelling.   Gastrointestinal:  Negative for abdominal pain, constipation, diarrhea, nausea and vomiting.   Genitourinary:  Positive for dyspareunia. Negative for difficulty urinating, dysuria, frequency, hematuria, pelvic pain, urgency, vaginal bleeding, vaginal discharge and vaginal pain.   Musculoskeletal:  Negative for back pain and neck pain.   Skin: Negative.    Allergic/Immunologic: Negative.    Neurological:  Negative for weakness and headaches.   Hematological:  Negative for adenopathy.   Psychiatric/Behavioral: Negative.           Objective:      /76 (BP Location: Left arm, Patient Position: Sitting, Cuff Size: Standard)   Ht 5' 3.25\" (1.607 m)   Wt 72.8 kg (160 lb 9.6 oz)   BMI 28.22 kg/m²          Physical Exam  Vitals and nursing note reviewed.   Constitutional:       Appearance: Normal appearance. She is well-developed.   HENT:      Head: Normocephalic.   Neck:      Thyroid: No thyromegaly.   Cardiovascular:      Rate and Rhythm: Normal rate " and regular rhythm.      Heart sounds: Normal heart sounds.   Pulmonary:      Effort: Pulmonary effort is normal.      Breath sounds: Normal breath sounds.   Chest:   Breasts:     Breasts are symmetrical.      Right: Normal. No mass, skin change or tenderness.      Left: Normal. No mass, skin change or tenderness.          Comments: Bilateral mastectomy  Abdominal:      Palpations: Abdomen is soft.   Genitourinary:     General: Normal vulva.      Exam position: Lithotomy position.      Labia:         Right: No rash, tenderness, lesion or injury.         Left: No rash, tenderness, lesion or injury.       Urethra: No prolapse, urethral pain, urethral swelling or urethral lesion.      Vagina: No signs of injury and foreign body. No vaginal discharge, erythema, tenderness, bleeding, lesions or prolapsed vaginal walls.      Cervix: Normal.      Uterus: Normal.       Adnexa: Right adnexa normal and left adnexa normal.        Right: No mass, tenderness or fullness.          Left: No mass, tenderness or fullness.        Rectum: No external hemorrhoid.      Comments: Vulvovaginal atrophy  Musculoskeletal:         General: Normal range of motion.      Cervical back: Normal range of motion.   Lymphadenopathy:      Head:      Right side of head: No submental, submandibular, tonsillar or occipital adenopathy.      Left side of head: No submental, submandibular, tonsillar or occipital adenopathy.      Upper Body:      Right upper body: No supraclavicular or axillary adenopathy.      Left upper body: No supraclavicular or axillary adenopathy.      Lower Body: No right inguinal adenopathy. No left inguinal adenopathy.   Skin:     General: Skin is warm and dry.   Neurological:      Mental Status: She is alert and oriented to person, place, and time.   Psychiatric:         Mood and Affect: Mood normal.         Behavior: Behavior normal. Behavior is cooperative.

## 2024-04-01 ENCOUNTER — ANNUAL EXAM (OUTPATIENT)
Dept: OBGYN CLINIC | Facility: CLINIC | Age: 59
End: 2024-04-01
Payer: COMMERCIAL

## 2024-04-01 VITALS
DIASTOLIC BLOOD PRESSURE: 76 MMHG | HEIGHT: 63 IN | SYSTOLIC BLOOD PRESSURE: 122 MMHG | WEIGHT: 160.6 LBS | BODY MASS INDEX: 28.46 KG/M2

## 2024-04-01 DIAGNOSIS — Z90.13 S/P BILATERAL MASTECTOMY: ICD-10-CM

## 2024-04-01 DIAGNOSIS — Z01.419 ENCNTR FOR GYN EXAM (GENERAL) (ROUTINE) W/O ABN FINDINGS: Primary | ICD-10-CM

## 2024-04-01 PROCEDURE — S0612 ANNUAL GYNECOLOGICAL EXAMINA: HCPCS | Performed by: NURSE PRACTITIONER

## 2024-04-01 NOTE — PATIENT INSTRUCTIONS
Calcium 6402-4941 mg (in divided doses-max 600 mg at one time) + 600-1000 IU Vit D daily.   DEXA scan already ordered  Exercise 150-300 minutes per week minimum including weight bearing exercises.   Pap with high risk HPV Q 5 years, if normal. Due 2025   Call your insurance company to verify coverage prior to completing any ordered tests.   Monthly chest self exam recommended.    Colonoscopy- Has follow up planned.          Kegels 20 times twice daily.   Silicone based lubricant with sex. (Use water based lubricant with condoms or sexual toys.)    Vaginal moisturizers twice weekly as needed.   Return to office in one year or sooner, if needed.

## 2024-04-10 NOTE — PROGRESS NOTES
Assessment  1. Sacroiliitis (HCC)        Plan  The patient is experiencing pain in the bilateral sacroiliac joints with positive provocative maneuvers.  At this time, I discussed performing bilateral sacroiliac injections to help reduce swelling/inflammation which is leading to pain symptoms.  She would like to proceed and will be scheduled under fluoroscopic guidance.    Complete risks and benefits including bleeding, infection, tissue reaction, nerve injury and allergic reaction were discussed. The approach was demonstrated using models and literature was provided. Verbal and written consent was obtained.    My impressions and treatment recommendations were discussed in detail with the patient who verbalized understanding and had no further questions.  Discharge instructions were provided. I personally saw and examined the patient and I agree with the above discussed plan of care.    Orders Placed This Encounter   Procedures   • FL spine and pain procedure     Standing Status:   Future     Standing Expiration Date:   4/15/2028     Order Specific Question:   Reason for Exam:     Answer:   Bilateral SIJ Injections     Order Specific Question:   Is the patient pregnant?     Answer:   No     Order Specific Question:   Anticoagulant hold needed?     Answer:   No     No orders of the defined types were placed in this encounter.      History of Present Illness    Debra Escoto is a 58 y.o. female who presents with back pain has been present for 8 months.  Symptoms are moderate to severe rated 2-8/10 on numeric rating scale has felt nearly constantly described to be dull, aching, throbbing and pressure-like in the lower back.  Symptoms are aggravated standing, sitting, walking, relaxation.  Treatment history has included injections many years ago which were helpful.  Exercise is helpful.  She uses ibuprofen with some relief but has to limit it due to stomach irritation and rash.  Tramadol also provide some moderate  relief.    I have personally reviewed and/or updated the patient's past medical history, past surgical history, family history, social history, current medications, allergies, and vital signs today.     Review of Systems   Constitutional:  Positive for unexpected weight change. Negative for fever.   HENT:  Negative for trouble swallowing.    Eyes:  Negative for visual disturbance.   Respiratory:  Negative for shortness of breath and wheezing.    Cardiovascular:  Negative for chest pain and palpitations.   Gastrointestinal:  Negative for constipation, diarrhea, nausea and vomiting.   Endocrine: Negative for cold intolerance, heat intolerance and polydipsia.   Genitourinary:  Negative for difficulty urinating and frequency.   Musculoskeletal:  Positive for back pain, gait problem and joint swelling. Negative for arthralgias and myalgias.   Skin:  Negative for rash.   Neurological:  Negative for dizziness, seizures, syncope, weakness and headaches.   Hematological:  Does not bruise/bleed easily.   Psychiatric/Behavioral:  Negative for dysphoric mood.    All other systems reviewed and are negative.      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   • Tear of right acetabular labrum   • Leukopenia   • Leiomyoma of uterus, unspecified   • S/P bilateral mastectomy   • Vaginal atrophy   • Essential hypertension   • Abnormal fasting glucose   • Osteopenia of multiple sites   • Hot flashes   • Other insomnia   • Uncomplicated opioid dependence (HCC)   • Neuropathy   • Atypical nevus of back   •  Nonunion after arthrodesis   • Palpitations       Past Medical History:   Diagnosis Date   • Allergic    • Anxiety    • Basal cell carcinoma 12/07/2023    left nasal side wall-Mohs   • BRCA gene mutation negative 06/20/2019    [facility]   • Cancer (HCC)     breast    • Eczema     Entire life   • Fibrocystic disease of breast    • GERD (gastroesophageal reflux disease)    • Hiatal hernia    • Hyperlipidemia        Past Surgical History:   Procedure Laterality Date   • APPENDECTOMY     • BREAST BIOPSY     • BREAST CYST ASPIRATION Left 2005   • COLONOSCOPY     • FOOT SURGERY Right 2011    right, hardware removal   • IR PORT PLACEMENT  09/27/2019   • IR PORT REMOVAL  02/19/2020   • LYMPH NODE BIOPSY     • MASTECTOMY W/ SENTINEL NODE BIOPSY Left 08/09/2019    Procedure: BREAST MASTECTOMY, SENTINEL LYMPH NODE BIOPSY, LYMPHATIC MAPPING W/ BLUE DYE AND RADIOACTIVE DYE (INJECT AT 0730 BY DR SADLER IN THE O.R.);  Surgeon: Deshaun Sadler MD;  Location: [facility];  Service: Surgical Oncology   • MOHS SURGERY Left 03/19/2024    BCC left nasal side wall   • OTHER SURGICAL HISTORY Right 2010    Complete Excision of Fifth Metatarsal Head    • TN COLONOSCOPY FLX DX W/COLLJ SPEC WHEN PFRMD N/A 07/20/2018    Procedure: EGD AND COLONOSCOPY;  Surgeon: Ilan Aponte MD;  Location: [facility];  Service: Gastroenterology   • TN MASTECTOMY SIMPLE COMPLETE Right 08/09/2019    Procedure: BREAST SIMPLE MASTECTOMY;  Surgeon: Deshaun Sadler MD;  Location: [facility];  Service: Surgical Oncology   • SKIN BIOPSY     • US GUIDANCE BREAST BIOPSY LEFT EACH ADDITIONAL Left 06/13/2019   • US GUIDED BREAST BIOPSY LEFT COMPLETE Left 06/13/2019       Family History   Problem Relation Age of Onset   • Coronary artery disease Mother         CABG in her 60s   • Other Mother         Dyslipidemia   • Hypertension Mother    • Hyperlipidemia Mother    • Heart attack Father 51        acute myocardial infarction   • Other Father         Dyslipidemia   • Hypertension  Father    • Hyperlipidemia Father    • Squamous cell carcinoma Father         MOHS to face 2022   • Coronary artery disease Brother    • No Known Problems Maternal Aunt    • No Known Problems Maternal Aunt    • Prostate cancer Paternal Uncle 65   • Thyroid disease Family         disorder       Social History     Occupational History   • 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   Tobacco Use   • Smoking status: Never   • Smokeless tobacco: Never   Vaping Use   • Vaping status: Never Used   Substance and Sexual Activity   • Alcohol use: Yes     Alcohol/week: 2.0 standard drinks of alcohol     Types: 2 Glasses of wine per week   • Drug use: Not Currently     Types: Marijuana     Comment: oral usage/capsules   • Sexual activity: Yes     Partners: Male     Birth control/protection: Male Sterilization     Comment: Partner had vasectomy       Current Outpatient Medications on File Prior to Visit   Medication Sig   • anastrozole (ARIMIDEX) 1 mg tablet Take 1 tablet (1 mg total) by mouth daily   • BIOTIN PO Take by mouth   • cholecalciferol (VITAMIN D3) 1,000 units tablet Take 1,000 Units by mouth daily   • escitalopram (LEXAPRO) 10 mg tablet Take 1 tablet (10 mg total) by mouth daily   • gabapentin (NEURONTIN) 100 mg capsule Take 1 capsule (100 mg total) by mouth 3 (three) times a day as needed (moderate pain)   • omeprazole (PriLOSEC) 20 mg delayed release capsule Take 1 capsule (20 mg total) by mouth daily before breakfast   • rosuvastatin (CRESTOR) 20 MG tablet Take 1 tablet (20 mg total) by mouth daily   • traMADol (ULTRAM) 50 mg tablet Take 1 tablet (50 mg total) by mouth every 8 (eight) hours as needed for moderate pain   • traZODone (DESYREL) 50 mg tablet Take 1 tablet (50 mg total) by mouth daily at bedtime   • tretinoin (RETIN-A) 0.025 % cream Apply topically daily at bedtime   • vitamin B-12 (VITAMIN B-12) 500 mcg tablet Take 1 tablet (500 mcg total)  by mouth daily     No current facility-administered medications on file prior to visit.       Allergies   Allergen Reactions   • Sulfa Antibiotics Headache     Annotation - [id number]: Migraine; Annotation - [id number]: cellular issues       Physical Exam    /78   Pulse 67   Wt 72.6 kg (160 lb)   BMI 28.12 kg/m²     Constitutional: normal, well developed, well nourished, alert, in no distress and non-toxic and no overt pain behavior.  Eyes: anicteric  HEENT: grossly intact  Neck: supple, symmetric, trachea midline and no masses   Pulmonary:even and unlabored  Cardiovascular:No edema or pitting edema present  Skin:Normal without rashes or lesions and well hydrated  Psychiatric:Mood and affect appropriate  Neurologic:Cranial Nerves II-XII grossly intact  Musculoskeletal:normal    Lumbar Spine Exam  Appearance:  Normal lordosis  Palpation/Tenderness:  left sacroiliac joint tenderness  right sacroiliac joint tenderness  Range of Motion:  Flexion:  Minimally limited  with pain  Extension:  Moderately limited  with pain  Motor Strength:  Left hip flexion:  5/5  Left hip extension:  5/5  Right hip flexion:  5/5  Right hip extension:  5/5  Left knee flexion:  5/5  Left knee extension:  5/5  Right knee flexion:  5/5  Right knee extension:  5/5  Left foot dorsiflexion:  5/5  Left foot plantar flexion:  5/5  Right foot dorsiflexion:  5/5  Right foot plantar flexion:  5/5  Special Tests:  Left Graeme's Maneuver:  positive  Right Graeme's Maneuver:  positive  Left Gaenslen's Test:  positive  Right Gaenslen's Test:  positive    Imaging    XR LUMBAR SPINE (10/20/2023)     INDICATION:   M51.17: Intervertebral disc disorders with radiculopathy, lumbosacral region.     COMPARISON:  None     VIEWS:  XR SPINE LUMBAR MINIMUM 4 VIEWS NON INJURY  Images: 4     FINDINGS:     There are 5 non rib bearing lumbar vertebral bodies.     There is no evidence of acute fracture or destructive osseous lesion.     Alignment is  unremarkable.     No significant lumbar degenerative change noted.     The pedicles appear intact.     Soft tissues are unremarkable.     IMPRESSION:     No acute osseous abnormalities.    XR SACROILIAC JOINTS (10/20/2023)     INDICATION:   M46.1: Sacroiliitis, not elsewhere classified.     COMPARISON:  None     VIEWS:  XR SACROILIAC JOINTS < 3 VIEWS  Images: 2     FINDINGS:     The SI joints appear symmetric without evidence of focal erosions or joint space widening.     Sacral arcuate lines appear intact.     No fracture or pathologic bone lesions seen.     Included portions of the pelvis and lumbar spine are unremarkable.     IMPRESSION:     Unremarkable SI joints.

## 2024-04-12 ENCOUNTER — TELEPHONE (OUTPATIENT)
Dept: HEMATOLOGY ONCOLOGY | Facility: CLINIC | Age: 59
End: 2024-04-12

## 2024-04-12 DIAGNOSIS — M51.17 INTERVERTEBRAL DISC DISORDER WITH RADICULOPATHY OF LUMBOSACRAL REGION: ICD-10-CM

## 2024-04-12 RX ORDER — TRAMADOL HYDROCHLORIDE 50 MG/1
50 TABLET ORAL EVERY 8 HOURS PRN
Qty: 90 TABLET | Refills: 0 | Status: SHIPPED | OUTPATIENT
Start: 2024-04-12

## 2024-04-12 NOTE — TELEPHONE ENCOUNTER
Appointment Change  Cancel, Reschedule, Change to Virtual      Who are you speaking with? Patient   If it is not the patient, is the caller listed on the communication consent form? Yes   Which provider is the appointment scheduled with? Dr Monika Clayton   When was the original appointment scheduled?    Please list date and time 5/16/24   At which location is the appointment scheduled to take place? [place]   Was the appointment rescheduled?     Was the appointment changed from an in person visit to a virtual visit?    If so, please list the details of the change. 5/13/24   What is the reason for the appointment change? Provide/left message

## 2024-04-15 ENCOUNTER — APPOINTMENT (OUTPATIENT)
Dept: LAB | Facility: CLINIC | Age: 59
End: 2024-04-15
Payer: COMMERCIAL

## 2024-04-15 ENCOUNTER — CONSULT (OUTPATIENT)
Dept: PAIN MEDICINE | Facility: CLINIC | Age: 59
End: 2024-04-15
Payer: COMMERCIAL

## 2024-04-15 ENCOUNTER — TRANSCRIBE ORDERS (OUTPATIENT)
Dept: PAIN MEDICINE | Facility: CLINIC | Age: 59
End: 2024-04-15

## 2024-04-15 VITALS
HEART RATE: 67 BPM | DIASTOLIC BLOOD PRESSURE: 78 MMHG | WEIGHT: 160 LBS | BODY MASS INDEX: 28.12 KG/M2 | SYSTOLIC BLOOD PRESSURE: 128 MMHG

## 2024-04-15 DIAGNOSIS — Z00.00 LABORATORY EXAMINATION ORDERED AS PART OF A ROUTINE GENERAL MEDICAL EXAMINATION: ICD-10-CM

## 2024-04-15 DIAGNOSIS — R73.01 ABNORMAL FASTING GLUCOSE: ICD-10-CM

## 2024-04-15 DIAGNOSIS — M85.89 OSTEOPENIA OF MULTIPLE SITES: ICD-10-CM

## 2024-04-15 DIAGNOSIS — E78.49 OTHER HYPERLIPIDEMIA: ICD-10-CM

## 2024-04-15 DIAGNOSIS — E53.8 VITAMIN B12 DEFICIENCY: ICD-10-CM

## 2024-04-15 DIAGNOSIS — M46.1 SACROILIITIS (HCC): Primary | ICD-10-CM

## 2024-04-15 DIAGNOSIS — D72.818 OTHER DECREASED WHITE BLOOD CELL (WBC) COUNT: ICD-10-CM

## 2024-04-15 LAB
25(OH)D3 SERPL-MCNC: 59.8 NG/ML (ref 30–100)
ALBUMIN SERPL BCP-MCNC: 4.4 G/DL (ref 3.5–5)
ALP SERPL-CCNC: 59 U/L (ref 34–104)
ALT SERPL W P-5'-P-CCNC: 27 U/L (ref 7–52)
ANION GAP SERPL CALCULATED.3IONS-SCNC: 7 MMOL/L (ref 4–13)
AST SERPL W P-5'-P-CCNC: 22 U/L (ref 13–39)
BASOPHILS # BLD AUTO: 0.03 THOUSANDS/ÂΜL (ref 0–0.1)
BASOPHILS NFR BLD AUTO: 1 % (ref 0–1)
BILIRUB SERPL-MCNC: 0.56 MG/DL (ref 0.2–1)
BUN SERPL-MCNC: 21 MG/DL (ref 5–25)
CALCIUM SERPL-MCNC: 9.6 MG/DL (ref 8.4–10.2)
CHLORIDE SERPL-SCNC: 103 MMOL/L (ref 96–108)
CHOLEST SERPL-MCNC: 192 MG/DL
CO2 SERPL-SCNC: 29 MMOL/L (ref 21–32)
CREAT SERPL-MCNC: 0.91 MG/DL (ref 0.6–1.3)
EOSINOPHIL # BLD AUTO: 0.25 THOUSAND/ÂΜL (ref 0–0.61)
EOSINOPHIL NFR BLD AUTO: 7 % (ref 0–6)
ERYTHROCYTE [DISTWIDTH] IN BLOOD BY AUTOMATED COUNT: 13.1 % (ref 11.6–15.1)
EST. AVERAGE GLUCOSE BLD GHB EST-MCNC: 120 MG/DL
GFR SERPL CREATININE-BSD FRML MDRD: 70 ML/MIN/1.73SQ M
GLUCOSE P FAST SERPL-MCNC: 102 MG/DL (ref 65–99)
HBA1C MFR BLD: 5.8 %
HCT VFR BLD AUTO: 40.1 % (ref 34.8–46.1)
HDLC SERPL-MCNC: 82 MG/DL
HGB BLD-MCNC: 13.1 G/DL (ref 11.5–15.4)
IMM GRANULOCYTES # BLD AUTO: 0 THOUSAND/UL (ref 0–0.2)
IMM GRANULOCYTES NFR BLD AUTO: 0 % (ref 0–2)
LDLC SERPL CALC-MCNC: 92 MG/DL (ref 0–100)
LYMPHOCYTES # BLD AUTO: 1.05 THOUSANDS/ÂΜL (ref 0.6–4.47)
LYMPHOCYTES NFR BLD AUTO: 29 % (ref 14–44)
MCH RBC QN AUTO: 30.6 PG (ref 26.8–34.3)
MCHC RBC AUTO-ENTMCNC: 32.7 G/DL (ref 31.4–37.4)
MCV RBC AUTO: 94 FL (ref 82–98)
MONOCYTES # BLD AUTO: 0.38 THOUSAND/ÂΜL (ref 0.17–1.22)
MONOCYTES NFR BLD AUTO: 11 % (ref 4–12)
NEUTROPHILS # BLD AUTO: 1.87 THOUSANDS/ÂΜL (ref 1.85–7.62)
NEUTS SEG NFR BLD AUTO: 52 % (ref 43–75)
NONHDLC SERPL-MCNC: 110 MG/DL
NRBC BLD AUTO-RTO: 0 /100 WBCS
PLATELET # BLD AUTO: 211 THOUSANDS/UL (ref 149–390)
PMV BLD AUTO: 8.6 FL (ref 8.9–12.7)
POTASSIUM SERPL-SCNC: 4.2 MMOL/L (ref 3.5–5.3)
PROT SERPL-MCNC: 6.9 G/DL (ref 6.4–8.4)
RBC # BLD AUTO: 4.28 MILLION/UL (ref 3.81–5.12)
SODIUM SERPL-SCNC: 139 MMOL/L (ref 135–147)
TRIGL SERPL-MCNC: 89 MG/DL
TSH SERPL DL<=0.05 MIU/L-ACNC: 1.32 UIU/ML (ref 0.45–4.5)
VIT B12 SERPL-MCNC: 551 PG/ML (ref 180–914)
WBC # BLD AUTO: 3.58 THOUSAND/UL (ref 4.31–10.16)

## 2024-04-15 PROCEDURE — 85025 COMPLETE CBC W/AUTO DIFF WBC: CPT

## 2024-04-15 PROCEDURE — 80053 COMPREHEN METABOLIC PANEL: CPT

## 2024-04-15 PROCEDURE — 99204 OFFICE O/P NEW MOD 45 MIN: CPT | Performed by: ANESTHESIOLOGY

## 2024-04-15 PROCEDURE — 36415 COLL VENOUS BLD VENIPUNCTURE: CPT

## 2024-04-15 PROCEDURE — 82607 VITAMIN B-12: CPT

## 2024-04-15 PROCEDURE — 80061 LIPID PANEL: CPT

## 2024-04-15 PROCEDURE — 82306 VITAMIN D 25 HYDROXY: CPT

## 2024-04-15 PROCEDURE — 84443 ASSAY THYROID STIM HORMONE: CPT

## 2024-04-15 PROCEDURE — 83036 HEMOGLOBIN GLYCOSYLATED A1C: CPT

## 2024-04-16 ENCOUNTER — OFFICE VISIT (OUTPATIENT)
Dept: INTERNAL MEDICINE CLINIC | Facility: CLINIC | Age: 59
End: 2024-04-16
Payer: COMMERCIAL

## 2024-04-16 VITALS
SYSTOLIC BLOOD PRESSURE: 122 MMHG | DIASTOLIC BLOOD PRESSURE: 80 MMHG | OXYGEN SATURATION: 99 % | HEART RATE: 69 BPM | WEIGHT: 152.8 LBS | BODY MASS INDEX: 27.07 KG/M2 | TEMPERATURE: 96.6 F | HEIGHT: 63 IN

## 2024-04-16 DIAGNOSIS — E78.49 OTHER HYPERLIPIDEMIA: ICD-10-CM

## 2024-04-16 DIAGNOSIS — R73.01 ABNORMAL FASTING GLUCOSE: ICD-10-CM

## 2024-04-16 DIAGNOSIS — C50.812 MALIGNANT NEOPLASM OF OVERLAPPING SITES OF LEFT BREAST IN FEMALE, ESTROGEN RECEPTOR POSITIVE (HCC): ICD-10-CM

## 2024-04-16 DIAGNOSIS — K21.9 GASTROESOPHAGEAL REFLUX DISEASE WITHOUT ESOPHAGITIS: ICD-10-CM

## 2024-04-16 DIAGNOSIS — Z17.0 MALIGNANT NEOPLASM OF OVERLAPPING SITES OF LEFT BREAST IN FEMALE, ESTROGEN RECEPTOR POSITIVE (HCC): ICD-10-CM

## 2024-04-16 DIAGNOSIS — F41.9 ANXIETY AND DEPRESSION: ICD-10-CM

## 2024-04-16 DIAGNOSIS — F32.A ANXIETY AND DEPRESSION: ICD-10-CM

## 2024-04-16 DIAGNOSIS — M51.17 INTERVERTEBRAL DISC DISORDER WITH RADICULOPATHY OF LUMBOSACRAL REGION: ICD-10-CM

## 2024-04-16 DIAGNOSIS — M85.89 OSTEOPENIA OF MULTIPLE SITES: ICD-10-CM

## 2024-04-16 DIAGNOSIS — E53.8 VITAMIN B12 DEFICIENCY: ICD-10-CM

## 2024-04-16 DIAGNOSIS — G62.9 NEUROPATHY: ICD-10-CM

## 2024-04-16 DIAGNOSIS — G89.4 PAIN SYNDROME, CHRONIC: ICD-10-CM

## 2024-04-16 DIAGNOSIS — G47.09 OTHER INSOMNIA: ICD-10-CM

## 2024-04-16 PROCEDURE — 99214 OFFICE O/P EST MOD 30 MIN: CPT | Performed by: INTERNAL MEDICINE

## 2024-04-16 NOTE — PROGRESS NOTES
Assessment/Plan:    Malignant neoplasm of left breast in female, estrogen receptor positive   Started on anastrozole.  Per oncology.    Intervertebral disc disorder with radiculopathy of lumbosacral region  Saw pain, injection scheduled.  Taking tramadol at least bid.  Taking gabapentin 100 mg qHS.    Hyperlipidemia  Lipids stable, maintain on rosuvastatin 20 mg.    Anxiety and depression  Stable, decrease Lexapro to 5 mg on her own.    Other insomnia  Taking trazodone qHS.    Vitamin B12 deficiency  Taking B12.    Osteopenia of multiple sites  Schedule bone density.    Abnormal fasting glucose  A1c 5.8%.    Gastroesophageal reflux disease  On daily PPI.    Neuropathy  Improved, on low dose gabapentin.    Pain syndrome, chronic  Narcotic contract updated.  UDS today.     Diagnoses and all orders for this visit:    Malignant neoplasm of overlapping sites of left breast in female, estrogen receptor positive (HCC)  -     CBC and differential; Future  -     Basic metabolic panel; Future    Intervertebral disc disorder with radiculopathy of lumbosacral region    Other hyperlipidemia    Anxiety and depression    Other insomnia    Vitamin B12 deficiency    Osteopenia of multiple sites    Abnormal fasting glucose  -     Hemoglobin A1C; Future    Gastroesophageal reflux disease without esophagitis    Neuropathy    Pain syndrome, chronic  -     [facility] All Prescribed Meds and Special Instructions  -     Amphetamines, Methamphetamines  -     Butalbital  -     Phenobarbital  -     Secobarbital  -     Alprazolam  -     Clonazepam  -     Diazepam  -     Lorazepam  -     Gabapentin  -     Pregabalin  -     Cocaine  -     Heroin  -     Buprenorphine  -     Levorphanol  -     Meperidine  -     Naltrexone  -     Fentanyl  -     Methadone  -     Oxycodone  -     Tapentadol  -     THC  -     Tramadol  -     Codeine, Hydrocodone, Hydropmorphone, Morphine  -     Bath Salts  -     Ethyl Glucuronide/Ethyl Sulfate  -     Kratom  -      Spice  -     Methylphenidate  -     Phentermine  -     Validity Oxidant  -     Validity Creatinine  -     Validity pH  -     Validity Specific  -     Xylazine Definitive Test      Follow up in 3 months or as needed.      Subjective:      Patient ID: Debra Escoto is a 58 y.o. female here for a follow up.    She recently had Mohs surgery on her nose.  She reports after the procedure, she was experiencing pain on her nose and across her face.  She was taking Advil, already taking tramadol.  She reports it is feeling better.  She has been applying Vaseline and putting a Band-Aid on it as instructed.    She started taking gabapentin which helps with her neuropathy symptoms.  She only takes it at night.  She continues to take tramadol twice a day regularly.  She has a back injection scheduled.    She is upset that her sugars are in prediabetic range.  She does not eat a lot of sweets or carbs.  She is very conscious about her weight.  She is also asking about her cholesterol and white count.      The following portions of the patient's history were reviewed and updated as appropriate: allergies, current medications, past medical history, past social history, past surgical history, and problem list.    Review of Systems   Constitutional:  Negative for appetite change and fatigue.   HENT:  Negative for congestion, ear pain and postnasal drip.    Eyes:  Negative for visual disturbance.   Respiratory:  Negative for cough and shortness of breath.    Cardiovascular:  Negative for chest pain and leg swelling.   Gastrointestinal:  Negative for abdominal pain, constipation and diarrhea.   Genitourinary:  Negative for dysuria, frequency and urgency.   Musculoskeletal:  Positive for arthralgias. Negative for myalgias.   Skin:  Positive for wound. Negative for rash.   Neurological:  Negative for dizziness, numbness and headaches.   Hematological:  Does not bruise/bleed easily.   Psychiatric/Behavioral:  Negative for confusion and  "sleep disturbance. The patient is not nervous/anxious.          Objective:      /80   Pulse 69   Temp (!) 96.6 °F (35.9 °C)   Ht 5' 3\" (1.6 m)   Wt 69.3 kg (152 lb 12.8 oz)   SpO2 99%   BMI 27.07 kg/m²          Physical Exam  Vitals and nursing note reviewed.   Constitutional:       General: She is not in acute distress.     Appearance: She is well-developed.   HENT:      Head: Normocephalic and atraumatic.     Eyes:      Pupils: Pupils are equal, round, and reactive to light.   Cardiovascular:      Rate and Rhythm: Normal rate and regular rhythm.      Heart sounds: Normal heart sounds.   Pulmonary:      Effort: Pulmonary effort is normal.      Breath sounds: Normal breath sounds. No wheezing.   Abdominal:      General: Bowel sounds are normal.      Palpations: Abdomen is soft.   Musculoskeletal:         General: No swelling.      Right lower leg: No edema.      Left lower leg: No edema.   Skin:     General: Skin is warm.      Findings: No rash.   Neurological:      General: No focal deficit present.      Mental Status: She is alert and oriented to person, place, and time.   Psychiatric:         Mood and Affect: Mood and affect normal. Mood is not anxious or depressed.         Behavior: Behavior normal.           Labs & imaging results reviewed with patient.    "

## 2024-04-19 LAB
4OH-XYLAZINE UR QL CFM: NEGATIVE NG/ML
6MAM UR QL CFM: NEGATIVE NG/ML
7AMINOCLONAZEPAM UR QL CFM: NEGATIVE NG/ML
A-OH ALPRAZ UR QL CFM: NEGATIVE NG/ML
ACCEPTABLE CREAT UR QL: NORMAL MG/DL
ACCEPTIBLE SP GR UR QL: NORMAL
AMPHET UR QL CFM: NEGATIVE NG/ML
BUPRENORPHINE UR QL CFM: NEGATIVE NG/ML
BUTALBITAL UR QL CFM: NEGATIVE NG/ML
BZE UR QL CFM: NEGATIVE NG/ML
CODEINE UR QL CFM: NEGATIVE NG/ML
EDDP UR QL CFM: NEGATIVE NG/ML
ETHYL GLUCURONIDE UR QL CFM: NEGATIVE NG/ML
ETHYL SULFATE UR QL SCN: NEGATIVE NG/ML
EUTYLONE UR QL: NEGATIVE NG/ML
FENTANYL UR QL CFM: NEGATIVE NG/ML
GLIADIN IGG SER IA-ACNC: NEGATIVE NG/ML
HYDROCODONE UR QL CFM: NEGATIVE NG/ML
HYDROMORPHONE UR QL CFM: NEGATIVE NG/ML
LORAZEPAM UR QL CFM: NEGATIVE NG/ML
ME-PHENIDATE UR QL CFM: NEGATIVE NG/ML
MEPERIDINE UR QL CFM: NEGATIVE NG/ML
METHADONE UR QL CFM: NEGATIVE NG/ML
METHAMPHET UR QL CFM: NEGATIVE NG/ML
MORPHINE UR QL CFM: NEGATIVE NG/ML
NALTREXONE UR QL CFM: NEGATIVE NG/ML
NITRITE UR QL: NORMAL UG/ML
NORBUPRENORPHINE UR QL CFM: NEGATIVE NG/ML
NORDIAZEPAM UR QL CFM: NEGATIVE NG/ML
NORFENTANYL UR QL CFM: NEGATIVE NG/ML
NORHYDROCODONE UR QL CFM: NEGATIVE NG/ML
NORMEPERIDINE UR QL CFM: NEGATIVE NG/ML
NOROXYCODONE UR QL CFM: NEGATIVE NG/ML
OXAZEPAM UR QL CFM: NEGATIVE NG/ML
OXYCODONE UR QL CFM: NEGATIVE NG/ML
OXYMORPHONE UR QL CFM: NEGATIVE NG/ML
PARA-FLUOROFENTANYL QUANTIFICATION: NORMAL NG/ML
PHENOBARB UR QL CFM: NEGATIVE NG/ML
RESULT ALL_PRESCRIBED MEDS AND SPECIAL INSTRUCTIONS: NORMAL
SECOBARBITAL UR QL CFM: NEGATIVE NG/ML
SL AMB 4-ANPP QUANTIFICATION: NORMAL NG/ML
SL AMB 5F-ADB-M7 METABOLITE QUANTIFICATION: NEGATIVE NG/ML
SL AMB 7-OH-MITRAGYNINE (KRATOM ALKALOID) QUANTIFICATION: NEGATIVE NG/ML
SL AMB AB-FUBINACA-M3 METABOLITE QUANTIFICATION: NEGATIVE NG/ML
SL AMB ACETYL FENTANYL QUANTIFICATION: NORMAL NG/ML
SL AMB ACETYL NORFENTANYL QUANTIFICATION: NORMAL NG/ML
SL AMB ACRYL FENTANYL QUANTIFICATION: NORMAL NG/ML
SL AMB CARFENTANIL QUANTIFICATION: NORMAL NG/ML
SL AMB CTHC (MARIJUANA METABOLITE) QUANTIFICATION: NEGATIVE NG/ML
SL AMB DEXTRORPHAN (DEXTROMETHORPHAN METABOLITE) QUANT: NEGATIVE NG/ML
SL AMB GABAPENTIN QUANTIFICATION: NORMAL
SL AMB JWH018 METABOLITE QUANTIFICATION: NEGATIVE NG/ML
SL AMB JWH073 METABOLITE QUANTIFICATION: NEGATIVE NG/ML
SL AMB MDMB-FUBINACA-M1 METABOLITE QUANTIFICATION: NEGATIVE NG/ML
SL AMB METHYLONE QUANTIFICATION: NEGATIVE NG/ML
SL AMB N-DESMETHYL-TRAMADOL QUANTIFICATION: NORMAL NG/ML
SL AMB PHENTERMINE QUANTIFICATION: NEGATIVE NG/ML
SL AMB PREGABALIN QUANTIFICATION: NEGATIVE
SL AMB RCS4 METABOLITE QUANTIFICATION: NEGATIVE NG/ML
SL AMB RITALINIC ACID QUANTIFICATION: NEGATIVE NG/ML
SMOOTH MUSCLE AB TITR SER IF: NEGATIVE NG/ML
SPECIMEN DRAWN SERPL: NEGATIVE NG/ML
SPECIMEN PH ACCEPTABLE UR: NORMAL
TAPENTADOL UR QL CFM: NEGATIVE NG/ML
TEMAZEPAM UR QL CFM: NEGATIVE NG/ML
TRAMADOL UR QL CFM: NORMAL NG/ML
URATE/CREAT 24H UR: NORMAL NG/ML
XYLAZINE UR QL CFM: NEGATIVE NG/ML

## 2024-04-29 ENCOUNTER — PROCEDURE VISIT (OUTPATIENT)
Dept: DERMATOLOGY | Facility: CLINIC | Age: 59
End: 2024-04-29

## 2024-04-29 DIAGNOSIS — Z41.1 ENCOUNTER FOR COSMETIC LASER PROCEDURE: Primary | ICD-10-CM

## 2024-04-29 PROCEDURE — BUNDLE PR BUNDLE: Performed by: STUDENT IN AN ORGANIZED HEALTH CARE EDUCATION/TRAINING PROGRAM

## 2024-04-29 NOTE — PATIENT INSTRUCTIONS
POST-SURGERY SCAR CARE    We advise you start silicone scar gel to scar lines with firm massage to the scar after 2 weeks. [employer] makes a gel that you can buy and is available over the counter. You can find this in the band-aid aisle.     Please remember to use sunscreen daily after your wound has healed. We recommend a broad spectrum sunscreen with SPF of at least 30 and sun protective clothing, shade, etc.

## 2024-04-29 NOTE — PROGRESS NOTES
"[facility] Dermatology Clinic Note     Patient Name: Debra Escoto  Encounter Date: 4/29/2024     Have you been cared for by a [facility] Dermatologist in the last 3 years and, if so, which description applies to you?    Yes.  I have been here within the last 3 years, and my medical history has NOT changed since that time.  I am FEMALE/of child-bearing potential.    REVIEW OF SYSTEMS:  Have you recently had or currently have any of the following? No changes in my recent health.   PAST MEDICAL HISTORY:  Have you personally ever had or currently have any of the following?  If \"YES,\" then please provide more detail. No changes in my medical history.   HISTORY OF IMMUNOSUPPRESSION: Do you have a history of any of the following:  Systemic Immunosuppression such as Diabetes, Biologic or Immunotherapy, Chemotherapy, Organ Transplantation, Bone Marrow Transplantation?  No     Answering \"YES\" requires the addition of the dotphrase \"IMMUNOSUPPRESSED\" as the first diagnosis of the patient's visit.   FAMILY HISTORY:  Any \"first degree relatives\" (parent, brother, sister, or child) with the following?    No changes in my family's known health.   PATIENT EXPERIENCE:    Do you want the Dermatologist to perform a COMPLETE skin exam today including a clinical examination under the \"bra and underwear\" areas?  NO  If necessary, do we have your permission to call and leave a detailed message on your Preferred Phone number that includes your specific medical information?  Yes      Allergies   Allergen Reactions    Sulfa Antibiotics Headache     Annotation - [id number]: Migraine; Annotation - [id number]: cellular issues      Current Outpatient Medications:     anastrozole (ARIMIDEX) 1 mg tablet, Take 1 tablet (1 mg total) by mouth daily, Disp: 90 tablet, Rfl: 1    BIOTIN PO, Take by mouth, Disp: , Rfl:     cholecalciferol (VITAMIN D3) 1,000 units tablet, Take 1,000 Units by mouth daily, Disp: , Rfl:     escitalopram (LEXAPRO) 10 mg " tablet, Take 1 tablet (10 mg total) by mouth daily (Patient taking differently: Take 10 mg by mouth daily Pt decreased to 5 mg), Disp: 90 tablet, Rfl: 0    gabapentin (NEURONTIN) 100 mg capsule, Take 1 capsule (100 mg total) by mouth 3 (three) times a day as needed (moderate pain), Disp: 90 capsule, Rfl: 2    omeprazole (PriLOSEC) 20 mg delayed release capsule, Take 1 capsule (20 mg total) by mouth daily before breakfast, Disp: 90 capsule, Rfl: 1    rosuvastatin (CRESTOR) 20 MG tablet, Take 1 tablet (20 mg total) by mouth daily, Disp: 90 tablet, Rfl: 1    traMADol (ULTRAM) 50 mg tablet, Take 1 tablet (50 mg total) by mouth every 8 (eight) hours as needed for moderate pain, Disp: 90 tablet, Rfl: 0    traZODone (DESYREL) 50 mg tablet, Take 1 tablet (50 mg total) by mouth daily at bedtime, Disp: 90 tablet, Rfl: 1    tretinoin (RETIN-A) 0.025 % cream, Apply topically daily at bedtime, Disp: 30 g, Rfl: 3    vitamin B-12 (VITAMIN B-12) 500 mcg tablet, Take 1 tablet (500 mcg total) by mouth daily, Disp: 90 tablet, Rfl: 1        Whom besides the patient is providing clinical information about today's encounter?   NO ADDITIONAL HISTORIAN (patient alone provided history)    Physical Exam and Assessment/Plan by Diagnosis:    Intense Pulse Light Treatment Note  3/20/2024     Device: Lumenis Stellar M22   Place of Treatment: [facility]     Surgeon and : Fadumo Gilbert MD  Assistant: SHILA Coker     Treatment number: 3  Site of treatment: bilateral arms  Skin type: Garza II     Pre-operative Diagnosis: Dyschromia  Indications for Procedure:  Acquired Brachiocutaneous dyschromatosis   Post-operative Diagnosis: same as pre-operative     Anesthetic: Mikala cooler     Safety Precautions:  Fire extinguisher persent/Window covered/Staff and patient eyes covered/Warning sign posted.      Interim History/Comments:  none  Percent Improvement: N/A     Parameters:      First pass over entire b/l dorsal distal and  proximal arms:  The following filter was used: 640mm   Fluence: 19 J/cm2   Spot size/shape: rectangular 15 x 35mm     Second pass to more discrete lentigos:   The following filter was used: 560mm  Fluence: 20 J/cm2   Spot size/shape: circular 6mm     Third pass to  Mohs scar   The following filter was used: Vascular  Fluence: 27 J/cm2   Spot size/shape: circular 6mm     Total Pulse count: 238     Procedure Note:   After discussing potential procedure related risks including but not limited to pain, purpura, blistering, scarring, discoloration, “footprinting,” recurrence, inefficacy, need for additional treatment, and undesirable cosmetic results, written and verbal consent were obtained.  Patient was brought back to the operating room. Time out was performed.  Patient's name, identification and intended procedure were verified. Prior to the procedure, the patient cleansed the treatment area. The treatment area was re-cleansed with alcohol. Eye coverings eye shield inserted/placed. Ultrasound gel was applied to areas of treatment and re-applied as necessary throughout treatment.     Tolerance: Patient tolerated the procedure well with no immediate significant complications and left in stable condition.   Complications: none  Other procedures: none  Photography: no     Post-op Care:   As discussed, when we treat pigment in the skin with IPL, gradual darkening of pigmentation will occur. We advised not to rub the treatment area or try to scrub the area away. This is part of the healing process and should start to slough off in three to four days.     Most patients experience minimal to no side effects after this treatment. However, a small percentage (less than 1%) of patients may experience minor side effects such as swelling, slight scabbing, blisters, bruising, hyper/hypopigmentation. Listed below are some simple but important instructions that will help minimize side effects and help you achieve the best possible  result from your treatment.     1. Avoid direct/indirect sun exposure. Direct sunlight should be avoided for around four weeks post-treatment. This means no tanning of any kind, including tanning booths or artificial tanning otions. We recommend applying sunblock daily to the treated area, especially facial areas with minimum of 30 SPF. Your sunblock should contain Zinc Oxide and/or Titanium Dioxide as the active ingredients. Make sure to reapply sunblock every 2-3 hours outdoors or in direct sunlight.     2. After a treatment your skin may feel slightly sunburned for up to 24-48 hours. You may use a cold pack for comfort and to relieve swelling as needed. To minimize swelling, you may apply cold compresses to the treatment area for 10 minutes of every hour on the day of the treatment. Sleeping on your back with your head elevated on the first night post-treatment also helps. Cold Aloe Vera gel works well.  Avoid hot baths or showering for the first 24-48 hours. Call if you notice the sensation of excessive or uncomfortable heat.     3. Very rarely, a small blister or scab may form. Call immediately if you have any blistering. Leave it alone if intact. If skin breaks or a blister opens, apply an over the counter topical antibiotic ointment to the area and reapply regularly to keep area moist and well covered with medication. To reduce the risk of scarring you must not pick at your skin after an IPL treatment.     4. If you experience itching as the skin heals, you may use an over the counter topical hydrocortisone cream. Do not use Retin A products or other irritating products including glycolic acids or other acids until instructed to do so.     5. You may apply make up on the treated area as long as the skin is not broken or blistered.     6. Discontinue use of any Retin A or hydroquinone products 5-7 days prior to your next treatment.     7. Resume a regular skin regimen once the treatment area is back to  “normal.”     IPL should be scheduled every three to six weeks (depending on treatment reaction) and can be done for a series of multiple treatments. Please do not hesitate to call our office at [phone] if you have any questions or concerns about your treatments.     Follow-up:  Patient is aware that it will take multiple treatments to treat this condition. Return to clinic for re-treatment in 4-6 weeks.        IPL TREATMENT OF ARMS WAS A COSMETIC TREATMENT AND PATIENT PAID OUT OF POCKET, DO NOT BILL INSURANCE.   AMOUNT PAID: $1200 for package of 3 paid at initial visit. This is Tx 3 of 3.    ResurFx Treatment Note  4/29/2024    Device: Scutumllar M22   Place of Treatment: [facility]    Surgeon and : Fadumo Gilbert MD    Treatment number: 1  Site of treatment: nose  Skin type: Garza II    Pre-operative Diagnosis: Mohs Scar (PIE and firm scar)  Indications for Procedure:  cosmesis of scar  Post-operative Diagnosis: same as pre-operative    Anesthetic: Mikala cooler     Safety Precautions:  Fire extinguisher persent/Window covered/Staff and patient eyes covered/Warning sign posted.      Interim History/Comments:  none  Percent Improvement: N/A    Parameters: The ResurFx laser handle is fixed at the 1565nm wavelength.  Parameters were as follows:    Energy: 30 mJ  Density (D):  350ub/cm2     Procedure Note:   After discussing potential procedure related risks including but not limited to pain, purpura, blistering, scarring, discoloration, “footprinting,” recurrence, inefficacy, need for additional treatment, and undesirable cosmetic results, written and verbal consent were obtained.  Patient was brought back to the operating room. Time out was performed.  Patient's name, identification and intended procedure were verified. Prior to the procedure, the patient cleansed the treatment area. The treatment area was re-cleansed with alcohol. Eye coverings eye shield inserted/placed.       Tolerance: Patient tolerated the procedure well with no immediate significant complications and left in stable condition.   Complications: none  Photography: yes        Post-op Care: Aftercare was discussed. Continue to ice at home and keep the area moist with a gentle moisturizer. Advised the patient to avoid exercise for the next 24 hours and also to be cautious with sun exposure over the next week. Advised patient to call the office if there is excessive swelling.   Follow-up:  Patient is aware that it will take multiple treatments to treat this condition. Return to clinic for re-treatment in 4-6 weeks.    No Charge for treatment of Mohs scar (IPL followed by ResurFx). This was treatment 1 of 2 covered post-Mohs laser treatments.         Scribe Attestation      I,:  Lisa Benitez am acting as a scribe while in the presence of the attending physician.:       I,:  Fadumo Gilbert MD personally performed the services described in this documentation    as scribed in my presence.:

## 2024-05-02 ENCOUNTER — HOSPITAL ENCOUNTER (OUTPATIENT)
Dept: RADIOLOGY | Facility: CLINIC | Age: 59
Discharge: HOME | End: 2024-05-02
Payer: COMMERCIAL

## 2024-05-02 VITALS
TEMPERATURE: 97.4 F | RESPIRATION RATE: 20 BRPM | SYSTOLIC BLOOD PRESSURE: 114 MMHG | HEART RATE: 74 BPM | OXYGEN SATURATION: 97 % | DIASTOLIC BLOOD PRESSURE: 73 MMHG

## 2024-05-02 DIAGNOSIS — M46.1 SACROILIITIS (HCC): ICD-10-CM

## 2024-05-02 PROCEDURE — 27096 INJECT SACROILIAC JOINT: CPT | Performed by: ANESTHESIOLOGY

## 2024-05-02 RX ORDER — 0.9 % SODIUM CHLORIDE 0.9 %
4 VIAL (ML) INJECTION ONCE
Status: COMPLETED | OUTPATIENT
Start: 2024-05-02 | End: 2024-05-02

## 2024-05-02 RX ORDER — METHYLPREDNISOLONE ACETATE 80 MG/ML
80 INJECTION, SUSPENSION INTRA-ARTICULAR; INTRALESIONAL; INTRAMUSCULAR; PARENTERAL; SOFT TISSUE ONCE
Status: COMPLETED | OUTPATIENT
Start: 2024-05-02 | End: 2024-05-02

## 2024-05-02 RX ORDER — ROPIVACAINE HYDROCHLORIDE 2 MG/ML
7 INJECTION, SOLUTION EPIDURAL; INFILTRATION; PERINEURAL ONCE
Status: COMPLETED | OUTPATIENT
Start: 2024-05-02 | End: 2024-05-02

## 2024-05-02 RX ADMIN — IOHEXOL 2 ML: 300 INJECTION, SOLUTION INTRAVENOUS at 13:47

## 2024-05-02 RX ADMIN — ROPIVACAINE HYDROCHLORIDE 7 ML: 2 INJECTION, SOLUTION EPIDURAL; INFILTRATION; PERINEURAL at 13:47

## 2024-05-02 RX ADMIN — Medication 4 ML: at 13:46

## 2024-05-02 RX ADMIN — METHYLPREDNISOLONE ACETATE 80 MG: 80 INJECTION, SUSPENSION INTRA-ARTICULAR; INTRALESIONAL; INTRAMUSCULAR; PARENTERAL; SOFT TISSUE at 13:47

## 2024-05-02 NOTE — H&P
History of Present Illness: The patient is a 58 y.o. female who presents with complaints of lower back pain and is here today for bilateral sacroiliac injections    Past Medical History:   Diagnosis Date    Allergic     Anxiety     Basal cell carcinoma 12/07/2023    left nasal side wall-Mohs    BRCA gene mutation negative 06/20/2019    [facility]    Cancer (HCC)     breast     Eczema     Entire life    Fibrocystic disease of breast     GERD (gastroesophageal reflux disease)     Hiatal hernia     Hyperlipidemia        Past Surgical History:   Procedure Laterality Date    APPENDECTOMY      BREAST BIOPSY      BREAST CYST ASPIRATION Left 2005    COLONOSCOPY      FOOT SURGERY Right 2011    right, hardware removal    IR PORT PLACEMENT  09/27/2019    IR PORT REMOVAL  02/19/2020    LYMPH NODE BIOPSY      MASTECTOMY W/ SENTINEL NODE BIOPSY Left 08/09/2019    Procedure: BREAST MASTECTOMY, SENTINEL LYMPH NODE BIOPSY, LYMPHATIC MAPPING W/ BLUE DYE AND RADIOACTIVE DYE (INJECT AT 0730 BY DR SADLER IN THE O.R.);  Surgeon: Deshaun Sadler MD;  Location: [facility];  Service: Surgical Oncology    MOHS SURGERY Left 03/19/2024    BCC left nasal side wall    OTHER SURGICAL HISTORY Right 2010    Complete Excision of Fifth Metatarsal Head     MA COLONOSCOPY FLX DX W/COLLJ SPEC WHEN PFRMD N/A 07/20/2018    Procedure: EGD AND COLONOSCOPY;  Surgeon: Ilan Aponte MD;  Location: [facility] GI LAB;  Service: Gastroenterology    MA MASTECTOMY SIMPLE COMPLETE Right 08/09/2019    Procedure: BREAST SIMPLE MASTECTOMY;  Surgeon: Deshaun Sadler MD;  Location: [facility];  Service: Surgical Oncology    SKIN BIOPSY      US GUIDANCE BREAST BIOPSY LEFT EACH ADDITIONAL Left 06/13/2019    US GUIDED BREAST BIOPSY LEFT COMPLETE Left 06/13/2019         Current Outpatient Medications:     anastrozole (ARIMIDEX) 1 mg tablet, Take 1 tablet (1 mg total) by mouth daily, Disp: 90 tablet, Rfl: 1    BIOTIN PO, Take by mouth, Disp: , Rfl:     cholecalciferol (VITAMIN D3) 1,000 units  tablet, Take 1,000 Units by mouth daily, Disp: , Rfl:     escitalopram (LEXAPRO) 10 mg tablet, Take 1 tablet (10 mg total) by mouth daily (Patient taking differently: Take 10 mg by mouth daily Pt decreased to 5 mg), Disp: 90 tablet, Rfl: 0    gabapentin (NEURONTIN) 100 mg capsule, Take 1 capsule (100 mg total) by mouth 3 (three) times a day as needed (moderate pain), Disp: 90 capsule, Rfl: 2    omeprazole (PriLOSEC) 20 mg delayed release capsule, Take 1 capsule (20 mg total) by mouth daily before breakfast, Disp: 90 capsule, Rfl: 1    rosuvastatin (CRESTOR) 20 MG tablet, Take 1 tablet (20 mg total) by mouth daily, Disp: 90 tablet, Rfl: 1    traMADol (ULTRAM) 50 mg tablet, Take 1 tablet (50 mg total) by mouth every 8 (eight) hours as needed for moderate pain, Disp: 90 tablet, Rfl: 0    traZODone (DESYREL) 50 mg tablet, Take 1 tablet (50 mg total) by mouth daily at bedtime, Disp: 90 tablet, Rfl: 1    tretinoin (RETIN-A) 0.025 % cream, Apply topically daily at bedtime, Disp: 30 g, Rfl: 3    vitamin B-12 (VITAMIN B-12) 500 mcg tablet, Take 1 tablet (500 mcg total) by mouth daily, Disp: 90 tablet, Rfl: 1    Current Facility-Administered Medications:     iohexol (OMNIPAQUE) 300 mg/mL injection 2 mL, 2 mL, Intra-articular, Once, Krishna Adrian MD    lidocaine (PF) (XYLOCAINE-MPF) 2 % injection 4 mL, 4 mL, Infiltration, Once, Krishna Adrian MD    methylPREDNISolone acetate (DEPO-MEDROL) injection 80 mg, 80 mg, Intra-articular, Once, Krishna Adrian MD    ropivacaine (NAROPIN) injection 7 mL, 7 mL, Intra-articular, Once, Krishna Adrian MD    sodium chloride (PF) 0.9 % injection 4 mL, 4 mL, Infiltration, Once, Krishna Adrian MD    Allergies   Allergen Reactions    Sulfa Antibiotics Headache     Annotation - [id number]: Migraine; Annotation - [id number]: cellular issues       Physical Exam:   Vitals:    05/02/24 1332   BP: 115/81   Pulse: 66   Resp: 18   Temp: (!) 97.4 °F (36.3 °C)   SpO2: 97%     General: Awake, Alert,  "Oriented x 3, Mood and affect appropriate  Respiratory: Respirations even and unlabored  Cardiovascular: Peripheral pulses intact; no edema  Musculoskeletal Exam: Lower back pain    ASA Score: 3    Patient/Chart Verification  Patient ID Verified: Verbal  ID Band Applied: No  Consents Confirmed: To be obtained in the Pre-Procedure area, Procedural  H&P( within 30 days) Verified: To be obtained in the Pre-Procedure area  Allergies Reviewed: Yes  Anticoag/NSAID held?: No  Currently on antibiotics?: No (pt reports symptoms of \"head cold / allergies\" afebrile, FQ informed. no abx treatment at this time)    Assessment:   1. Sacroiliitis (HCC)        Plan: Bilateral SIJ Injections    "

## 2024-05-02 NOTE — DISCHARGE INSTR - LAB

## 2024-05-08 DIAGNOSIS — M51.17 INTERVERTEBRAL DISC DISORDER WITH RADICULOPATHY OF LUMBOSACRAL REGION: ICD-10-CM

## 2024-05-09 ENCOUNTER — TELEPHONE (OUTPATIENT)
Dept: RADIOLOGY | Facility: MEDICAL CENTER | Age: 59
End: 2024-05-09

## 2024-05-09 RX ORDER — GABAPENTIN 100 MG/1
100 CAPSULE ORAL 3 TIMES DAILY PRN
Qty: 90 CAPSULE | Refills: 2 | Status: SHIPPED | OUTPATIENT
Start: 2024-05-09

## 2024-05-09 NOTE — PROGRESS NOTES
HEMATOLOGY / ONCOLOGY CLINIC FOLLOW UP NOTE    Patient Debra Escoto  MRN: [id number]  : 1965  Date of Encounter 2024      Referring Provider:      Reason for Encounter: follow up     Left breast cancer ER/WV + [id number] genetics neg s/p left mastectomy , adjuvant AC/T     CLARK 2020  Started Arimidex due to toxicity CLARK 2023    Oncology History   Malignant neoplasm of left breast in female, estrogen receptor positive (HCC)   2019 Biopsy    Left breast ultrasound-guided biopsy  12 o'clock, 4 cm from nipple  Invasive breast carcinoma of no special type (ductal NST)  Grade 2    2 o'clock, 5 cm from nipple  Invasive breast carcinoma of no special type (ductal NST)  Grade 2  ER 90%  WV 90%  HER2 1+     2019 Genetic Testing    The following genes were evaluated: JOSH, BRCA1, BRCA2, CDH1, CHEK2, PALB2, PTEN, STK11, TP53  Negative for genetic mutations or variants  [facility]     2019 Surgery    Left breast mastectomy with sentinel lymph node biopsy  Two foci of invasive mammary carcinoma of no special type (ductal)  Grade 2  2.4 cm  DCIS measures at least 8.2 cm  0/2 Lymph nodes  Margins negative  Anatomic Stage IIA  Prognostic Stage IA    Right breast mastectomy; prophylactic  Dr. Sadler     2019 Genomic Testing    MammaPrint for FLEX  High Risk     9/3/2019 -  Cancer Staged    Staging form: Breast, AJCC 8th Edition  - Pathologic stage from 9/3/2019: Stage IA (pT2(2), pN0(sn), cM0, G2, ER+, WV+, HER2-) - Signed by RUDOLPH Beasley on 3/6/2024  Stage prefix: Initial diagnosis  Neoadjuvant therapy: No  Method of lymph node assessment: [place] lymph node biopsy  Multigene prognostic tests performed: MammaPrint  Histologic grading system: 3 grade system  Laterality: Left  Tumor size (mm): 24  Multiple tumors: Yes  Number of tumors: 2       10/4/2019 -  Chemotherapy    Adjuvant chemotherapy (Dr Carreon)  AC followed by dose dense paclitaxel     2020 -  Hormone Therapy     Tamoxifen  8/2022 Transfer of care- Dr. Ojeda             Assessment / Plan:      1. Right breast cancer, stage IIA (pT2, pN0, M0) grade 2, ER 90% positive, NH 90% positive, HER2 negative disease.  MammaPrint high risk.  Diagnosed in August 2019.   2. BRCA gene mutation negative.     Allie Escoto is a 58-year-old premenopausal female with stage IIA right breast cancer, grade 2, ER 90% positive, NH 90% positive, HER2 negative disease.  She is negative for BRCA gene mutation.  She underwent right mastectomy and sentinel lymph node biopsy as well as left prophylactic mastectomy, resulting in ERIK.  Her tumor was found to be high risk based on a MammaPrint.      She underwent adjuvant chemotherapy with dose dense AC followed by dose dense paclitaxel.       She was on adjuvant endocrine therapy with tamoxifen from February 2020-March 2023 at which time she stopped taking the medication on her own.  She states she was experiencing weight gain and joint pain.  Patient does note history of chronic SI joint issues and chronic pain for which she was following with pain management in the past.  Prior plan was to complete 5 years of tamoxifen and switch to an aromatase inhibitor for another 5 years.      At this juncture, due to intolerance of tamoxifen recommend patient switch to aromatase inhibitor.  We discussed treatment with Arimidex 1 mg p.o. daily.  We also discussed low suspicion of tamoxifen causing her SI joint pain as this is a chronic problem for her.     Thus:   CLARK 8/2020  Started Arimidex due to toxicity CLARK March 2023 2/22/2024     Breast Cancer:  Stage Iia pT2pN0 M0 ER/NH + Her neg on adjuvant endocrine therapy, now Arimidex after starting CLARK in Feb 2020      Continue on Arimidex     Repeat Dexa scan-last done in 2020     Labs acceptable; post menopausal hormone levels     Follow up      6 months       HPI:     Allie Escoto is a 58-year-old premenopausal female who was initially found to have an  abnormality in her right breast based on screening mammogram. Therefore, she underwent right breast biopsy in June 13, 2019 which showed invasive ductal carcinoma, grade 2.  This was ER 90 % positive, MN 90 % positive, HER2 negative disease.  She was referred to Dr. Sadler.  She was negative for BRCA gene mutation.  Because of the multifocal disease, she underwent right mastectomy and sentinel lymph node biopsy as well as left prophylactic mastectomy in August 9, 2019. Left breast tissue did not show any malignancy.  She had a 2.3 as well as 2.4 cm of invasive ductal carcinoma, grade 2. Surgical margin was negative.  2 sentinel lymph nodes were negative for metastatic disease.  She did not have reconstruction. Her tumor was found to be high risk based on a MammaPrint.  She underwent adjuvant chemotherapy with dose dense AC followed by dose dense paclitaxel given by Dr. Ev Augustin.  Subsequently, she has been on adjuvant hormonal therapy with tamoxifen since February 2020.  She was previously following with Dr. Ojeda and last seen in the office on 8/17/2022.  He presents today for transfer of care/follow-up visit.     Last Visit Nov 2023      Pt self stopped tamoxifen in March 2023. Was having joint pains and weight gain.  LMP was October 2019.   She does note a history of chronic bone pain. Has chronic SI joint issues. Was previously following with pain management.  She denies any chest pain, shortness of breath, abdominal pain, nausea, vomiting, diarrhea.        Interval History:  2/22/2024     Ms Escoto has been on adjuvant arimidex since Mar 2023.  She has been on adjuvant endocrine therapy since Feb 2020 so at the 4 year renee.  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  She cannot tolerate NSAIDs and uses Tramadol for pain control.  She  has no sleeping issues.  She otherwise is doing well.  Labs were unremarkable.  Her FSH, estradiol consistent with post menopausal levels        Interval History: 5/13/2024    Ms Escoto is tolerating  Arimidex .  She recently had influenza B. She still has residual symptoms.  She has no other issues    Labs done 5/10/2024 WBC 6.4 Hgb 13.8 plts 362 ANC 4260  Na 139 K 4.2 Cr 0.91 ALT/AST 22/27 TB 0.56        Review of Systems   Constitutional:  Negative for chills, fatigue and fever.   Eyes:  Negative for pain and visual disturbance.   Respiratory:  Negative for cough and shortness of breath.    Cardiovascular:  Negative for chest pain and palpitations.   Gastrointestinal:  Negative for abdominal pain and vomiting.   Genitourinary:  Negative for dysuria and hematuria.   Musculoskeletal:  Positive for arthralgias (chronic SI joint). Negative for back pain.   Skin:  Negative for color change and rash.   Neurological:  Negative for seizures and syncope.   All other systems reviewed and are negative.  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    Tear of right acetabular labrum    Leukopenia    Leiomyoma of uterus, unspecified    S/P bilateral mastectomy    Vaginal atrophy    Essential hypertension    Abnormal fasting glucose    Osteopenia of multiple sites    Hot flashes    Other insomnia    Uncomplicated opioid dependence (HCC)    Neuropathy    Atypical nevus of back    Nonunion after arthrodesis    Palpitations       Past  Medical History:   has a past medical history of Allergic, Anxiety, Basal cell carcinoma (12/07/2023), BRCA gene mutation negative (06/20/2019), Cancer (HCC), Eczema, Fibrocystic disease of breast, GERD (gastroesophageal reflux disease), Hiatal hernia, and Hyperlipidemia.    PAST SURGICAL HISTORY:   has a past surgical history that includes Appendectomy; Other surgical history (Right, 2010); Foot surgery (Right, 2011); pr colonoscopy flx dx w/collj spec when pfrmd (N/A, 07/20/2018); Breast cyst aspiration (Left, 2005); US guided breast biopsy left complete (Left, 06/13/2019); US guidance breast biopsy left each additional (Left, 06/13/2019); Breast biopsy; Colonoscopy; pr mastectomy simple complete (Right, 08/09/2019); Mastectomy w/ sentinel node biopsy (Left, 08/09/2019); IR port placement (09/27/2019); IR port removal (02/19/2020); Lymph node biopsy; Mohs surgery (Left, 03/19/2024); and Skin biopsy.    CURRENT MEDICATIONS  Current Outpatient Medications   Medication Sig Dispense Refill    anastrozole (ARIMIDEX) 1 mg tablet Take 1 tablet (1 mg total) by mouth daily 90 tablet 1    BIOTIN PO Take by mouth      cholecalciferol (VITAMIN D3) 1,000 units tablet Take 1,000 Units by mouth daily      escitalopram (LEXAPRO) 10 mg tablet Take 1 tablet (10 mg total) by mouth daily (Patient taking differently: Take 10 mg by mouth daily Pt decreased to 5 mg) 90 tablet 0    gabapentin (NEURONTIN) 100 mg capsule Take 1 capsule (100 mg total) by mouth 3 (three) times a day as needed (moderate pain) 90 capsule 2    omeprazole (PriLOSEC) 20 mg delayed release capsule Take 1 capsule (20 mg total) by mouth daily before breakfast 90 capsule 1    rosuvastatin (CRESTOR) 20 MG tablet Take 1 tablet (20 mg total) by mouth daily 90 tablet 1    traMADol (ULTRAM) 50 mg tablet Take 1 tablet (50 mg total) by mouth every 8 (eight) hours as needed for moderate pain 90 tablet 0    traZODone (DESYREL) 50 mg tablet Take 1 tablet (50 mg total) by mouth  daily at bedtime 90 tablet 1    tretinoin (RETIN-A) 0.025 % cream Apply topically daily at bedtime 30 g 3    vitamin B-12 (VITAMIN B-12) 500 mcg tablet Take 1 tablet (500 mcg total) by mouth daily 90 tablet 1     No current facility-administered medications for this visit.     [unfilled]    SOCIAL HISTORY:   reports that she has never smoked. She has never used smokeless tobacco. She reports current alcohol use of about 2.0 standard drinks of alcohol per week. She reports that she does not currently use drugs after having used the following drugs: Marijuana.     FAMILY HISTORY:  family history includes Coronary artery disease in her brother and mother; Heart attack (age of onset: 51) in her father; Hyperlipidemia in her father and mother; Hypertension in her father and mother; No Known Problems in her maternal aunt and maternal aunt; Other in her father and mother; Prostate cancer (age of onset: 65) in her paternal uncle; Squamous cell carcinoma in her father; Thyroid disease in her family.     ALLERGIES:  is allergic to sulfa antibiotics.      Physical Exam:  Vital Signs:   Visit Vitals  OB Status Chemotherapy/radiation   Smoking Status Never     There is no height or weight on file to calculate BMI.  There is no height or weight on file to calculate BSA.    GEN: Alert, awake oriented x3, in no acute distress  HEENT- No pallor, icterus, cyanosis, no oral mucosal lesions,   LAD - no palpable cervical, clavicle, axillary, inguinal LAD  Heart- normal S1 S2, regular rate and rhythm, No murmur, rubs.   Lungs- clear breathing sound bilateral.   Abdomen- soft, Non tender, bowel sounds present  Extremities- No cyanosis, clubbing, edema  Neuro- No focal neurological deficit    Labs:  Lab Results   Component Value Date    WBC 3.58 (L) 04/15/2024    HGB 13.1 04/15/2024    HCT 40.1 04/15/2024    MCV 94 04/15/2024     04/15/2024     Lab Results   Component Value Date     02/05/2015    SODIUM 139 04/15/2024    K 4.2  04/15/2024     04/15/2024    CO2 29 04/15/2024    ANIONGAP 6 02/05/2015    AGAP 7 04/15/2024    BUN 21 04/15/2024    CREATININE 0.91 04/15/2024    GLUC 101 12/30/2019    GLUF 102 (H) 04/15/2024    CALCIUM 9.6 04/15/2024    AST 22 04/15/2024    ALT 27 04/15/2024    ALKPHOS 59 04/15/2024    PROT 7.4 02/05/2015    TP 6.9 04/15/2024    BILITOT 0.69 02/05/2015    TBILI 0.56 04/15/2024    EGFR 70 04/15/2024           I spent 30 minutes on chart review, face to face counseling time, coordination of care and documentation.    Monika Clayton MD PhD

## 2024-05-10 ENCOUNTER — APPOINTMENT (OUTPATIENT)
Dept: LAB | Age: 59
End: 2024-05-10
Payer: COMMERCIAL

## 2024-05-10 DIAGNOSIS — C50.912 MALIGNANT NEOPLASM OF LEFT BREAST IN FEMALE, ESTROGEN RECEPTOR POSITIVE, UNSPECIFIED SITE OF BREAST (HCC): ICD-10-CM

## 2024-05-10 DIAGNOSIS — Z17.0 MALIGNANT NEOPLASM OF LEFT BREAST IN FEMALE, ESTROGEN RECEPTOR POSITIVE, UNSPECIFIED SITE OF BREAST (HCC): ICD-10-CM

## 2024-05-10 LAB
BASOPHILS # BLD AUTO: 0.03 THOUSANDS/ÂΜL (ref 0–0.1)
BASOPHILS NFR BLD AUTO: 1 % (ref 0–1)
EOSINOPHIL # BLD AUTO: 0.2 THOUSAND/ÂΜL (ref 0–0.61)
EOSINOPHIL NFR BLD AUTO: 3 % (ref 0–6)
ERYTHROCYTE [DISTWIDTH] IN BLOOD BY AUTOMATED COUNT: 12.8 % (ref 11.6–15.1)
HCT VFR BLD AUTO: 41.7 % (ref 34.8–46.1)
HGB BLD-MCNC: 13.8 G/DL (ref 11.5–15.4)
IMM GRANULOCYTES # BLD AUTO: 0.04 THOUSAND/UL (ref 0–0.2)
IMM GRANULOCYTES NFR BLD AUTO: 1 % (ref 0–2)
LDH SERPL-CCNC: 179 U/L (ref 140–271)
LYMPHOCYTES # BLD AUTO: 1.47 THOUSANDS/ÂΜL (ref 0.6–4.47)
LYMPHOCYTES NFR BLD AUTO: 23 % (ref 14–44)
MCH RBC QN AUTO: 31.3 PG (ref 26.8–34.3)
MCHC RBC AUTO-ENTMCNC: 33.1 G/DL (ref 31.4–37.4)
MCV RBC AUTO: 95 FL (ref 82–98)
MONOCYTES # BLD AUTO: 0.43 THOUSAND/ÂΜL (ref 0.17–1.22)
MONOCYTES NFR BLD AUTO: 7 % (ref 4–12)
NEUTROPHILS # BLD AUTO: 4.26 THOUSANDS/ÂΜL (ref 1.85–7.62)
NEUTS SEG NFR BLD AUTO: 65 % (ref 43–75)
NRBC BLD AUTO-RTO: 0 /100 WBCS
PLATELET # BLD AUTO: 362 THOUSANDS/UL (ref 149–390)
PMV BLD AUTO: 9 FL (ref 8.9–12.7)
RBC # BLD AUTO: 4.41 MILLION/UL (ref 3.81–5.12)
WBC # BLD AUTO: 6.43 THOUSAND/UL (ref 4.31–10.16)

## 2024-05-10 PROCEDURE — 85025 COMPLETE CBC W/AUTO DIFF WBC: CPT

## 2024-05-10 PROCEDURE — 36415 COLL VENOUS BLD VENIPUNCTURE: CPT

## 2024-05-10 PROCEDURE — 83615 LACTATE (LD) (LDH) ENZYME: CPT

## 2024-05-13 ENCOUNTER — OFFICE VISIT (OUTPATIENT)
Dept: HEMATOLOGY ONCOLOGY | Facility: CLINIC | Age: 59
End: 2024-05-13
Payer: COMMERCIAL

## 2024-05-13 VITALS
OXYGEN SATURATION: 98 % | HEIGHT: 63 IN | RESPIRATION RATE: 17 BRPM | WEIGHT: 160 LBS | TEMPERATURE: 97.7 F | HEART RATE: 54 BPM | DIASTOLIC BLOOD PRESSURE: 76 MMHG | SYSTOLIC BLOOD PRESSURE: 118 MMHG | BODY MASS INDEX: 28.35 KG/M2

## 2024-05-13 DIAGNOSIS — Z17.0 MALIGNANT NEOPLASM OF LEFT BREAST IN FEMALE, ESTROGEN RECEPTOR POSITIVE, UNSPECIFIED SITE OF BREAST (HCC): Primary | ICD-10-CM

## 2024-05-13 DIAGNOSIS — C50.912 MALIGNANT NEOPLASM OF LEFT BREAST IN FEMALE, ESTROGEN RECEPTOR POSITIVE, UNSPECIFIED SITE OF BREAST (HCC): Primary | ICD-10-CM

## 2024-05-13 PROCEDURE — 99214 OFFICE O/P EST MOD 30 MIN: CPT | Performed by: INTERNAL MEDICINE

## 2024-05-14 DIAGNOSIS — E78.49 OTHER HYPERLIPIDEMIA: ICD-10-CM

## 2024-05-15 RX ORDER — ROSUVASTATIN CALCIUM 20 MG/1
20 TABLET, COATED ORAL DAILY
Qty: 90 TABLET | Refills: 1 | Status: SHIPPED | OUTPATIENT
Start: 2024-05-15

## 2024-05-16 DIAGNOSIS — M51.17 INTERVERTEBRAL DISC DISORDER WITH RADICULOPATHY OF LUMBOSACRAL REGION: ICD-10-CM

## 2024-05-16 RX ORDER — TRAMADOL HYDROCHLORIDE 50 MG/1
50 TABLET ORAL EVERY 8 HOURS PRN
Qty: 90 TABLET | Refills: 0 | Status: SHIPPED | OUTPATIENT
Start: 2024-05-16

## 2024-05-21 ENCOUNTER — OFFICE VISIT (OUTPATIENT)
Dept: PODIATRY | Facility: CLINIC | Age: 59
End: 2024-05-21
Payer: COMMERCIAL

## 2024-05-21 VITALS
DIASTOLIC BLOOD PRESSURE: 77 MMHG | HEART RATE: 63 BPM | HEIGHT: 64 IN | BODY MASS INDEX: 27.31 KG/M2 | SYSTOLIC BLOOD PRESSURE: 116 MMHG | WEIGHT: 160 LBS

## 2024-05-21 DIAGNOSIS — M79.672 LEFT FOOT PAIN: ICD-10-CM

## 2024-05-21 DIAGNOSIS — M72.2 PLANTAR FASCIITIS, LEFT: Primary | ICD-10-CM

## 2024-05-21 PROCEDURE — 99213 OFFICE O/P EST LOW 20 MIN: CPT | Performed by: PODIATRIST

## 2024-05-21 PROCEDURE — 20550 NJX 1 TENDON SHEATH/LIGAMENT: CPT | Performed by: PODIATRIST

## 2024-05-21 RX ORDER — LIDOCAINE HYDROCHLORIDE 10 MG/ML
2 INJECTION, SOLUTION INFILTRATION; PERINEURAL
Status: SHIPPED | OUTPATIENT
Start: 2024-05-21

## 2024-05-21 RX ORDER — TRIAMCINOLONE ACETONIDE 40 MG/ML
20 INJECTION, SUSPENSION INTRA-ARTICULAR; INTRAMUSCULAR
Status: SHIPPED | OUTPATIENT
Start: 2024-05-21

## 2024-05-21 RX ADMIN — LIDOCAINE HYDROCHLORIDE 2 ML: 10 INJECTION, SOLUTION INFILTRATION; PERINEURAL at 16:00

## 2024-05-21 RX ADMIN — TRIAMCINOLONE ACETONIDE 20 MG: 40 INJECTION, SUSPENSION INTRA-ARTICULAR; INTRAMUSCULAR at 16:00

## 2024-05-21 NOTE — PROGRESS NOTES
PATIENT:  Debra Escoto  1965       ASSESSMENT:     1. Plantar fasciitis, left  Foot/lower extremity injection      2. Left foot pain                    PLAN:  1. Reviewed medical records.  Patient was counseled and educated on the condition and the diagnosis.    2. The exam and symptoms are consistent with plantar fasciitis.  The diagnosis, treatment options and prognosis were discussed with the patient.    3. Treatment options were discussed and the patient wished to proceed with an injection.  See procedure.  4. Instructed supportive care, home exercise, icing, and proper footwear/ arch support.     5. Patient will return in 6 weeks for re-evaluation.       Imaging: I have personally reviewed pertinent films in PACS  Labs, pathology, and Other Studies: I have personally reviewed pertinent reports.      Foot/lower extremity injection    Performed by: Lewis Meade DPM  Authorized by: Lewis Meade DPM    Procedure:     Verbal consent obtained?: Yes      Risks and benefits: Risks, benefits and alternatives were discussed      Consent given by:  Patient    Time out: Immediately prior to the procedure a time out was called      Time out performed at:  5/21/2024 4:33 PM    Patient states understanding of procedure being performed: Yes      Patient identity confirmed:  Verbally with patient    Supporting Documentation:     Indications:  Pain    Procedure Details:    Prep: patient was prepped and draped in usual sterile fashion                Ethyl Chloride was applied      Needle size: 25 G G    Ultrasound Guidance: no      Approach:  Plantar    Laterality:  Left    Location: aponeurosis      Aponeurosis Structures: Plantar fascia origin      Injection Information:       Medications:  2 mL lidocaine 1 %; 20 mg triamcinolone acetonide 40 mg/mL    Patient tolerance:  Patient tolerated the procedure well with no immediate complications          Subjective:       HPI  The patient presents with chief complaint  of left heel pain in the last few months.  Pain increases with walking and standing.  She also has post-static dyskinesia.  The patient does not recall any injury.  The patient tried OTC meds, and different shoes without relieving the pain.   No associated numbness or paresthesia.  No significant weakness or dysfunction.  She has history of left plantar fasciitis and was treated with injection in the past.              The following portions of the patient's history were reviewed and updated as appropriate: allergies, current medications, past family history, past medical history, past social history, past surgical history and problem list.  All pertinent labs and images were reviewed.      Past Medical History  Past Medical History:   Diagnosis Date    Allergic     Anxiety     Basal cell carcinoma 12/07/2023    left nasal side wall-Mohs    BRCA gene mutation negative 06/20/2019    [facility]    Cancer (HCC)     breast     Eczema     Entire life    Fibrocystic disease of breast     GERD (gastroesophageal reflux disease)     Hiatal hernia     Hyperlipidemia        Past Surgical History  Past Surgical History:   Procedure Laterality Date    APPENDECTOMY      BREAST BIOPSY      BREAST CYST ASPIRATION Left 2005    COLONOSCOPY      FOOT SURGERY Right 2011    right, hardware removal    IR PORT PLACEMENT  09/27/2019    IR PORT REMOVAL  02/19/2020    LYMPH NODE BIOPSY      MASTECTOMY W/ SENTINEL NODE BIOPSY Left 08/09/2019    Procedure: BREAST MASTECTOMY, SENTINEL LYMPH NODE BIOPSY, LYMPHATIC MAPPING W/ BLUE DYE AND RADIOACTIVE DYE (INJECT AT 0730 BY DR SADLER IN THE O.R.);  Surgeon: Deshaun Sadler MD;  Location: [facility];  Service: Surgical Oncology    MOHS SURGERY Left 03/19/2024    BCC left nasal side wall    OTHER SURGICAL HISTORY Right 2010    Complete Excision of Fifth Metatarsal Head     TX COLONOSCOPY FLX DX W/COLLJ SPEC WHEN PFRMD N/A 07/20/2018    Procedure: EGD AND COLONOSCOPY;  Surgeon: Ilan Aponte MD;  Location: [facility];  Service: Gastroenterology    NC MASTECTOMY SIMPLE COMPLETE Right 08/09/2019    Procedure: BREAST SIMPLE MASTECTOMY;  Surgeon: Deshaun Sadler MD;  Location: [facility];  Service: Surgical Oncology    SKIN BIOPSY      US GUIDANCE BREAST BIOPSY LEFT EACH ADDITIONAL Left 06/13/2019    US GUIDED BREAST BIOPSY LEFT COMPLETE Left 06/13/2019        Allergies:  Nsaids and Sulfa antibiotics    Medications:  Current Outpatient Medications   Medication Sig Dispense Refill    anastrozole (ARIMIDEX) 1 mg tablet Take 1 tablet (1 mg total) by mouth daily 90 tablet 1    BIOTIN PO Take by mouth      cholecalciferol (VITAMIN D3) 1,000 units tablet Take 1,000 Units by mouth daily      escitalopram (LEXAPRO) 10 mg tablet Take 1 tablet (10 mg total) by mouth daily (Patient taking differently: Take 10 mg by mouth daily Pt decreased to 5 mg) 90 tablet 0    gabapentin (NEURONTIN) 100 mg capsule Take 1 capsule (100 mg total) by mouth 3 (three) times a day as needed (moderate pain) 90 capsule 2    omeprazole (PriLOSEC) 20 mg delayed release capsule Take 1 capsule (20 mg total) by mouth daily before breakfast 90 capsule 1    rosuvastatin (CRESTOR) 20 MG tablet TAKE 1 TABLET BY MOUTH EVERY DAY 90 tablet 1    traMADol (ULTRAM) 50 mg tablet Take 1 tablet (50 mg total) by mouth every 8 (eight) hours as needed for moderate pain 90 tablet 0    traZODone (DESYREL) 50 mg tablet Take 1 tablet (50 mg total) by mouth daily at bedtime 90 tablet 1    tretinoin (RETIN-A) 0.025 % cream Apply topically daily at bedtime 30 g 3    vitamin B-12 (VITAMIN B-12) 500 mcg tablet Take 1 tablet (500 mcg total) by mouth daily 90 tablet 1     No current facility-administered medications for this visit.       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   Tobacco Use    Smoking status: Never    Smokeless tobacco: Never   Vaping Use    Vaping status: Never Used   Substance and Sexual Activity    Alcohol use: Yes     Alcohol/week: 2.0 standard drinks of alcohol     Types: 2 Glasses of wine per week    Drug use: Not Currently     Types: Marijuana     Comment: oral usage/capsules    Sexual activity: Yes     Partners: Male     Birth control/protection: Male Sterilization     Comment: Partner had vasectomy   Other Topics Concern    None   Social History Narrative    Exercise habits    Working full-time - used to be GI RN     Social Determinants of Health     Financial Resource Strain: Not on file   Food Insecurity: Not on file   Transportation Needs: Not on file   Physical Activity: Not on file   Stress: Not on file   Social Connections: Not on file   Intimate Partner Violence: Not on file   Housing Stability: Not on file          Review of Systems      Objective:      /77 (BP Location: Right arm, Patient Position: Sitting, Cuff Size: Large)   Pulse 63   Ht 5' 4\" (1.626 m) Comment: verbal  Wt 72.6 kg (160 lb) Comment: verbal  BMI 27.46 kg/m²          Physical Exam    "

## 2024-05-22 ENCOUNTER — TELEPHONE (OUTPATIENT)
Age: 59
End: 2024-05-22

## 2024-05-22 DIAGNOSIS — R45.4 IRRITABILITY AND ANGER: ICD-10-CM

## 2024-05-22 RX ORDER — ESCITALOPRAM OXALATE 10 MG/1
10 TABLET ORAL DAILY
Qty: 90 TABLET | Refills: 1 | Status: SHIPPED | OUTPATIENT
Start: 2024-05-22

## 2024-05-22 NOTE — TELEPHONE ENCOUNTER
Caller: pipe Kelley  Doctor/office: Kaylah NARANJO#: [phone]    % of improvement: 50      Pain Scale (1-10): 6

## 2024-06-06 DIAGNOSIS — M51.17 INTERVERTEBRAL DISC DISORDER WITH RADICULOPATHY OF LUMBOSACRAL REGION: ICD-10-CM

## 2024-06-06 RX ORDER — GABAPENTIN 100 MG/1
100 CAPSULE ORAL 3 TIMES DAILY PRN
Qty: 90 CAPSULE | Refills: 0 | Status: SHIPPED | OUTPATIENT
Start: 2024-06-06

## 2024-06-21 DIAGNOSIS — M51.17 INTERVERTEBRAL DISC DISORDER WITH RADICULOPATHY OF LUMBOSACRAL REGION: ICD-10-CM

## 2024-06-21 DIAGNOSIS — G47.09 OTHER INSOMNIA: ICD-10-CM

## 2024-06-21 RX ORDER — TRAZODONE HYDROCHLORIDE 50 MG/1
50 TABLET ORAL
Qty: 90 TABLET | Refills: 1 | Status: SHIPPED | OUTPATIENT
Start: 2024-06-21

## 2024-06-21 RX ORDER — GABAPENTIN 100 MG/1
CAPSULE ORAL
Qty: 90 CAPSULE | Refills: 1 | Status: SHIPPED | OUTPATIENT
Start: 2024-06-21

## 2024-06-21 RX ORDER — TRAMADOL HYDROCHLORIDE 50 MG/1
50 TABLET ORAL EVERY 8 HOURS PRN
Qty: 90 TABLET | Refills: 0 | Status: SHIPPED | OUTPATIENT
Start: 2024-06-21

## 2024-06-24 ENCOUNTER — PROCEDURE VISIT (OUTPATIENT)
Dept: DERMATOLOGY | Facility: CLINIC | Age: 59
End: 2024-06-24

## 2024-06-24 DIAGNOSIS — L90.5 SCAR: Primary | ICD-10-CM

## 2024-06-24 PROCEDURE — BUNDLE PR BUNDLE: Performed by: STUDENT IN AN ORGANIZED HEALTH CARE EDUCATION/TRAINING PROGRAM

## 2024-06-24 NOTE — PROGRESS NOTES
"[facility] Note     Patient Name: Debra Escoto  Encounter Date: 06/24/2024     Have you been cared for by a [facility]ologist in the last 3 years and, if so, which description applies to you?    Yes.  I have been here within the last 3 years, and my medical history has NOT changed since that time.  I am FEMALE/of child-bearing potential.    REVIEW OF SYSTEMS:  Have you recently had or currently have any of the following? No changes in my recent health.   PAST MEDICAL HISTORY:  Have you personally ever had or currently have any of the following?  If \"YES,\" then please provide more detail. No changes in my medical history.   HISTORY OF IMMUNOSUPPRESSION: Do you have a history of any of the following:  Systemic Immunosuppression such as Diabetes, Biologic or Immunotherapy, Chemotherapy, Organ Transplantation, Bone Marrow Transplantation?  No     Answering \"YES\" requires the addition of the dotphrase \"IMMUNOSUPPRESSED\" as the first diagnosis of the patient's visit.   FAMILY HISTORY:  Any \"first degree relatives\" (parent, brother, sister, or child) with the following?    No changes in my family's known health.   PATIENT EXPERIENCE:    Do you want the Dermatologist to perform a COMPLETE skin exam today including a clinical examination under the \"bra and underwear\" areas?  NO  If necessary, do we have your permission to call and leave a detailed message on your Preferred Phone number that includes your specific medical information?  Yes      Allergies   Allergen Reactions    Nsaids Abdominal Pain, Dermatitis and GI Intolerance    Sulfa Antibiotics Headache     Annotation - [id number]: Migraine; Annotation - [id number]: cellular issues      Current Outpatient Medications:     anastrozole (ARIMIDEX) 1 mg tablet, Take 1 tablet (1 mg total) by mouth daily, Disp: 90 tablet, Rfl: 1    BIOTIN PO, Take by mouth, Disp: , Rfl:     cholecalciferol (VITAMIN D3) 1,000 units tablet, Take 1,000 Units by mouth " daily, Disp: , Rfl:     escitalopram (LEXAPRO) 10 mg tablet, TAKE 1 TABLET BY MOUTH EVERY DAY, Disp: 90 tablet, Rfl: 1    gabapentin (NEURONTIN) 100 mg capsule, TAKE 1 CAPSULE BY MOUTH THREE TIMES DAILY AS NEEDED FOR MODERATE PAIN, Disp: 90 capsule, Rfl: 1    omeprazole (PriLOSEC) 20 mg delayed release capsule, Take 1 capsule (20 mg total) by mouth daily before breakfast, Disp: 90 capsule, Rfl: 1    rosuvastatin (CRESTOR) 20 MG tablet, TAKE 1 TABLET BY MOUTH EVERY DAY, Disp: 90 tablet, Rfl: 1    traMADol (ULTRAM) 50 mg tablet, Take 1 tablet (50 mg total) by mouth every 8 (eight) hours as needed for moderate pain, Disp: 90 tablet, Rfl: 0    traZODone (DESYREL) 50 mg tablet, Take 1 tablet (50 mg total) by mouth daily at bedtime, Disp: 90 tablet, Rfl: 1    tretinoin (RETIN-A) 0.025 % cream, Apply topically daily at bedtime, Disp: 30 g, Rfl: 3    vitamin B-12 (VITAMIN B-12) 500 mcg tablet, Take 1 tablet (500 mcg total) by mouth daily, Disp: 90 tablet, Rfl: 1    Current Facility-Administered Medications:     lidocaine (XYLOCAINE) 1 % injection 2 mL, 2 mL, Infiltration, , , 2 mL at 05/21/24 1600    triamcinolone acetonide (KENALOG-40) 40 mg/mL injection 20 mg, 20 mg, Infiltration, , , 20 mg at 05/21/24 1600          Whom besides the patient is providing clinical information about today's encounter?   NO ADDITIONAL HISTORIAN (patient alone provided history)    Physical Exam and Assessment/Plan by Diagnosis:    Discussed slow progression of improvement of scar, even on its own. It takes 12+ months for scars to mature into what they will truly be longer term. Discussed redness as well can take 6-9 months to fade. Lasers enahnce the speed of resolution. Sun protection is important     Intense Pulse Light Treatment Note  6/24/2024    Device: [employer] Stellar M22   Place of Treatment: [facility]    Surgeon and : Fadumo Gilbert MD  Assistant: SHILA Larry    Treatment number: 2  Site of treatment:  nose  Skin type: Garza II    Pre-operative Diagnosis: Mohs Scar (PIE and firm scar)   Indications for Procedure:  cosmesis of scar  Post-operative Diagnosis: same as pre-operative    Anesthetic: Mikala cooler     Safety Precautions:  Fire extinguisher persent/Window covered/Staff and patient eyes covered/Warning sign posted.      Interim History/Comments:  patient states area is still red  Percent Improvement: N/A    Parameters: The following filter was used: 560nm    Fluence: 16 J/cm2  Spot size/shape: rectangular 15 x 35mm  Pulse count: 3     Procedure Note:   After discussing potential procedure related risks including but not limited to pain, purpura, blistering, scarring, discoloration, “footprinting,” recurrence, inefficacy, need for additional treatment, and undesirable cosmetic results, written and verbal consent were obtained.  Patient was brought back to the operating room. Time out was performed.  Patient's name, identification and intended procedure were verified. Prior to the procedure, the patient cleansed the treatment area. The treatment area was re-cleansed with alcohol. Eye coverings eye shield inserted/placed. Ultrasound gel was applied to areas of treatment and re-applied as necessary throughout treatment.     Tolerance: Patient tolerated the procedure well with no immediate significant complications and left in stable condition.   Complications: none  Other procedures: ResurFx for textrure and Q-switched Nd:YAG for pigmentation at inferior edge  Photography: yes    Post-op Care:   As discussed, when we treat pigment in the skin with IPL, gradual darkening of pigmentation will occur. We advised not to rub the treatment area or try to scrub the area away. This is part of the healing process and should start to slough off in three to four days.    Most patients experience minimal to no side effects after this treatment. However, a small percentage (less than 1%) of patients may experience  minor side effects such as swelling, slight scabbing, blisters, bruising, hyper/hypopigmentation. Listed below are some simple but important instructions that will help minimize side effects and help you achieve the best possible result from your treatment.    1. Avoid direct/indirect sun exposure. Direct sunlight should be avoided for around four weeks post-treatment. This means no tanning of any kind, including tanning booths or artificial tanning otions. We recommend applying sunblock daily to the treated area, especially facial areas with minimum of 30 SPF. Your sunblock should contain Zinc Oxide and/or Titanium Dioxide as the active ingredients. Make sure to reapply sunblock every 2-3 hours outdoors or in direct sunlight.    2. After a treatment your skin may feel slightly sunburned for up to 24-48 hours. You may use a cold pack for comfort and to relieve swelling as needed. To minimize swelling, you may apply cold compresses to the treatment area for 10 minutes of every hour on the day of the treatment. Sleeping on your back with your head elevated on the first night post-treatment also helps. Cold Aloe Vera gel works well.  Avoid hot baths or showering for the first 24-48 hours. Call if you notice the sensation of excessive or uncomfortable heat.    3. Very rarely, a small blister or scab may form. Call immediately if you have any blistering. Leave it alone if intact. If skin breaks or a blister opens, apply an over the counter topical antibiotic ointment to the area and reapply regularly to keep area moist and well covered with medication. To reduce the risk of scarring you must not pick at your skin after an IPL treatment.    4. If you experience itching as the skin heals, you may use an over the counter topical hydrocortisone cream. Do not use Retin A products or other irritating products including glycolic acids or other acids until instructed to do so.    5. You may apply make up on the treated area as  long as the skin is not broken or blistered.    6. Discontinue use of any Retin A or hydroquinone products 5-7 days prior to your next treatment.    7. Resume a regular skin regimen once the treatment area is back to “normal.”    IPL should be scheduled every three to six weeks (depending on treatment reaction) and can be done for a series of multiple treatments. Please do not hesitate to call our office at [address] [place] [address] if you have any questions or concerns about your treatments.      Q-switched Nd:YAG Laser Treatment    6/24/2024  Device: "[employer]is Stellar M22    Place of Treatment: [facility]    Surgeon and : Fadumo Gilbert MD  Assistant: SHILA Larry    Treatment number: 1  Site of treatment: nose  Skin type: Garza II    Pre-operative Diagnosis:  Mohs Scar (PIE and firm scar)   Indications for Procedure:  cosmesis of scar  Post-operative Diagnosis: same as pre-operative    Anesthetic: Mikala cooler     Safety Precautions:  Fire extinguisher persent/Window covered/Staff and patient eyes covered/Warning sign posted     Interim History/Comments:  none, pt most bothered by redness  Percent Improvement: N/A    Procedure Note:   After discussing potential procedure related risks including but not limited to pain, purpura, blistering, scarring, discoloration, “footprinting,” recurrence, inefficacy, need for additional treatment, and undesirable cosmetic results, written and verbal consent were obtained.  Patient was brought back to the operating room. Time out was performed.  Patient's name, identification and intended procedure were verified. Prior to the procedure, the patient cleansed the treatment area. The treatment area was re-cleansed with alcohol. Eye coverings eye shield inserted/placed.      Parameters used for treatment:   Spot size: 3.5 mm  Fluence: 4.60 J/cm2  Pulse count: 30     Clinical Endpoint: whitening    Tolerance: Patient tolerated the procedure well with no immediate  significant complications and left in stable condition.   Complications: none  Other procedures: Yes, see above and below (Ipl first before qs-NdYAG, following qs, ResurFx used)  Photography: yes    Post-op Care: Aftercare was discussed. Continue to ice at home and keep the area moist with a gentle moisturizer. Advised the patient to avoid exercise for the next 24 hours and also to be cautious with sun exposure over the next week. Advised patient to call the office if there is excessive swelling.   Follow-up:  Patient to let us know how she does and if she desires further treatments.      ResurFx Treatment Note  6/24/2024    Device: [employer] Stellar M22   Place of Treatment: [facility]    Surgeon and : Fadumo Gilbert MD  Assistant: SHILA Larry    Treatment number: 2  Site of treatment: nose  Skin type: Garza II    Pre-operative Diagnosis: Mohs Scar (PIE and firm scar)   Indications for Procedure:  cosmesis of scar  Post-operative Diagnosis: same as pre-operative    Anesthetic: Mikala cooler     Safety Precautions:  Fire extinguisher persent/Window covered/Staff and patient eyes covered/Warning sign posted.      Interim History/Comments:  patient states area is still red   Percent Improvement: N/A    Parameters: The ResurFx laser handle is fixed at the 1565nm wavelength.  Parameters were as follows:    Energy: 25 mJ  Density (D):  300ub/cm2  Pulse count: 2     Procedure Note:   After discussing potential procedure related risks including but not limited to pain, purpura, blistering, scarring, discoloration, “footprinting,” recurrence, inefficacy, need for additional treatment, and undesirable cosmetic results, written and verbal consent were obtained.  Patient was brought back to the operating room. Time out was performed.  Patient's name, identification and intended procedure were verified. Prior to the procedure, the patient cleansed the treatment area. The treatment area was  re-cleansed with alcohol. Eye coverings eye shield inserted/placed.      Tolerance: Patient tolerated the procedure well with no immediate significant complications and left in stable condition.   Complications: none  Other procedures: Q-switched Nd:YAG and Intense Pulse Light Treatment  Photography: yes    Post-op Care: Aftercare was discussed. Continue to ice at home and keep the area moist with a gentle moisturizer. Advised the patient to avoid exercise for the next 24 hours and also to be cautious with sun exposure over the next week. Advised patient to call the office if there is excessive swelling.   Follow-up:   Patient to let us know how she does and if she desires further treatments.      No Charge for treatment of Mohs scar (IPL followed by ResurFx). This was treatment 2 of 2 covered post-Mohs laser treatments.     Scribe Attestation      I,:  Sinid Dueñas am acting as a scribe while in the presence of the attending physician.:       I,:  Fadumo Gilbert MD personally performed the services described in this documentation    as scribed in my presence.:

## 2024-06-25 DIAGNOSIS — G47.09 OTHER INSOMNIA: ICD-10-CM

## 2024-06-25 RX ORDER — TRAZODONE HYDROCHLORIDE 50 MG/1
50 TABLET ORAL
Qty: 90 TABLET | Refills: 0 | OUTPATIENT
Start: 2024-06-25

## 2024-07-19 DIAGNOSIS — K21.9 GASTROESOPHAGEAL REFLUX DISEASE WITHOUT ESOPHAGITIS: ICD-10-CM

## 2024-07-19 RX ORDER — OMEPRAZOLE 20 MG/1
20 CAPSULE, DELAYED RELEASE ORAL
Qty: 90 CAPSULE | Refills: 1 | Status: SHIPPED | OUTPATIENT
Start: 2024-07-19

## 2024-07-23 ENCOUNTER — OFFICE VISIT (OUTPATIENT)
Dept: INTERNAL MEDICINE CLINIC | Facility: CLINIC | Age: 59
End: 2024-07-23
Payer: COMMERCIAL

## 2024-07-23 VITALS
SYSTOLIC BLOOD PRESSURE: 116 MMHG | DIASTOLIC BLOOD PRESSURE: 78 MMHG | BODY MASS INDEX: 27.62 KG/M2 | WEIGHT: 161.8 LBS | HEIGHT: 64 IN | TEMPERATURE: 97.8 F | OXYGEN SATURATION: 98 % | HEART RATE: 59 BPM

## 2024-07-23 DIAGNOSIS — M25.551 RIGHT HIP PAIN: Primary | ICD-10-CM

## 2024-07-23 DIAGNOSIS — F41.9 ANXIETY AND DEPRESSION: ICD-10-CM

## 2024-07-23 DIAGNOSIS — M51.17 INTERVERTEBRAL DISC DISORDER WITH RADICULOPATHY OF LUMBOSACRAL REGION: ICD-10-CM

## 2024-07-23 DIAGNOSIS — G62.9 NEUROPATHY: ICD-10-CM

## 2024-07-23 DIAGNOSIS — F32.A ANXIETY AND DEPRESSION: ICD-10-CM

## 2024-07-23 DIAGNOSIS — G89.4 PAIN SYNDROME, CHRONIC: ICD-10-CM

## 2024-07-23 DIAGNOSIS — M85.89 OSTEOPENIA OF MULTIPLE SITES: ICD-10-CM

## 2024-07-23 PROCEDURE — 99214 OFFICE O/P EST MOD 30 MIN: CPT | Performed by: INTERNAL MEDICINE

## 2024-07-23 NOTE — PATIENT INSTRUCTIONS
Goals of care:  Maximize your health and quality of life by:   Increasing your level of function and activity  Decreasing the negative effects of pain on your life  Minimizing the risks and side effects of medications and ensuring safe use of opioid medication     Ways for you to help meet your goals:  Maintain a healthy lifestyle. This includes proper nutrition, regular physical activity as able, try for 8 hours of sleep per night, use stress reduction strategies, avoid triggers.      Risks and side effects of opioid use:  Prescription opioids carry serious risks of addiction and  overdose, especially with prolonged use. An opioid overdose,  often marked by slowed breathing, can cause sudden death. The  use of prescription opioids can have a number of side effects as  well, even when taken as directed:  Tolerance--meaning you might need to take more of a medication for the same pain relief  Physical dependence--meaning you have symptoms of withdrawal when a medication is stopped  Increased sensitivity to pain  Constipation  Nausea, vomiting, dry mouth  Sleepiness and dizziness   Confusion  Depression  Low levels of testosterone that can result in lower sex drive, energy, and strength  Itching and sweating    If you are prescribed opioids for pain:  Never take opioids in greater amounts or more often than prescribed.  Help prevent misuse and abuse.        - Never sell or share prescription opioids.        - Never use another person’s prescription opioids.  ‡Store prescription opioids in a secure place and out of reach of others (this may include visitors, children, friends, and family).  Safely dispose of unused prescription opioids: Find your community drug take-back program or your pharmacy mail-back program, or flush them down the toilet, following guidance from the Food and Drug Administration (www.fda.gov/Drugs/ResourcesForYou).  ‡Visit www.cdc.gov/drugoverdose to learn about the risks of opioid abuse and  overdose.  If you believe you may be struggling with addiction, tell your health care provider and ask for guidance or call [facility]’s National Helpline at 4-311-727-TPYH.

## 2024-07-23 NOTE — PROGRESS NOTES
Ambulatory Visit  Name: Debra Escoto      : 1965      MRN: [id number]  Encounter Provider: Bernarda Wei MD  Encounter Date: 2024   Encounter department: [facility] INTERNAL MEDICINE    Assessment & Plan   1. Right hip pain  Comments:  Differential Dx: bursitis, OA, radiculopathy.  Refer back to Ortho.  Orders:  -     XR hip/pelv 2-3 vws right if performed; Future; Expected date: 2024  2. Intervertebral disc disorder with radiculopathy of lumbosacral region  Assessment & Plan:  S/p injection 2 months ago, pain improved.  On tramadol bid, gabapentin qHS.  PDMP reviewed.  3. Pain syndrome, chronic  Assessment & Plan:  As above.  4. Neuropathy  Assessment & Plan:  On gabapentin.  5. Osteopenia of multiple sites  Assessment & Plan:  Bone density scheduled tomorrow.  6. Anxiety and depression  Assessment & Plan:  Taking Lexapro 10 mg, sleep improved.      Treatment Plan: Check hip x-ray, discussed steroid injection with Ortho if bursitis.  No medication changes.    Treatment Goals: Maintain daily activities, control pain.          History of Present Illness     Opioid Management:   Type of visit: Follow-up    Pain related diagnoses: chronic pain syndrome , low back SI joint pain.     Interval history: Ongoing pain, sciatica pain improved after injection.  Now with right hip pain, worse when laying on it. No groin pain, hx of labral tear.    Screening Tools/Assessments:    PHQ-2/9:  PHQ-9 score: 1    Brief Pain Inventory (BPI):  1) Throughout our lives, most of us have had pain from time to time (such as minor headaches, sprains, and toothaches). Have you had pain other than these everyday kinds of pain today? Yes  2) Where is your pain located? right hip  3) Rate your pain at its worst in the last 24 hours: 9  4) Rate your pain at its least in the last 24 hours: 5  5) Rate your average level of pain: 7  6) Rate your pain right now: 8  7) What treatments or medications are you receiving  "for your pain? tramadol, heat ice, tylenol  8) In the past 24 hours, how much relief have pain treatments or medication provided? 20%  9) During the past 24 hours, pain has interfered with your:     A) General activity: 3     B) Mood: 8     C) Walking ability: 5     D) Normal work (work outside the home & housework): 5     E) Relations with other people: 2     F) Sleep: 9     G) Enjoyment of life: 5    COMM:  Current COMM Score: 10 (positive, at risk patient)    Drug Screen:  Date of last drug screen: 4/19/2024    Opioid agreement:  Active Opioid agreement on file?: Yes    Opioid agreement signed date: 4/17/2024  Opioid agreement expiration date: 4/17/2025    Naloxone:  Currently prescribed Naloxone (Narcan): No      Outpatient Morphine Milligram Equivalents Per Day     7/23/24 and after 15 MME/Day    Order Name Dose Route Frequency Maximum MME/Day     traMADol (ULTRAM) 50 mg tablet 50 mg Oral Every 8 hours PRN 15 MME/Day    Total Potential Morphine Milligram Equivalents Per Day 15 MME/Day    Calculation Information        traMADol (ULTRAM) 50 mg tablet    traMADol 50 mg Tabs: single dose of 50 mg * 3 doses per day * morphine equivalence factor of 0.1 = 15 MME/Day                         PDMP Review       Value Time User    PDMP Reviewed  Yes 6/21/2024 12:21 PM RUDOLPH Walker         Review of Systems   Constitutional:  Negative for activity change and appetite change.   Musculoskeletal:  Positive for arthralgias and back pain. Negative for gait problem and joint swelling.   Psychiatric/Behavioral:  Positive for sleep disturbance.      Objective     /78 (BP Location: Left arm, Patient Position: Sitting, Cuff Size: Large)   Pulse 59   Temp 97.8 °F (36.6 °C) (Temporal)   Ht 5' 4\" (1.626 m)   Wt 73.4 kg (161 lb 12.8 oz)   SpO2 98%   BMI 27.77 kg/m²     Physical Exam  Constitutional:       Appearance: Normal appearance.   Pulmonary:      Effort: Pulmonary effort is normal. No respiratory distress. "   Musculoskeletal:      Right hip: Bony tenderness present.      Left hip: No tenderness or bony tenderness.        Legs:    Neurological:      General: No focal deficit present.      Mental Status: She is alert and oriented to person, place, and time.   Psychiatric:         Mood and Affect: Mood normal.         Behavior: Behavior normal.

## 2024-07-24 ENCOUNTER — HOSPITAL ENCOUNTER (OUTPATIENT)
Dept: RADIOLOGY | Age: 59
Discharge: HOME | End: 2024-07-24
Payer: COMMERCIAL

## 2024-07-24 ENCOUNTER — APPOINTMENT (OUTPATIENT)
Dept: RADIOLOGY | Age: 59
End: 2024-07-24
Payer: COMMERCIAL

## 2024-07-24 DIAGNOSIS — Z79.811 AROMATASE INHIBITOR USE: ICD-10-CM

## 2024-07-24 DIAGNOSIS — Z17.0 MALIGNANT NEOPLASM OF LEFT BREAST IN FEMALE, ESTROGEN RECEPTOR POSITIVE, UNSPECIFIED SITE OF BREAST (HCC): ICD-10-CM

## 2024-07-24 DIAGNOSIS — C50.912 MALIGNANT NEOPLASM OF LEFT BREAST IN FEMALE, ESTROGEN RECEPTOR POSITIVE, UNSPECIFIED SITE OF BREAST (HCC): ICD-10-CM

## 2024-07-24 DIAGNOSIS — M25.551 RIGHT HIP PAIN: ICD-10-CM

## 2024-07-24 PROCEDURE — 77080 DXA BONE DENSITY AXIAL: CPT

## 2024-07-24 PROCEDURE — 73502 X-RAY EXAM HIP UNI 2-3 VIEWS: CPT

## 2024-07-25 DIAGNOSIS — M51.17 INTERVERTEBRAL DISC DISORDER WITH RADICULOPATHY OF LUMBOSACRAL REGION: ICD-10-CM

## 2024-07-26 RX ORDER — TRAMADOL HYDROCHLORIDE 50 MG/1
50 TABLET ORAL EVERY 8 HOURS PRN
Qty: 90 TABLET | Refills: 0 | Status: SHIPPED | OUTPATIENT
Start: 2024-07-26

## 2024-08-20 DIAGNOSIS — M51.17 INTERVERTEBRAL DISC DISORDER WITH RADICULOPATHY OF LUMBOSACRAL REGION: ICD-10-CM

## 2024-08-20 RX ORDER — GABAPENTIN 100 MG/1
CAPSULE ORAL
Qty: 100 CAPSULE | Refills: 1 | Status: SHIPPED | OUTPATIENT
Start: 2024-08-20

## 2024-08-26 ENCOUNTER — TELEPHONE (OUTPATIENT)
Dept: HEMATOLOGY ONCOLOGY | Facility: CLINIC | Age: 59
End: 2024-08-26

## 2024-08-26 NOTE — TELEPHONE ENCOUNTER
Left message for patient regarding need to reschedule upcoming appointment with Dr. Clayton from 11/15 TO 11/26 AT 8:20AM due to provider availability.  Advised patient to return call if this appointment time is not acceptable.  [employer] number provided.

## 2024-09-01 DIAGNOSIS — M51.17 INTERVERTEBRAL DISC DISORDER WITH RADICULOPATHY OF LUMBOSACRAL REGION: ICD-10-CM

## 2024-09-03 RX ORDER — TRAMADOL HYDROCHLORIDE 50 MG/1
50 TABLET ORAL EVERY 8 HOURS PRN
Qty: 90 TABLET | Refills: 0 | Status: SHIPPED | OUTPATIENT
Start: 2024-09-03

## 2024-10-04 DIAGNOSIS — M51.17 INTERVERTEBRAL DISC DISORDER WITH RADICULOPATHY OF LUMBOSACRAL REGION: ICD-10-CM

## 2024-10-04 RX ORDER — TRAMADOL HYDROCHLORIDE 50 MG/1
50 TABLET ORAL EVERY 8 HOURS PRN
Qty: 90 TABLET | Refills: 0 | Status: SHIPPED | OUTPATIENT
Start: 2024-10-04

## 2024-10-16 ENCOUNTER — APPOINTMENT (OUTPATIENT)
Dept: LAB | Age: 59
End: 2024-10-16
Payer: COMMERCIAL

## 2024-10-16 ENCOUNTER — OFFICE VISIT (OUTPATIENT)
Dept: INTERNAL MEDICINE CLINIC | Facility: CLINIC | Age: 59
End: 2024-10-16
Payer: COMMERCIAL

## 2024-10-16 VITALS
HEART RATE: 57 BPM | DIASTOLIC BLOOD PRESSURE: 80 MMHG | OXYGEN SATURATION: 98 % | HEIGHT: 64 IN | BODY MASS INDEX: 27.31 KG/M2 | SYSTOLIC BLOOD PRESSURE: 120 MMHG | TEMPERATURE: 96.4 F | WEIGHT: 160 LBS

## 2024-10-16 DIAGNOSIS — N65.0 DEFORMITY OF RECONSTRUCTED BREAST: ICD-10-CM

## 2024-10-16 DIAGNOSIS — E66.3 OVERWEIGHT (BMI 25.0-29.9): ICD-10-CM

## 2024-10-16 DIAGNOSIS — F32.A ANXIETY AND DEPRESSION: ICD-10-CM

## 2024-10-16 DIAGNOSIS — Z01.810 ENCOUNTER FOR PREPROCEDURAL CARDIOVASCULAR EXAMINATION: ICD-10-CM

## 2024-10-16 DIAGNOSIS — Z85.3 PERSONAL HISTORY OF MALIGNANT NEOPLASM OF BREAST: ICD-10-CM

## 2024-10-16 DIAGNOSIS — C50.912 MALIGNANT NEOPLASM OF LEFT BREAST IN FEMALE, ESTROGEN RECEPTOR POSITIVE, UNSPECIFIED SITE OF BREAST (HCC): ICD-10-CM

## 2024-10-16 DIAGNOSIS — G47.09 OTHER INSOMNIA: ICD-10-CM

## 2024-10-16 DIAGNOSIS — Z01.818 ENCOUNTER FOR OTHER PREPROCEDURAL EXAMINATION: ICD-10-CM

## 2024-10-16 DIAGNOSIS — M85.89 OSTEOPENIA OF MULTIPLE SITES: ICD-10-CM

## 2024-10-16 DIAGNOSIS — Z01.812 PRE-OPERATIVE LABORATORY EXAMINATION: ICD-10-CM

## 2024-10-16 DIAGNOSIS — R73.01 ABNORMAL FASTING GLUCOSE: ICD-10-CM

## 2024-10-16 DIAGNOSIS — E78.49 OTHER HYPERLIPIDEMIA: ICD-10-CM

## 2024-10-16 DIAGNOSIS — Z17.0 MALIGNANT NEOPLASM OF OVERLAPPING SITES OF LEFT BREAST IN FEMALE, ESTROGEN RECEPTOR POSITIVE (HCC): ICD-10-CM

## 2024-10-16 DIAGNOSIS — F41.9 ANXIETY AND DEPRESSION: ICD-10-CM

## 2024-10-16 DIAGNOSIS — Z00.00 HEALTH MAINTENANCE EXAMINATION: ICD-10-CM

## 2024-10-16 DIAGNOSIS — K21.9 GASTROESOPHAGEAL REFLUX DISEASE WITHOUT ESOPHAGITIS: ICD-10-CM

## 2024-10-16 DIAGNOSIS — C50.812 MALIGNANT NEOPLASM OF OVERLAPPING SITES OF LEFT BREAST IN FEMALE, ESTROGEN RECEPTOR POSITIVE (HCC): ICD-10-CM

## 2024-10-16 DIAGNOSIS — E53.8 VITAMIN B12 DEFICIENCY: ICD-10-CM

## 2024-10-16 DIAGNOSIS — G89.4 PAIN SYNDROME, CHRONIC: ICD-10-CM

## 2024-10-16 DIAGNOSIS — Z17.0 MALIGNANT NEOPLASM OF LEFT BREAST IN FEMALE, ESTROGEN RECEPTOR POSITIVE, UNSPECIFIED SITE OF BREAST (HCC): ICD-10-CM

## 2024-10-16 DIAGNOSIS — C50.812 MALIGNANT NEOPLASM OF OVERLAPPING SITES OF LEFT BREAST IN FEMALE, ESTROGEN RECEPTOR POSITIVE (HCC): Primary | ICD-10-CM

## 2024-10-16 DIAGNOSIS — Z17.0 MALIGNANT NEOPLASM OF OVERLAPPING SITES OF LEFT BREAST IN FEMALE, ESTROGEN RECEPTOR POSITIVE (HCC): Primary | ICD-10-CM

## 2024-10-16 DIAGNOSIS — G62.9 NEUROPATHY: ICD-10-CM

## 2024-10-16 DIAGNOSIS — M51.17 INTERVERTEBRAL DISC DISORDER WITH RADICULOPATHY OF LUMBOSACRAL REGION: ICD-10-CM

## 2024-10-16 PROBLEM — M89.8X9 BONE PAIN DUE TO GRANULOCYTE COLONY STIMULATING FACTOR: Status: RESOLVED | Noted: 2019-10-14 | Resolved: 2024-10-16

## 2024-10-16 LAB
ALBUMIN SERPL BCG-MCNC: 4.4 G/DL (ref 3.5–5)
ALP SERPL-CCNC: 68 U/L (ref 34–104)
ALT SERPL W P-5'-P-CCNC: 27 U/L (ref 7–52)
ANION GAP SERPL CALCULATED.3IONS-SCNC: 8 MMOL/L (ref 4–13)
AST SERPL W P-5'-P-CCNC: 21 U/L (ref 13–39)
ATRIAL RATE: 50 BPM
BASOPHILS # BLD AUTO: 0.02 THOUSANDS/ΜL (ref 0–0.1)
BASOPHILS NFR BLD AUTO: 1 % (ref 0–1)
BILIRUB SERPL-MCNC: 0.69 MG/DL (ref 0.2–1)
BUN SERPL-MCNC: 15 MG/DL (ref 5–25)
CALCIUM SERPL-MCNC: 9.4 MG/DL (ref 8.4–10.2)
CHLORIDE SERPL-SCNC: 100 MMOL/L (ref 96–108)
CO2 SERPL-SCNC: 31 MMOL/L (ref 21–32)
CREAT SERPL-MCNC: 0.99 MG/DL (ref 0.6–1.3)
EOSINOPHIL # BLD AUTO: 0.2 THOUSAND/ΜL (ref 0–0.61)
EOSINOPHIL NFR BLD AUTO: 5 % (ref 0–6)
ERYTHROCYTE [DISTWIDTH] IN BLOOD BY AUTOMATED COUNT: 13 % (ref 11.6–15.1)
EST. AVERAGE GLUCOSE BLD GHB EST-MCNC: 123 MG/DL
GFR SERPL CREATININE-BSD FRML MDRD: 63 ML/MIN/1.73SQ M
GLUCOSE P FAST SERPL-MCNC: 86 MG/DL (ref 65–99)
HBA1C MFR BLD: 5.9 %
HCT VFR BLD AUTO: 42.4 % (ref 34.8–46.1)
HGB BLD-MCNC: 13.7 G/DL (ref 11.5–15.4)
IMM GRANULOCYTES # BLD AUTO: 0.01 THOUSAND/UL (ref 0–0.2)
IMM GRANULOCYTES NFR BLD AUTO: 0 % (ref 0–2)
LDH SERPL-CCNC: 171 U/L (ref 140–271)
LYMPHOCYTES # BLD AUTO: 1.4 THOUSANDS/ΜL (ref 0.6–4.47)
LYMPHOCYTES NFR BLD AUTO: 32 % (ref 14–44)
MAGNESIUM SERPL-MCNC: 2.2 MG/DL (ref 1.9–2.7)
MCH RBC QN AUTO: 30.5 PG (ref 26.8–34.3)
MCHC RBC AUTO-ENTMCNC: 32.3 G/DL (ref 31.4–37.4)
MCV RBC AUTO: 94 FL (ref 82–98)
MONOCYTES # BLD AUTO: 0.39 THOUSAND/ΜL (ref 0.17–1.22)
MONOCYTES NFR BLD AUTO: 9 % (ref 4–12)
NEUTROPHILS # BLD AUTO: 2.36 THOUSANDS/ΜL (ref 1.85–7.62)
NEUTS SEG NFR BLD AUTO: 53 % (ref 43–75)
NRBC BLD AUTO-RTO: 0 /100 WBCS
P AXIS: 44 DEGREES
PLATELET # BLD AUTO: 249 THOUSANDS/UL (ref 149–390)
PMV BLD AUTO: 9.1 FL (ref 8.9–12.7)
POTASSIUM SERPL-SCNC: 4.2 MMOL/L (ref 3.5–5.3)
PR INTERVAL: 216 MS
PROT SERPL-MCNC: 7.1 G/DL (ref 6.4–8.4)
QRS AXIS: 17 DEGREES
QRSD INTERVAL: 70 MS
QT INTERVAL: 516 MS
QTC INTERVAL: 470 MS
RBC # BLD AUTO: 4.49 MILLION/UL (ref 3.81–5.12)
SODIUM SERPL-SCNC: 139 MMOL/L (ref 135–147)
T WAVE AXIS: 23 DEGREES
VENTRICULAR RATE: 50 BPM
WBC # BLD AUTO: 4.38 THOUSAND/UL (ref 4.31–10.16)

## 2024-10-16 PROCEDURE — 36415 COLL VENOUS BLD VENIPUNCTURE: CPT

## 2024-10-16 PROCEDURE — 80053 COMPREHEN METABOLIC PANEL: CPT

## 2024-10-16 PROCEDURE — 99214 OFFICE O/P EST MOD 30 MIN: CPT | Performed by: INTERNAL MEDICINE

## 2024-10-16 PROCEDURE — 83615 LACTATE (LD) (LDH) ENZYME: CPT

## 2024-10-16 PROCEDURE — 83036 HEMOGLOBIN GLYCOSYLATED A1C: CPT

## 2024-10-16 PROCEDURE — 83735 ASSAY OF MAGNESIUM: CPT

## 2024-10-16 PROCEDURE — 85025 COMPLETE CBC W/AUTO DIFF WBC: CPT

## 2024-10-16 PROCEDURE — 99396 PREV VISIT EST AGE 40-64: CPT | Performed by: INTERNAL MEDICINE

## 2024-10-16 PROCEDURE — 93010 ELECTROCARDIOGRAM REPORT: CPT | Performed by: INTERNAL MEDICINE

## 2024-10-16 NOTE — PROGRESS NOTES
Ambulatory Visit  Name: Debra Escoto      : 1965      MRN: [id number]  Encounter Provider: Bernarda Wei MD  Encounter Date: 10/16/2024   Encounter department: [facility] INTERNAL MEDICINE    Assessment & Plan  Malignant neoplasm of overlapping sites of left breast in female, estrogen receptor positive (HCC)  She had stopped taking anastrozole.  Followed by oncology.         Intervertebral disc disorder with radiculopathy of lumbosacral region  S/p injection.  Pain improved.  Still taking gabapentin 100 mg nightly, tramadol twice daily.         Anxiety and depression  Stable, on escitalopram 10 mg daily.         Other hyperlipidemia  Taking rosuvastatin 20 mg daily.    Orders:    Comprehensive metabolic panel; Future    Lipid panel; Future    TSH, 3rd generation with Free T4 reflex; Future    Other insomnia  Taking trazodone 50 mg qHS.         Osteopenia of multiple sites  Bone density slightly worse than previous.  Continue daily walks and supplements.    Orders:    Vitamin D 25 hydroxy; Future    Gastroesophageal reflux disease without esophagitis  On daily PPI.         Abnormal fasting glucose  A1c at 5.9%..    Orders:    Hemoglobin A1C; Future    Neuropathy  On gabapentin.         Pain syndrome, chronic  As above.         Vitamin B12 deficiency    Orders:    CBC and differential; Future    Vitamin B12; Future    Overweight (BMI 25.0-29.9)  Gained 8 lbs since 3 months ago.  Continue regular aerobic and resistance exercises.         Health maintenance examination          Follow up in 6 months or as needed.    History of Present Illness     She has surgery scheduled next week, has decided to get breast implants.    She is upset her A1c is worse.  Her brother had diabetes.  She watches her diet carefully.  She is also upset that she had gained weight recently.  She exercises regularly, lifts weights.    She has not been sleeping well the last 2 weeks since her sick elderly dog has been  "sleeping in bed with them.    She had stopped her hormone pill, did not take it for a full 5 years.  She does not think it had given her osteoporosis.  She does not take her calcium pill regularly since it is very large to swallow.  She does take her vitamin D regularly.    She reports some relief with the back injection a few months ago, still taking tramadol regularly.        Review of Systems   Constitutional:  Negative for appetite change and fatigue.   HENT:  Negative for congestion, ear pain and postnasal drip.    Eyes:  Negative for visual disturbance.   Respiratory:  Negative for cough and shortness of breath.    Cardiovascular:  Negative for chest pain and leg swelling.   Gastrointestinal:  Negative for abdominal pain, constipation and diarrhea.   Genitourinary:  Negative for dysuria, frequency and urgency.   Musculoskeletal:  Negative for arthralgias and myalgias.   Skin:  Negative for rash and wound.   Neurological:  Negative for dizziness, numbness and headaches.   Hematological:  Does not bruise/bleed easily.   Psychiatric/Behavioral:  Negative for confusion. The patient is not nervous/anxious.            Objective     /80   Pulse 57   Temp (!) 96.4 °F (35.8 °C)   Ht 5' 4\" (1.626 m)   Wt 72.6 kg (160 lb)   SpO2 98%   BMI 27.46 kg/m²     Physical Exam  Vitals and nursing note reviewed.   Constitutional:       General: She is not in acute distress.     Appearance: She is well-developed.   HENT:      Head: Normocephalic and atraumatic.      Mouth/Throat:      Mouth: Mucous membranes are moist.   Eyes:      Pupils: Pupils are equal, round, and reactive to light.   Cardiovascular:      Rate and Rhythm: Normal rate and regular rhythm.      Heart sounds: Normal heart sounds.   Pulmonary:      Effort: Pulmonary effort is normal.      Breath sounds: Normal breath sounds. No wheezing.   Abdominal:      General: Bowel sounds are normal.      Palpations: Abdomen is soft.   Musculoskeletal:         " General: No swelling.      Right lower leg: No edema.      Left lower leg: No edema.   Skin:     General: Skin is warm.      Findings: No rash.   Neurological:      General: No focal deficit present.      Mental Status: She is alert and oriented to person, place, and time.   Psychiatric:         Mood and Affect: Mood and affect normal. Mood is not anxious or depressed.         Behavior: Behavior normal.         Labs & imaging results reviewed with patient.

## 2024-10-16 NOTE — ASSESSMENT & PLAN NOTE
Taking rosuvastatin 20 mg daily.    Orders:    Comprehensive metabolic panel; Future    Lipid panel; Future    TSH, 3rd generation with Free T4 reflex; Future

## 2024-10-16 NOTE — ASSESSMENT & PLAN NOTE
S/p injection.  Pain improved.  Still taking gabapentin 100 mg nightly, tramadol twice daily.

## 2024-10-16 NOTE — ASSESSMENT & PLAN NOTE
Bone density slightly worse than previous.  Continue daily walks and supplements.    Orders:    Vitamin D 25 hydroxy; Future

## 2024-10-18 DIAGNOSIS — M51.17 INTERVERTEBRAL DISC DISORDER WITH RADICULOPATHY OF LUMBOSACRAL REGION: ICD-10-CM

## 2024-10-18 RX ORDER — GABAPENTIN 100 MG/1
CAPSULE ORAL
Qty: 100 CAPSULE | Refills: 1 | Status: SHIPPED | OUTPATIENT
Start: 2024-10-18

## 2024-10-22 NOTE — PRE-PROCEDURE INSTRUCTIONS
Pre-Surgery Instructions:   Medication Instructions    BIOTIN PO Stop taking 7 days prior to surgery.    cholecalciferol (VITAMIN D3) 1,000 units tablet Stop taking 7 days prior to surgery.    escitalopram (LEXAPRO) 10 mg tablet Take night before surgery    gabapentin (NEURONTIN) 100 mg capsule Uses PRN- OK to take day of surgery    omeprazole (PriLOSEC) 20 mg delayed release capsule Take day of surgery.    rosuvastatin (CRESTOR) 20 MG tablet Take night before surgery    traMADol (ULTRAM) 50 mg tablet Uses PRN- OK to take day of surgery    traZODone (DESYREL) 50 mg tablet Take night before surgery    tretinoin (RETIN-A) 0.025 % cream Hold day of surgery.    vitamin B-12 (VITAMIN B-12) 500 mcg tablet Stop taking 7 days prior to surgery.     Pt verbalizes understanding of the following:    Please reference your “My Surgical Experience Booklet” for additional information to prepare for your upcoming surgery.      - DO NOT EAT OR DRINK ANYTHING after midnight on the evening before your procedure including coffee, tea, gum or hard candy.    - ONLY SIPS OF WATER with your medications are allowed on the morning of your procedure.  - Avoid OTC non-directed Vit/ Suppl/ Herbals 7 days prior to surgery to ensure no drug interactions with perioperative surgical/ anesthetic meds  - Avoid NSAIDs 3 days prior. Tylenol is ok to take as needed.   - Avoid ASA containing products 5 days prior, unless otherwise instructed by your provider   - Continue statin medication up through and including the day of surgery  - Bring a list of meds you take at home with your last dose noted    - Avoid alcohol 24hrs before your surgery. Avoid marijuana 12hrs before your surgery.       - Follow the pre surgery showering instructions as listed in the “My Surgical Experience Booklet” or otherwise provided by your surgeon's office.  - Bathing instructions, has chg, neck down, no genitals  - No lotions, powders, sprays, deodorant, perfume, jewelry, body  piercings, false lashes or make-up  - Do not use a blade to shave the surgical area 1 week before surgery. It is ok to use clean electric clippers up to 24 hours before surgery. Do not shave any body parts with a razor within 24hrs.  - Do not use dry shampoo, hair spray, hair gel, or any type of hair products.   - Remove nail polish, including gel polish, and any artificial, gel, or acrylic nails if possible.    - For outpatient surgery, arrange for someone to drive you home after the procedure & stay with you until the next morning. Visitor guidelines discussed.   - Bring insurance cards & photo id    - Leave all valuables such as credit cards, money & jewelry at home  - Please bring any specially ordered equipment (sling, braces) if indicated.  - Wear causal clothing that is easy to take on and off. Consider your type of surgery.    - Notify surgeon if you develop any new illnesses, exposure, develop a rash/ open wounds or have additional questions prior to your surgery.    - Did the surgeon's office give you any other special instructions? no  - Did surgeon require any clearances? no    You will receive a call one business day prior to surgery with an arrival time and hospital directions. If your surgery is scheduled on a Monday, the hospital will be calling you on the Friday prior to your surgery.     Please confirm the visitor policy for the day of your procedure when you receive your phone call with an arrival time.

## 2024-10-24 ENCOUNTER — ANESTHESIA EVENT (OUTPATIENT)
Dept: PERIOP | Facility: HOSPITAL | Age: 59
End: 2024-10-24
Payer: COMMERCIAL

## 2024-10-25 ENCOUNTER — HOSPITAL ENCOUNTER (OUTPATIENT)
Facility: HOSPITAL | Age: 59
Setting detail: OUTPATIENT SURGERY
Discharge: HOME | End: 2024-10-25
Attending: SURGERY | Admitting: SURGERY
Payer: COMMERCIAL

## 2024-10-25 ENCOUNTER — ANESTHESIA (OUTPATIENT)
Dept: PERIOP | Facility: HOSPITAL | Age: 59
End: 2024-10-25
Payer: COMMERCIAL

## 2024-10-25 VITALS
RESPIRATION RATE: 16 BRPM | OXYGEN SATURATION: 97 % | HEART RATE: 58 BPM | BODY MASS INDEX: 27.31 KG/M2 | TEMPERATURE: 97.6 F | WEIGHT: 160 LBS | HEIGHT: 64 IN | SYSTOLIC BLOOD PRESSURE: 143 MMHG | DIASTOLIC BLOOD PRESSURE: 74 MMHG

## 2024-10-25 DIAGNOSIS — N65.0 DEFORMITY OF RECONSTRUCTED BREAST: ICD-10-CM

## 2024-10-25 DIAGNOSIS — Z85.3 PERSONAL HISTORY OF MALIGNANT NEOPLASM OF BREAST: ICD-10-CM

## 2024-10-25 PROCEDURE — 88302 TISSUE EXAM BY PATHOLOGIST: CPT | Performed by: STUDENT IN AN ORGANIZED HEALTH CARE EDUCATION/TRAINING PROGRAM

## 2024-10-25 PROCEDURE — C1781 MESH (IMPLANTABLE): HCPCS | Performed by: SURGERY

## 2024-10-25 PROCEDURE — C1789 PROSTHESIS, BREAST, IMP: HCPCS | Performed by: SURGERY

## 2024-10-25 DEVICE — BREAST TISSUE EXPANDER, SUTURE TABS, INTEGRAL INJECTION DOME, 475CC
Type: IMPLANTABLE DEVICE | Site: BREAST | Status: FUNCTIONAL
Brand: MENTOR ARTOURA PLUS, SMOOTH, HIGH PROFILE

## 2024-10-25 DEVICE — IMPLANTABLE DEVICE: Type: IMPLANTABLE DEVICE | Site: BREAST | Status: FUNCTIONAL

## 2024-10-25 RX ORDER — FENTANYL CITRATE 50 UG/ML
INJECTION, SOLUTION INTRAMUSCULAR; INTRAVENOUS AS NEEDED
Status: DISCONTINUED | OUTPATIENT
Start: 2024-10-25 | End: 2024-10-25

## 2024-10-25 RX ORDER — OXYCODONE AND ACETAMINOPHEN 5; 325 MG/1; MG/1
1 TABLET ORAL EVERY 4 HOURS PRN
Status: DISCONTINUED | OUTPATIENT
Start: 2024-10-25 | End: 2024-10-25 | Stop reason: HOSPADM

## 2024-10-25 RX ORDER — ACETAMINOPHEN 325 MG/1
650 TABLET ORAL EVERY 6 HOURS PRN
Status: DISCONTINUED | OUTPATIENT
Start: 2024-10-25 | End: 2024-10-25 | Stop reason: HOSPADM

## 2024-10-25 RX ORDER — SODIUM CHLORIDE, SODIUM LACTATE, POTASSIUM CHLORIDE, CALCIUM CHLORIDE 600; 310; 30; 20 MG/100ML; MG/100ML; MG/100ML; MG/100ML
100 INJECTION, SOLUTION INTRAVENOUS CONTINUOUS
Status: DISCONTINUED | OUTPATIENT
Start: 2024-10-25 | End: 2024-10-25 | Stop reason: HOSPADM

## 2024-10-25 RX ORDER — FENTANYL CITRATE 50 UG/ML
50 INJECTION, SOLUTION INTRAMUSCULAR; INTRAVENOUS
Status: DISCONTINUED | OUTPATIENT
Start: 2024-10-25 | End: 2024-10-25 | Stop reason: HOSPADM

## 2024-10-25 RX ORDER — CEFAZOLIN SODIUM 2 G/50ML
2000 SOLUTION INTRAVENOUS ONCE
Status: COMPLETED | OUTPATIENT
Start: 2024-10-25 | End: 2024-10-25

## 2024-10-25 RX ORDER — MIDAZOLAM HYDROCHLORIDE 2 MG/2ML
INJECTION, SOLUTION INTRAMUSCULAR; INTRAVENOUS AS NEEDED
Status: DISCONTINUED | OUTPATIENT
Start: 2024-10-25 | End: 2024-10-25

## 2024-10-25 RX ORDER — PROPOFOL 10 MG/ML
INJECTION, EMULSION INTRAVENOUS AS NEEDED
Status: DISCONTINUED | OUTPATIENT
Start: 2024-10-25 | End: 2024-10-25

## 2024-10-25 RX ORDER — PROPOFOL 10 MG/ML
INJECTION, EMULSION INTRAVENOUS CONTINUOUS PRN
Status: DISCONTINUED | OUTPATIENT
Start: 2024-10-25 | End: 2024-10-25

## 2024-10-25 RX ORDER — ONDANSETRON 2 MG/ML
4 INJECTION INTRAMUSCULAR; INTRAVENOUS ONCE AS NEEDED
Status: DISCONTINUED | OUTPATIENT
Start: 2024-10-25 | End: 2024-10-25 | Stop reason: HOSPADM

## 2024-10-25 RX ORDER — PROMETHAZINE HYDROCHLORIDE 25 MG/ML
12.5 INJECTION, SOLUTION INTRAMUSCULAR; INTRAVENOUS
Status: DISCONTINUED | OUTPATIENT
Start: 2024-10-25 | End: 2024-10-25 | Stop reason: HOSPADM

## 2024-10-25 RX ORDER — ONDANSETRON 2 MG/ML
INJECTION INTRAMUSCULAR; INTRAVENOUS AS NEEDED
Status: DISCONTINUED | OUTPATIENT
Start: 2024-10-25 | End: 2024-10-25

## 2024-10-25 RX ORDER — ONDANSETRON 4 MG/1
4 TABLET, ORALLY DISINTEGRATING ORAL EVERY 6 HOURS PRN
Status: DISCONTINUED | OUTPATIENT
Start: 2024-10-25 | End: 2024-10-25 | Stop reason: HOSPADM

## 2024-10-25 RX ORDER — LIDOCAINE HYDROCHLORIDE 10 MG/ML
INJECTION, SOLUTION EPIDURAL; INFILTRATION; INTRACAUDAL; PERINEURAL AS NEEDED
Status: DISCONTINUED | OUTPATIENT
Start: 2024-10-25 | End: 2024-10-25

## 2024-10-25 RX ORDER — KETAMINE HCL IN NACL, ISO-OSM 100MG/10ML
SYRINGE (ML) INJECTION AS NEEDED
Status: DISCONTINUED | OUTPATIENT
Start: 2024-10-25 | End: 2024-10-25

## 2024-10-25 RX ORDER — HYDROMORPHONE HCL/PF 1 MG/ML
0.5 SYRINGE (ML) INJECTION
Status: DISCONTINUED | OUTPATIENT
Start: 2024-10-25 | End: 2024-10-25 | Stop reason: HOSPADM

## 2024-10-25 RX ORDER — HYDROMORPHONE HCL IN WATER/PF 6 MG/30 ML
0.2 PATIENT CONTROLLED ANALGESIA SYRINGE INTRAVENOUS
Status: DISCONTINUED | OUTPATIENT
Start: 2024-10-25 | End: 2024-10-25 | Stop reason: HOSPADM

## 2024-10-25 RX ORDER — ACETAMINOPHEN 10 MG/ML
INJECTION, SOLUTION INTRAVENOUS AS NEEDED
Status: DISCONTINUED | OUTPATIENT
Start: 2024-10-25 | End: 2024-10-25

## 2024-10-25 RX ORDER — KETOROLAC TROMETHAMINE 30 MG/ML
30 INJECTION, SOLUTION INTRAMUSCULAR; INTRAVENOUS ONCE
Status: COMPLETED | OUTPATIENT
Start: 2024-10-25 | End: 2024-10-25

## 2024-10-25 RX ORDER — DEXAMETHASONE SODIUM PHOSPHATE 10 MG/ML
INJECTION, SOLUTION INTRAMUSCULAR; INTRAVENOUS AS NEEDED
Status: DISCONTINUED | OUTPATIENT
Start: 2024-10-25 | End: 2024-10-25

## 2024-10-25 RX ORDER — ROCURONIUM BROMIDE 10 MG/ML
INJECTION, SOLUTION INTRAVENOUS AS NEEDED
Status: DISCONTINUED | OUTPATIENT
Start: 2024-10-25 | End: 2024-10-25

## 2024-10-25 RX ADMIN — FENTANYL CITRATE 50 MCG: 50 INJECTION, SOLUTION INTRAMUSCULAR; INTRAVENOUS at 13:41

## 2024-10-25 RX ADMIN — Medication 10 MG: at 13:08

## 2024-10-25 RX ADMIN — ROCURONIUM BROMIDE 20 MG: 10 INJECTION, SOLUTION INTRAVENOUS at 10:16

## 2024-10-25 RX ADMIN — FENTANYL CITRATE 50 MCG: 50 INJECTION, SOLUTION INTRAMUSCULAR; INTRAVENOUS at 10:01

## 2024-10-25 RX ADMIN — PROPOFOL 100 MCG/KG/MIN: 10 INJECTION, EMULSION INTRAVENOUS at 09:40

## 2024-10-25 RX ADMIN — ROCURONIUM BROMIDE 50 MG: 10 INJECTION, SOLUTION INTRAVENOUS at 09:39

## 2024-10-25 RX ADMIN — CEFAZOLIN SODIUM 2000 MG: 2 SOLUTION INTRAVENOUS at 09:33

## 2024-10-25 RX ADMIN — FENTANYL CITRATE 100 MCG: 50 INJECTION, SOLUTION INTRAMUSCULAR; INTRAVENOUS at 09:39

## 2024-10-25 RX ADMIN — SODIUM CHLORIDE, SODIUM LACTATE, POTASSIUM CHLORIDE, AND CALCIUM CHLORIDE: .6; .31; .03; .02 INJECTION, SOLUTION INTRAVENOUS at 12:31

## 2024-10-25 RX ADMIN — MIDAZOLAM HYDROCHLORIDE 2 MG: 2 INJECTION, SOLUTION INTRAMUSCULAR; INTRAVENOUS at 09:33

## 2024-10-25 RX ADMIN — Medication 20 MG: at 10:59

## 2024-10-25 RX ADMIN — ROCURONIUM BROMIDE 20 MG: 10 INJECTION, SOLUTION INTRAVENOUS at 11:54

## 2024-10-25 RX ADMIN — FENTANYL CITRATE 50 MCG: 50 INJECTION, SOLUTION INTRAMUSCULAR; INTRAVENOUS at 10:55

## 2024-10-25 RX ADMIN — FENTANYL CITRATE 50 MCG: 50 INJECTION, SOLUTION INTRAMUSCULAR; INTRAVENOUS at 13:48

## 2024-10-25 RX ADMIN — SODIUM CHLORIDE, SODIUM LACTATE, POTASSIUM CHLORIDE, AND CALCIUM CHLORIDE: .6; .31; .03; .02 INJECTION, SOLUTION INTRAVENOUS at 09:33

## 2024-10-25 RX ADMIN — Medication 10 MG: at 11:55

## 2024-10-25 RX ADMIN — DEXAMETHASONE SODIUM PHOSPHATE 10 MG: 10 INJECTION, SOLUTION INTRAMUSCULAR; INTRAVENOUS at 09:39

## 2024-10-25 RX ADMIN — PROPOFOL 200 MG: 10 INJECTION, EMULSION INTRAVENOUS at 09:39

## 2024-10-25 RX ADMIN — ONDANSETRON 4 MG: 4 TABLET, ORALLY DISINTEGRATING ORAL at 15:06

## 2024-10-25 RX ADMIN — LIDOCAINE HYDROCHLORIDE 50 MG: 10 INJECTION, SOLUTION EPIDURAL; INFILTRATION; INTRACAUDAL; PERINEURAL at 09:39

## 2024-10-25 RX ADMIN — ONDANSETRON 4 MG: 2 INJECTION INTRAMUSCULAR; INTRAVENOUS at 09:33

## 2024-10-25 RX ADMIN — KETOROLAC TROMETHAMINE 30 MG: 30 INJECTION, SOLUTION INTRAMUSCULAR; INTRAVENOUS at 13:59

## 2024-10-25 RX ADMIN — ACETAMINOPHEN 1000 MG: 10 INJECTION, SOLUTION INTRAVENOUS at 12:30

## 2024-10-25 NOTE — ANESTHESIA POSTPROCEDURE EVALUATION
Post-Op Assessment Note    CV Status:  Stable    Pain management: satisfactory to patient       Mental Status:  Alert and awake   Hydration Status:  Stable   PONV Controlled:  None   Airway Patency:  Patent  Airway: intubated     Post Op Vitals Reviewed: Yes    No anethesia notable event occurred.    Staff: CRNA           Last Filed PACU Vitals:  Vitals Value Taken Time   Temp 98 °F (36.7 °C) 10/25/24 1313   Pulse 69 10/25/24 1316   /90 10/25/24 1313   Resp 0 10/25/24 1316   SpO2 100 % 10/25/24 1316   Vitals shown include unfiled device data.    Modified Adam:  No data recorded

## 2024-10-25 NOTE — INTERVAL H&P NOTE
H&P reviewed. After examining the patient I find no changes in the patients condition since the H&P had been written.    Vitals:    10/25/24 0801   BP: 125/66   Pulse: 57   Resp: 18   Temp: (!) 96.8 °F (36 °C)   SpO2: 96%     To OR for bilateral breast reconstruction with tissue expander and ADM

## 2024-10-25 NOTE — ANESTHESIA PREPROCEDURE EVALUATION
Procedure:  breast reconstruction c/ tissue expanders and ADM (Bilateral: Breast)    Relevant Problems   ANESTHESIA (within normal limits)      CARDIO   (+) Essential hypertension   (+) Hyperlipidemia      GI/HEPATIC   (+) Gastroesophageal reflux disease   (+) Hiatal hernia      GYN   (+) Malignant neoplasm of left breast in female, estrogen receptor positive (HCC)      MUSCULOSKELETAL   (+) Hiatal hernia      NEURO/PSYCH   (+) Anxiety and depression   (+) Pain syndrome, chronic        Physical Exam    Airway    Mallampati score: II  TM Distance: <3 FB  Neck ROM: full     Dental       Cardiovascular  Rate: normal    Pulmonary  Pulmonary exam normal     Other Findings  Per pt denies anything remaining that is loose or removeablepost-pubertal.      Anesthesia Plan  ASA Score- 2     Anesthesia Type- general with ASA Monitors.         Additional Monitors:     Airway Plan: ETT.           Plan Factors-Exercise tolerance (METS): >4 METS.    Chart reviewed.    Patient summary reviewed.    Patient is not a current smoker.              Induction- intravenous.    Postoperative Plan- Plan for postoperative opioid use.         Informed Consent- Anesthetic plan and risks discussed with patient.  I personally reviewed this patient with the CRNA. Discussed and agreed on the Anesthesia Plan with the CRNA..

## 2024-10-25 NOTE — OP NOTE
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.0]  Personal history of malignant neoplasm of breast [Z85.3]    Post-Op Diagnosis Codes:     * Deformity of reconstructed breast [N65.0]     * Personal history of malignant neoplasm of breast [Z85.3]    Procedure(s):  Bilateral - breast reconstruction c/ tissue expanders and ADM    Specimen(s):  ID Type Source Tests Collected by Time Destination   1 : MASTECTOMY SCAR FROM LEFT BREAST Tissue Skin, Plastic Repair TISSUE EXAM Sharan Castro MD 10/25/2024 1025    2 : MASTECTOMY SCAR FROM RIGHT BREAST Tissue Skin, Plastic Repair TISSUE EXAM Sharan Castro MD 10/25/2024 1042        Estimated Blood Loss:   Minimal    Drains:  Closed/Suction Drain Right;Lateral Breast Bulb 19 Fr. (Active)   Number of days: 1904       Closed/Suction Drain Right;Medial Breast Bulb 19 Fr. (Active)   Number of days: 1904       Closed/Suction Drain Left;Medial Breast Bulb 19 Fr. (Active)   Number of days: 1904       Closed/Suction Drain Left;Lateral Breast Bulb 19 Fr. (Active)   Number of days: 1904       Closed/Suction Drain Right;Lateral Breast Bulb 15 Fr. (Active)   Status To bulb suction 10/25/24 1201   Number of days: 0       Closed/Suction Drain Left;Lateral Breast Bulb 15 Fr. (Active)   Number of days: 0       Anesthesia Type:   General    Operative Indications:  Deformity of reconstructed breast [N65.0]  Personal history of malignant neoplasm of breast [Z85.3]  This is a 59 year old female who presents for delayed bilateral breast reconstruction. She is s/p mastectomy in 2019 and now wishes to have breast reconstruction with an alloplastic implant. Risks, benefits and alternatives were discussed with the patient.    Operative Findings:  Bilateral breast  reconstruction with 13cm tissue expanders and ADM      Complications:   None    Procedure and Technique:  The patient was met in the preoperative holding area and marked according to hospital policy. She was transported to the operating room and positioned supine on the operating table, all pressure points were padded and SCDs were on and functioning. General endotracheal anesthesia was induced without difficulty. The chest was prepped and draped in the usual sterile fashion. A surgical timeout was conducted to verify the correct patient, procedure and fire risk according to hospital protocol.    A local anesthetic mixture of 1% lidocaine with epinephrine (1:100,000) and 0.25% marcaine was administered to the previous mastectomy incisions for postoperative pain relief. The left breast previous mastectomy incision was marked and incised sharply with a scalpel. The previous scar was excised and sent off for pathology analysis. Electrocautery was used to dissect the subcutaneous tissue down to the pectoralis muscle fascia. A prepectoral plane was then elevated superiorly to the clavicle and inferiorly to the level of the inframammary fold, to recreate the mastectomy defect. An identical procedure was performed on the right side. Hemostasis was achieved with electrocautery. The width of the pocket measured 13cm, therefore a 13cm tissue expander was selected for each side.    On the back table, the tissue expanders were prepared, by first fully inflating them with air. A 71x09ar sheet of acellular dermal matrix (Flex HD) was wrapped around the expander, ensuring correct polarity of the ADM. The ADM was sutured together with 3-0 vicryl suture, and cutouts were made for the tabs at the periphery of the expander. The air was then removed from the expander and 1mL of methylene blue dye was injected through the expansion port. The expander was not filled with any air or saline. Both expanders were prepared in an identical  fashion. The ADM serial numbers are Left: [id number] and Right: [id number].     Attention was then turned to the pocket. The pocket was irrigated with saline and an antibiotic solution containing cefazolin and gentamicin. Hemostasis was again ensured. A 15Fr channel drain was placed on each side and tunneled below the inframammary fold to the lateral chest skin and secured with 3-0 nylon sutures. The inframammary fold was recreated with interrupted 2-0 PDS sutures, anchoring the skin to the chest wall fascia. The expander was centered in the pocket along the inframammary fold and the tabs were sutured in place with 2-0 PDS sutures. On the left side, a 475cc Mentour Artoura Plus High Profile tissue expander (Serial Number: [id number]) was placed. The deep Jorden's layer was then closed with interrupted 3-0 PDS sutures, followed by 3-0 PDS sutures in a deep dermal fashion. The skin was then closed with a 3-0 Stratifix suture in a running subcuticular fashion. An identical procedure was performed on the right side, with a 475cc Mentour Artoura Plus High Profile tissue expander (Serial Number: [id number]). Skin glue and steri strips were applied to the incisions, and the drain was dressed with a Biopatch and Tegaderm, and a surgical bra was placed.    All sponge, needle and instrument counts were correct at the end of the case. The patient tolerated the procedure without any complications and was transferred to the recovery room in stable condition. I was the attending plastic surgeon for the entire procedure.     I was present for the entire procedure.    Patient Disposition:  PACU              SIGNATURE: Sharan Castro MD  DATE: October 25, 2024  TIME: 1:17 PM

## 2024-10-25 NOTE — NURSING NOTE
Awake and tolerating po well, patient requesting zofran for ride home. Given. Discharge instructions given and drain teaching completed.  to drive home

## 2024-10-25 NOTE — DISCHARGE INSTR - AVS FIRST PAGE
Keep incisions clean. OK to shower starting tomorrow, do not let the water hit the area directly. Wear the bra at all times after surgery until your follow up appointment, except to take a shower. No heavy lifting, limit weight to 10 lbs. Do not raise your arms more higher than your shoulder height. Empty and record drain output twice daily, and bring the log to your follow up appointment    Take Tylenol and ibuprofen as needed for pain. If needed, you may take Percocet, just ensure that total Tylenol dose does not exceeed 4,000mg in a day.    Please take antibiotics as prescribed.    Follow up in the office on 11/1/2024.    If you have any questions or concerns, please call the office.

## 2024-10-29 DIAGNOSIS — E78.49 OTHER HYPERLIPIDEMIA: ICD-10-CM

## 2024-10-30 PROCEDURE — 88302 TISSUE EXAM BY PATHOLOGIST: CPT | Performed by: STUDENT IN AN ORGANIZED HEALTH CARE EDUCATION/TRAINING PROGRAM

## 2024-10-30 RX ORDER — ROSUVASTATIN CALCIUM 20 MG/1
20 TABLET, COATED ORAL DAILY
Qty: 90 TABLET | Refills: 1 | Status: SHIPPED | OUTPATIENT
Start: 2024-10-30

## 2024-11-05 NOTE — ANESTHESIA POSTPROCEDURE EVALUATION
Post-Op Assessment Note    Last Filed PACU Vitals:  Vitals Value Taken Time   Temp 98 °F (36.7 °C) 10/25/24 1313   Pulse 59 10/25/24 1410   /79 10/25/24 1410   Resp 13 10/25/24 1410   SpO2 95 % 10/25/24 1410       Modified Adam:  No data recorded

## 2024-11-06 DIAGNOSIS — R45.4 IRRITABILITY AND ANGER: ICD-10-CM

## 2024-11-06 RX ORDER — ESCITALOPRAM OXALATE 10 MG/1
10 TABLET ORAL DAILY
Qty: 90 TABLET | Refills: 1 | Status: SHIPPED | OUTPATIENT
Start: 2024-11-06

## 2024-11-10 DIAGNOSIS — M51.17 INTERVERTEBRAL DISC DISORDER WITH RADICULOPATHY OF LUMBOSACRAL REGION: ICD-10-CM

## 2024-11-11 RX ORDER — TRAMADOL HYDROCHLORIDE 50 MG/1
50 TABLET ORAL EVERY 8 HOURS PRN
Qty: 90 TABLET | Refills: 0 | Status: SHIPPED | OUTPATIENT
Start: 2024-11-11

## 2024-11-15 NOTE — TELEPHONE ENCOUNTER
Scheduling Appointment     Who Is Calling to Schedule  self   Doctor Dr Dejuan Guillory   Date and Time 8/17 2:20PM   Reason for scheduling appointment Follow up   Patient verbalized understanding    yes No gallop.   Pulmonary:      Effort: Pulmonary effort is normal. No respiratory distress.      Breath sounds: Normal breath sounds. No wheezing, rhonchi or rales.   Abdominal:      General: Bowel sounds are normal. There is no distension.      Palpations: Abdomen is soft. There is no mass.      Tenderness: There is no abdominal tenderness. There is no guarding.   Musculoskeletal:         General: No swelling or deformity. Normal range of motion.      Cervical back: Normal range of motion and neck supple. No rigidity.   Lymphadenopathy:      Cervical: No cervical adenopathy.   Skin:     General: Skin is warm.      Coloration: Skin is not jaundiced or pale.      Findings: No bruising or rash.   Neurological:      General: No focal deficit present.      Mental Status: He is alert and oriented to person, place, and time.      Cranial Nerves: No cranial nerve deficit.      Motor: No weakness.      Gait: Gait normal.      Deep Tendon Reflexes: Reflexes normal.   Psychiatric:         Mood and Affect: Mood normal.         Thought Content: Thought content normal.         The 10-year ASCVD risk score (Paco PINEDA, et al., 2019) is: 2.8%    Values used to calculate the score:      Age: 46 years      Sex: Male      Is Non- : No      Diabetic: Yes      Tobacco smoker: No      Systolic Blood Pressure: 122 mmHg      Is BP treated: Yes      HDL Cholesterol: 44 mg/dL      Total Cholesterol: 155 mg/dL     Results for orders placed or performed during the hospital encounter of 07/17/24   ACTH   Result Value Ref Range    ACTH 34 7 - 63 pg/mL   Comprehensive Metabolic Panel   Result Value Ref Range    Sodium 140 136 - 145 mmol/L    Potassium 4.2 3.7 - 5.3 mmol/L    Chloride 104 98 - 107 mmol/L    CO2 25 20 - 31 mmol/L    Anion Gap 11 9 - 16 mmol/L    Glucose 167 (H) 74 - 99 mg/dL    BUN 16 6 - 20 mg/dL    Creatinine 0.8 0.70 - 1.20 mg/dL    Est, Glom Filt Rate >90 >60 mL/min/1.73m2    Calcium 9.4 8.6 - 10.4 mg/dL

## 2024-12-07 DIAGNOSIS — G47.09 OTHER INSOMNIA: ICD-10-CM

## 2024-12-09 RX ORDER — TRAZODONE HYDROCHLORIDE 50 MG/1
50 TABLET, FILM COATED ORAL
Qty: 90 TABLET | Refills: 1 | Status: SHIPPED | OUTPATIENT
Start: 2024-12-09

## 2024-12-16 DIAGNOSIS — M51.17 INTERVERTEBRAL DISC DISORDER WITH RADICULOPATHY OF LUMBOSACRAL REGION: ICD-10-CM

## 2024-12-16 RX ORDER — TRAMADOL HYDROCHLORIDE 50 MG/1
50 TABLET ORAL EVERY 8 HOURS PRN
Qty: 90 TABLET | Refills: 0 | Status: SHIPPED | OUTPATIENT
Start: 2024-12-16

## 2024-12-24 ENCOUNTER — TELEPHONE (OUTPATIENT)
Dept: SURGICAL ONCOLOGY | Facility: CLINIC | Age: 59
End: 2024-12-24

## 2024-12-24 NOTE — TELEPHONE ENCOUNTER
Left msg for pt regarding need to cancel appt on Mon 3/10/25 @ 8AM.  Provided phone# [phone] to reschedule

## 2025-01-04 DIAGNOSIS — K21.9 GASTROESOPHAGEAL REFLUX DISEASE WITHOUT ESOPHAGITIS: ICD-10-CM

## 2025-01-13 ENCOUNTER — APPOINTMENT (OUTPATIENT)
Dept: LAB | Age: 60
End: 2025-01-13
Payer: COMMERCIAL

## 2025-01-13 DIAGNOSIS — Z01.812 PRE-OPERATIVE LABORATORY EXAMINATION: ICD-10-CM

## 2025-01-13 DIAGNOSIS — Z01.818 ENCOUNTER FOR OTHER PREPROCEDURAL EXAMINATION: ICD-10-CM

## 2025-01-13 DIAGNOSIS — Z85.3 PERSONAL HISTORY OF MALIGNANT NEOPLASM OF BREAST: ICD-10-CM

## 2025-01-13 DIAGNOSIS — N65.0 DEFORMITY OF RECONSTRUCTED BREAST: ICD-10-CM

## 2025-01-13 DIAGNOSIS — Z01.810 ENCOUNTER FOR PREPROCEDURAL CARDIOVASCULAR EXAMINATION: ICD-10-CM

## 2025-01-13 LAB
ANION GAP SERPL CALCULATED.3IONS-SCNC: 11 MMOL/L (ref 4–13)
BUN SERPL-MCNC: 20 MG/DL (ref 5–25)
CALCIUM SERPL-MCNC: 9.9 MG/DL (ref 8.4–10.2)
CHLORIDE SERPL-SCNC: 101 MMOL/L (ref 96–108)
CO2 SERPL-SCNC: 27 MMOL/L (ref 21–32)
CREAT SERPL-MCNC: 0.81 MG/DL (ref 0.6–1.3)
ERYTHROCYTE [DISTWIDTH] IN BLOOD BY AUTOMATED COUNT: 13.3 % (ref 11.6–15.1)
GFR SERPL CREATININE-BSD FRML MDRD: 80 ML/MIN/1.73SQ M
GLUCOSE SERPL-MCNC: 87 MG/DL (ref 65–140)
HCT VFR BLD AUTO: 40.9 % (ref 34.8–46.1)
HGB BLD-MCNC: 13.6 G/DL (ref 11.5–15.4)
MCH RBC QN AUTO: 31 PG (ref 26.8–34.3)
MCHC RBC AUTO-ENTMCNC: 33.3 G/DL (ref 31.4–37.4)
MCV RBC AUTO: 93 FL (ref 82–98)
PLATELET # BLD AUTO: 288 THOUSANDS/UL (ref 149–390)
PMV BLD AUTO: 9.4 FL (ref 8.9–12.7)
POTASSIUM SERPL-SCNC: 4 MMOL/L (ref 3.5–5.3)
RBC # BLD AUTO: 4.39 MILLION/UL (ref 3.81–5.12)
SODIUM SERPL-SCNC: 139 MMOL/L (ref 135–147)
WBC # BLD AUTO: 6.28 THOUSAND/UL (ref 4.31–10.16)

## 2025-01-13 PROCEDURE — 36415 COLL VENOUS BLD VENIPUNCTURE: CPT

## 2025-01-13 PROCEDURE — 80048 BASIC METABOLIC PNL TOTAL CA: CPT

## 2025-01-13 PROCEDURE — 85027 COMPLETE CBC AUTOMATED: CPT

## 2025-01-14 NOTE — PROGRESS NOTES
"Cardio-Oncology Clinic Note    Debra Escoto 59 y.o. female   MRN: [id number]  Encounter: [id number]        Assessment / Plan:    # Cardio-Oncology Pertinent History  Breast cancer  Dx 2019.  Left sided.  S/p surgery  Chemotherapy:  S/p AC-T  Current:   arimidex (patient is holding this)    # Cardio-Oncology Survivorship Recommendations  Discussed that many cancer survivors are at increased risk of cardiovascular disease due to cancer treatments as well as comorbidities.  Discussed importance of optimizing cardiovascular risk factors and post-treatment cardiac surveillance when appropriate.    Anthracyclines    Annual history and physical  BP - SEE BELOW  Lipids - SEE BELOW  Diabetes screening with PCP  Exercise - yes - loves cycling  Healthy diet - doing reasonably well  Post-treatment surveillance:   complete.    # GO  Started after COVID (in May 2023)  Checked CXR, no concerns  Sx's have mostly resolved.  Exercising without significant limitations.  Does get short of breath going up stairs.  We talked about if this progresses we could repeat echo and check BNP.    # Palpitations  Offered ziopatch.  Wants to hold off.  She will let me know if sx's worsen and we will check ziopatch.    # HTN  Not on meds.  BP today 132/80.  Last month 143/74.  Shared decision making, start Norvasc 2.5    # HLD  With very strong fam hx, recommend LDL < 100.  Most recent LDL 81 --> 92  Continue crestor 20    # Fam hx CAD  Parents and brother with hx CAD  Patient had a normal stress echo and calcium score of 0.  Recommend lifestyle optimization and risk factor modification as above    # Calcification in right atrium  Coronary calcium scan noted \"Unusual finding of calcification associated with the inferior aspect of the right atrium\"  Seen on echo but not well visualized.  Patient reports seen on cardiac MRI at [place] in 1999 (incidental during research MRI for healthy subjects).    Presumed calcified prominent roderick terminalis or " eustachian valve.      Today's Plan Summary:  See above assessment/plan for full details of today's plan.  Briefly,    start Norvasc 2.5              Reason For Visit / Chief Complaint:  F/u palpitations, HLD, fam hx CAD    HPI:   Debra Escoto is a 59 y.o.  female with history as noted in the problem list and further detailed in the above assessment and plan.    Initial:  July 2023  Referred by Helio De Anda NP for a new patient visit for history of potentially cardiotoxic chemotherapy and family history of CAD.  As above, the patient has a history of breast cancer diagnosed in 2019.  The patient had chemotherapy that included anthracyclines.  She had an executive physical earlier this year and her simvastatin was changed to Crestor.  Testing at that time included a normal EKG, echo, stress echo, and a calcium score of 0.  The patient has a family history of CAD.  She was referred to cardio-oncology for cardiovascular optimization and long-term follow-up.  reports had COVID in May (after all this testing).  And after this, more GO, exercise intolerance.   Gets occasional palpitation.  No syncope.   Makes it feel like she needs to cough.    Nurse - per ronnie with GI.  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   .  No children.  Fam hx:   Mother CABG in 60s.   Father had MI age 51, ischemic cardiomyopathy, ICD.  Brother - CABG age 57.    Interval:  Last visit -->  reports the post COVID GO has resolved.   Very rare palpitations.     Plan last visit -->  No med changes  F/u 1 year    EKG Oct  -  sinus at 50 bpm with mild first-degree AV block.    Today -  feeling pretty good.   No chest pain or tightness.   No SOB with rowing machine but does get GO with stairs.  No leg edema.   No syncope.             Cardiac Imaging personally reviewed:  EKG 2/21/23  Sinus rhythm at 59 bpm    10-16-24  sinus at 50 bpm with mild first-degree AV block.       Holter or event  monitor    Echo 2-21-23  EF 65%.  GLS -23%.  Mild MR  Mild TR     BRUCE    Cardiac MRI    Stress testing Stress echo 2/2023  92% PMHR.  11.7 METs.  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   • Atypical nevus of back 02/21/2023   • Other insomnia 09/07/2022   • Uncomplicated opioid dependence (HCC) 09/07/2022   • Neuropathy 09/07/2022   • Hot flashes 03/02/2022   • Primary hypertension 02/24/2021   • Abnormal fasting glucose 02/24/2021   • Osteopenia of multiple sites 02/24/2021   • Vaginal atrophy 02/24/2020   • Leukopenia 02/14/2020   • Leiomyoma of uterus, unspecified 02/14/2020   • S/P bilateral mastectomy 02/14/2020   • Tear of right acetabular labrum 01/05/2020   • Malignant neoplasm of left breast in female, estrogen receptor positive ([facility]) 06/18/2019   • Intervertebral disc disorder with radiculopathy of lumbosacral region    • Gastroesophageal reflux disease 05/14/2018   • Anxiety and depression 04/30/2018   • Vitamin B12 deficiency 04/30/2018   • Seasonal allergic rhinitis due to pollen 04/30/2018   • Pain syndrome, chronic 03/06/2015   • Sacroiliitis ([facility]) 06/24/2013   • Hiatal hernia 07/14/2012   • Hyperlipidemia 07/14/2012       Past Medical History:   Diagnosis Date   • Allergic    • Anxiety    • Basal cell carcinoma 12/07/2023    left nasal side wall-Mohs   • BRCA gene mutation negative 06/20/2019    Inv[facility]ae   • Cancer (HCC) 6/2019    breast    • COVID 2022   • Eczema     Entire life   • Fibrocystic disease of breast    • GERD (gastroesophageal reflux disease)    • Hiatal hernia    • Hyperlipidemia        Allergies   Allergen Reactions   • Dilaudid [Hydromorphone] Nausea Only     Extreme nausea     • Nsaids Dermatitis, GI Intolerance and Abdominal Pain     Occurred when she took more  than recommended, no issues with normal dose NSAIDS ~ AAK 10/25/24   • Sulfa Antibiotics Headache     Annotation - [id number]: Migraine; Annotation - [id number]: cellular issues       Current Outpatient Medications   Medication Instructions   • amLODIPine (NORVASC) 2.5 mg, Oral, Daily   • BIOTIN PO 1 capsule, Daily   • cholecalciferol (VITAMIN D3) 1,000 Units, Daily   • escitalopram (LEXAPRO) 10 mg, Oral, Daily   • omeprazole (PRILOSEC) 20 mg, Oral, Daily before breakfast   • rosuvastatin (CRESTOR) 20 mg, Oral, Daily   • traMADol (ULTRAM) 50 mg, Oral, Every 8 hours PRN   • traZODone (DESYREL) 50 mg, Oral, Daily at bedtime   • vitamin B-12 (VITAMIN B-12) 500 mcg, Oral, Daily       Social History     Socioeconomic History   • Marital status: /Civil Union     Spouse name: Not on file   • Number of children: 0   • Years of education: Not on file   • Highest education level: Not on file   Occupational History   • 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   Tobacco Use   • Smoking status: Never     Passive exposure: Past   • Smokeless tobacco: Never   Vaping Use   • Vaping status: Never Used   Substance and Sexual Activity   • Alcohol use: Yes     Alcohol/week: 2.0 standard drinks of alcohol     Types: 2 Glasses of wine per week   • Drug use: Not Currently     Types: Marijuana     Comment: oral usage/capsules   • Sexual activity: Yes     Partners: Male     Birth control/protection: Post-menopausal, Male Sterilization     Comment: Partner had vasectomy   Other Topics Concern   • Not on file   Social History Narrative    Exercise habits    Working full-time - used to be GI RN     Social Drivers of Health     Financial Resource Strain: Not on file   Food Insecurity: Not on file   Transportation Needs: Not on file   Physical Activity: Not on file   Stress: Not on file   Social Connections: Not on file   Intimate Partner Violence: Not on file   Housing  "Stability: Not on file       Family History   Problem Relation Age of Onset   • Coronary artery disease Mother         CABG in her 60s   • Other Mother         Dyslipidemia   • Hypertension Mother    • Hyperlipidemia Mother    • Heart attack Father 51        acute myocardial infarction   • Other Father         Dyslipidemia   • Hypertension Father    • Hyperlipidemia Father    • Squamous cell carcinoma Father         MOHS to face 2022   • Coronary artery disease Brother    • No Known Problems Maternal Aunt    • No Known Problems Maternal Aunt    • Prostate cancer Paternal Uncle 65   • Thyroid disease Family         disorder       Physical Exam:  Blood pressure 132/80, pulse 58, height 5' 4\" (1.626 m), weight 75.8 kg (167 lb 3.2 oz), SpO2 99%.  Body mass index is 28.7 kg/m².  Wt Readings from Last 3 Encounters:   01/15/25 75.8 kg (167 lb 3.2 oz)   10/22/24 72.6 kg (160 lb)   10/16/24 72.6 kg (160 lb)     Physical Exam  Vitals reviewed.   Constitutional:       General: She is not in acute distress.     Appearance: She is not toxic-appearing.   Neck:      Comments: No JVD  Cardiovascular:      Rate and Rhythm: Regular rhythm. Bradycardia present.      Heart sounds: No murmur heard.     No friction rub. No gallop.   Musculoskeletal:      Comments: No edema   Neurological:      Mental Status: She is alert.          Labs & Results:  Lab Results   Component Value Date    SODIUM 139 01/13/2025    K 4.0 01/13/2025     01/13/2025    CO2 27 01/13/2025    BUN 20 01/13/2025    CREATININE 0.81 01/13/2025    GLUC 87 01/13/2025    CALCIUM 9.9 01/13/2025     No results found for: \"NTBNP\"       Thank you for the opportunity to participate in the care of this patient.    Cody Rivera MD, Merged with [facility]  Staff Cardiologist  Director of Cardio-Oncology  [facility]  "

## 2025-01-15 ENCOUNTER — OFFICE VISIT (OUTPATIENT)
Age: 60
End: 2025-01-15
Payer: COMMERCIAL

## 2025-01-15 VITALS
SYSTOLIC BLOOD PRESSURE: 132 MMHG | DIASTOLIC BLOOD PRESSURE: 80 MMHG | HEART RATE: 58 BPM | BODY MASS INDEX: 28.54 KG/M2 | WEIGHT: 167.2 LBS | OXYGEN SATURATION: 99 % | HEIGHT: 64 IN

## 2025-01-15 DIAGNOSIS — R06.09 DOE (DYSPNEA ON EXERTION): ICD-10-CM

## 2025-01-15 DIAGNOSIS — E78.2 MIXED HYPERLIPIDEMIA: ICD-10-CM

## 2025-01-15 DIAGNOSIS — I10 PRIMARY HYPERTENSION: Primary | ICD-10-CM

## 2025-01-15 DIAGNOSIS — Z92.21 STATUS POST ADMINISTRATION OF CARDIOTOXIC CHEMOTHERAPY: ICD-10-CM

## 2025-01-15 DIAGNOSIS — R00.2 PALPITATIONS: ICD-10-CM

## 2025-01-15 DIAGNOSIS — Z82.49 FAMILY HISTORY OF CARDIAC DISORDER: ICD-10-CM

## 2025-01-15 PROCEDURE — 99214 OFFICE O/P EST MOD 30 MIN: CPT | Performed by: INTERNAL MEDICINE

## 2025-01-15 RX ORDER — AMLODIPINE BESYLATE 2.5 MG/1
2.5 TABLET ORAL DAILY
Qty: 90 TABLET | Refills: 3 | Status: SHIPPED | OUTPATIENT
Start: 2025-01-15

## 2025-01-16 RX ORDER — ACETAMINOPHEN 500 MG
1000 TABLET ORAL EVERY 6 HOURS PRN
COMMUNITY

## 2025-01-16 NOTE — PRE-PROCEDURE INSTRUCTIONS
Pre-Surgery Instructions:   Medication Instructions    acetaminophen (TYLENOL) 500 mg tablet Uses PRN- OK to take day of surgery    BIOTIN PO Stop taking 7 days prior to surgery.    cholecalciferol (VITAMIN D3) 1,000 units tablet Stop taking 7 days prior to surgery.    escitalopram (LEXAPRO) 10 mg tablet Take night before surgery    Ibuprofen-Acetaminophen (ADVIL DUAL ACTION PO) Stop taking 7 days prior to surgery.    omeprazole (PriLOSEC) 20 mg delayed release capsule Take day of surgery.    rosuvastatin (CRESTOR) 20 MG tablet Take night before surgery    traMADol (ULTRAM) 50 mg tablet Uses PRN- OK to take day of surgery    traZODone (DESYREL) 50 mg tablet Take night before surgery    vitamin B-12 (VITAMIN B-12) 500 mcg tablet Stop taking 7 days prior to surgery.   Medication instructions for day surgery reviewed. Please use only a sip of water to take your instructed medications. Avoid all over the counter vitamins, supplements and NSAIDS for one week prior to surgery per anesthesia guidelines. Tylenol is ok to take as needed.     You will receive a call one business day prior to surgery with an arrival time and hospital directions. If your surgery is scheduled on a Monday, the hospital will be calling you on the Friday prior to your surgery. If you have not heard from anyone by 8pm, please call the hospital supervisor through the hospital  at [phone]. ([place] [phone] or [place] [phone]).    Do not eat or drink anything after midnight the night before your surgery, including candy, mints, lifesavers, or chewing gum. Do not drink alcohol 24hrs before your surgery. Try not to smoke at least 24hrs before your surgery.       Follow the pre surgery showering instructions as listed in the “My Surgical Experience Booklet” or otherwise provided by your surgeon's office. Do not use a blade to shave the surgical area 1 week before surgery. It is okay to use a clean electric clippers up to 24 hours  before surgery. Do not apply any lotions, creams, including makeup, cologne, deodorant, or perfumes after showering on the day of your surgery. Do not use dry shampoo, hair spray, hair gel, or any type of hair products.     No contact lenses, eye make-up, or artificial eyelashes. Remove nail polish, including gel polish, and any artificial, gel, or acrylic nails if possible. Remove all jewelry including rings and body piercing jewelry.     Wear causal clothing that is easy to take on and off. Consider your type of surgery.    Keep any valuables, jewelry, piercings at home. Please bring any specially ordered equipment (sling, braces) if indicated.    Arrange for a responsible person to drive you to and from the hospital on the day of your surgery. Please confirm the visitor policy for the day of your procedure when you receive your phone call with an arrival time.     Call the surgeon's office with any new illnesses, exposures, or additional questions prior to surgery.    Please reference your “My Surgical Experience Booklet” for additional information to prepare for your upcoming surgery.

## 2025-01-22 DIAGNOSIS — M51.17 INTERVERTEBRAL DISC DISORDER WITH RADICULOPATHY OF LUMBOSACRAL REGION: ICD-10-CM

## 2025-01-22 RX ORDER — TRAMADOL HYDROCHLORIDE 50 MG/1
50 TABLET ORAL EVERY 8 HOURS PRN
Qty: 90 TABLET | Refills: 0 | Status: SHIPPED | OUTPATIENT
Start: 2025-01-22

## 2025-01-26 ENCOUNTER — ANESTHESIA EVENT (OUTPATIENT)
Dept: PERIOP | Facility: HOSPITAL | Age: 60
End: 2025-01-26
Payer: COMMERCIAL

## 2025-01-27 ENCOUNTER — HOSPITAL ENCOUNTER (OUTPATIENT)
Facility: HOSPITAL | Age: 60
Setting detail: OUTPATIENT SURGERY
Discharge: HOME | End: 2025-01-27
Attending: SURGERY | Admitting: SURGERY
Payer: COMMERCIAL

## 2025-01-27 ENCOUNTER — ANESTHESIA (OUTPATIENT)
Dept: PERIOP | Facility: HOSPITAL | Age: 60
End: 2025-01-27
Payer: COMMERCIAL

## 2025-01-27 VITALS
HEIGHT: 64 IN | WEIGHT: 160 LBS | RESPIRATION RATE: 16 BRPM | HEART RATE: 62 BPM | SYSTOLIC BLOOD PRESSURE: 118 MMHG | OXYGEN SATURATION: 96 % | DIASTOLIC BLOOD PRESSURE: 69 MMHG | BODY MASS INDEX: 27.31 KG/M2 | TEMPERATURE: 97.6 F

## 2025-01-27 PROCEDURE — L8600 IMPLANT BREAST SILICONE/EQ: HCPCS | Performed by: SURGERY

## 2025-01-27 DEVICE — SMOOTH MODERATE HIGH PROFILE XTRA, 430 CC  SMOOTH ROUND SILICONE
Type: IMPLANTABLE DEVICE | Site: BREAST | Status: FUNCTIONAL
Brand: MENTOR MEMORYGEL XTRA BREAST IMPLANT

## 2025-01-27 RX ORDER — MIDAZOLAM HYDROCHLORIDE 2 MG/2ML
INJECTION, SOLUTION INTRAMUSCULAR; INTRAVENOUS AS NEEDED
Status: DISCONTINUED | OUTPATIENT
Start: 2025-01-27 | End: 2025-01-27

## 2025-01-27 RX ORDER — ACETAMINOPHEN 10 MG/ML
INJECTION, SOLUTION INTRAVENOUS AS NEEDED
Status: DISCONTINUED | OUTPATIENT
Start: 2025-01-27 | End: 2025-01-27

## 2025-01-27 RX ORDER — CEFAZOLIN SODIUM 2 G/50ML
2000 SOLUTION INTRAVENOUS ONCE
Status: COMPLETED | OUTPATIENT
Start: 2025-01-27 | End: 2025-01-27

## 2025-01-27 RX ORDER — PROPOFOL 10 MG/ML
INJECTION, EMULSION INTRAVENOUS CONTINUOUS PRN
Status: DISCONTINUED | OUTPATIENT
Start: 2025-01-27 | End: 2025-01-27

## 2025-01-27 RX ORDER — DEXAMETHASONE SODIUM PHOSPHATE 10 MG/ML
INJECTION, SOLUTION INTRAMUSCULAR; INTRAVENOUS AS NEEDED
Status: DISCONTINUED | OUTPATIENT
Start: 2025-01-27 | End: 2025-01-27

## 2025-01-27 RX ORDER — OXYCODONE AND ACETAMINOPHEN 5; 325 MG/1; MG/1
1 TABLET ORAL EVERY 4 HOURS PRN
Refills: 0 | Status: DISCONTINUED | OUTPATIENT
Start: 2025-01-27 | End: 2025-01-27 | Stop reason: HOSPADM

## 2025-01-27 RX ORDER — ACETAMINOPHEN 325 MG/1
650 TABLET ORAL EVERY 6 HOURS PRN
Status: DISCONTINUED | OUTPATIENT
Start: 2025-01-27 | End: 2025-01-27 | Stop reason: HOSPADM

## 2025-01-27 RX ORDER — ONDANSETRON 2 MG/ML
INJECTION INTRAMUSCULAR; INTRAVENOUS AS NEEDED
Status: DISCONTINUED | OUTPATIENT
Start: 2025-01-27 | End: 2025-01-27

## 2025-01-27 RX ORDER — LIDOCAINE HYDROCHLORIDE 10 MG/ML
INJECTION, SOLUTION EPIDURAL; INFILTRATION; INTRACAUDAL; PERINEURAL AS NEEDED
Status: DISCONTINUED | OUTPATIENT
Start: 2025-01-27 | End: 2025-01-27

## 2025-01-27 RX ORDER — SODIUM CHLORIDE, SODIUM LACTATE, POTASSIUM CHLORIDE, CALCIUM CHLORIDE 600; 310; 30; 20 MG/100ML; MG/100ML; MG/100ML; MG/100ML
125 INJECTION, SOLUTION INTRAVENOUS CONTINUOUS
Status: DISCONTINUED | OUTPATIENT
Start: 2025-01-27 | End: 2025-01-27 | Stop reason: HOSPADM

## 2025-01-27 RX ORDER — FENTANYL CITRATE/PF 50 MCG/ML
50 SYRINGE (ML) INJECTION
Status: DISCONTINUED | OUTPATIENT
Start: 2025-01-27 | End: 2025-01-27 | Stop reason: HOSPADM

## 2025-01-27 RX ORDER — PROPOFOL 10 MG/ML
INJECTION, EMULSION INTRAVENOUS AS NEEDED
Status: DISCONTINUED | OUTPATIENT
Start: 2025-01-27 | End: 2025-01-27

## 2025-01-27 RX ORDER — ROCURONIUM BROMIDE 10 MG/ML
INJECTION, SOLUTION INTRAVENOUS AS NEEDED
Status: DISCONTINUED | OUTPATIENT
Start: 2025-01-27 | End: 2025-01-27

## 2025-01-27 RX ORDER — MAGNESIUM HYDROXIDE 1200 MG/15ML
LIQUID ORAL AS NEEDED
Status: DISCONTINUED | OUTPATIENT
Start: 2025-01-27 | End: 2025-01-27 | Stop reason: HOSPADM

## 2025-01-27 RX ORDER — FENTANYL CITRATE 50 UG/ML
INJECTION, SOLUTION INTRAMUSCULAR; INTRAVENOUS AS NEEDED
Status: DISCONTINUED | OUTPATIENT
Start: 2025-01-27 | End: 2025-01-27

## 2025-01-27 RX ADMIN — LIDOCAINE HYDROCHLORIDE 50 MG: 10 SOLUTION INTRAVENOUS at 07:38

## 2025-01-27 RX ADMIN — PROPOFOL 70 MCG/KG/MIN: 10 INJECTION, EMULSION INTRAVENOUS at 07:44

## 2025-01-27 RX ADMIN — CEFAZOLIN SODIUM 2000 MG: 2 SOLUTION INTRAVENOUS at 07:30

## 2025-01-27 RX ADMIN — FENTANYL CITRATE 100 MCG: 50 INJECTION, SOLUTION INTRAMUSCULAR; INTRAVENOUS at 07:37

## 2025-01-27 RX ADMIN — PROPOFOL 50 MG: 10 INJECTION, EMULSION INTRAVENOUS at 09:23

## 2025-01-27 RX ADMIN — MIDAZOLAM 2 MG: 1 INJECTION INTRAMUSCULAR; INTRAVENOUS at 07:33

## 2025-01-27 RX ADMIN — PROPOFOL 200 MG: 10 INJECTION, EMULSION INTRAVENOUS at 07:39

## 2025-01-27 RX ADMIN — SODIUM CHLORIDE, SODIUM LACTATE, POTASSIUM CHLORIDE, AND CALCIUM CHLORIDE 125 ML/HR: .6; .31; .03; .02 INJECTION, SOLUTION INTRAVENOUS at 06:25

## 2025-01-27 RX ADMIN — ROCURONIUM BROMIDE 50 MG: 10 INJECTION INTRAVENOUS at 07:39

## 2025-01-27 RX ADMIN — DEXAMETHASONE SODIUM PHOSPHATE 10 MG: 10 INJECTION INTRAMUSCULAR; INTRAVENOUS at 07:52

## 2025-01-27 RX ADMIN — SUGAMMADEX 150 MG: 100 INJECTION, SOLUTION INTRAVENOUS at 09:20

## 2025-01-27 RX ADMIN — ONDANSETRON 4 MG: 2 INJECTION INTRAMUSCULAR; INTRAVENOUS at 08:48

## 2025-01-27 RX ADMIN — ACETAMINOPHEN 1000 MG: 10 INJECTION, SOLUTION INTRAVENOUS at 08:55

## 2025-01-27 NOTE — ANESTHESIA POSTPROCEDURE EVALUATION
Post-Op Assessment Note    CV Status:  Stable  Pain Score: 0    Pain management: adequate       Mental Status:  Alert   Hydration Status:  Stable   PONV Controlled:  Controlled   Airway Patency:  Patent     Post Op Vitals Reviewed: Yes    No anethesia notable event occurred.    Staff: CRNA           Last Filed PACU Vitals:  Vitals Value Taken Time   Temp 97.3    Pulse 75 01/27/25 0940   /70    Resp 16    SpO2 97 % 01/27/25 0940   Vitals shown include unfiled device data.

## 2025-01-27 NOTE — OP NOTE
OPERATIVE REPORT  PATIENT NAME: Debra Escoto    :  1965  MRN: [id number]  Pt Location:  OR ROOM 08    SURGERY DATE: 2025    Surgeons and Role:     * Sharan Castro MD - Primary     * Deshaun Hernandez CST - Assisting    Preop Diagnosis:  Deformity of reconstructed breast [N65.0]  Personal history of malignant neoplasm of breast [Z85.3]    Post-Op Diagnosis Codes:     * Deformity of reconstructed breast [N65.0]     * Personal history of malignant neoplasm of breast [Z85.3]    Procedure(s):  Bilateral - Bilateral breast tissue expander removal. bilateral silicone breast implant placement    Specimen(s):  * No specimens in log *    Estimated Blood Loss:   Minimal    Drains:  Closed/Suction Drain Right;Lateral Breast Bulb 19 Fr. (Active)   Number of days:        Closed/Suction Drain Right;Medial Breast Bulb 19 Fr. (Active)   Number of days:        Closed/Suction Drain Left;Medial Breast Bulb 19 Fr. (Active)   Number of days:        Closed/Suction Drain Left;Lateral Breast Bulb 19 Fr. (Active)   Number of days:        Closed/Suction Drain Right;Lateral Breast Bulb 15 Fr. (Active)   Number of days:        Closed/Suction Drain Left;Lateral Breast Bulb 15 Fr. (Active)   Number of days: 94       Anesthesia Type:   General    Operative Indications:  Deformity of reconstructed breast [N65.0]  Personal history of malignant neoplasm of breast [Z85.3]  This is a 59 year old female who presents after delayed bilateral breast reconstruction with tissue expanders. She now presents for exchange of expanders for permanent implants. Risks, benefits and alternatives were discussed with the patient.    Operative Findings:  Bilateral expanders intact, good capsule formation and ADM integration. Superior capsulotomy performed. Excellent shape and symmetry after implant placement.      Complications:   None    Procedure and Technique:  The patient was met in the preoperative holding area and marked  according to hospital policy. She was transported to the operating room and positioned supine on the operating table, all pressure points were padded and SCDs were on and functioning. General endotracheal anesthesia was induced without difficulty. The chest was prepped and draped in the usual sterile fashion. A surgical timeout was conducted to verify the correct patient, procedure and fire risk according to hospital protocol.    A local anesthetic mixture of 1% lidocaine with epinephrine (1:100,000) and 0.25% marcaine was administered to the surgical site for postoperative pain relief. The right breast previous incisions were marked and incised sharply with a scalpel. Electrocautery was used to dissect the skin down to the previous capsule. The capsule was opened and the tissue expander appeared to be intact. The pocket was then inspected and noted to have an excellent intact capsule without abnormality and good integration of the previous ADM. An implant sizer was then placed into the pocket. An identical procedure was performed on the left side. The capsule was opened and the tissue expander appeared intact. An implant sizer was placed into the pocket. Capsulotomy was performed on the superior aspect of the pocket on both sides in order to better shape the pocket to accommodate the new implant. The skin was temporarily closed and the patient was sat up on the operating table. Excellent symmetry and shape were achieved with the sizer.    The sizers were then removed and the pocket was irrigated with antibiotic irrigation consisting of cefazolin and gentamicin in normal saline. The pocket was checked for hemostasis. Interrupted 3-0 PDS sutures were placed in the capsule edges and left untied. On the right side, a 430cc [place] MemoryGel Xtra Smooth Round breast implant (Serial Number: [id number]) was then placed into the pocket using a no-touch technique utilizing a Juan funnel. Appropriate implant position was  confirmed with the stamp on the posterior aspect of the implant. The capsule sutures were then tied sequentially to ensure a watertight closure. The deep layer was then closed with interrupted 3-0 PDS sutures in a deep dermal fashion. The skin was then closed with a 3-0 Stratifix suture in a running subcuticular fashion. An identical procedure was performed on the left side, with a 430cc [place] MemoryGel Xtra Smooth Round breast implant (Serial Number: [id number]). Skin glue and steri strips were applied to the incisions, and a surgical bra was placed.    A small dog ear deformity on the right lateral chest was also excised after infiltration with local anesthetic. The skin was then closed with interrrupted 3-0 PDS sutures in a deep dermal fashion, followed by a running 3-0 monocryl suture in a subcuticular fashion. Skin glue and steri strips were applied.    All sponge, needle and instrument counts were correct at the end of the case. The patient tolerated the procedure without any complications and was transferred to the recovery room in stable condition. I was the attending plastic surgeon for the entire procedure.       I was present for the entire procedure.    Patient Disposition:  PACU              SIGNATURE: Sharan Castro MD  DATE: January 27, 2025  TIME: 9:49 AM

## 2025-01-27 NOTE — NURSING NOTE
Pt tolerated diet OOB to BR, voided. Written and verbal instructions given, pt and her  verbalize an understanding.

## 2025-01-27 NOTE — DISCHARGE INSTR - AVS FIRST PAGE
Keep incisions clean. OK to shower starting tomorrow, do not let the water hit the area directly. Wear the bra at all times after surgery until your follow up appointment, except to take a shower. No heavy lifting, limit weight to 10 lbs. Do not raise your arms more higher than your shoulder height.    Take Tylenol and ibuprofen as needed for pain. If needed, you may take Percocet, just ensure that total Tylenol dose does not exceeed 4,000mg in a day.    Please take antibiotics as prescribed.    Follow up in the office on 2/7/25 at 8:30am.    If you have any questions or concerns, please call the office.

## 2025-01-27 NOTE — ANESTHESIA PREPROCEDURE EVALUATION
"Procedure:  Bilateral breast tissue expander removal, bilateral silicone breast implant placement (Bilateral: Breast)    Relevant Problems   ANESTHESIA (within normal limits)      CARDIO   (+) Hot flashes   (+) Hyperlipidemia   (+) Primary hypertension      GI/HEPATIC   (+) Gastroesophageal reflux disease   (+) Hiatal hernia      GYN   (+) Malignant neoplasm of left breast in female, estrogen receptor positive (HCC)      MUSCULOSKELETAL   (+) Hiatal hernia      NEURO/PSYCH   (+) Anxiety and depression   (+) Pain syndrome, chronic      Lab Results   Component Value Date    WBC 6.28 01/13/2025    HGB 13.6 01/13/2025    HCT 40.9 01/13/2025    MCV 93 01/13/2025     01/13/2025     Lab Results   Component Value Date    SODIUM 139 01/13/2025    K 4.0 01/13/2025     01/13/2025    CO2 27 01/13/2025    BUN 20 01/13/2025    CREATININE 0.81 01/13/2025    GLUC 87 01/13/2025    CALCIUM 9.9 01/13/2025     No results found for: \"INR\", \"PROTIME\"  Lab Results   Component Value Date    HGBA1C 5.9 (H) 10/16/2024            Physical Exam    Airway    Mallampati score: II  TM Distance: <3 FB  Neck ROM: full     Dental   No notable dental hx     Cardiovascular      Pulmonary  Pulmonary exam normal     Other Findings  post-pubertal.      Anesthesia Plan  ASA Score- 2     Anesthesia Type- general with ASA Monitors.         Additional Monitors:     Airway Plan: ETT.           Plan Factors-Exercise tolerance (METS): >4 METS.    Chart reviewed.   Existing labs reviewed. Patient summary reviewed.    Patient is not a current smoker.      Obstructive sleep apnea risk education given perioperatively.        Induction- intravenous.    Postoperative Plan- Plan for postoperative opioid use.     Perioperative Resuscitation Plan - Level 1 - Full Code.       Informed Consent- Anesthetic plan and risks discussed with patient.  I personally reviewed this patient with the CRNA. Discussed and agreed on the Anesthesia Plan with the " CRNA..

## 2025-01-27 NOTE — INTERVAL H&P NOTE
H&P reviewed. After examining the patient I find no changes in the patients condition since the H&P had been written.    Vitals:    01/27/25 0610   BP: 133/89   Pulse: 55   Resp: 18   Temp: (!) 97 °F (36.1 °C)   SpO2: 96%     To OR for bilateral breast tissue expander exchange for permanent implants, capsulectomy/capsulorrhaphy/capsulotomy

## 2025-01-27 NOTE — ANESTHESIA POSTPROCEDURE EVALUATION
Post-Op Assessment Note    CV Status:  Stable    Pain management: satisfactory to patient       Mental Status:  Awake   Hydration Status:  Stable   PONV Controlled:  None   Airway Patency:  Patent     Post Op Vitals Reviewed: Yes    No anethesia notable event occurred.    Staff: Anesthesiologist           Last Filed PACU Vitals:  Vitals Value Taken Time   Temp 97.3 °F (36.3 °C) 01/27/25 0938   Pulse 67 01/27/25 1011   /68 01/27/25 1010   Resp 23 01/27/25 1011   SpO2 96 % 01/27/25 1011   Vitals shown include unfiled device data.    Modified Adam:     Vitals Value Taken Time   Activity 2 01/27/25 1010   Respiration 2 01/27/25 1010   Circulation 2 01/27/25 1010   Consciousness 2 01/27/25 1010   Oxygen Saturation 2 01/27/25 1010     Modified Adam Score: 10

## 2025-02-18 ENCOUNTER — TELEPHONE (OUTPATIENT)
Dept: HEMATOLOGY ONCOLOGY | Facility: CLINIC | Age: 60
End: 2025-02-18

## 2025-02-18 NOTE — TELEPHONE ENCOUNTER
Maude. Pt's appt on 4/21 @ 800 am will now be w/ Dr. Bonilla due to Dr. Clayton deciding she will no longer see any pt w/ some sort of breast dx as she will now be at [place] 1 day a week. Left hope line [phone] if anymore questions or needs to rs appt.

## 2025-02-28 RX ORDER — DOXYCYCLINE 100 MG/1
1 CAPSULE ORAL 2 TIMES DAILY
COMMUNITY
Start: 2025-02-15

## 2025-02-28 RX ORDER — MUPIROCIN 20 MG/G
OINTMENT TOPICAL AS NEEDED
COMMUNITY
Start: 2025-02-15

## 2025-02-28 RX ORDER — CEPHALEXIN 500 MG/1
CAPSULE ORAL
COMMUNITY
Start: 2025-01-23

## 2025-02-28 NOTE — PRE-PROCEDURE INSTRUCTIONS
Pre-Surgery Instructions:   Medication Instructions    acetaminophen (TYLENOL) 500 mg tablet Takes PRN - if needed may take with sip of water DOS     amLODIPine (NORVASC) 2.5 mg tablet Take night before surgery    BIOTIN PO Stop taking 7 days prior to surgery.    cephalexin (KEFLEX) 500 mg capsule Instructions provided by MD- for post op use     cholecalciferol (VITAMIN D3) 1,000 units tablet Stop taking 7 days prior to surgery.    doxycycline hyclate (VIBRAMYCIN) 100 mg capsule Taking now - will be finished by Saturday 3/1/25    escitalopram (LEXAPRO) 10 mg tablet Take night before surgery    Ibuprofen-Acetaminophen (ADVIL DUAL ACTION PO) Stop taking 3 days prior to surgery.    mupirocin (BACTROBAN) 2 % ointment Hold day of surgery.    omeprazole (PriLOSEC) 20 mg delayed release capsule Takes every 3 days - pt instructed if due on DOS may take with sip of water     rosuvastatin (CRESTOR) 20 MG tablet Take night before surgery    traMADol (ULTRAM) 50 mg tablet Uses PRN- OK to take day of surgery- if needed with sip of water     traZODone (DESYREL) 50 mg tablet Take night before surgery    vitamin B-12 (VITAMIN B-12) 500 mcg tablet Stop taking 7 days prior to surgery.    Pt instructed on use of cleanser for DOS and instructions for showering both night before and DOS reviewed with pt - pt verbalized understanding of instructions given  Pt also instructed on no hair or body products on skin for DOS.  No shaving with a razor blade for 7 days prior to surgery to decrease any incident of infection - electric razor is ok to use up till 24 hrs prior to DOS.  Pt instructed that if with any changes in health status between now and DOS - notify surgeon office.  Tylenol is ok to use as needed up to and including DOS with sips of water - if needed - did instruct NO NSAIDS to be used at least 3 days prior to surgery - or if given a longer hold time of NSAIDS from MD please follow MD hold instructions.  Instructed to bring photo  ID and medical insurance card for DOS as forms of identification for DOS.  Also instructed pt on no jewelry or body piercings, no valuables on body for DOS. Contact lenses, if worn - need to be removed for DOS.  Pt instructed does need transport home after surgery- pt is not allowed to drive.      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    Medication instructions for day of surgery reviewed. Please take all instructed medications with only a sip of water.       You will receive a call one business day prior to surgery with an arrival time and hospital directions. If your surgery is scheduled on a Monday, the hospital will be calling you on the Friday prior to your surgery. If you have not heard from anyone by 8pm, please call the hospital supervisor through the hospital  at [phone]. ([place] [phone] or [place] [phone]).    Do not eat or drink anything after midnight the night before your surgery, including candy, mints, lifesavers, or chewing gum. Do not drink alcohol 24hrs before your surgery. Try not to smoke at least 24hrs before your surgery.       Follow the pre surgery showering instructions as listed in the “My Surgical Experience Booklet” or otherwise provided by your surgeon's office. Do not use a blade to shave the surgical area 1 week before surgery. It is okay to use a clean electric clippers up to 24 hours before surgery. Do not apply any lotions, creams, including makeup, cologne, deodorant, or perfumes after showering on the day of your surgery. Do not use dry shampoo, hair spray, hair gel, or any type of hair products.     No contact lenses, eye make-up, or artificial eyelashes. Remove nail polish, including gel polish, and any artificial, gel, or acrylic nails if possible. Remove all jewelry including rings and body piercing jewelry.     Wear causal clothing that is easy to take on and off. Consider your type of surgery.    Keep any  valuables, jewelry, piercings at home. Please bring any specially ordered equipment (sling, braces) if indicated.    Arrange for a responsible person to drive you to and from the hospital on the day of your surgery. Please confirm the visitor policy for the day of your procedure when you receive your phone call with an arrival time.     Call the surgeon's office with any new illnesses, exposures, or additional questions prior to surgery.    Please reference your “My Surgical Experience Booklet” for additional information to prepare for your upcoming surgery.

## 2025-03-02 ENCOUNTER — ANESTHESIA EVENT (OUTPATIENT)
Dept: PERIOP | Facility: HOSPITAL | Age: 60
End: 2025-03-02
Payer: COMMERCIAL

## 2025-03-03 ENCOUNTER — HOSPITAL ENCOUNTER (OUTPATIENT)
Facility: HOSPITAL | Age: 60
Setting detail: OUTPATIENT SURGERY
Discharge: HOME | End: 2025-03-03
Attending: SURGERY | Admitting: SURGERY
Payer: COMMERCIAL

## 2025-03-03 ENCOUNTER — ANESTHESIA (OUTPATIENT)
Dept: PERIOP | Facility: HOSPITAL | Age: 60
End: 2025-03-03
Payer: COMMERCIAL

## 2025-03-03 VITALS
HEART RATE: 61 BPM | TEMPERATURE: 96.4 F | BODY MASS INDEX: 27.14 KG/M2 | WEIGHT: 159 LBS | DIASTOLIC BLOOD PRESSURE: 78 MMHG | RESPIRATION RATE: 18 BRPM | SYSTOLIC BLOOD PRESSURE: 122 MMHG | OXYGEN SATURATION: 100 % | HEIGHT: 64 IN

## 2025-03-03 DIAGNOSIS — Z85.3 PERSONAL HISTORY OF MALIGNANT NEOPLASM OF BREAST: ICD-10-CM

## 2025-03-03 DIAGNOSIS — N65.0 DEFORMITY OF RECONSTRUCTED BREAST: ICD-10-CM

## 2025-03-03 PROCEDURE — 87075 CULTR BACTERIA EXCEPT BLOOD: CPT | Performed by: SURGERY

## 2025-03-03 PROCEDURE — 87205 SMEAR GRAM STAIN: CPT | Performed by: SURGERY

## 2025-03-03 PROCEDURE — 87176 TISSUE HOMOGENIZATION CULTR: CPT | Performed by: SURGERY

## 2025-03-03 PROCEDURE — 87070 CULTURE OTHR SPECIMN AEROBIC: CPT | Performed by: SURGERY

## 2025-03-03 RX ORDER — PROPOFOL 10 MG/ML
INJECTION, EMULSION INTRAVENOUS CONTINUOUS PRN
Status: DISCONTINUED | OUTPATIENT
Start: 2025-03-03 | End: 2025-03-03

## 2025-03-03 RX ORDER — PROMETHAZINE HYDROCHLORIDE 25 MG/ML
12.5 INJECTION, SOLUTION INTRAMUSCULAR; INTRAVENOUS
Status: DISCONTINUED | OUTPATIENT
Start: 2025-03-03 | End: 2025-03-03 | Stop reason: HOSPADM

## 2025-03-03 RX ORDER — ONDANSETRON 2 MG/ML
INJECTION INTRAMUSCULAR; INTRAVENOUS AS NEEDED
Status: DISCONTINUED | OUTPATIENT
Start: 2025-03-03 | End: 2025-03-03

## 2025-03-03 RX ORDER — ROCURONIUM BROMIDE 10 MG/ML
INJECTION, SOLUTION INTRAVENOUS AS NEEDED
Status: DISCONTINUED | OUTPATIENT
Start: 2025-03-03 | End: 2025-03-03

## 2025-03-03 RX ORDER — LIDOCAINE HYDROCHLORIDE 10 MG/ML
INJECTION, SOLUTION EPIDURAL; INFILTRATION; INTRACAUDAL; PERINEURAL AS NEEDED
Status: DISCONTINUED | OUTPATIENT
Start: 2025-03-03 | End: 2025-03-03

## 2025-03-03 RX ORDER — GLYCOPYRROLATE 0.2 MG/ML
INJECTION INTRAMUSCULAR; INTRAVENOUS AS NEEDED
Status: DISCONTINUED | OUTPATIENT
Start: 2025-03-03 | End: 2025-03-03

## 2025-03-03 RX ORDER — MIDAZOLAM HYDROCHLORIDE 2 MG/2ML
INJECTION, SOLUTION INTRAMUSCULAR; INTRAVENOUS AS NEEDED
Status: DISCONTINUED | OUTPATIENT
Start: 2025-03-03 | End: 2025-03-03

## 2025-03-03 RX ORDER — FENTANYL CITRATE 50 UG/ML
50 INJECTION, SOLUTION INTRAMUSCULAR; INTRAVENOUS
Status: DISCONTINUED | OUTPATIENT
Start: 2025-03-03 | End: 2025-03-03 | Stop reason: HOSPADM

## 2025-03-03 RX ORDER — SODIUM CHLORIDE, SODIUM LACTATE, POTASSIUM CHLORIDE, CALCIUM CHLORIDE 600; 310; 30; 20 MG/100ML; MG/100ML; MG/100ML; MG/100ML
100 INJECTION, SOLUTION INTRAVENOUS CONTINUOUS
Status: DISCONTINUED | OUTPATIENT
Start: 2025-03-03 | End: 2025-03-03 | Stop reason: HOSPADM

## 2025-03-03 RX ORDER — ONDANSETRON 2 MG/ML
4 INJECTION INTRAMUSCULAR; INTRAVENOUS ONCE AS NEEDED
Status: DISCONTINUED | OUTPATIENT
Start: 2025-03-03 | End: 2025-03-03 | Stop reason: HOSPADM

## 2025-03-03 RX ORDER — NEOSTIGMINE METHYLSULFATE 1 MG/ML
INJECTION INTRAVENOUS AS NEEDED
Status: DISCONTINUED | OUTPATIENT
Start: 2025-03-03 | End: 2025-03-03

## 2025-03-03 RX ORDER — EPHEDRINE SULFATE 50 MG/ML
INJECTION INTRAVENOUS AS NEEDED
Status: DISCONTINUED | OUTPATIENT
Start: 2025-03-03 | End: 2025-03-03

## 2025-03-03 RX ORDER — DEXAMETHASONE SODIUM PHOSPHATE 10 MG/ML
INJECTION, SOLUTION INTRAMUSCULAR; INTRAVENOUS AS NEEDED
Status: DISCONTINUED | OUTPATIENT
Start: 2025-03-03 | End: 2025-03-03

## 2025-03-03 RX ORDER — PROPOFOL 10 MG/ML
INJECTION, EMULSION INTRAVENOUS AS NEEDED
Status: DISCONTINUED | OUTPATIENT
Start: 2025-03-03 | End: 2025-03-03

## 2025-03-03 RX ORDER — CEFAZOLIN SODIUM 2 G/50ML
2000 SOLUTION INTRAVENOUS ONCE
Status: COMPLETED | OUTPATIENT
Start: 2025-03-03 | End: 2025-03-03

## 2025-03-03 RX ORDER — FENTANYL CITRATE 50 UG/ML
INJECTION, SOLUTION INTRAMUSCULAR; INTRAVENOUS AS NEEDED
Status: DISCONTINUED | OUTPATIENT
Start: 2025-03-03 | End: 2025-03-03

## 2025-03-03 RX ORDER — MAGNESIUM HYDROXIDE 1200 MG/15ML
LIQUID ORAL AS NEEDED
Status: DISCONTINUED | OUTPATIENT
Start: 2025-03-03 | End: 2025-03-03 | Stop reason: HOSPADM

## 2025-03-03 RX ORDER — BUPIVACAINE HYDROCHLORIDE AND EPINEPHRINE 2.5; 5 MG/ML; UG/ML
INJECTION, SOLUTION EPIDURAL; INFILTRATION; INTRACAUDAL; PERINEURAL AS NEEDED
Status: DISCONTINUED | OUTPATIENT
Start: 2025-03-03 | End: 2025-03-03 | Stop reason: HOSPADM

## 2025-03-03 RX ADMIN — ONDANSETRON 4 MG: 2 INJECTION INTRAMUSCULAR; INTRAVENOUS at 11:14

## 2025-03-03 RX ADMIN — NEOSTIGMINE METHYLSULFATE 3 MG: 1 INJECTION INTRAVENOUS at 12:14

## 2025-03-03 RX ADMIN — ROCURONIUM BROMIDE 10 MG: 10 INJECTION INTRAVENOUS at 11:38

## 2025-03-03 RX ADMIN — EPHEDRINE SULFATE 5 MG: 50 INJECTION INTRAVENOUS at 12:11

## 2025-03-03 RX ADMIN — LIDOCAINE HYDROCHLORIDE 50 MG: 10 SOLUTION INTRAVENOUS at 11:14

## 2025-03-03 RX ADMIN — PROPOFOL 150 MG: 10 INJECTION, EMULSION INTRAVENOUS at 11:14

## 2025-03-03 RX ADMIN — EPHEDRINE SULFATE 5 MG: 50 INJECTION INTRAVENOUS at 12:07

## 2025-03-03 RX ADMIN — FENTANYL CITRATE 50 MCG: 50 INJECTION, SOLUTION INTRAMUSCULAR; INTRAVENOUS at 11:14

## 2025-03-03 RX ADMIN — GLYCOPYRROLATE 0.4 MG: 0.2 INJECTION, SOLUTION INTRAMUSCULAR; INTRAVENOUS at 12:14

## 2025-03-03 RX ADMIN — SODIUM CHLORIDE, SODIUM LACTATE, POTASSIUM CHLORIDE, AND CALCIUM CHLORIDE: .6; .31; .03; .02 INJECTION, SOLUTION INTRAVENOUS at 11:10

## 2025-03-03 RX ADMIN — MIDAZOLAM 2 MG: 1 INJECTION INTRAMUSCULAR; INTRAVENOUS at 11:10

## 2025-03-03 RX ADMIN — FENTANYL CITRATE 50 MCG: 50 INJECTION, SOLUTION INTRAMUSCULAR; INTRAVENOUS at 11:37

## 2025-03-03 RX ADMIN — DEXAMETHASONE SODIUM PHOSPHATE 10 MG: 10 INJECTION, SOLUTION INTRAMUSCULAR; INTRAVENOUS at 11:14

## 2025-03-03 RX ADMIN — PROPOFOL 80 MCG/KG/MIN: 10 INJECTION, EMULSION INTRAVENOUS at 11:19

## 2025-03-03 RX ADMIN — ROCURONIUM BROMIDE 40 MG: 10 INJECTION INTRAVENOUS at 11:14

## 2025-03-03 RX ADMIN — CEFAZOLIN SODIUM 2000 MG: 2 SOLUTION INTRAVENOUS at 11:11

## 2025-03-03 NOTE — ANESTHESIA PREPROCEDURE EVALUATION
Procedure:  Right reconstructed breast washout and silicone implant replacement (Right: Breast)    Relevant Problems   ANESTHESIA (within normal limits)      CARDIO   (+) Hot flashes   (+) Hyperlipidemia   (+) Primary hypertension      GI/HEPATIC   (+) Gastroesophageal reflux disease   (+) Hiatal hernia      GYN   (+) Malignant neoplasm of left breast in female, estrogen receptor positive (HCC)      MUSCULOSKELETAL   (+) Hiatal hernia      NEURO/PSYCH   (+) Anxiety and depression   (+) Pain syndrome, chronic        Physical Exam    Airway    Mallampati score: II  TM Distance: >3 FB  Neck ROM: full     Dental       Cardiovascular  Rate: normal    Pulmonary  Pulmonary exam normal     Other Findings  Per pt denies anything remaining that is loose or removeablepost-pubertal.      Anesthesia Plan  ASA Score- 2     Anesthesia Type- general with ASA Monitors.         Additional Monitors:     Airway Plan: LMA.           Plan Factors-Exercise tolerance (METS): >4 METS.    Chart reviewed.    Patient summary reviewed.    Patient is not a current smoker.              Induction- intravenous.    Postoperative Plan- Plan for postoperative opioid use.         Informed Consent- Anesthetic plan and risks discussed with patient.  I personally reviewed this patient with the CRNA. Discussed and agreed on the Anesthesia Plan with the CRNA..      NPO Status:  Vitals Value Taken Time   Date of last liquid 03/02/25 03/03/25 0927   Time of last liquid 2100 03/03/25 0927   Date of last solid 03/02/25 03/03/25 0927   Time of last solid 1700 03/03/25 0927

## 2025-03-03 NOTE — ANESTHESIA POSTPROCEDURE EVALUATION
Post-Op Assessment Note    CV Status:  Stable  Pain Score: 0    Pain management: adequate       Mental Status:  Alert and awake   Hydration Status:  Euvolemic   PONV Controlled:  Controlled   Airway Patency:  Patent     Post Op Vitals Reviewed: Yes    No anethesia notable event occurred.    Staff: Anesthesiologist, CRNA       Last Filed PACU Vitals:  Vitals Value Taken Time   Temp 98.1 °F (36.7 °C) 03/03/25 1232   Pulse 81 03/03/25 1234   /87 03/03/25 1232   Resp 8 03/03/25 1234   SpO2 98 % 03/03/25 1234   Vitals shown include unfiled device data.

## 2025-03-03 NOTE — INTERVAL H&P NOTE
H&P reviewed. After examining the patient I find no changes in the patients condition since the H&P had been written.    Vitals:    03/03/25 0930   BP: 119/78   Pulse:    Resp:    Temp:    SpO2:      To OR for washout of right reconstructed breast and silicone implant replacement.

## 2025-03-03 NOTE — OP NOTE
OPERATIVE REPORT  PATIENT NAME: Debra Escoto    :  1965  MRN: [id number]  Pt Location:  OR ROOM 10    SURGERY DATE: 3/3/2025    Surgeons and Role:     * Sharan Castro MD - Primary     * Deshaun Hernandez CST - Assisting    Preop Diagnosis:  Deformity of reconstructed breast [N65.0]  Personal history of malignant neoplasm of breast [Z85.3]    Post-Op Diagnosis Codes:     * Deformity of reconstructed breast [N65.0]     * Personal history of malignant neoplasm of breast [Z85.3]    Procedure(s):  Right - Right reconstructed breast washout and silicone implant replacement    Specimen(s):  ID Type Source Tests Collected by Time Destination   A : right breast capsule Tissue Breast, Right ANAEROBIC CULTURE AND GRAM STAIN, CULTURE, TISSUE AND GRAM STAIN Sharan Castro MD 3/3/2025 1137        Estimated Blood Loss:   Minimal    Drains:  Closed/Suction Drain Right;Lateral Breast Bulb 19 Fr. (Active)   Number of days:        Closed/Suction Drain Right;Medial Breast Bulb 19 Fr. (Active)   Number of days:        Closed/Suction Drain Left;Medial Breast Bulb 19 Fr. (Active)   Number of days:        Closed/Suction Drain Left;Lateral Breast Bulb 19 Fr. (Active)   Number of days:        Closed/Suction Drain Right;Lateral Breast Bulb 15 Fr. (Active)   Number of days: 129       Closed/Suction Drain Left;Lateral Breast Bulb 15 Fr. (Active)   Number of days: 129       Anesthesia Type:   General    Operative Indications:  Deformity of reconstructed breast [N65.0]  Personal history of malignant neoplasm of breast [Z85.3]  This is a 59 year old female who had breast reconstruction with expanders to implants. She subsequently developed a small area of delayed wound healing and a superficial cellulitis which resolved with oral antibiotics, however she has persistent swelling and discomfort of the right side. With suspicion for a seroma in the implant pocket, we discussed an operative washout and implant  replacement. Risks, benefits and alternatives were discussed with the patient.    Operative Findings:  Approximately 10cc of serous fluid, small amount of detached alloderm within the implant pocket, no purulence, no evidence of implant infection. Implant was intact. Lateral capsulotomy performed      Complications:   None    Procedure and Technique:  The patient was met in the preoperative holding area and marked according to hospital policy. She was transported to the operating room and positioned supine on the operating table, all pressure points were padded and SCDs were on and functioning. General endotracheal anesthesia was induced without difficulty. The chest was prepped and draped in the usual sterile fashion. A surgical timeout was conducted to verify the correct patient, procedure and fire risk according to hospital protocol.    A local anesthetic mixture of 0.25% marcaine with epinephrine (1:100,000) was administered to the surgical site for postoperative pain relief. The right breast previous incision was marked surrounding the small eschar over the incision at the area of delayed wound healing and incised sharply with a scalpel. Electrocautery was used to dissect the subcutaneous tissue down to the previous capsule. The capsule was opened and the breast implant appeared to be intact. There was minimal serous fluid expressed from the pocket. The implant was removed and placed in a betadine solution with antibiotic irrigation. The pocket was then inspected and noted to have an excellent intact capsule without abnormality. There were about 10cc of serous fluid with a small piece of Alloderm that was detached from the chest wall. The pocket was then irrigated with 3L of pulse-lavage normal saline. Hemostasis was ensured with electrocautery. A lateral capsulotomy was performed to slightly widen the pocket for better symmetry.    On the right side, the same 430cc implant (Serial Number: [id number]) was then  placed into the pocket using a no-touch technique utilizing a Juan funnel. Appropriate implant position was confirmed with the stamp on the posterior aspect of the implant. The capsule was then closed with a running 3-0 PDS suture. The deep layer was then closed with interrupted 3-0 PDS sutures in a deep dermal fashion. The skin was then closed with a 3-0 stratifix suture in a running subcuticular fashion. Skin glue and steri strips were applied to the incision, and a surgical bra was placed.    All sponge, needle and instrument counts were correct at the end of the case. The patient tolerated the procedure without any complications and was transferred to the recovery room in stable condition. I was the attending plastic surgeon for the entire procedure.      I was present for the entire procedure.    Patient Disposition:  PACU              SIGNATURE: Sharan Castro MD  DATE: March 3, 2025  TIME: 12:40 PM

## 2025-03-03 NOTE — DISCHARGE INSTR - AVS FIRST PAGE
Keep incisions clean. OK to shower starting tomorrow, do not let the water hit the area directly. Wear the bra at all times after surgery until your follow up appointment, except to take a shower. No heavy lifting, limit weight to 10 lbs. Do not raise your arms more higher than your shoulder height.    Take Tylenol and ibuprofen as needed for pain. If needed, you may take Percocet, just ensure that total Tylenol dose does not exceeed 4,000mg in a day.    Please take antibiotics as prescribed.    Follow up in the office on 3/10/2025 at 8:00am.    If you have any questions or concerns, please call the office.

## 2025-03-03 NOTE — ANESTHESIA POSTPROCEDURE EVALUATION
Post-Op Assessment Note    Last Filed PACU Vitals:  Vitals Value Taken Time   Temp 96.4 °F (35.8 °C) 03/03/25 1309   Pulse 61 03/03/25 1309   /78 03/03/25 1309   Resp 18 03/03/25 1309   SpO2 100 % 03/03/25 1309       Modified Adam:     Vitals Value Taken Time   Activity 2 03/03/25 1300   Respiration 2 03/03/25 1300   Circulation 2 03/03/25 1300   Consciousness 2 03/03/25 1300   Oxygen Saturation 2 03/03/25 1300     Modified Adam Score: 10

## 2025-03-06 LAB
BACTERIA SPEC ANAEROBE CULT: NO GROWTH
BACTERIA TISS AEROBE CULT: NO GROWTH
GRAM STN SPEC: NORMAL
GRAM STN SPEC: NORMAL

## 2025-03-25 DIAGNOSIS — M51.17 INTERVERTEBRAL DISC DISORDER WITH RADICULOPATHY OF LUMBOSACRAL REGION: ICD-10-CM

## 2025-03-26 RX ORDER — TRAMADOL HYDROCHLORIDE 50 MG/1
50 TABLET ORAL EVERY 8 HOURS PRN
Qty: 90 TABLET | Refills: 0 | Status: SHIPPED | OUTPATIENT
Start: 2025-03-26

## 2025-04-03 ENCOUNTER — OFFICE VISIT (OUTPATIENT)
Dept: SURGICAL ONCOLOGY | Facility: CLINIC | Age: 60
End: 2025-04-03
Payer: COMMERCIAL

## 2025-04-03 VITALS
HEART RATE: 66 BPM | SYSTOLIC BLOOD PRESSURE: 122 MMHG | OXYGEN SATURATION: 98 % | BODY MASS INDEX: 27.29 KG/M2 | DIASTOLIC BLOOD PRESSURE: 78 MMHG | TEMPERATURE: 97.2 F | HEIGHT: 64 IN

## 2025-04-03 DIAGNOSIS — Z17.0 MALIGNANT NEOPLASM OF OVERLAPPING SITES OF LEFT BREAST IN FEMALE, ESTROGEN RECEPTOR POSITIVE (HCC): ICD-10-CM

## 2025-04-03 DIAGNOSIS — Z90.13 S/P BILATERAL MASTECTOMY: ICD-10-CM

## 2025-04-03 DIAGNOSIS — Z08 ENCOUNTER FOR FOLLOW-UP EXAMINATION AFTER COMPLETED TREATMENT FOR MALIGNANT NEOPLASM: Primary | ICD-10-CM

## 2025-04-03 DIAGNOSIS — C50.812 MALIGNANT NEOPLASM OF OVERLAPPING SITES OF LEFT BREAST IN FEMALE, ESTROGEN RECEPTOR POSITIVE (HCC): ICD-10-CM

## 2025-04-03 PROCEDURE — 99213 OFFICE O/P EST LOW 20 MIN: CPT

## 2025-04-03 NOTE — ASSESSMENT & PLAN NOTE
Patient is a 59-year-old female presenting today for a 1 year follow-up for left breast cancer diagnosed in 2019. Pathology revealed invasive carcinoma ER/MA positive, HER2 negative. She had genetic testing which was negative. She had a left breast mastectomy with sentinel node biopsy and right prophylactic mastectomy. She did not have lymph node involvement. She completed adjuvant chemotherapy. She took tamoxifen for roughly 3 years before discontinuing a few months ago secondary to fatigue and weight gain. She attempted anastrozole briefly but ultimately d/c. She began breast reconstruction in Oct 2024 with Dr. Castro and recently completed her last surgery on 3/3/25. She has healed very well. There are no concerns on her clinical breast exam. Patient denies changes on her breast exam. She denies persistent headaches, bone pain, back pain, shortness of breath, or abdominal pain. I will see the patient back in 1 year or sooner should the need arise. She was instructed to call with any questions or concerns prior to this time. All questions were answered today.

## 2025-04-03 NOTE — PROGRESS NOTES
Name: Debra Escoto      : 1965      MRN: [id number]  Encounter Provider: RUDOLPH Beasley  Encounter Date: 4/3/2025   Encounter department: CANCER [facility] SURGICAL ONCOLOGY [place]  :  Assessment & Plan  Encounter for follow-up examination after completed treatment for malignant neoplasm  - 1 year f/u  - gyn 27  Malignant neoplasm of overlapping sites of left breast in female, estrogen receptor positive (HCC)  Patient is a 59-year-old female presenting today for a 1 year follow-up for left breast cancer diagnosed in 2019. Pathology revealed invasive carcinoma ER/OR positive, HER2 negative. She had genetic testing which was negative. She had a left breast mastectomy with sentinel node biopsy and right prophylactic mastectomy. She did not have lymph node involvement. She completed adjuvant chemotherapy. She took tamoxifen for roughly 3 years before discontinuing a few months ago secondary to fatigue and weight gain. She attempted anastrozole briefly but ultimately d/c. She began breast reconstruction in Oct 2024 with Dr. Castro and recently completed her last surgery on 3/3/25. She has healed very well. There are no concerns on her clinical breast exam. Patient denies changes on her breast exam. She denies persistent headaches, bone pain, back pain, shortness of breath, or abdominal pain. I will see the patient back in 1 year or sooner should the need arise. She was instructed to call with any questions or concerns prior to this time. All questions were answered today.     S/P bilateral mastectomy      Oncology History   Cancer Staging   Malignant neoplasm of left breast in female, estrogen receptor positive (HCC)  Staging form: Breast, AJCC 8th Edition  - Pathologic stage from 9/3/2019: Stage IA (pT2(2), pN0(sn), cM0, G2, ER+, OR+, HER2-) - Signed by RUDOLPH Beasley on 3/6/2024  Stage prefix: Initial diagnosis  Neoadjuvant therapy: No  Method of lymph node  assessment: Terry lymph node biopsy  Multigene prognostic tests performed: MammaPrint  Histologic grading system: 3 grade system  Laterality: Left  Tumor size (mm): 24  Multiple tumors: Yes  Number of tumors: 2  Oncology History   Malignant neoplasm of left breast in female, estrogen receptor positive (HCC)   6/13/2019 Biopsy    Left breast ultrasound-guided biopsy  12 o'clock, 4 cm from nipple  Invasive breast carcinoma of no special type (ductal NST)  Grade 2    2 o'clock, 5 cm from nipple  Invasive breast carcinoma of no special type (ductal NST)  Grade 2  ER 90%  ID 90%  HER2 1+     6/20/2019 Genetic Testing    The following genes were evaluated: JOSH, BRCA1, BRCA2, CDH1, CHEK2, PALB2, PTEN, STK11, TP53  Negative for genetic mutations or variants  [facility]     8/9/2019 Surgery    Left breast mastectomy with sentinel lymph node biopsy  Two foci of invasive mammary carcinoma of no special type (ductal)  Grade 2  2.4 cm  DCIS measures at least 8.2 cm  0/2 Lymph nodes  Margins negative  Anatomic Stage IIA  Prognostic Stage IA    Right breast mastectomy; prophylactic  Dr. Sadler     8/26/2019 Genomic Testing    MammaPrint for FLEX  High Risk     9/3/2019 -  Cancer Staged    Staging form: Breast, AJCC 8th Edition  - Pathologic stage from 9/3/2019: Stage IA (pT2(2), pN0(sn), cM0, G2, ER+, ID+, HER2-) - Signed by RUDOLPH Beasley on 3/6/2024  Stage prefix: Initial diagnosis  Neoadjuvant therapy: No  Method of lymph node assessment: Terry lymph node biopsy  Multigene prognostic tests performed: MammaPrint  Histologic grading system: 3 grade system  Laterality: Left  Tumor size (mm): 24  Multiple tumors: Yes  Number of tumors: 2       10/4/2019 -  Chemotherapy    Adjuvant chemotherapy (Dr Carreno)  AC followed by dose dense paclitaxel     2/2020 -  Hormone Therapy    Tamoxifen  8/2022 Transfer of care- Dr. Ojeda        Review of Systems   Constitutional:  Negative for activity change, appetite change,  "fatigue and unexpected weight change.   Respiratory:  Negative for cough and shortness of breath.    Cardiovascular:  Negative for chest pain.   Gastrointestinal:  Negative for abdominal pain, diarrhea, nausea and vomiting.   Endocrine: Positive for heat intolerance.   Musculoskeletal:  Negative for arthralgias, back pain and myalgias.   Skin:  Negative for rash.   Neurological:  Negative for weakness and headaches.   Hematological:  Negative for adenopathy.    A complete review of systems is negative other than that noted above in the HPI.  Objective   /78 (BP Location: Right arm, Patient Position: Sitting, Cuff Size: Standard)   Pulse 66   Temp (!) 97.2 °F (36.2 °C) (Temporal)   Ht 5' 4\" (1.626 m)   LMP  (LMP Unknown)   SpO2 98%   BMI 27.29 kg/m²     Pain Screening:  Pain Score: 0-No pain  ECOG    Physical Exam  Constitutional:       General: She is not in acute distress.     Appearance: Normal appearance.   Cardiovascular:      Rate and Rhythm: Normal rate and regular rhythm.      Pulses: Normal pulses.      Heart sounds: Normal heart sounds.   Pulmonary:      Effort: Pulmonary effort is normal.      Breath sounds: Normal breath sounds.   Chest:      Chest wall: No mass.   Breasts:     Right: No swelling, bleeding, mass, skin change or tenderness.      Left: No swelling, bleeding, mass, skin change or tenderness.          Comments: B/l mastectomy scars with implant reconstruction, healing well s/p surgery less than 1 month ago  Abdominal:      General: Abdomen is flat.      Palpations: Abdomen is soft.   Lymphadenopathy:      Upper Body:      Right upper body: No supraclavicular, axillary or pectoral adenopathy.      Left upper body: No supraclavicular, axillary or pectoral adenopathy.   Skin:     General: Skin is warm.   Neurological:      General: No focal deficit present.      Mental Status: She is alert and oriented to person, place, and time.   Psychiatric:         Mood and Affect: Mood normal.   "       Behavior: Behavior normal.

## 2025-04-07 ENCOUNTER — ANNUAL EXAM (OUTPATIENT)
Dept: OBGYN CLINIC | Facility: CLINIC | Age: 60
End: 2025-04-07
Payer: COMMERCIAL

## 2025-04-07 ENCOUNTER — APPOINTMENT (OUTPATIENT)
Dept: LAB | Age: 60
End: 2025-04-07
Payer: COMMERCIAL

## 2025-04-07 VITALS
DIASTOLIC BLOOD PRESSURE: 92 MMHG | HEIGHT: 65 IN | SYSTOLIC BLOOD PRESSURE: 126 MMHG | WEIGHT: 164 LBS | BODY MASS INDEX: 27.32 KG/M2

## 2025-04-07 DIAGNOSIS — Z01.419 ENCNTR FOR GYN EXAM (GENERAL) (ROUTINE) W/O ABN FINDINGS: Primary | ICD-10-CM

## 2025-04-07 DIAGNOSIS — E53.8 VITAMIN B12 DEFICIENCY: ICD-10-CM

## 2025-04-07 DIAGNOSIS — R73.01 ABNORMAL FASTING GLUCOSE: ICD-10-CM

## 2025-04-07 DIAGNOSIS — E78.49 OTHER HYPERLIPIDEMIA: ICD-10-CM

## 2025-04-07 DIAGNOSIS — Z12.4 ENCOUNTER FOR PAPANICOLAOU SMEAR FOR CERVICAL CANCER SCREENING: ICD-10-CM

## 2025-04-07 DIAGNOSIS — Z12.11 SCREENING FOR COLON CANCER: ICD-10-CM

## 2025-04-07 DIAGNOSIS — Z90.13 S/P BILATERAL MASTECTOMY: ICD-10-CM

## 2025-04-07 DIAGNOSIS — M85.89 OSTEOPENIA OF MULTIPLE SITES: ICD-10-CM

## 2025-04-07 LAB
25(OH)D3 SERPL-MCNC: 46.7 NG/ML (ref 30–100)
ALBUMIN SERPL BCG-MCNC: 4.3 G/DL (ref 3.5–5)
ALP SERPL-CCNC: 79 U/L (ref 34–104)
ALT SERPL W P-5'-P-CCNC: 19 U/L (ref 7–52)
ANION GAP SERPL CALCULATED.3IONS-SCNC: 9 MMOL/L (ref 4–13)
AST SERPL W P-5'-P-CCNC: 16 U/L (ref 13–39)
BASOPHILS # BLD AUTO: 0.04 THOUSANDS/ÂΜL (ref 0–0.1)
BASOPHILS NFR BLD AUTO: 1 % (ref 0–1)
BILIRUB SERPL-MCNC: 0.51 MG/DL (ref 0.2–1)
BUN SERPL-MCNC: 17 MG/DL (ref 5–25)
CALCIUM SERPL-MCNC: 9.3 MG/DL (ref 8.4–10.2)
CHLORIDE SERPL-SCNC: 102 MMOL/L (ref 96–108)
CHOLEST SERPL-MCNC: 205 MG/DL (ref ?–200)
CO2 SERPL-SCNC: 28 MMOL/L (ref 21–32)
CREAT SERPL-MCNC: 0.89 MG/DL (ref 0.6–1.3)
EOSINOPHIL # BLD AUTO: 0.13 THOUSAND/ÂΜL (ref 0–0.61)
EOSINOPHIL NFR BLD AUTO: 4 % (ref 0–6)
ERYTHROCYTE [DISTWIDTH] IN BLOOD BY AUTOMATED COUNT: 13.6 % (ref 11.6–15.1)
EST. AVERAGE GLUCOSE BLD GHB EST-MCNC: 123 MG/DL
GFR SERPL CREATININE-BSD FRML MDRD: 72 ML/MIN/1.73SQ M
GLUCOSE P FAST SERPL-MCNC: 94 MG/DL (ref 65–99)
HBA1C MFR BLD: 5.9 %
HCT VFR BLD AUTO: 40.2 % (ref 34.8–46.1)
HDLC SERPL-MCNC: 78 MG/DL
HGB BLD-MCNC: 13.2 G/DL (ref 11.5–15.4)
IMM GRANULOCYTES # BLD AUTO: 0 THOUSAND/UL (ref 0–0.2)
IMM GRANULOCYTES NFR BLD AUTO: 0 % (ref 0–2)
LDLC SERPL CALC-MCNC: 94 MG/DL (ref 0–100)
LYMPHOCYTES # BLD AUTO: 1.03 THOUSANDS/ÂΜL (ref 0.6–4.47)
LYMPHOCYTES NFR BLD AUTO: 28 % (ref 14–44)
MCH RBC QN AUTO: 30.5 PG (ref 26.8–34.3)
MCHC RBC AUTO-ENTMCNC: 32.8 G/DL (ref 31.4–37.4)
MCV RBC AUTO: 93 FL (ref 82–98)
MONOCYTES # BLD AUTO: 0.31 THOUSAND/ÂΜL (ref 0.17–1.22)
MONOCYTES NFR BLD AUTO: 8 % (ref 4–12)
NEUTROPHILS # BLD AUTO: 2.2 THOUSANDS/ÂΜL (ref 1.85–7.62)
NEUTS SEG NFR BLD AUTO: 59 % (ref 43–75)
NONHDLC SERPL-MCNC: 127 MG/DL
NRBC BLD AUTO-RTO: 0 /100 WBCS
PLATELET # BLD AUTO: 289 THOUSANDS/UL (ref 149–390)
PMV BLD AUTO: 9.2 FL (ref 8.9–12.7)
POTASSIUM SERPL-SCNC: 3.9 MMOL/L (ref 3.5–5.3)
PROT SERPL-MCNC: 7 G/DL (ref 6.4–8.4)
RBC # BLD AUTO: 4.33 MILLION/UL (ref 3.81–5.12)
SODIUM SERPL-SCNC: 139 MMOL/L (ref 135–147)
TRIGL SERPL-MCNC: 167 MG/DL (ref ?–150)
TSH SERPL DL<=0.05 MIU/L-ACNC: 1.58 UIU/ML (ref 0.45–4.5)
VIT B12 SERPL-MCNC: 559 PG/ML (ref 180–914)
WBC # BLD AUTO: 3.71 THOUSAND/UL (ref 4.31–10.16)

## 2025-04-07 PROCEDURE — 84443 ASSAY THYROID STIM HORMONE: CPT

## 2025-04-07 PROCEDURE — 82306 VITAMIN D 25 HYDROXY: CPT

## 2025-04-07 PROCEDURE — 82607 VITAMIN B-12: CPT

## 2025-04-07 PROCEDURE — G0145 SCR C/V CYTO,THINLAYER,RESCR: HCPCS | Performed by: NURSE PRACTITIONER

## 2025-04-07 PROCEDURE — S0612 ANNUAL GYNECOLOGICAL EXAMINA: HCPCS | Performed by: NURSE PRACTITIONER

## 2025-04-07 PROCEDURE — 36415 COLL VENOUS BLD VENIPUNCTURE: CPT

## 2025-04-07 PROCEDURE — 83036 HEMOGLOBIN GLYCOSYLATED A1C: CPT

## 2025-04-07 PROCEDURE — G0476 HPV COMBO ASSAY CA SCREEN: HCPCS | Performed by: NURSE PRACTITIONER

## 2025-04-07 PROCEDURE — 80053 COMPREHEN METABOLIC PANEL: CPT

## 2025-04-07 PROCEDURE — 80061 LIPID PANEL: CPT

## 2025-04-07 PROCEDURE — 85025 COMPLETE CBC W/AUTO DIFF WBC: CPT

## 2025-04-07 NOTE — PATIENT INSTRUCTIONS
Calcium 5425-1052 mg (in divided doses-max 600 mg at one time) + 600-1000 IU Vit D daily.   Exercise 150-300 minutes per week minimum including weight bearing exercises.   DEXA due 2026  Pap with high risk HPV Q 5 years, if normal.  Collected today.   Call your insurance company to verify coverage prior to completing any ordered tests.   Monthly breast self exam recommended.    Colonoscopy- referred to SIMI Platt 20 times twice daily.   Silicone based lubricant with sex. (Use water based lubricant with condoms or sexual toys.)  Vaginal moisturizers twice weekly as needed.   Return to office in one year or sooner, if needed.

## 2025-04-09 ENCOUNTER — RESULTS FOLLOW-UP (OUTPATIENT)
Dept: OBGYN CLINIC | Facility: MEDICAL CENTER | Age: 60
End: 2025-04-09

## 2025-04-10 LAB
LAB AP GYN PRIMARY INTERPRETATION: NORMAL
Lab: NORMAL

## 2025-04-15 ENCOUNTER — OFFICE VISIT (OUTPATIENT)
Dept: PODIATRY | Facility: CLINIC | Age: 60
End: 2025-04-15
Payer: COMMERCIAL

## 2025-04-15 VITALS — HEIGHT: 65 IN | BODY MASS INDEX: 27.32 KG/M2 | WEIGHT: 164 LBS

## 2025-04-15 DIAGNOSIS — M72.2 PLANTAR FASCIITIS, LEFT: Primary | ICD-10-CM

## 2025-04-15 DIAGNOSIS — M79.672 LEFT FOOT PAIN: ICD-10-CM

## 2025-04-15 PROCEDURE — 99213 OFFICE O/P EST LOW 20 MIN: CPT | Performed by: PODIATRIST

## 2025-04-15 PROCEDURE — 20550 NJX 1 TENDON SHEATH/LIGAMENT: CPT | Performed by: PODIATRIST

## 2025-04-15 RX ORDER — LIDOCAINE HYDROCHLORIDE 10 MG/ML
2 INJECTION, SOLUTION INFILTRATION; PERINEURAL
Status: SHIPPED | OUTPATIENT
Start: 2025-04-15

## 2025-04-15 RX ORDER — TRIAMCINOLONE ACETONIDE 40 MG/ML
20 INJECTION, SUSPENSION INTRA-ARTICULAR; INTRAMUSCULAR
Status: SHIPPED | OUTPATIENT
Start: 2025-04-15

## 2025-04-15 RX ADMIN — LIDOCAINE HYDROCHLORIDE 2 ML: 10 INJECTION, SOLUTION INFILTRATION; PERINEURAL at 17:15

## 2025-04-15 RX ADMIN — TRIAMCINOLONE ACETONIDE 20 MG: 40 INJECTION, SUSPENSION INTRA-ARTICULAR; INTRAMUSCULAR at 17:15

## 2025-04-15 NOTE — PROGRESS NOTES
PATIENT:  Debra Escoto  1965       ASSESSMENT:     1. Plantar fasciitis, left  Foot/lower extremity injection      2. Left foot pain                    PLAN:  1. Reviewed medical records.  Patient was counseled and educated on the condition and the diagnosis.    2. The exam and symptoms are consistent with plantar fasciitis.  The diagnosis, treatment options and prognosis were discussed with the patient.    3. Treatment options were discussed and the patient wished to proceed with an injection.  See procedure.  4. Instructed supportive care, home exercise, icing, and proper footwear/ arch support.     5. Patient will return in 6 weeks for re-evaluation.       Imaging: I have personally reviewed pertinent films in PACS  Labs, pathology, and Other Studies: I have personally reviewed pertinent reports.      Foot/lower extremity injection    Performed by: Lewis Meade DPM  Authorized by: Lewis Meade DPM    Procedure:     Verbal consent obtained?: Yes      Risks and benefits: Risks, benefits and alternatives were discussed      Consent given by:  Patient    Time out: Immediately prior to the procedure a time out was called      Time out performed at:  4/15/2025 6:01 PM    Patient states understanding of procedure being performed: Yes      Site marked: Yes      Patient identity confirmed:  Verbally with patient    Supporting Documentation:     Indications:  Pain    Procedure Details:    Prep: patient was prepped and draped in usual sterile fashion                Ethyl Chloride was applied      Needle size: 25 G G    Approach:  Plantar    Laterality:  Left    Location: aponeurosis      Aponeurosis Structures: Plantar fascia origin      Injection Information:       Medications:  2 mL lidocaine 1 %; 20 mg triamcinolone acetonide 40 mg/mL    Patient tolerance:  Patient tolerated the procedure well with no immediate complications          Subjective:       HPI  The patient presents with chief complaint of left  heel pain in the last few weeks.  Pain increases with walking and standing.  She also has post-static dyskinesia.  The patient does not recall any injury.  The patient tried OTC meds, stretching and different shoes without relieving the pain.   No associated numbness or paresthesia.  No significant weakness or dysfunction.          The following portions of the patient's history were reviewed and updated as appropriate: allergies, current medications, past family history, past medical history, past social history, past surgical history and problem list.  All pertinent labs and images were reviewed.      Past Medical History  Past Medical History:   Diagnosis Date    Allergic     Anxiety     Basal cell carcinoma 12/07/2023    left nasal side wall-Mohs    BRCA gene mutation negative 06/20/2019    [facility]    Breast cancer (HCC) 6/18/19    Bunion     Cancer ([facility]) 6/2019    breast     Cellulitis     right breast incision line    COVID 2022    Eczema     Entire life    GERD (gastroesophageal reflux disease)     Hiatal hernia     minor    Hyperlipidemia     Hypertension     Neuromuscular disorder ([facility])     Plantar fasciitis     Stress fracture        Past Surgical History  Past Surgical History:   Procedure Laterality Date    APPENDECTOMY      BREAST BIOPSY      BREAST CYST ASPIRATION Left 2005    CAPSULOTOMY Bilateral 01/27/2025    Procedure: Bilateral breast tissue expander removal, bilateral silicone breast implant placement;  Surgeon: Sharan Castro MD;  Location:  MAIN OR;  Service: Plastics    COLONOSCOPY      FOOT SURGERY Right 2011    right, hardware removal    IR PORT PLACEMENT  09/27/2019    IR PORT REMOVAL  02/19/2020    LYMPH NODE BIOPSY      MASTECTOMY  8/2019    MASTECTOMY W/ SENTINEL NODE BIOPSY Left 08/09/2019    Procedure: BREAST MASTECTOMY, SENTINEL LYMPH NODE BIOPSY, LYMPHATIC MAPPING W/ BLUE DYE AND RADIOACTIVE DYE (INJECT AT 0730 BY DR SADLER IN THE O.R.);  Surgeon: Deshaun Sadler MD;   Location: [facility];  Service: Surgical Oncology    MOHS SURGERY Left 03/19/2024    BCC left nasal side wall    OTHER SURGICAL HISTORY Right 2010    Complete Excision of Fifth Metatarsal Head     MT COLONOSCOPY FLX DX W/COLLJ SPEC WHEN PFRMD N/A 07/20/2018    Procedure: EGD AND COLONOSCOPY;  Surgeon: Ilan Aponte MD;  Location: [facility] GI [facility];  Service: Gastroenterology    MT INSJ/RPLCMT BREAST IMPLANT SEP DAY MASTECTOMY Right 03/03/2025    Procedure: Right reconstructed breast washout and silicone implant replacement;  Surgeon: Sharan Castro MD;  Location:  MAIN OR;  Service: Plastics    MT MASTECTOMY SIMPLE COMPLETE Right 08/09/2019    Procedure: BREAST SIMPLE MASTECTOMY;  Surgeon: Deshaun Sadler MD;  Location: [facility];  Service: Surgical Oncology    MT TISSUE EXPANDER PLACEMENT BREAST RECONSTRUCTION Bilateral 10/25/2024    Procedure: breast reconstruction c/ tissue expanders and ADM;  Surgeon: Sharan Castro MD;  Location:  MAIN OR;  Service: Plastics    SKIN BIOPSY      UPPER GASTROINTESTINAL ENDOSCOPY  7/2018    US GUIDANCE BREAST BIOPSY LEFT EACH ADDITIONAL Left 06/13/2019    US GUIDED BREAST BIOPSY LEFT COMPLETE Left 06/13/2019        Allergies:  Sulfa antibiotics, Dilaudid [hydromorphone], and Nsaids    Medications:  Current Outpatient Medications   Medication Sig Dispense Refill    acetaminophen (TYLENOL) 500 mg tablet Take 1,000 mg by mouth every 6 (six) hours as needed for mild pain      amLODIPine (NORVASC) 2.5 mg tablet Take 1 tablet (2.5 mg total) by mouth daily 90 tablet 3    BIOTIN PO Take 1 capsule by mouth in the morning      cholecalciferol (VITAMIN D3) 1,000 units tablet Take 1,000 Units by mouth daily      escitalopram (LEXAPRO) 10 mg tablet TAKE 1 TABLET BY MOUTH EVERY DAY 90 tablet 1    omeprazole (PriLOSEC) 20 mg delayed release capsule TAKE 1 CAPSULE BY MOUTH EVERY DAY BEFORE BREAKFAST 90 capsule 1    rosuvastatin (CRESTOR) 20 MG tablet TAKE 1 TABLET BY MOUTH  EVERY DAY 90 tablet 1    traMADol (ULTRAM) 50 mg tablet Take 1 tablet (50 mg total) by mouth every 8 (eight) hours as needed for moderate pain 90 tablet 0    traZODone (DESYREL) 50 mg tablet TAKE 1 TABLET BY MOUTH EVERY DAY AT BEDTIME 90 tablet 1    vitamin B-12 (VITAMIN B-12) 500 mcg tablet Take 1 tablet (500 mcg total) by mouth daily 90 tablet 1    Ibuprofen-Acetaminophen (ADVIL DUAL ACTION PO) Take 1-2 tablets by mouth if needed (Patient not taking: Reported on 4/15/2025)       Current Facility-Administered Medications   Medication Dose Route Frequency Provider Last Rate Last Admin    lidocaine (XYLOCAINE) 1 % injection 2 mL  2 mL Infiltration     2 mL at 05/21/24 1600    triamcinolone acetonide (KENALOG-40) 40 mg/mL injection 20 mg  20 mg Infiltration     20 mg at 05/21/24 1600       Social History:  Social History     Socioeconomic History    Marital status: /Civil Union     Spouse name: None    Number of children: 0    Years of education: None    Highest education level: None   Occupational History    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   Tobacco Use    Smoking status: Never     Passive exposure: Past    Smokeless tobacco: Never   Vaping Use    Vaping status: Never Used   Substance and Sexual Activity    Alcohol use: Yes     Alcohol/week: 2.0 standard drinks of alcohol     Types: 2 Glasses of wine per week     Comment: social    Drug use: Not Currently     Types: Marijuana     Comment: oral usage/capsules    Sexual activity: Yes     Partners: Male     Birth control/protection: Male Sterilization     Comment: Partner had vasectomy   Other Topics Concern    None   Social History Narrative    Exercise habits    Working full-time - used to be GI RN     Social Drivers of Health     Financial Resource Strain: Not on file   Food Insecurity: Not on file   Transportation Needs: Not on file   Physical Activity: Not on file   Stress: Not on file  "  Social Connections: Not on file   Intimate Partner Violence: Not on file   Housing Stability: Not on file          Review of Systems   Constitutional:  Negative for chills and fever.   Respiratory:  Negative for cough and shortness of breath.    Cardiovascular:  Negative for chest pain.   Gastrointestinal:  Negative for nausea and vomiting.   Musculoskeletal:  Negative for gait problem.   Neurological:  Negative for weakness and numbness.         Objective:      Ht 5' 4.5\" (1.638 m)   Wt 74.4 kg (164 lb)   LMP  (LMP Unknown)   BMI 27.72 kg/m²          Physical Exam  Constitutional:       General: She is not in acute distress.     Appearance: She is not toxic-appearing or diaphoretic.   Cardiovascular:      Rate and Rhythm: Normal rate and regular rhythm.      Pulses: Normal pulses.           Dorsalis pedis pulses are 2+ on the right side and 2+ on the left side.        Posterior tibial pulses are 2+ on the right side and 2+ on the left side.   Pulmonary:      Effort: Pulmonary effort is normal. No respiratory distress.   Musculoskeletal:         General: Tenderness present. No swelling or signs of injury.      Right lower leg: No edema.      Left lower leg: No edema.      Right foot: No foot drop.      Left foot: No foot drop.      Comments: Focal pain in left plantar medial heel.  No acute swelling.  No pain on lateral compression of left heel.   Skin:     General: Skin is warm.      Capillary Refill: Capillary refill takes less than 2 seconds.      Coloration: Skin is not cyanotic or mottled.      Findings: No abscess.      Nails: There is no clubbing.   Neurological:      General: No focal deficit present.      Mental Status: She is alert and oriented to person, place, and time.      Cranial Nerves: No cranial nerve deficit.      Sensory: No sensory deficit.      Motor: No weakness.      Coordination: Coordination normal.   Psychiatric:         Mood and Affect: Mood normal.         Behavior: Behavior normal. "         Thought Content: Thought content normal.         Judgment: Judgment normal.

## 2025-04-17 DIAGNOSIS — E78.49 OTHER HYPERLIPIDEMIA: ICD-10-CM

## 2025-04-18 RX ORDER — ROSUVASTATIN CALCIUM 20 MG/1
20 TABLET, COATED ORAL DAILY
Qty: 90 TABLET | Refills: 1 | Status: SHIPPED | OUTPATIENT
Start: 2025-04-18

## 2025-04-28 ENCOUNTER — OFFICE VISIT (OUTPATIENT)
Dept: INTERNAL MEDICINE CLINIC | Facility: CLINIC | Age: 60
End: 2025-04-28
Payer: COMMERCIAL

## 2025-04-28 VITALS
OXYGEN SATURATION: 97 % | BODY MASS INDEX: 27.16 KG/M2 | WEIGHT: 163 LBS | SYSTOLIC BLOOD PRESSURE: 136 MMHG | TEMPERATURE: 97.4 F | DIASTOLIC BLOOD PRESSURE: 82 MMHG | HEART RATE: 80 BPM | HEIGHT: 65 IN | RESPIRATION RATE: 14 BRPM

## 2025-04-28 DIAGNOSIS — M51.17 INTERVERTEBRAL DISC DISORDER WITH RADICULOPATHY OF LUMBOSACRAL REGION: ICD-10-CM

## 2025-04-28 DIAGNOSIS — E78.49 OTHER HYPERLIPIDEMIA: ICD-10-CM

## 2025-04-28 DIAGNOSIS — K21.9 GASTROESOPHAGEAL REFLUX DISEASE WITHOUT ESOPHAGITIS: ICD-10-CM

## 2025-04-28 DIAGNOSIS — M72.2 PLANTAR FASCIITIS, LEFT: Primary | ICD-10-CM

## 2025-04-28 DIAGNOSIS — Z17.0 MALIGNANT NEOPLASM OF OVERLAPPING SITES OF LEFT BREAST IN FEMALE, ESTROGEN RECEPTOR POSITIVE (HCC): ICD-10-CM

## 2025-04-28 DIAGNOSIS — F41.9 ANXIETY AND DEPRESSION: ICD-10-CM

## 2025-04-28 DIAGNOSIS — F32.A ANXIETY AND DEPRESSION: ICD-10-CM

## 2025-04-28 DIAGNOSIS — C50.812 MALIGNANT NEOPLASM OF OVERLAPPING SITES OF LEFT BREAST IN FEMALE, ESTROGEN RECEPTOR POSITIVE (HCC): ICD-10-CM

## 2025-04-28 DIAGNOSIS — F11.20 UNCOMPLICATED OPIOID DEPENDENCE (HCC): ICD-10-CM

## 2025-04-28 DIAGNOSIS — M85.89 OSTEOPENIA OF MULTIPLE SITES: ICD-10-CM

## 2025-04-28 DIAGNOSIS — R73.03 PREDIABETES: ICD-10-CM

## 2025-04-28 DIAGNOSIS — Z90.13 S/P BILATERAL MASTECTOMY: ICD-10-CM

## 2025-04-28 DIAGNOSIS — G47.09 OTHER INSOMNIA: ICD-10-CM

## 2025-04-28 DIAGNOSIS — I10 PRIMARY HYPERTENSION: ICD-10-CM

## 2025-04-28 PROCEDURE — 99214 OFFICE O/P EST MOD 30 MIN: CPT | Performed by: INTERNAL MEDICINE

## 2025-04-28 RX ORDER — AMLODIPINE BESYLATE 2.5 MG/1
5 TABLET ORAL DAILY
Start: 2025-04-28

## 2025-04-28 NOTE — ASSESSMENT & PLAN NOTE
Amlodipine 2.5 mg daily recently started by cardiology, BP still slightly elevated. May increase to 5 mg daily.  Orders:  •  CBC and differential; Future  •  amLODIPine (NORVASC) 2.5 mg tablet; Take 2 tablets (5 mg total) by mouth daily

## 2025-04-28 NOTE — ASSESSMENT & PLAN NOTE
Recent injection has not helped.  Continue daily stretching exercises.  Follow-up with podiatry as scheduled.

## 2025-04-28 NOTE — PROGRESS NOTES
Name: Debra Escoto      : 1965      MRN: [id number]  Encounter Provider: Bernarda Wei MD  Encounter Date: 2025   Encounter department: [facility] INTERNAL MEDICINE  :  Assessment & Plan  Plantar fasciitis, left  Recent injection has not helped.  Continue daily stretching exercises.  Follow-up with podiatry as scheduled.       S/P bilateral mastectomy  Breast reconstruction, has had 3 surgeries since last visit.  No issues, doing well.       Malignant neoplasm of overlapping sites of left breast in female, estrogen receptor positive (HCC)  Not on medication.  Would like to see oncology prn.       Primary hypertension  Amlodipine 2.5 mg daily recently started by cardiology, BP still slightly elevated. May increase to 5 mg daily.  Orders:  •  CBC and differential; Future  •  amLODIPine (NORVASC) 2.5 mg tablet; Take 2 tablets (5 mg total) by mouth daily    Other hyperlipidemia  Lipids worse.  On rosuvastatin 20 mg.  Orders:  •  Comprehensive metabolic panel; Future  •  Lipid panel; Future    Gastroesophageal reflux disease without esophagitis  Taking PPI daily.       Intervertebral disc disorder with radiculopathy of lumbosacral region  Stable.  On gabapentin 100 mg qHS, taking tramadol 50 mg prn only.  PDMP reviewed.       Other insomnia  Takes trazodone 50 mg qHS.       Osteopenia of multiple sites  Continue daily walks and supplements.       Anxiety and depression  Stable, on escitalopram 10 mg.       Prediabetes  A1c stable at 5.9%.  Orders:  •  Hemoglobin A1C; Future    Uncomplicated opioid dependence (HCC)  As above.       Follow up in 6 months or as needed.       History of Present Illness   She reports breast reconstruction surgery was a success.  Her right side had minor complication with cellulitis but this has since healed.   She reports her left plantar fasciitis acted up again.  She did receive an injection recently.  She has been it in the past and injection worked right  "away.  This time, it did not do so well.  She had tried multiple kinds of shoes inserts and stretching at home.  She reports it is slowly getting better but remains frustrated.  With this flare up, she reports her sciatica has not given her any issues.    She has been more sedentary in the last few months due to her breast surgery.  She is trying to take tramadol as needed only.    She remains frustrated with her weight.  She prefers not to see oncology anymore since she is not on any medication and has had bilateral mastectomies.  She continues to see the breast surgeon and her gynecology regularly.      Review of Systems   Constitutional:  Negative for appetite change and fatigue.   HENT:  Negative for congestion, ear pain and postnasal drip.    Eyes:  Negative for visual disturbance.   Respiratory:  Negative for cough and shortness of breath.    Cardiovascular:  Negative for chest pain and leg swelling.   Gastrointestinal:  Negative for abdominal pain, constipation and diarrhea.   Genitourinary:  Negative for dysuria, frequency and urgency.   Musculoskeletal:  Positive for arthralgias and gait problem. Negative for back pain and myalgias.   Skin:  Negative for rash and wound.   Neurological:  Negative for dizziness, numbness and headaches.   Hematological:  Does not bruise/bleed easily.   Psychiatric/Behavioral:  Negative for confusion and sleep disturbance. The patient is not nervous/anxious.        Objective   /82 (BP Location: Left arm, Patient Position: Sitting, Cuff Size: Standard)   Pulse 80   Temp (!) 97.4 °F (36.3 °C)   Resp 14   Ht 5' 4.5\" (1.638 m)   Wt 73.9 kg (163 lb)   LMP  (LMP Unknown)   SpO2 97%   BMI 27.55 kg/m²      Physical Exam  Vitals and nursing note reviewed.   Constitutional:       General: She is not in acute distress.     Appearance: She is well-developed.   HENT:      Head: Normocephalic and atraumatic.      Right Ear: External ear normal.      Left Ear: External ear " normal.      Mouth/Throat:      Mouth: Mucous membranes are moist.   Eyes:      Pupils: Pupils are equal, round, and reactive to light.   Cardiovascular:      Rate and Rhythm: Normal rate and regular rhythm.      Heart sounds: Normal heart sounds.   Pulmonary:      Effort: Pulmonary effort is normal.      Breath sounds: Normal breath sounds. No wheezing.   Chest:   Breasts:     Right: No swelling, bleeding or skin change.      Left: No swelling, bleeding or skin change.   Abdominal:      General: Bowel sounds are normal.      Palpations: Abdomen is soft.   Musculoskeletal:         General: No swelling.      Right lower leg: No edema.      Left lower leg: No edema.   Skin:     General: Skin is warm.      Findings: No rash.   Neurological:      General: No focal deficit present.      Mental Status: She is alert and oriented to person, place, and time.   Psychiatric:         Mood and Affect: Mood and affect normal. Mood is not anxious or depressed.         Behavior: Behavior normal.           Labs & imaging results reviewed with patient.

## 2025-04-28 NOTE — ASSESSMENT & PLAN NOTE
Lipids worse.  On rosuvastatin 20 mg.  Orders:  •  Comprehensive metabolic panel; Future  •  Lipid panel; Future

## 2025-05-02 DIAGNOSIS — R45.4 IRRITABILITY AND ANGER: ICD-10-CM

## 2025-05-02 RX ORDER — ESCITALOPRAM OXALATE 10 MG/1
10 TABLET ORAL DAILY
Qty: 90 TABLET | Refills: 1 | Status: SHIPPED | OUTPATIENT
Start: 2025-05-02

## 2025-05-10 DIAGNOSIS — E78.49 OTHER HYPERLIPIDEMIA: ICD-10-CM

## 2025-05-10 DIAGNOSIS — M51.17 INTERVERTEBRAL DISC DISORDER WITH RADICULOPATHY OF LUMBOSACRAL REGION: ICD-10-CM

## 2025-05-10 RX ORDER — ROSUVASTATIN CALCIUM 20 MG/1
20 TABLET, COATED ORAL DAILY
Qty: 90 TABLET | Refills: 1 | Status: SHIPPED | OUTPATIENT
Start: 2025-05-10 | End: 2025-05-13 | Stop reason: SDUPTHER

## 2025-05-12 RX ORDER — TRAMADOL HYDROCHLORIDE 50 MG/1
50 TABLET ORAL EVERY 8 HOURS PRN
Qty: 90 TABLET | Refills: 0 | Status: SHIPPED | OUTPATIENT
Start: 2025-05-12

## 2025-05-13 DIAGNOSIS — E78.49 OTHER HYPERLIPIDEMIA: ICD-10-CM

## 2025-05-13 RX ORDER — ROSUVASTATIN CALCIUM 20 MG/1
20 TABLET, COATED ORAL DAILY
Qty: 90 TABLET | Refills: 1 | Status: SHIPPED | OUTPATIENT
Start: 2025-05-13

## 2025-05-27 ENCOUNTER — OFFICE VISIT (OUTPATIENT)
Dept: PODIATRY | Facility: CLINIC | Age: 60
End: 2025-05-27
Payer: COMMERCIAL

## 2025-05-27 VITALS — WEIGHT: 160 LBS | BODY MASS INDEX: 26.66 KG/M2 | HEIGHT: 65 IN

## 2025-05-27 DIAGNOSIS — M72.2 PLANTAR FASCIITIS, LEFT: Primary | ICD-10-CM

## 2025-05-27 DIAGNOSIS — M79.672 LEFT FOOT PAIN: ICD-10-CM

## 2025-05-27 PROCEDURE — 99213 OFFICE O/P EST LOW 20 MIN: CPT | Performed by: PODIATRIST

## 2025-05-27 PROCEDURE — 20550 NJX 1 TENDON SHEATH/LIGAMENT: CPT | Performed by: PODIATRIST

## 2025-05-27 RX ORDER — TRIAMCINOLONE ACETONIDE 40 MG/ML
20 INJECTION, SUSPENSION INTRA-ARTICULAR; INTRAMUSCULAR
Status: SHIPPED | OUTPATIENT
Start: 2025-05-27

## 2025-05-27 RX ORDER — LIDOCAINE HYDROCHLORIDE 10 MG/ML
2 INJECTION, SOLUTION INFILTRATION; PERINEURAL
Status: SHIPPED | OUTPATIENT
Start: 2025-05-27

## 2025-05-27 RX ADMIN — LIDOCAINE HYDROCHLORIDE 2 ML: 10 INJECTION, SOLUTION INFILTRATION; PERINEURAL at 14:45

## 2025-05-27 RX ADMIN — TRIAMCINOLONE ACETONIDE 20 MG: 40 INJECTION, SUSPENSION INTRA-ARTICULAR; INTRAMUSCULAR at 14:45

## 2025-05-27 NOTE — PROGRESS NOTES
PATIENT:  Debra Escoto  1965       ASSESSMENT:     1. Plantar fasciitis, left  Foot/lower extremity injection      2. Left foot pain                      PLAN:  1. Reviewed medical records.  Patient was counseled and educated on the condition and the diagnosis.    2. The diagnosis, treatment options and prognosis were discussed with the patient.    3. Her pain is more on plantar left heel.  Treatment options were discussed and the patient wished to proceed with an injection.  See procedure.  4. Instructed supportive care, home exercise, icing, and proper footwear/ arch support.     5. Patient will return in 6 weeks for re-evaluation.       Imaging: I have personally reviewed pertinent films in PACS  Labs, pathology, and Other Studies: I have personally reviewed pertinent reports.      Foot/lower extremity injection    Performed by: Lewis Meade DPM  Authorized by: Lewis Meade DPM    Procedure:     Verbal consent obtained?: Yes      Risks and benefits: Risks, benefits and alternatives were discussed      Consent given by:  Patient    Time out: Immediately prior to the procedure a time out was called      Time out performed at:  5/27/2025 3:44 PM    Patient states understanding of procedure being performed: Yes      Site marked: Yes      Patient identity confirmed:  Verbally with patient    Supporting Documentation:     Indications:  Pain    Procedure Details:    Prep: patient was prepped and draped in usual sterile fashion                Ethyl Chloride was applied      Needle size: 25 G G    Approach:  Plantar    Laterality:  Left    Location: aponeurosis      Aponeurosis Structures: Plantar fascia origin      Injection Information:       Medications:  2 mL lidocaine 1 %; 20 mg triamcinolone acetonide 40 mg/mL    Patient tolerance:  Patient tolerated the procedure well with no immediate complications          Subjective:       HPI  The patient presents for follow-up on left heel pain.  Pain seems to  be more on left plantar heel now.  She has post-static dyskinesia.  No associated numbness or paresthesia.  No significant weakness or dysfunction.          The following portions of the patient's history were reviewed and updated as appropriate: allergies, current medications, past family history, past medical history, past social history, past surgical history and problem list.  All pertinent labs and images were reviewed.      Past Medical History  Past Medical History:   Diagnosis Date    Allergic     Anxiety     Basal cell carcinoma 12/07/2023    left nasal side wall-Mohs    BRCA gene mutation negative 06/20/2019    [facility]    Breast cancer (HCC) 6/18/19    Bunion     Cancer (HCC) 6/2019    breast     Cellulitis     right breast incision line    COVID 2022    Eczema     Entire life    GERD (gastroesophageal reflux disease)     Hiatal hernia     minor    Hyperlipidemia     Hypertension     Neuromuscular disorder (HCC)     Plantar fasciitis     Stress fracture        Past Surgical History  Past Surgical History:   Procedure Laterality Date    APPENDECTOMY      BREAST BIOPSY      BREAST CYST ASPIRATION Left 2005    CAPSULOTOMY Bilateral 01/27/2025    Procedure: Bilateral breast tissue expander removal, bilateral silicone breast implant placement;  Surgeon: Sharan Castro MD;  Location:  MAIN OR;  Service: Plastics    COLONOSCOPY      FOOT SURGERY Right 2011    right, hardware removal    IR PORT PLACEMENT  09/27/2019    IR PORT REMOVAL  02/19/2020    LYMPH NODE BIOPSY      MASTECTOMY  8/2019    MASTECTOMY W/ SENTINEL NODE BIOPSY Left 08/09/2019    Procedure: BREAST MASTECTOMY, SENTINEL LYMPH NODE BIOPSY, LYMPHATIC MAPPING W/ BLUE DYE AND RADIOACTIVE DYE (INJECT AT 0730 BY DR SADLER IN THE O.R.);  Surgeon: Deshaun Sadler MD;  [facility];  Service: Surgical Oncology    MOHS SURGERY Left 03/19/2024    BCC left nasal side wall    OTHER SURGICAL HISTORY Right 2010    Complete Excision of Fifth  Metatarsal Head     OR COLONOSCOPY FLX DX W/COLLJ SPEC WHEN PFRMD N/A 07/20/2018    Procedure: EGD AND COLONOSCOPY;  Surgeon: Ilan Aponte MD;  Location: [facility] GI [facility];  Service: Gastroenterology    OR INSJ/RPLCMT BREAST IMPLANT SEP DAY MASTECTOMY Right 03/03/2025    Procedure: Right reconstructed breast washout and silicone implant replacement;  Surgeon: Sharan Castro MD;  Location:  MAIN OR;  Service: Plastics    OR MASTECTOMY SIMPLE COMPLETE Right 08/09/2019    Procedure: BREAST SIMPLE MASTECTOMY;  Surgeon: Deshaun Sadler MD;  [facility];  Service: Surgical Oncology    OR TISSUE EXPANDER PLACEMENT BREAST RECONSTRUCTION Bilateral 10/25/2024    Procedure: breast reconstruction c/ tissue expanders and ADM;  Surgeon: Sharan Castro MD;  Location:  MAIN OR;  Service: Plastics    SKIN BIOPSY      UPPER GASTROINTESTINAL ENDOSCOPY  7/2018    US GUIDANCE BREAST BIOPSY LEFT EACH ADDITIONAL Left 06/13/2019    US GUIDED BREAST BIOPSY LEFT COMPLETE Left 06/13/2019        Allergies:  Sulfa antibiotics, Dilaudid [hydromorphone], and Nsaids    Medications:  Current Outpatient Medications   Medication Sig Dispense Refill    amLODIPine (NORVASC) 2.5 mg tablet Take 2 tablets (5 mg total) by mouth daily      BIOTIN PO Take 1 capsule by mouth in the morning      cholecalciferol (VITAMIN D3) 1,000 units tablet Take 1,000 Units by mouth in the morning.      escitalopram (LEXAPRO) 10 mg tablet TAKE 1 TABLET BY MOUTH EVERY DAY 90 tablet 1    omeprazole (PriLOSEC) 20 mg delayed release capsule TAKE 1 CAPSULE BY MOUTH EVERY DAY BEFORE BREAKFAST 90 capsule 1    rosuvastatin (CRESTOR) 20 MG tablet Take 1 tablet (20 mg total) by mouth daily 90 tablet 1    traMADol (ULTRAM) 50 mg tablet Take 1 tablet (50 mg total) by mouth every 8 (eight) hours as needed for moderate pain 90 tablet 0    traZODone (DESYREL) 50 mg tablet TAKE 1 TABLET BY MOUTH EVERY DAY AT BEDTIME 90 tablet 1    vitamin B-12 (VITAMIN B-12) 500  "mcg tablet Take 1 tablet (500 mcg total) by mouth daily 90 tablet 1    acetaminophen (TYLENOL) 500 mg tablet Take 1,000 mg by mouth every 6 (six) hours as needed for mild pain       Current Facility-Administered Medications   Medication Dose Route Frequency Provider Last Rate Last Admin    lidocaine (XYLOCAINE) 1 % injection 2 mL  2 mL Infiltration     2 mL at 05/21/24 1600    lidocaine (XYLOCAINE) 1 % injection 2 mL  2 mL Infiltration     2 mL at 04/15/25 1715    triamcinolone acetonide (KENALOG-40) 40 mg/mL injection 20 mg  20 mg Infiltration     20 mg at 05/21/24 1600    triamcinolone acetonide (Kenalog-40) 40 mg/mL injection 20 mg  20 mg Infiltration     20 mg at 04/15/25 1715       Social History:  Social History     Socioeconomic History    Marital status: /Civil Union    Number of children: 0   Occupational History    Occupation: Medical Professional     Comment: 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   Tobacco Use    Smoking status: Never     Passive exposure: Past    Smokeless tobacco: Never   Vaping Use    Vaping status: Never Used   Substance and Sexual Activity    Alcohol use: Yes     Alcohol/week: 2.0 standard drinks of alcohol     Types: 2 Glasses of wine per week     Comment: social    Drug use: Not Currently     Types: Marijuana     Comment: oral usage/capsules    Sexual activity: Yes     Partners: Male     Birth control/protection: Male Sterilization     Comment: Partner had vasectomy   Social History Narrative    Exercise habits    Working full-time - used to be GI RN          Review of Systems   Constitutional:  Negative for chills and fever.   Respiratory:  Negative for cough and shortness of breath.    Cardiovascular:  Negative for chest pain.   Gastrointestinal:  Negative for nausea and vomiting.   Musculoskeletal:  Negative for gait problem.   Neurological:  Negative for weakness and numbness.         Objective:      Ht 5' 4.5\" (1.638 m)   Wt 72.6 kg (160 lb)   LMP  " (LMP Unknown)   BMI 27.04 kg/m²          Physical Exam  Constitutional:       General: She is not in acute distress.     Appearance: She is not toxic-appearing or diaphoretic.     Cardiovascular:      Rate and Rhythm: Normal rate and regular rhythm.      Pulses: Normal pulses.           Dorsalis pedis pulses are 2+ on the right side and 2+ on the left side.        Posterior tibial pulses are 2+ on the right side and 2+ on the left side.   Pulmonary:      Effort: Pulmonary effort is normal. No respiratory distress.     Musculoskeletal:         General: Tenderness present. No swelling or signs of injury.      Right lower leg: No edema.      Left lower leg: No edema.      Right foot: No foot drop.      Left foot: No foot drop.      Comments: No pain in left plantar medial heel.  Focal pain in left plantar heel.  No acute swelling.  No pain on lateral compression of left heel.     Skin:     General: Skin is warm.      Capillary Refill: Capillary refill takes less than 2 seconds.      Coloration: Skin is not cyanotic or mottled.      Findings: No abscess.      Nails: There is no clubbing.     Neurological:      General: No focal deficit present.      Mental Status: She is alert and oriented to person, place, and time.      Cranial Nerves: No cranial nerve deficit.      Sensory: No sensory deficit.      Motor: No weakness.      Coordination: Coordination normal.     Psychiatric:         Mood and Affect: Mood normal.         Behavior: Behavior normal.         Thought Content: Thought content normal.         Judgment: Judgment normal.

## 2025-06-03 DIAGNOSIS — G47.09 OTHER INSOMNIA: ICD-10-CM

## 2025-06-03 RX ORDER — TRAZODONE HYDROCHLORIDE 50 MG/1
50 TABLET ORAL
Qty: 90 TABLET | Refills: 1 | Status: SHIPPED | OUTPATIENT
Start: 2025-06-03

## 2025-06-19 DIAGNOSIS — G47.09 OTHER INSOMNIA: ICD-10-CM

## 2025-06-19 RX ORDER — TRAZODONE HYDROCHLORIDE 50 MG/1
50 TABLET ORAL
Qty: 90 TABLET | Refills: 0 | Status: CANCELLED | OUTPATIENT
Start: 2025-06-19

## 2025-07-07 DIAGNOSIS — K21.9 GASTROESOPHAGEAL REFLUX DISEASE WITHOUT ESOPHAGITIS: ICD-10-CM

## 2025-07-09 RX ORDER — OMEPRAZOLE 20 MG/1
20 CAPSULE, DELAYED RELEASE ORAL
Qty: 90 CAPSULE | Refills: 1 | Status: SHIPPED | OUTPATIENT
Start: 2025-07-09

## 2025-07-22 ENCOUNTER — OFFICE VISIT (OUTPATIENT)
Dept: PODIATRY | Facility: CLINIC | Age: 60
End: 2025-07-22
Payer: COMMERCIAL

## 2025-07-22 VITALS — BODY MASS INDEX: 26.66 KG/M2 | WEIGHT: 160 LBS | HEIGHT: 65 IN

## 2025-07-22 DIAGNOSIS — S93.622A SPRAIN OF TARSOMETATARSAL JOINT OF LEFT FOOT, INITIAL ENCOUNTER: Primary | ICD-10-CM

## 2025-07-22 DIAGNOSIS — M72.2 PLANTAR FASCIITIS, LEFT: ICD-10-CM

## 2025-07-22 DIAGNOSIS — M79.672 LEFT FOOT PAIN: ICD-10-CM

## 2025-07-22 PROCEDURE — 99213 OFFICE O/P EST LOW 20 MIN: CPT | Performed by: PODIATRIST

## 2025-07-22 NOTE — PROGRESS NOTES
Name: Debra Escoto      : 1965      MRN: [id number]  Encounter Provider: Lewis Meade DPM  Encounter Date: 2025   Encounter department: [facility]  :  Assessment & Plan  Plantar fasciitis, left         Left foot pain         Sprain of tarsometatarsal joint of left foot, initial encounter    Orders:    Cam Boot      1. Reviewed medical records.  Patient was counseled and educated on the condition and the diagnosis.    2. X-ray was personally reviewed.  The radiographic findings were discussed with the patient.  There is no osseous injury.  3. 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  The diagnosis, treatment options and prognosis were discussed with the patient.    4. Rx: CAM boot for partial immobilization.  Instructed supportive care, icing, and resting.   Discussed possible injection depending on the progress.    5. Patient will return in 6 weeks for re-evaluation.       History of Present Illness   HPI  Debra Escoto is a 59 y.o. female who presents for follow-up.  Left heel pain resolved after the last injection.  She started pain in left dorsal foot in the last 3 weeks.  She had X-ray of left foot last week without injury to her bone.  Pain can be severe.  Denied any acute injury, but she had some overuse with biking.  No numbness.      History obtained from: patient    Review of Systems   Constitutional:  Negative for chills and fever.   Respiratory:  Negative for cough and shortness of breath.    Cardiovascular:  Negative for chest pain.   Gastrointestinal:  Negative for nausea and vomiting.   Musculoskeletal:  Negative for gait problem.   Neurological:  Negative for weakness and numbness.     Past Medical History   Past Medical History[1]  Past Surgical History[2]  Family History[3]   reports that she has never smoked. She has been exposed to tobacco smoke. She has never used smokeless tobacco. She reports current alcohol use of about 2.0  "standard drinks of alcohol per week. She reports that she does not currently use drugs after having used the following drugs: Marijuana.  Current Outpatient Medications   Medication Instructions    acetaminophen (TYLENOL) 1,000 mg, Every 6 hours PRN    amLODIPine (NORVASC) 5 mg, Oral, Daily    BIOTIN PO 1 capsule, Daily    cholecalciferol (VITAMIN D3) 1,000 Units, Daily    escitalopram (LEXAPRO) 10 mg, Oral, Daily    omeprazole (PRILOSEC) 20 mg, Oral, Daily before breakfast    rosuvastatin (CRESTOR) 20 mg, Oral, Daily    traMADol (ULTRAM) 50 mg, Oral, Every 8 hours PRN    traZODone (DESYREL) 50 mg, Oral, Daily at bedtime    vitamin B-12 (VITAMIN B-12) 500 mcg, Oral, Daily   Allergies[4]      Objective   Ht 5' 4.5\" (1.638 m)   Wt 72.6 kg (160 lb)   LMP  (LMP Unknown)   BMI 27.04 kg/m²      Physical Exam  Constitutional:       General: She is not in acute distress.     Appearance: She is well-developed. She is not toxic-appearing or diaphoretic.     Cardiovascular:      Rate and Rhythm: Normal rate and regular rhythm.      Pulses: Normal pulses.           Dorsalis pedis pulses are 2+ on the right side and 2+ on the left side.        Posterior tibial pulses are 2+ on the right side and 2+ on the left side.   Pulmonary:      Effort: Pulmonary effort is normal. No respiratory distress.     Musculoskeletal:         General: Tenderness present.      Right lower leg: No edema.      Left lower leg: No edema.      Right foot: No foot drop.      Left foot: No foot drop.      Comments: No pain in left heel.  No acute swelling. Focal pain in left 2nd and 3rd TMTJ with palpation and ROM.     Skin:     General: Skin is warm.      Capillary Refill: Capillary refill takes less than 2 seconds.      Coloration: Skin is not cyanotic or mottled.      Findings: No abscess, erythema or wound.      Nails: There is no clubbing.     Neurological:      General: No focal deficit present.      Mental Status: She is alert and oriented to " person, place, and time.      Cranial Nerves: No cranial nerve deficit.      Sensory: No sensory deficit.      Motor: No weakness.      Coordination: Coordination normal.     Psychiatric:         Mood and Affect: Mood normal.         Behavior: Behavior normal.                  [1]   Past Medical History:  Diagnosis Date    Allergic     Anxiety     Basal cell carcinoma 12/07/2023    left nasal side wall-Mohs    BRCA gene mutation negative 06/20/2019    [facility]    Breast cancer (HCC) 6/18/19    Bunion     Cancer (HCC) 6/2019    breast     Cellulitis     right breast incision line    COVID 2022    Eczema     Entire life    GERD (gastroesophageal reflux disease)     Hiatal hernia     minor    Hyperlipidemia     Hypertension     Neuromuscular disorder (HCC)     Plantar fasciitis     Stress fracture    [2]   Past Surgical History:  Procedure Laterality Date    APPENDECTOMY      BREAST BIOPSY      BREAST CYST ASPIRATION Left 2005    CAPSULOTOMY Bilateral 01/27/2025    Procedure: Bilateral breast tissue expander removal, bilateral silicone breast implant placement;  Surgeon: Sharan Castro MD;  Location:  MAIN OR;  Service: Plastics    COLONOSCOPY      FOOT SURGERY Right 2011    right, hardware removal    IR PORT PLACEMENT  09/27/2019    IR PORT REMOVAL  02/19/2020    LYMPH NODE BIOPSY      MASTECTOMY  8/2019    MASTECTOMY W/ SENTINEL NODE BIOPSY Left 08/09/2019    Procedure: BREAST MASTECTOMY, SENTINEL LYMPH NODE BIOPSY, LYMPHATIC MAPPING W/ BLUE DYE AND RADIOACTIVE DYE (INJECT AT 0730 BY DR SADLER IN THE O.R.);  Surgeon: Deshaun Sadler MD;  [facility];  Service: Surgical Oncology    MOHS SURGERY Left 03/19/2024    BCC left nasal side wall    OTHER SURGICAL HISTORY Right 2010    Complete Excision of Fifth Metatarsal Head     UT COLONOSCOPY FLX DX W/COLLJ SPEC WHEN PFRMD N/A 07/20/2018    Procedure: EGD AND COLONOSCOPY;  Surgeon: Ilan Aponte MD;  Location: [facility] GI [facility];  Service: Gastroenterology    UT  INSJ/RPLCMT BREAST IMPLANT SEP DAY MASTECTOMY Right 03/03/2025    Procedure: Right reconstructed breast washout and silicone implant replacement;  Surgeon: Sharan Castro MD;  Location:  MAIN OR;  Service: Plastics    OR MASTECTOMY SIMPLE COMPLETE Right 08/09/2019    Procedure: BREAST SIMPLE MASTECTOMY;  Surgeon: Deshaun Sadler MD;  [facility];  Service: Surgical Oncology    OR TISSUE EXPANDER PLACEMENT BREAST RECONSTRUCTION Bilateral 10/25/2024    Procedure: breast reconstruction c/ tissue expanders and ADM;  Surgeon: Sharan Castro MD;  Location:  MAIN OR;  Service: Plastics    SKIN BIOPSY      UPPER GASTROINTESTINAL ENDOSCOPY  7/2018    US GUIDANCE BREAST BIOPSY LEFT EACH ADDITIONAL Left 06/13/2019    US GUIDED BREAST BIOPSY LEFT COMPLETE Left 06/13/2019   [3]   Family History  Problem Relation Name Age of Onset    Coronary artery disease Mother Bebe         CABG in her 60s    Hypertension Mother Bebe     Hyperlipidemia Mother Bebe     Heart disease Mother Bebe     Heart attack Father Toro 51        acute myocardial infarction    Hypertension Father Toro     Hyperlipidemia Father Toro     Squamous cell carcinoma Father Toro         MOHS to face 2022    Heart disease Father Toro     Coronary artery disease Brother By     No Known Problems Maternal Aunt Sonya     No Known Problems Maternal Aunt Yadi     Prostate cancer Paternal Uncle Uncle Al 65    Thyroid disease Family          disorder    Anesthesia problems Neg Hx     [4]   Allergies  Allergen Reactions    Sulfa Antibiotics Headache     Annotation - [id number]: Migraine; Annotation - [id number]: cellular issues    Dilaudid [Hydromorphone] Nausea Only     Extreme nausea      Nsaids Dermatitis, GI Intolerance and Abdominal Pain     Occurred when she took more than recommended, no issues with normal dose NSAIDS ~ AAK 10/25/24

## 2025-07-22 NOTE — ASSESSMENT & PLAN NOTE
Orders:    Cam Boot      1. Reviewed medical records.  Patient was counseled and educated on the condition and the diagnosis.    2. X-ray was personally reviewed.  The radiographic findings were discussed with the patient.  There is no osseous injury.  3. 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  The diagnosis, treatment options and prognosis were discussed with the patient.    4. Rx: CAM boot for partial immobilization.  Instructed supportive care, icing, and resting.   Discussed possible injection depending on the progress.    5. Patient will return in 6 weeks for re-evaluation.

## 2025-08-06 DIAGNOSIS — M51.17 INTERVERTEBRAL DISC DISORDER WITH RADICULOPATHY OF LUMBOSACRAL REGION: ICD-10-CM

## 2025-08-07 RX ORDER — TRAMADOL HYDROCHLORIDE 50 MG/1
50 TABLET ORAL EVERY 8 HOURS PRN
Qty: 90 TABLET | Refills: 0 | Status: SHIPPED | OUTPATIENT
Start: 2025-08-07

## (undated) DEVICE — DECANTER: Brand: UNBRANDED

## (undated) DEVICE — 3M™ IOBAN™ 2 ANTIMICROBIAL INCISE DRAPE 6650EZ: Brand: IOBAN™ 2

## (undated) DEVICE — SINGLE PORT MANIFOLD: Brand: NEPTUNE 2

## (undated) DEVICE — EXOFIN PRECISION PEN HIGH VISCOSITY TOPICAL SKIN ADHESIVE: Brand: EXOFIN PRECISION PEN, 1G

## (undated) DEVICE — HANDPIECE SET WITH HIGH FLOW TIP AND SUCTION TUBE: Brand: INTERPULSE

## (undated) DEVICE — SYRINGE 50ML LL

## (undated) DEVICE — DRAPE SHEET THREE QUARTER

## (undated) DEVICE — STERILE POLYISOPRENE POWDER-FREE SURGICAL GLOVES WITH EMOLLIENT COATING: Brand: PROTEXIS

## (undated) DEVICE — POV-IOD SOLUTION 4OZ BT

## (undated) DEVICE — BETHLEHEM UNIVERSAL BREAST PK: Brand: CARDINAL HEALTH

## (undated) DEVICE — MEMORYGEL XTRA MODERATE HIGH (MHX) SMOOTH SIZER 430CC: Brand: MENTOR MEMORYGEL XTRA RESTERILIZABLE GEL SIZER

## (undated) DEVICE — 3M™ TEGADERM™ TRANSPARENT FILM DRESSING FRAME STYLE, 1624W, 2-3/8 IN X 2-3/4 IN (6 CM X 7 CM), 100/CT 4CT/CASE: Brand: 3M™ TEGADERM™

## (undated) DEVICE — ELECTRODE BLADE MOD E-Z CLEAN 2.5IN 6.4CM -0012M

## (undated) DEVICE — JP CHAN DRN SIL HUBLESS 15FR W/TRO: Brand: CARDINAL HEALTH

## (undated) DEVICE — JACKSON-PRATT 100CC BULB RESERVOIR: Brand: CARDINAL HEALTH

## (undated) DEVICE — INTENDED FOR TISSUE SEPARATION, AND OTHER PROCEDURES THAT REQUIRE A SHARP SURGICAL BLADE TO PUNCTURE OR CUT.: Brand: BARD-PARKER ® SAFETYLOCK CARBON RIB-BACK BLADES

## (undated) DEVICE — DISPOSABLE OR TOWEL: Brand: CARDINAL HEALTH

## (undated) DEVICE — NEEDLE BLUNT 18 G X 1 1/2IN

## (undated) DEVICE — ELECTRODE BLADE MOD  E-Z CLEAN 6.5IN -0014M

## (undated) DEVICE — UNDYED MONOFILAMENT (POLYDIOXANONE), ABSORBABLE SURGICAL SUTURE: Brand: PDS

## (undated) DEVICE — PENCIL ELECTROSURG E-Z CLEAN -0035H

## (undated) DEVICE — FUNNEL E KELLER 2 DELIVERY DEVICE

## (undated) DEVICE — LIGHT HANDLE COVER SLEEVE DISP BLUE STELLAR

## (undated) DEVICE — SYRINGE 30ML LL

## (undated) DEVICE — SCD SEQUENTIAL COMPRESSION COMFORT SLEEVE MEDIUM KNEE LENGTH: Brand: KENDALL SCD

## (undated) DEVICE — NEEDLE BLUNT 18 G X 1 1/2 W FILTER

## (undated) DEVICE — SYRINGE 1ML TB 27G X 3/8 SAFETY

## (undated) DEVICE — SUT SILK 2-0 SH 30 IN K833H

## (undated) DEVICE — CHLORAPREP HI-LITE 26ML ORANGE

## (undated) DEVICE — 3M™ STERI-STRIP™ REINFORCED ADHESIVE SKIN CLOSURES, R1547, 1/2 IN X 4 IN (12 MM X 100 MM), 6 STRIPS/ENVELOPE: Brand: 3M™ STERI-STRIP™

## (undated) DEVICE — SUT MONOCRYL 4-0 PS-2 27 IN Y426H

## (undated) DEVICE — SUT CHROMIC 5-0 P-3 18 IN 687G

## (undated) DEVICE — PACK UNIVERSAL DRAPES SUB-Q ICD

## (undated) DEVICE — DRAIN SPONGES,6 PLY: Brand: EXCILON

## (undated) DEVICE — NEPTUNE E-SEP SMOKE EVACUATION PENCIL, COATED, 70MM BLADE, PUSH BUTTON SWITCH: Brand: NEPTUNE E-SEP

## (undated) DEVICE — SUT MONOCRYL 3-0 PS-2 27 IN Y427H

## (undated) DEVICE — DRAPE EQUIPMENT RF WAND

## (undated) DEVICE — SUT MONOCRYL 4-0 PS-2 18 IN Y496G

## (undated) DEVICE — BULB SYRINGE, IRRIGATION WITH PROTECTIVE CAP, 60 CC, INDIVIDUALLY WRAPPED: Brand: DOVER

## (undated) DEVICE — GLOVE SRG BIOGEL 7

## (undated) DEVICE — SUT PDS II 2-0 CT-1 27 IN Z339H

## (undated) DEVICE — SUT VICRYL 2-0 SH 27 IN UNDYED J417H

## (undated) DEVICE — 3M™ TEGADERM™ CHG DRESSING 25/CARTON 4 CARTONS/CASE 1659: Brand: TEGADERM™

## (undated) DEVICE — NEEDLE 25G X 1 1/2

## (undated) DEVICE — BULB SYRINGE,IRRIGATION WITH PROTECTIVE CAP: Brand: DOVER

## (undated) DEVICE — INTENDED FOR TISSUE SEPARATION, AND OTHER PROCEDURES THAT REQUIRE A SHARP SURGICAL BLADE TO PUNCTURE OR CUT.: Brand: BARD-PARKER SAFETY BLADES SIZE 15, STERILE

## (undated) DEVICE — VIAL DECANTER

## (undated) DEVICE — SUT STRATAFIX SPIRAL MONOCRYL PLUS  3-0 PS-2 30 CM SXMP1B106

## (undated) DEVICE — SKIN MARKER DUAL TIP WITH RULER CAP, FLEXIBLE RULER AND LABELS: Brand: DEVON

## (undated) DEVICE — STERILE POLYISOPRENE POWDER-FREE SURGICAL GLOVES: Brand: PROTEXIS

## (undated) DEVICE — SPONGE LAP 18 X 18 IN STRL RFD

## (undated) DEVICE — PREMIUM DRY TRAY LF: Brand: MEDLINE INDUSTRIES, INC.

## (undated) DEVICE — SMOKE EVACUATION TUBING WITH 8 IN INTEGRAL WAND AND SPONGE GUARD: Brand: BUFFALO FILTER

## (undated) DEVICE — CHEST/BREAST DRAPE: Brand: CONVERTORS

## (undated) DEVICE — DRESSING BIOPATCH ANTIMICROBIAL 1 IN DISC

## (undated) DEVICE — SUT ETHILON 3-0 PS-1 18 IN 1663G